# Patient Record
Sex: FEMALE | Race: WHITE | NOT HISPANIC OR LATINO | Employment: OTHER | URBAN - METROPOLITAN AREA
[De-identification: names, ages, dates, MRNs, and addresses within clinical notes are randomized per-mention and may not be internally consistent; named-entity substitution may affect disease eponyms.]

---

## 2017-07-12 ENCOUNTER — LAB REQUISITION (OUTPATIENT)
Dept: LAB | Facility: HOSPITAL | Age: 67
End: 2017-07-12
Payer: COMMERCIAL

## 2017-07-12 DIAGNOSIS — Z01.419 ENCOUNTER FOR GYNECOLOGICAL EXAMINATION WITHOUT ABNORMAL FINDING: ICD-10-CM

## 2017-07-12 PROCEDURE — G0145 SCR C/V CYTO,THINLAYER,RESCR: HCPCS | Performed by: OBSTETRICS & GYNECOLOGY

## 2017-07-20 LAB
LAB AP GYN PRIMARY INTERPRETATION: NORMAL
Lab: NORMAL

## 2017-08-04 ENCOUNTER — ALLSCRIPTS OFFICE VISIT (OUTPATIENT)
Dept: OTHER | Facility: OTHER | Age: 67
End: 2017-08-04

## 2017-09-18 ENCOUNTER — ALLSCRIPTS OFFICE VISIT (OUTPATIENT)
Dept: OTHER | Facility: OTHER | Age: 67
End: 2017-09-18

## 2018-01-10 NOTE — PROGRESS NOTES
Chief Complaint  Patient here for high dose flu shot      Active Problems    1  Acute frontal sinusitis (461 1) (J01 10)   2  Need for prophylactic vaccination and inoculation against influenza (V04 81) (Z23)   3  Need for vaccination with 13-polyvalent pneumococcal conjugate vaccine (V03 82) (Z23)    Current Meds   1  Probiotic CAPS; TAKE 1 CAPSULE BY MOUTH WITH LUNCH - GI HEALTH; Therapy: (Recorded:[id number]) to Recorded    Allergies    1   Codeine Derivatives    Plan  Need for prophylactic vaccination and inoculation against influenza    · Fluzone High-Dose 0 5 ML Intramuscular Suspension Prefilled Syringe    Signatures   Electronically signed by : Clif Castro DO; Sep 18 2017 12:05PM EST                       (Author)

## 2018-01-14 VITALS
WEIGHT: 206.25 LBS | HEIGHT: 60 IN | RESPIRATION RATE: 16 BRPM | TEMPERATURE: 101 F | BODY MASS INDEX: 40.49 KG/M2 | SYSTOLIC BLOOD PRESSURE: 124 MMHG | HEART RATE: 92 BPM | DIASTOLIC BLOOD PRESSURE: 90 MMHG

## 2018-07-11 ENCOUNTER — OFFICE VISIT (OUTPATIENT)
Dept: FAMILY MEDICINE CLINIC | Facility: CLINIC | Age: 68
End: 2018-07-11
Payer: COMMERCIAL

## 2018-07-11 VITALS
DIASTOLIC BLOOD PRESSURE: 74 MMHG | HEIGHT: 60 IN | BODY MASS INDEX: 40.37 KG/M2 | HEART RATE: 68 BPM | WEIGHT: 205.6 LBS | SYSTOLIC BLOOD PRESSURE: 110 MMHG | RESPIRATION RATE: 16 BRPM

## 2018-07-11 DIAGNOSIS — Z23 ENCOUNTER FOR IMMUNIZATION: ICD-10-CM

## 2018-07-11 DIAGNOSIS — Z12.11 COLON CANCER SCREENING: ICD-10-CM

## 2018-07-11 DIAGNOSIS — Z12.39 SCREENING FOR MALIGNANT NEOPLASM OF BREAST: ICD-10-CM

## 2018-07-11 DIAGNOSIS — Z00.00 WELLNESS EXAMINATION: Primary | ICD-10-CM

## 2018-07-11 DIAGNOSIS — Z13.1 SCREENING FOR DIABETES MELLITUS (DM): ICD-10-CM

## 2018-07-11 PROCEDURE — 90471 IMMUNIZATION ADMIN: CPT

## 2018-07-11 PROCEDURE — 90732 PPSV23 VACC 2 YRS+ SUBQ/IM: CPT

## 2018-07-11 PROCEDURE — 99397 PER PM REEVAL EST PAT 65+ YR: CPT | Performed by: FAMILY MEDICINE

## 2018-07-11 NOTE — PATIENT INSTRUCTIONS
Thank you for enrolling in [facility]  Please follow the instructions below to securely access your online medical record  Originalhart allows you to send messages to your doctor, view your test results, renew your prescriptions, schedule appointments, and more  [address] uses Single Sign on (SSO) Technology for you to log in and access our [facility]  Angel[place], including [employer]  No more remembering multiple user names and passwords! We are going to guide you through, step by step, to help you set up your 5 Minutes account which will provide access to your Originalhart account  How Do I Sign Up? 1  In your Internet browser, go to [email or web address]       2  Click on the   [employer] patient account and then click Dont have an                 Account? Create one now      3  Enter your demographic information and chose a user name (email address) and password  Think of one that is secure and easy to remember  Enter a Referral code if you have one (this is not your MyChart code ) Accept the Terms and Conditions and the Privacy Policy  4  Select your security questions that you will use to reset your password should you forget it  Click Submit  5  Enter your Roposot Activation Code exactly as it appears below  You will not need to use this code after you have completed the sign-up process  If you do not sign up before the expiration date, you must request a new code  [employer] Activation Code: [email or web address]  Expires: 7/25/2018  8:40 AM    6  Confirm your email address  An email confirmation was sent to you  Please open that email and click Confirm your Email   You should then be redirected to our 5 Minutes Single sign on page, where you will log on with the user name and password you created! Proceed to the [employer] Icon to view your personal health information          Additional Information  If you have questions, you can e-mail patient  [email or web address] or call [phone] to talk to our customer support staff  Remember, MyChart is NOT to be used for urgent needs  For medical emergencies, dial 911

## 2018-07-11 NOTE — PROGRESS NOTES
Assessment/Plan:    No problem-specific Assessment & Plan notes found for this encounter  Diagnoses and all orders for this visit:    Wellness examination  Comments:  Healthy on exam   Continue regular walking for exercise  Continue f/u with GYN  FBW ordered  Pneumovax given IM, and tolerated well  Orders:  -     Ambulatory referral to Gastroenterology; Future    Screening for diabetes mellitus (DM)  -     CBC and differential; Future  -     Comprehensive metabolic panel; Future  -     Lipid panel; Future  -     PNEUMOCOCCAL POLYSACCHARIDE VACCINE 23-VALENT =>3YO SQ IM (Pneumovax)    Screening for malignant neoplasm of breast    Encounter for immunization  -     PNEUMOCOCCAL POLYSACCHARIDE VACCINE 23-VALENT =>3YO SQ IM (Pneumovax)    Colon cancer screening  Comments:  Pt referred locally to GI  Orders:  -     Ambulatory referral to Gastroenterology; Future    Other orders  -     Probiotic Product (PROBIOTIC-10) CAPS; Take 1 capsule by mouth          Subjective:      Patient ID: Laila Gasca is a 69 y o  female  Annual Wellness exam today  Pt has GYN visits and mammogram with Dr Sol Cadet of Gynecology  Needs colon cancer screening  Walks for exercise  Immunizations reviewed; needs Pneumovax, and she will check with her insurance on coverage for Zostavax  Is seeing the Dentist         The following portions of the patient's history were reviewed and updated as appropriate: allergies, current medications, past family history, past social history, past surgical history and problem list     No past medical history on file        Current Outpatient Prescriptions:     Probiotic Product (PROBIOTIC-10) CAPS, Take 1 capsule by mouth, Disp: , Rfl:     Allergies   Allergen Reactions    Codeine Anaphylaxis     Increased Heart Rate, Palpitations     Social History   Substance Use Topics    Smoking status: Never Smoker    Smokeless tobacco: Not on file    Alcohol use No         Review of Systems Constitutional: Negative for activity change  Respiratory: Negative for shortness of breath  Cardiovascular: Negative for chest pain  Gastrointestinal: Negative for abdominal pain and blood in stool  Genitourinary: Negative for vaginal bleeding  No new breast lumps on self-examination  Psychiatric/Behavioral: Negative for dysphoric mood  The patient is not nervous/anxious  Objective:      /74 (BP Location: Left arm, Patient Position: Sitting, Cuff Size: Large)   Pulse 68   Resp 16   Ht 5' 0 08" (1 526 m)   Wt 93 3 kg (205 lb 9 6 oz)   BMI 40 05 kg/m²          Physical Exam   Constitutional: She is oriented to person, place, and time  She appears well-developed and well-nourished  No distress  HENT:   Head: Normocephalic and atraumatic  Right Ear: Hearing, tympanic membrane, external ear and ear canal normal    Left Ear: Hearing, tympanic membrane, external ear and ear canal normal    Mouth/Throat: Oropharynx is clear and moist  No oropharyngeal exudate  Neck: Normal range of motion  Neck supple  No thyromegaly present  Cardiovascular: Normal rate, regular rhythm and normal heart sounds  Exam reveals no gallop and no friction rub  No murmur heard  Pulmonary/Chest: Effort normal and breath sounds normal  No respiratory distress  She has no wheezes  She has no rales  Abdominal: Soft  Bowel sounds are normal  She exhibits no distension and no mass  There is no tenderness  There is no rebound and no guarding  Lymphadenopathy:     She has no cervical adenopathy  Neurological: She is alert and oriented to person, place, and time  Skin: She is not diaphoretic  Psychiatric: She has a normal mood and affect  Her behavior is normal  Judgment and thought content normal    Nursing note and vitals reviewed

## 2018-07-12 ENCOUNTER — TELEPHONE (OUTPATIENT)
Dept: FAMILY MEDICINE CLINIC | Facility: CLINIC | Age: 68
End: 2018-07-12

## 2018-10-03 ENCOUNTER — LAB (OUTPATIENT)
Dept: LAB | Facility: CLINIC | Age: 68
End: 2018-10-03
Payer: COMMERCIAL

## 2018-10-03 ENCOUNTER — OFFICE VISIT (OUTPATIENT)
Dept: FAMILY MEDICINE CLINIC | Facility: CLINIC | Age: 68
End: 2018-10-03
Payer: COMMERCIAL

## 2018-10-03 VITALS
BODY MASS INDEX: 40.56 KG/M2 | SYSTOLIC BLOOD PRESSURE: 120 MMHG | TEMPERATURE: 98.5 F | HEART RATE: 70 BPM | DIASTOLIC BLOOD PRESSURE: 82 MMHG | OXYGEN SATURATION: 98 % | WEIGHT: 206.6 LBS | HEIGHT: 60 IN | RESPIRATION RATE: 16 BRPM

## 2018-10-03 DIAGNOSIS — R07.89 ATYPICAL CHEST PAIN: ICD-10-CM

## 2018-10-03 DIAGNOSIS — R06.83 SNORING: Chronic | ICD-10-CM

## 2018-10-03 DIAGNOSIS — R53.83 FATIGUE, UNSPECIFIED TYPE: ICD-10-CM

## 2018-10-03 DIAGNOSIS — R53.83 FATIGUE, UNSPECIFIED TYPE: Primary | Chronic | ICD-10-CM

## 2018-10-03 LAB
ALBUMIN SERPL BCP-MCNC: 3.7 G/DL (ref 3.5–5)
ALP SERPL-CCNC: 93 U/L (ref 46–116)
ALT SERPL W P-5'-P-CCNC: 21 U/L (ref 12–78)
ANION GAP SERPL CALCULATED.3IONS-SCNC: 6 MMOL/L (ref 4–13)
AST SERPL W P-5'-P-CCNC: 23 U/L (ref 5–45)
BASOPHILS # BLD AUTO: 0.11 THOUSANDS/ΜL (ref 0–0.1)
BASOPHILS NFR BLD AUTO: 1 % (ref 0–1)
BILIRUB SERPL-MCNC: 0.45 MG/DL (ref 0.2–1)
BUN SERPL-MCNC: 17 MG/DL (ref 5–25)
CALCIUM SERPL-MCNC: 9.4 MG/DL (ref 8.3–10.1)
CHLORIDE SERPL-SCNC: 103 MMOL/L (ref 100–108)
CHOLEST SERPL-MCNC: 215 MG/DL (ref 50–200)
CO2 SERPL-SCNC: 28 MMOL/L (ref 21–32)
CREAT SERPL-MCNC: 0.95 MG/DL (ref 0.6–1.3)
EOSINOPHIL # BLD AUTO: 0.23 THOUSAND/ΜL (ref 0–0.61)
EOSINOPHIL NFR BLD AUTO: 3 % (ref 0–6)
ERYTHROCYTE [DISTWIDTH] IN BLOOD BY AUTOMATED COUNT: 13.9 % (ref 11.6–15.1)
GFR SERPL CREATININE-BSD FRML MDRD: 61 ML/MIN/1.73SQ M
GLUCOSE P FAST SERPL-MCNC: 98 MG/DL (ref 65–99)
HCT VFR BLD AUTO: 47.7 % (ref 34.8–46.1)
HDLC SERPL-MCNC: 41 MG/DL (ref 40–60)
HGB BLD-MCNC: 15.2 G/DL (ref 11.5–15.4)
IMM GRANULOCYTES # BLD AUTO: 0.02 THOUSAND/UL (ref 0–0.2)
IMM GRANULOCYTES NFR BLD AUTO: 0 % (ref 0–2)
LDLC SERPL CALC-MCNC: 143 MG/DL (ref 0–100)
LYMPHOCYTES # BLD AUTO: 1.98 THOUSANDS/ΜL (ref 0.6–4.47)
LYMPHOCYTES NFR BLD AUTO: 26 % (ref 14–44)
MCH RBC QN AUTO: 29.6 PG (ref 26.8–34.3)
MCHC RBC AUTO-ENTMCNC: 31.9 G/DL (ref 31.4–37.4)
MCV RBC AUTO: 93 FL (ref 82–98)
MONOCYTES # BLD AUTO: 0.71 THOUSAND/ΜL (ref 0.17–1.22)
MONOCYTES NFR BLD AUTO: 9 % (ref 4–12)
NEUTROPHILS # BLD AUTO: 4.55 THOUSANDS/ΜL (ref 1.85–7.62)
NEUTS SEG NFR BLD AUTO: 61 % (ref 43–75)
NRBC BLD AUTO-RTO: 0 /100 WBCS
PLATELET # BLD AUTO: 294 THOUSANDS/UL (ref 149–390)
PMV BLD AUTO: 10.4 FL (ref 8.9–12.7)
POTASSIUM SERPL-SCNC: 4.8 MMOL/L (ref 3.5–5.3)
PROT SERPL-MCNC: 8.4 G/DL (ref 6.4–8.2)
RBC # BLD AUTO: 5.14 MILLION/UL (ref 3.81–5.12)
SODIUM SERPL-SCNC: 137 MMOL/L (ref 136–145)
T4 FREE SERPL-MCNC: 0.75 NG/DL (ref 0.76–1.46)
TRIGL SERPL-MCNC: 153 MG/DL
TSH SERPL DL<=0.05 MIU/L-ACNC: 7.54 UIU/ML (ref 0.36–3.74)
WBC # BLD AUTO: 7.6 THOUSAND/UL (ref 4.31–10.16)

## 2018-10-03 PROCEDURE — 85025 COMPLETE CBC W/AUTO DIFF WBC: CPT

## 2018-10-03 PROCEDURE — 93000 ELECTROCARDIOGRAM COMPLETE: CPT | Performed by: FAMILY MEDICINE

## 2018-10-03 PROCEDURE — 80053 COMPREHEN METABOLIC PANEL: CPT

## 2018-10-03 PROCEDURE — 1160F RVW MEDS BY RX/DR IN RCRD: CPT | Performed by: FAMILY MEDICINE

## 2018-10-03 PROCEDURE — 36415 COLL VENOUS BLD VENIPUNCTURE: CPT

## 2018-10-03 PROCEDURE — 80061 LIPID PANEL: CPT

## 2018-10-03 PROCEDURE — 84443 ASSAY THYROID STIM HORMONE: CPT

## 2018-10-03 PROCEDURE — 84439 ASSAY OF FREE THYROXINE: CPT

## 2018-10-03 PROCEDURE — 99214 OFFICE O/P EST MOD 30 MIN: CPT | Performed by: FAMILY MEDICINE

## 2018-10-03 NOTE — PROGRESS NOTES
Assessment/Plan:    No problem-specific Assessment & Plan notes found for this encounter  Diagnoses and all orders for this visit:    Fatigue, unspecified type  Comments:  Unclear etiology - ?possible BERTIN? Non-fasting labs (with TSH and CBC incl) ordered, and pt referred locally to Sleep Medicine  Orders:  -     POCT ECG  -     Comprehensive metabolic panel; Future  -     CBC and differential; Future  -     Lipid Panel with Direct LDL reflex; Future  -     TSH, 3rd generation with Free T4 reflex; Future    Snoring  Comments:  As above  Orders:  -     Ambulatory referral to Sleep Medicine; Future    Atypical chest pain  Comments:  Suspect NON-cardiac in nature - ECG reassuring today  Possible GERD sx - pt has had tea in the afternoons recently; discussed  OTC Zantac PRN  Orders:  -     POCT ECG  -     Comprehensive metabolic panel; Future  -     CBC and differential; Future  -     Lipid Panel with Direct LDL reflex; Future  -     TSH, 3rd generation with Free T4 reflex; Future          Subjective:      Patient ID: Maryse Myles is a 69 y o  female  Presents with 6 months of persistent fatigue (pt has not yet done previously ordered FBW)  Has from beginning of day to the end  No CP/SOB  Had discomfort in chest laying in bed only last night - no clear heartburn  ECG done today, and reassuring, with no real changes from 05/10/2015 ECG  Fatigue   Associated symptoms include coughing and fatigue  Pertinent negatives include no chest pain or fever  The following portions of the patient's history were reviewed and updated as appropriate: allergies, current medications, past family history, past social history, past surgical history and problem list     No past medical history on file        Current Outpatient Prescriptions:     Probiotic Product (PROBIOTIC-10) CAPS, Take 1 capsule by mouth, Disp: , Rfl:     Allergies   Allergen Reactions    Codeine Anaphylaxis     Increased Heart Rate, Palpitations     No family history on file  Social History   Substance Use Topics    Smoking status: Never Smoker    Smokeless tobacco: Not on file    Alcohol use No       Review of Systems   Constitutional: Positive for fatigue  Negative for activity change and fever  HENT: Positive for postnasal drip  Had nasal congestion and pnd last week  Respiratory: Positive for cough  Negative for shortness of breath  Cardiovascular: Negative for chest pain  Gastrointestinal: Negative for blood in stool  No heartburn  Genitourinary: Negative for vaginal bleeding  Objective:      /82 (BP Location: Right arm, Patient Position: Sitting, Cuff Size: Large)   Pulse 70   Temp 98 5 °F (36 9 °C) (Tympanic)   Resp 16   Ht 5' (1 524 m)   Wt 93 7 kg (206 lb 9 6 oz)   SpO2 98%   BMI 40 35 kg/m²          Physical Exam   Constitutional: She is oriented to person, place, and time  She appears well-developed and well-nourished  No distress  HENT:   Head: Normocephalic and atraumatic  Right Ear: Hearing, tympanic membrane, external ear and ear canal normal    Left Ear: Hearing, tympanic membrane, external ear and ear canal normal    Mouth/Throat: Oropharynx is clear and moist  No oropharyngeal exudate  Eyes: Conjunctivae are normal    Neck: Normal range of motion  Neck supple  No thyromegaly present  Cardiovascular: Normal rate, regular rhythm and normal heart sounds  Exam reveals no gallop and no friction rub  No murmur heard  Pulmonary/Chest: Effort normal and breath sounds normal  No respiratory distress  She has no wheezes  She has no rales  Abdominal: Soft  Bowel sounds are normal  She exhibits no distension and no mass  There is no tenderness  There is no rebound and no guarding  Lymphadenopathy:     She has no cervical adenopathy  Neurological: She is alert and oriented to person, place, and time  Skin: She is not diaphoretic     Psychiatric: She has a normal mood and affect  Her behavior is normal  Judgment and thought content normal    Nursing note and vitals reviewed

## 2018-10-04 ENCOUNTER — TRANSCRIBE ORDERS (OUTPATIENT)
Dept: SLEEP CENTER | Facility: CLINIC | Age: 68
End: 2018-10-04

## 2018-10-04 ENCOUNTER — TELEPHONE (OUTPATIENT)
Dept: FAMILY MEDICINE CLINIC | Facility: CLINIC | Age: 68
End: 2018-10-04

## 2018-10-04 DIAGNOSIS — R53.83 FATIGUE, UNSPECIFIED TYPE: ICD-10-CM

## 2018-10-04 DIAGNOSIS — E03.8 SUBCLINICAL HYPOTHYROIDISM: Primary | ICD-10-CM

## 2018-10-05 ENCOUNTER — APPOINTMENT (OUTPATIENT)
Dept: LAB | Facility: CLINIC | Age: 68
End: 2018-10-05
Payer: COMMERCIAL

## 2018-10-05 DIAGNOSIS — E03.8 SUBCLINICAL HYPOTHYROIDISM: ICD-10-CM

## 2018-10-05 DIAGNOSIS — R53.83 FATIGUE, UNSPECIFIED TYPE: ICD-10-CM

## 2018-10-05 LAB
T4 FREE SERPL-MCNC: 0.78 NG/DL (ref 0.76–1.46)
TSH SERPL DL<=0.05 MIU/L-ACNC: 7.94 UIU/ML (ref 0.36–3.74)

## 2018-10-05 PROCEDURE — 84439 ASSAY OF FREE THYROXINE: CPT

## 2018-10-05 PROCEDURE — 86376 MICROSOMAL ANTIBODY EACH: CPT

## 2018-10-05 PROCEDURE — 86800 THYROGLOBULIN ANTIBODY: CPT

## 2018-10-05 PROCEDURE — 84443 ASSAY THYROID STIM HORMONE: CPT

## 2018-10-05 PROCEDURE — 36415 COLL VENOUS BLD VENIPUNCTURE: CPT

## 2018-10-06 ENCOUNTER — IMMUNIZATION (OUTPATIENT)
Dept: FAMILY MEDICINE CLINIC | Facility: CLINIC | Age: 68
End: 2018-10-06
Payer: COMMERCIAL

## 2018-10-06 DIAGNOSIS — E06.3 HYPOTHYROIDISM DUE TO HASHIMOTO'S THYROIDITIS: Primary | ICD-10-CM

## 2018-10-06 DIAGNOSIS — Z23 ENCOUNTER FOR IMMUNIZATION: ICD-10-CM

## 2018-10-06 DIAGNOSIS — E03.8 HYPOTHYROIDISM DUE TO HASHIMOTO'S THYROIDITIS: Primary | ICD-10-CM

## 2018-10-06 LAB
THYROGLOB AB SERPL-ACNC: <1 IU/ML (ref 0–0.9)
THYROPEROXIDASE AB SERPL-ACNC: 268 IU/ML (ref 0–34)

## 2018-10-06 PROCEDURE — 90471 IMMUNIZATION ADMIN: CPT

## 2018-10-06 PROCEDURE — 90662 IIV NO PRSV INCREASED AG IM: CPT

## 2018-10-06 RX ORDER — LEVOTHYROXINE SODIUM 0.03 MG/1
25 TABLET ORAL DAILY
Qty: 30 TABLET | Refills: 3 | Status: SHIPPED | OUTPATIENT
Start: 2018-10-06 | End: 2018-11-24 | Stop reason: DRUGHIGH

## 2018-11-06 ENCOUNTER — TELEPHONE (OUTPATIENT)
Dept: FAMILY MEDICINE CLINIC | Facility: CLINIC | Age: 68
End: 2018-11-06

## 2018-11-23 ENCOUNTER — APPOINTMENT (OUTPATIENT)
Dept: LAB | Facility: CLINIC | Age: 68
End: 2018-11-23
Payer: COMMERCIAL

## 2018-11-23 DIAGNOSIS — E03.8 HYPOTHYROIDISM DUE TO HASHIMOTO'S THYROIDITIS: ICD-10-CM

## 2018-11-23 DIAGNOSIS — E06.3 HYPOTHYROIDISM DUE TO HASHIMOTO'S THYROIDITIS: ICD-10-CM

## 2018-11-23 LAB
T4 FREE SERPL-MCNC: 0.87 NG/DL (ref 0.76–1.46)
TSH SERPL DL<=0.05 MIU/L-ACNC: 7.47 UIU/ML (ref 0.36–3.74)

## 2018-11-23 PROCEDURE — 84439 ASSAY OF FREE THYROXINE: CPT

## 2018-11-23 PROCEDURE — 84443 ASSAY THYROID STIM HORMONE: CPT

## 2018-11-23 PROCEDURE — 36415 COLL VENOUS BLD VENIPUNCTURE: CPT

## 2018-11-24 DIAGNOSIS — E06.3 HYPOTHYROIDISM DUE TO HASHIMOTO'S THYROIDITIS: Primary | Chronic | ICD-10-CM

## 2018-11-24 DIAGNOSIS — E03.8 HYPOTHYROIDISM DUE TO HASHIMOTO'S THYROIDITIS: Primary | Chronic | ICD-10-CM

## 2018-11-24 RX ORDER — LEVOTHYROXINE SODIUM 0.05 MG/1
50 TABLET ORAL DAILY
Qty: 30 TABLET | Refills: 5 | Status: SHIPPED | OUTPATIENT
Start: 2018-11-24 | End: 2019-05-23 | Stop reason: SDUPTHER

## 2018-11-26 ENCOUNTER — TELEPHONE (OUTPATIENT)
Dept: FAMILY MEDICINE CLINIC | Facility: CLINIC | Age: 68
End: 2018-11-26

## 2018-11-26 NOTE — TELEPHONE ENCOUNTER
----- Message from Andrew Herbert DO sent at 11/24/2018  1:16 PM EST -----  Please call the patient regarding her abnormal result - Her thyroid levels are slightly improved, but she does need a higher dose of Levothyroxine -> I increased to 50 mcg QAM, and sent to her pharmacy  She is to recheck non-fasting thyroid levels in 2 months, and f/u around then  Thanks  Jovanna

## 2019-02-06 ENCOUNTER — TELEPHONE (OUTPATIENT)
Dept: SLEEP CENTER | Facility: CLINIC | Age: 69
End: 2019-02-06

## 2019-02-15 ENCOUNTER — LAB (OUTPATIENT)
Dept: LAB | Facility: CLINIC | Age: 69
End: 2019-02-15
Payer: COMMERCIAL

## 2019-02-15 DIAGNOSIS — E03.8 HYPOTHYROIDISM DUE TO HASHIMOTO'S THYROIDITIS: Chronic | ICD-10-CM

## 2019-02-15 DIAGNOSIS — E06.3 HYPOTHYROIDISM DUE TO HASHIMOTO'S THYROIDITIS: Chronic | ICD-10-CM

## 2019-02-15 LAB — TSH SERPL DL<=0.05 MIU/L-ACNC: 2.7 UIU/ML (ref 0.36–3.74)

## 2019-02-15 PROCEDURE — 36415 COLL VENOUS BLD VENIPUNCTURE: CPT

## 2019-02-15 PROCEDURE — 84443 ASSAY THYROID STIM HORMONE: CPT

## 2019-02-16 NOTE — RESULT ENCOUNTER NOTE
Please let the patient know that their bloodwork was normal this time around; her thyroid levels normalized on current dose of Levothyroxine! I would not change her dose any further at this time  Thanks     Dr Kendall Suazo

## 2019-04-24 ENCOUNTER — OFFICE VISIT (OUTPATIENT)
Dept: FAMILY MEDICINE CLINIC | Facility: CLINIC | Age: 69
End: 2019-04-24
Payer: COMMERCIAL

## 2019-04-24 VITALS
OXYGEN SATURATION: 96 % | HEIGHT: 60 IN | TEMPERATURE: 98.1 F | WEIGHT: 204.4 LBS | SYSTOLIC BLOOD PRESSURE: 122 MMHG | HEART RATE: 81 BPM | RESPIRATION RATE: 18 BRPM | BODY MASS INDEX: 40.13 KG/M2 | DIASTOLIC BLOOD PRESSURE: 74 MMHG

## 2019-04-24 DIAGNOSIS — J20.9 ACUTE BRONCHITIS, UNSPECIFIED ORGANISM: Primary | ICD-10-CM

## 2019-04-24 DIAGNOSIS — Z12.11 COLON CANCER SCREENING: ICD-10-CM

## 2019-04-24 PROCEDURE — 99214 OFFICE O/P EST MOD 30 MIN: CPT | Performed by: FAMILY MEDICINE

## 2019-04-24 RX ORDER — ALBUTEROL SULFATE 90 UG/1
2 AEROSOL, METERED RESPIRATORY (INHALATION) EVERY 6 HOURS PRN
Qty: 1 INHALER | Refills: 1 | Status: SHIPPED | OUTPATIENT
Start: 2019-04-24 | End: 2019-05-24

## 2019-04-24 RX ORDER — AZITHROMYCIN 250 MG/1
TABLET, FILM COATED ORAL
Qty: 6 TABLET | Refills: 0 | Status: SHIPPED | OUTPATIENT
Start: 2019-04-24 | End: 2019-04-29

## 2019-04-24 RX ORDER — PREDNISONE 10 MG/1
TABLET ORAL
Qty: 30 TABLET | Refills: 0 | Status: SHIPPED | OUTPATIENT
Start: 2019-04-24 | End: 2019-05-06

## 2019-05-06 ENCOUNTER — OFFICE VISIT (OUTPATIENT)
Dept: FAMILY MEDICINE CLINIC | Facility: CLINIC | Age: 69
End: 2019-05-06
Payer: COMMERCIAL

## 2019-05-06 VITALS
SYSTOLIC BLOOD PRESSURE: 116 MMHG | OXYGEN SATURATION: 98 % | HEIGHT: 60 IN | RESPIRATION RATE: 16 BRPM | WEIGHT: 208.2 LBS | DIASTOLIC BLOOD PRESSURE: 72 MMHG | TEMPERATURE: 99.1 F | HEART RATE: 88 BPM | BODY MASS INDEX: 40.88 KG/M2

## 2019-05-06 DIAGNOSIS — J20.9 ACUTE BRONCHITIS, UNSPECIFIED ORGANISM: Primary | ICD-10-CM

## 2019-05-06 PROCEDURE — 99213 OFFICE O/P EST LOW 20 MIN: CPT | Performed by: FAMILY MEDICINE

## 2019-05-13 ENCOUNTER — PROCEDURE VISIT (OUTPATIENT)
Dept: FAMILY MEDICINE CLINIC | Facility: CLINIC | Age: 69
End: 2019-05-13
Payer: COMMERCIAL

## 2019-05-13 VITALS
SYSTOLIC BLOOD PRESSURE: 130 MMHG | OXYGEN SATURATION: 97 % | WEIGHT: 206.6 LBS | RESPIRATION RATE: 12 BRPM | HEART RATE: 98 BPM | DIASTOLIC BLOOD PRESSURE: 70 MMHG | BODY MASS INDEX: 39.01 KG/M2 | HEIGHT: 61 IN

## 2019-05-13 DIAGNOSIS — H61.23 BILATERAL IMPACTED CERUMEN: Primary | ICD-10-CM

## 2019-05-13 PROCEDURE — 99213 OFFICE O/P EST LOW 20 MIN: CPT | Performed by: FAMILY MEDICINE

## 2019-05-13 PROCEDURE — 69209 REMOVE IMPACTED EAR WAX UNI: CPT | Performed by: FAMILY MEDICINE

## 2019-05-23 DIAGNOSIS — E06.3 HYPOTHYROIDISM DUE TO HASHIMOTO'S THYROIDITIS: Chronic | ICD-10-CM

## 2019-05-23 DIAGNOSIS — E03.8 HYPOTHYROIDISM DUE TO HASHIMOTO'S THYROIDITIS: Chronic | ICD-10-CM

## 2019-05-23 RX ORDER — LEVOTHYROXINE SODIUM 0.05 MG/1
TABLET ORAL
Qty: 30 TABLET | Refills: 5 | Status: SHIPPED | OUTPATIENT
Start: 2019-05-23 | End: 2019-11-26 | Stop reason: SDUPTHER

## 2019-05-24 ENCOUNTER — HOSPITAL ENCOUNTER (INPATIENT)
Facility: HOSPITAL | Age: 69
LOS: 4 days | Discharge: HOME | DRG: 392 | End: 2019-05-28
Attending: EMERGENCY MEDICINE | Admitting: SURGERY
Payer: COMMERCIAL

## 2019-05-24 ENCOUNTER — APPOINTMENT (EMERGENCY)
Dept: CT IMAGING | Facility: HOSPITAL | Age: 69
DRG: 392 | End: 2019-05-24
Payer: COMMERCIAL

## 2019-05-24 DIAGNOSIS — E03.8 HYPOTHYROIDISM DUE TO HASHIMOTO'S THYROIDITIS: Chronic | ICD-10-CM

## 2019-05-24 DIAGNOSIS — E06.3 HYPOTHYROIDISM DUE TO HASHIMOTO'S THYROIDITIS: Chronic | ICD-10-CM

## 2019-05-24 DIAGNOSIS — K57.20 DIVERTICULITIS OF COLON WITH PERFORATION: Primary | ICD-10-CM

## 2019-05-24 PROBLEM — E66.9 OBESITY (BMI 35.0-39.9 WITHOUT COMORBIDITY): Status: ACTIVE | Noted: 2019-05-24

## 2019-05-24 LAB
ALBUMIN SERPL BCP-MCNC: 3.7 G/DL (ref 3.5–5)
ALP SERPL-CCNC: 94 U/L (ref 46–116)
ALT SERPL W P-5'-P-CCNC: 23 U/L (ref 12–78)
ANION GAP SERPL CALCULATED.3IONS-SCNC: 12 MMOL/L (ref 4–13)
APTT PPP: 31 SECONDS (ref 26–38)
AST SERPL W P-5'-P-CCNC: 27 U/L (ref 5–45)
BASOPHILS # BLD AUTO: 0.08 THOUSANDS/ΜL (ref 0–0.1)
BASOPHILS NFR BLD AUTO: 1 % (ref 0–1)
BILIRUB SERPL-MCNC: 0.6 MG/DL (ref 0.2–1)
BILIRUB UR QL STRIP: NEGATIVE
BUN SERPL-MCNC: 14 MG/DL (ref 5–25)
CALCIUM SERPL-MCNC: 9.2 MG/DL (ref 8.3–10.1)
CHLORIDE SERPL-SCNC: 102 MMOL/L (ref 100–108)
CLARITY UR: CLEAR
CO2 SERPL-SCNC: 25 MMOL/L (ref 21–32)
COLOR UR: YELLOW
CREAT SERPL-MCNC: 0.95 MG/DL (ref 0.6–1.3)
EOSINOPHIL # BLD AUTO: 0.13 THOUSAND/ΜL (ref 0–0.61)
EOSINOPHIL NFR BLD AUTO: 1 % (ref 0–6)
ERYTHROCYTE [DISTWIDTH] IN BLOOD BY AUTOMATED COUNT: 14 % (ref 11.6–15.1)
GFR SERPL CREATININE-BSD FRML MDRD: 61 ML/MIN/1.73SQ M
GLUCOSE SERPL-MCNC: 117 MG/DL (ref 65–140)
GLUCOSE UR STRIP-MCNC: NEGATIVE MG/DL
HCT VFR BLD AUTO: 44.1 % (ref 34.8–46.1)
HGB BLD-MCNC: 14.3 G/DL (ref 11.5–15.4)
HGB UR QL STRIP.AUTO: NEGATIVE
IMM GRANULOCYTES # BLD AUTO: 0.07 THOUSAND/UL (ref 0–0.2)
IMM GRANULOCYTES NFR BLD AUTO: 1 % (ref 0–2)
INR PPP: 1.01 (ref 0.86–1.17)
KETONES UR STRIP-MCNC: NEGATIVE MG/DL
LACTATE SERPL-SCNC: 1.2 MMOL/L (ref 0.5–2)
LACTATE SERPL-SCNC: 1.5 MMOL/L (ref 0.5–2)
LEUKOCYTE ESTERASE UR QL STRIP: NEGATIVE
LYMPHOCYTES # BLD AUTO: 0.87 THOUSANDS/ΜL (ref 0.6–4.47)
LYMPHOCYTES NFR BLD AUTO: 7 % (ref 14–44)
MCH RBC QN AUTO: 29.3 PG (ref 26.8–34.3)
MCHC RBC AUTO-ENTMCNC: 32.4 G/DL (ref 31.4–37.4)
MCV RBC AUTO: 90 FL (ref 82–98)
MONOCYTES # BLD AUTO: 0.87 THOUSAND/ΜL (ref 0.17–1.22)
MONOCYTES NFR BLD AUTO: 7 % (ref 4–12)
NEUTROPHILS # BLD AUTO: 10.81 THOUSANDS/ΜL (ref 1.85–7.62)
NEUTS SEG NFR BLD AUTO: 83 % (ref 43–75)
NITRITE UR QL STRIP: NEGATIVE
NRBC BLD AUTO-RTO: 0 /100 WBCS
PH UR STRIP.AUTO: 7.5 [PH]
PLATELET # BLD AUTO: 273 THOUSANDS/UL (ref 149–390)
PMV BLD AUTO: 10 FL (ref 8.9–12.7)
POTASSIUM SERPL-SCNC: 4 MMOL/L (ref 3.5–5.3)
PROCALCITONIN SERPL-MCNC: 0.05 NG/ML
PROT SERPL-MCNC: 7.9 G/DL (ref 6.4–8.2)
PROT UR STRIP-MCNC: NEGATIVE MG/DL
PROTHROMBIN TIME: 13 SECONDS (ref 11.8–14.2)
RBC # BLD AUTO: 4.88 MILLION/UL (ref 3.81–5.12)
SODIUM SERPL-SCNC: 139 MMOL/L (ref 136–145)
SP GR UR STRIP.AUTO: 1.02 (ref 1–1.03)
UROBILINOGEN UR QL STRIP.AUTO: 0.2 E.U./DL
WBC # BLD AUTO: 12.83 THOUSAND/UL (ref 4.31–10.16)

## 2019-05-24 PROCEDURE — 85025 COMPLETE CBC W/AUTO DIFF WBC: CPT | Performed by: EMERGENCY MEDICINE

## 2019-05-24 PROCEDURE — 87040 BLOOD CULTURE FOR BACTERIA: CPT | Performed by: EMERGENCY MEDICINE

## 2019-05-24 PROCEDURE — 85610 PROTHROMBIN TIME: CPT | Performed by: EMERGENCY MEDICINE

## 2019-05-24 PROCEDURE — 81003 URINALYSIS AUTO W/O SCOPE: CPT | Performed by: EMERGENCY MEDICINE

## 2019-05-24 PROCEDURE — 74177 CT ABD & PELVIS W/CONTRAST: CPT

## 2019-05-24 PROCEDURE — 99285 EMERGENCY DEPT VISIT HI MDM: CPT

## 2019-05-24 PROCEDURE — 84145 PROCALCITONIN (PCT): CPT | Performed by: EMERGENCY MEDICINE

## 2019-05-24 PROCEDURE — 36415 COLL VENOUS BLD VENIPUNCTURE: CPT | Performed by: EMERGENCY MEDICINE

## 2019-05-24 PROCEDURE — 83605 ASSAY OF LACTIC ACID: CPT | Performed by: EMERGENCY MEDICINE

## 2019-05-24 PROCEDURE — 85730 THROMBOPLASTIN TIME PARTIAL: CPT | Performed by: EMERGENCY MEDICINE

## 2019-05-24 PROCEDURE — 96361 HYDRATE IV INFUSION ADD-ON: CPT

## 2019-05-24 PROCEDURE — 96365 THER/PROPH/DIAG IV INF INIT: CPT

## 2019-05-24 PROCEDURE — 80053 COMPREHEN METABOLIC PANEL: CPT | Performed by: EMERGENCY MEDICINE

## 2019-05-24 PROCEDURE — 96376 TX/PRO/DX INJ SAME DRUG ADON: CPT

## 2019-05-24 PROCEDURE — 96375 TX/PRO/DX INJ NEW DRUG ADDON: CPT

## 2019-05-24 PROCEDURE — 99284 EMERGENCY DEPT VISIT MOD MDM: CPT | Performed by: EMERGENCY MEDICINE

## 2019-05-24 RX ORDER — SODIUM CHLORIDE 9 MG/ML
125 INJECTION, SOLUTION INTRAVENOUS CONTINUOUS
Status: DISCONTINUED | OUTPATIENT
Start: 2019-05-24 | End: 2019-05-24 | Stop reason: ALTCHOICE

## 2019-05-24 RX ORDER — LEVOTHYROXINE SODIUM 0.05 MG/1
50 TABLET ORAL
Status: DISCONTINUED | OUTPATIENT
Start: 2019-05-25 | End: 2019-05-28 | Stop reason: HOSPADM

## 2019-05-24 RX ORDER — HYDROMORPHONE HCL/PF 1 MG/ML
0.5 SYRINGE (ML) INJECTION ONCE
Status: COMPLETED | OUTPATIENT
Start: 2019-05-24 | End: 2019-05-24

## 2019-05-24 RX ORDER — 0.9 % SODIUM CHLORIDE 0.9 %
3 VIAL (ML) INJECTION AS NEEDED
Status: DISCONTINUED | OUTPATIENT
Start: 2019-05-24 | End: 2019-05-28 | Stop reason: HOSPADM

## 2019-05-24 RX ORDER — ONDANSETRON 2 MG/ML
4 INJECTION INTRAMUSCULAR; INTRAVENOUS EVERY 6 HOURS PRN
Status: DISCONTINUED | OUTPATIENT
Start: 2019-05-24 | End: 2019-05-28 | Stop reason: HOSPADM

## 2019-05-24 RX ORDER — ONDANSETRON 2 MG/ML
4 INJECTION INTRAMUSCULAR; INTRAVENOUS ONCE
Status: COMPLETED | OUTPATIENT
Start: 2019-05-24 | End: 2019-05-24

## 2019-05-24 RX ORDER — HYDROMORPHONE HCL/PF 1 MG/ML
0.5 SYRINGE (ML) INJECTION
Status: DISCONTINUED | OUTPATIENT
Start: 2019-05-24 | End: 2019-05-27

## 2019-05-24 RX ORDER — HYDROMORPHONE HCL/PF 1 MG/ML
1 SYRINGE (ML) INJECTION
Status: DISCONTINUED | OUTPATIENT
Start: 2019-05-24 | End: 2019-05-27

## 2019-05-24 RX ORDER — SODIUM CHLORIDE, SODIUM LACTATE, POTASSIUM CHLORIDE, CALCIUM CHLORIDE 600; 310; 30; 20 MG/100ML; MG/100ML; MG/100ML; MG/100ML
125 INJECTION, SOLUTION INTRAVENOUS CONTINUOUS
Status: DISCONTINUED | OUTPATIENT
Start: 2019-05-24 | End: 2019-05-26

## 2019-05-24 RX ADMIN — ONDANSETRON 4 MG: 2 INJECTION INTRAMUSCULAR; INTRAVENOUS at 16:37

## 2019-05-24 RX ADMIN — HYDROMORPHONE HYDROCHLORIDE 0.5 MG: 1 INJECTION, SOLUTION INTRAMUSCULAR; INTRAVENOUS; SUBCUTANEOUS at 19:29

## 2019-05-24 RX ADMIN — ONDANSETRON 4 MG: 2 INJECTION INTRAMUSCULAR; INTRAVENOUS at 22:41

## 2019-05-24 RX ADMIN — SODIUM CHLORIDE 1000 ML: 0.9 INJECTION, SOLUTION INTRAVENOUS at 18:07

## 2019-05-24 RX ADMIN — HYDROMORPHONE HYDROCHLORIDE 0.5 MG: 1 INJECTION, SOLUTION INTRAMUSCULAR; INTRAVENOUS; SUBCUTANEOUS at 16:34

## 2019-05-24 RX ADMIN — IOHEXOL 100 ML: 350 INJECTION, SOLUTION INTRAVENOUS at 17:28

## 2019-05-24 RX ADMIN — PIPERACILLIN SODIUM,TAZOBACTAM SODIUM 3.38 G: 3; .375 INJECTION, POWDER, FOR SOLUTION INTRAVENOUS at 19:30

## 2019-05-24 RX ADMIN — SODIUM CHLORIDE 3 G: 9 INJECTION, SOLUTION INTRAVENOUS at 22:40

## 2019-05-24 RX ADMIN — SODIUM CHLORIDE 1000 ML: 0.9 INJECTION, SOLUTION INTRAVENOUS at 16:34

## 2019-05-24 RX ADMIN — SODIUM CHLORIDE, SODIUM LACTATE, POTASSIUM CHLORIDE, AND CALCIUM CHLORIDE 125 ML/HR: .6; .31; .03; .02 INJECTION, SOLUTION INTRAVENOUS at 22:40

## 2019-05-25 LAB
ANION GAP SERPL CALCULATED.3IONS-SCNC: 9 MMOL/L (ref 4–13)
BUN SERPL-MCNC: 9 MG/DL (ref 5–25)
CALCIUM SERPL-MCNC: 8.4 MG/DL (ref 8.3–10.1)
CHLORIDE SERPL-SCNC: 105 MMOL/L (ref 100–108)
CO2 SERPL-SCNC: 21 MMOL/L (ref 21–32)
CREAT SERPL-MCNC: 0.71 MG/DL (ref 0.6–1.3)
ERYTHROCYTE [DISTWIDTH] IN BLOOD BY AUTOMATED COUNT: 14.6 % (ref 11.6–15.1)
GFR SERPL CREATININE-BSD FRML MDRD: 87 ML/MIN/1.73SQ M
GLUCOSE SERPL-MCNC: 130 MG/DL (ref 65–140)
HCT VFR BLD AUTO: 37.7 % (ref 34.8–46.1)
HGB BLD-MCNC: 12.1 G/DL (ref 11.5–15.4)
MCH RBC QN AUTO: 30 PG (ref 26.8–34.3)
MCHC RBC AUTO-ENTMCNC: 32.1 G/DL (ref 31.4–37.4)
MCV RBC AUTO: 94 FL (ref 82–98)
PLATELET # BLD AUTO: 203 THOUSANDS/UL (ref 149–390)
PMV BLD AUTO: 10.2 FL (ref 8.9–12.7)
POTASSIUM SERPL-SCNC: 3.8 MMOL/L (ref 3.5–5.3)
RBC # BLD AUTO: 4.03 MILLION/UL (ref 3.81–5.12)
SODIUM SERPL-SCNC: 135 MMOL/L (ref 136–145)
WBC # BLD AUTO: 12.28 THOUSAND/UL (ref 4.31–10.16)

## 2019-05-25 PROCEDURE — 85027 COMPLETE CBC AUTOMATED: CPT | Performed by: PHYSICIAN ASSISTANT

## 2019-05-25 PROCEDURE — 80048 BASIC METABOLIC PNL TOTAL CA: CPT | Performed by: PHYSICIAN ASSISTANT

## 2019-05-25 RX ADMIN — ONDANSETRON 4 MG: 2 INJECTION INTRAMUSCULAR; INTRAVENOUS at 16:45

## 2019-05-25 RX ADMIN — ONDANSETRON 4 MG: 2 INJECTION INTRAMUSCULAR; INTRAVENOUS at 23:34

## 2019-05-25 RX ADMIN — SODIUM CHLORIDE 3 G: 9 INJECTION, SOLUTION INTRAVENOUS at 05:30

## 2019-05-25 RX ADMIN — HYDROMORPHONE HYDROCHLORIDE 1 MG: 1 INJECTION, SOLUTION INTRAMUSCULAR; INTRAVENOUS; SUBCUTANEOUS at 09:12

## 2019-05-25 RX ADMIN — HYDROMORPHONE HYDROCHLORIDE 1 MG: 1 INJECTION, SOLUTION INTRAMUSCULAR; INTRAVENOUS; SUBCUTANEOUS at 23:19

## 2019-05-25 RX ADMIN — HYDROMORPHONE HYDROCHLORIDE 0.5 MG: 1 INJECTION, SOLUTION INTRAMUSCULAR; INTRAVENOUS; SUBCUTANEOUS at 10:47

## 2019-05-25 RX ADMIN — SODIUM CHLORIDE 3 G: 9 INJECTION, SOLUTION INTRAVENOUS at 16:45

## 2019-05-25 RX ADMIN — SODIUM CHLORIDE 3 G: 9 INJECTION, SOLUTION INTRAVENOUS at 23:19

## 2019-05-25 RX ADMIN — ENOXAPARIN SODIUM 40 MG: 40 INJECTION SUBCUTANEOUS at 09:11

## 2019-05-25 RX ADMIN — ONDANSETRON 4 MG: 2 INJECTION INTRAMUSCULAR; INTRAVENOUS at 09:12

## 2019-05-25 RX ADMIN — SODIUM CHLORIDE 3 G: 9 INJECTION, SOLUTION INTRAVENOUS at 09:45

## 2019-05-25 RX ADMIN — LEVOTHYROXINE SODIUM 50 MCG: 50 TABLET ORAL at 05:36

## 2019-05-25 RX ADMIN — HYDROMORPHONE HYDROCHLORIDE 1 MG: 1 INJECTION, SOLUTION INTRAMUSCULAR; INTRAVENOUS; SUBCUTANEOUS at 16:45

## 2019-05-25 RX ADMIN — HYDROMORPHONE HYDROCHLORIDE 1 MG: 1 INJECTION, SOLUTION INTRAMUSCULAR; INTRAVENOUS; SUBCUTANEOUS at 00:31

## 2019-05-26 ENCOUNTER — APPOINTMENT (INPATIENT)
Dept: RADIOLOGY | Facility: HOSPITAL | Age: 69
DRG: 392 | End: 2019-05-26
Payer: COMMERCIAL

## 2019-05-26 LAB
ALBUMIN SERPL BCP-MCNC: 2.8 G/DL (ref 3.5–5)
ALP SERPL-CCNC: 90 U/L (ref 46–116)
ALT SERPL W P-5'-P-CCNC: 21 U/L (ref 12–78)
ANION GAP SERPL CALCULATED.3IONS-SCNC: 7 MMOL/L (ref 4–13)
ANION GAP SERPL CALCULATED.3IONS-SCNC: 9 MMOL/L (ref 4–13)
AST SERPL W P-5'-P-CCNC: 28 U/L (ref 5–45)
BASOPHILS # BLD AUTO: 0.05 THOUSANDS/ΜL (ref 0–0.1)
BASOPHILS NFR BLD AUTO: 0 % (ref 0–1)
BILIRUB SERPL-MCNC: 0.8 MG/DL (ref 0.2–1)
BUN SERPL-MCNC: 5 MG/DL (ref 5–25)
BUN SERPL-MCNC: 6 MG/DL (ref 5–25)
CALCIUM SERPL-MCNC: 8.7 MG/DL (ref 8.3–10.1)
CALCIUM SERPL-MCNC: 8.9 MG/DL (ref 8.3–10.1)
CHLORIDE SERPL-SCNC: 100 MMOL/L (ref 100–108)
CHLORIDE SERPL-SCNC: 103 MMOL/L (ref 100–108)
CO2 SERPL-SCNC: 26 MMOL/L (ref 21–32)
CO2 SERPL-SCNC: 27 MMOL/L (ref 21–32)
CREAT SERPL-MCNC: 0.74 MG/DL (ref 0.6–1.3)
CREAT SERPL-MCNC: 0.79 MG/DL (ref 0.6–1.3)
EOSINOPHIL # BLD AUTO: 0.14 THOUSAND/ΜL (ref 0–0.61)
EOSINOPHIL NFR BLD AUTO: 1 % (ref 0–6)
ERYTHROCYTE [DISTWIDTH] IN BLOOD BY AUTOMATED COUNT: 13.7 % (ref 11.6–15.1)
ERYTHROCYTE [DISTWIDTH] IN BLOOD BY AUTOMATED COUNT: 14.1 % (ref 11.6–15.1)
GFR SERPL CREATININE-BSD FRML MDRD: 76 ML/MIN/1.73SQ M
GFR SERPL CREATININE-BSD FRML MDRD: 82 ML/MIN/1.73SQ M
GLUCOSE SERPL-MCNC: 115 MG/DL (ref 65–140)
GLUCOSE SERPL-MCNC: 138 MG/DL (ref 65–140)
GLUCOSE SERPL-MCNC: 139 MG/DL (ref 65–140)
HCT VFR BLD AUTO: 35.7 % (ref 34.8–46.1)
HCT VFR BLD AUTO: 37.5 % (ref 34.8–46.1)
HGB BLD-MCNC: 11.6 G/DL (ref 11.5–15.4)
HGB BLD-MCNC: 12.4 G/DL (ref 11.5–15.4)
IMM GRANULOCYTES # BLD AUTO: 0.08 THOUSAND/UL (ref 0–0.2)
IMM GRANULOCYTES NFR BLD AUTO: 1 % (ref 0–2)
LYMPHOCYTES # BLD AUTO: 1.15 THOUSANDS/ΜL (ref 0.6–4.47)
LYMPHOCYTES NFR BLD AUTO: 10 % (ref 14–44)
MCH RBC QN AUTO: 29.5 PG (ref 26.8–34.3)
MCH RBC QN AUTO: 29.9 PG (ref 26.8–34.3)
MCHC RBC AUTO-ENTMCNC: 32.5 G/DL (ref 31.4–37.4)
MCHC RBC AUTO-ENTMCNC: 33.1 G/DL (ref 31.4–37.4)
MCV RBC AUTO: 90 FL (ref 82–98)
MCV RBC AUTO: 91 FL (ref 82–98)
MONOCYTES # BLD AUTO: 0.94 THOUSAND/ΜL (ref 0.17–1.22)
MONOCYTES NFR BLD AUTO: 8 % (ref 4–12)
NEUTROPHILS # BLD AUTO: 9.08 THOUSANDS/ΜL (ref 1.85–7.62)
NEUTS SEG NFR BLD AUTO: 80 % (ref 43–75)
NRBC BLD AUTO-RTO: 0 /100 WBCS
PLATELET # BLD AUTO: 206 THOUSANDS/UL (ref 149–390)
PLATELET # BLD AUTO: 237 THOUSANDS/UL (ref 149–390)
PMV BLD AUTO: 10.3 FL (ref 8.9–12.7)
PMV BLD AUTO: 9.9 FL (ref 8.9–12.7)
POTASSIUM SERPL-SCNC: 3.6 MMOL/L (ref 3.5–5.3)
POTASSIUM SERPL-SCNC: 3.8 MMOL/L (ref 3.5–5.3)
PROT SERPL-MCNC: 7.1 G/DL (ref 6.4–8.2)
RBC # BLD AUTO: 3.93 MILLION/UL (ref 3.81–5.12)
RBC # BLD AUTO: 4.15 MILLION/UL (ref 3.81–5.12)
SODIUM SERPL-SCNC: 136 MMOL/L (ref 136–145)
SODIUM SERPL-SCNC: 136 MMOL/L (ref 136–145)
TROPONIN I SERPL-MCNC: <0.02 NG/ML
WBC # BLD AUTO: 11.44 THOUSAND/UL (ref 4.31–10.16)
WBC # BLD AUTO: 11.8 THOUSAND/UL (ref 4.31–10.16)

## 2019-05-26 PROCEDURE — 93005 ELECTROCARDIOGRAM TRACING: CPT

## 2019-05-26 PROCEDURE — 99291 CRITICAL CARE FIRST HOUR: CPT | Performed by: NURSE PRACTITIONER

## 2019-05-26 PROCEDURE — 85025 COMPLETE CBC W/AUTO DIFF WBC: CPT | Performed by: SURGERY

## 2019-05-26 PROCEDURE — 80048 BASIC METABOLIC PNL TOTAL CA: CPT | Performed by: SURGERY

## 2019-05-26 PROCEDURE — 80053 COMPREHEN METABOLIC PANEL: CPT | Performed by: NURSE PRACTITIONER

## 2019-05-26 PROCEDURE — 85027 COMPLETE CBC AUTOMATED: CPT | Performed by: NURSE PRACTITIONER

## 2019-05-26 PROCEDURE — 84484 ASSAY OF TROPONIN QUANT: CPT | Performed by: NURSE PRACTITIONER

## 2019-05-26 PROCEDURE — 71045 X-RAY EXAM CHEST 1 VIEW: CPT

## 2019-05-26 PROCEDURE — 82948 REAGENT STRIP/BLOOD GLUCOSE: CPT

## 2019-05-26 RX ORDER — DEXTROSE, SODIUM CHLORIDE, AND POTASSIUM CHLORIDE 5; .45; .22 G/100ML; G/100ML; G/100ML
50 INJECTION INTRAVENOUS CONTINUOUS
Status: DISCONTINUED | OUTPATIENT
Start: 2019-05-26 | End: 2019-05-28 | Stop reason: HOSPADM

## 2019-05-26 RX ADMIN — HYDROMORPHONE HYDROCHLORIDE 1 MG: 1 INJECTION, SOLUTION INTRAMUSCULAR; INTRAVENOUS; SUBCUTANEOUS at 02:55

## 2019-05-26 RX ADMIN — SODIUM CHLORIDE, SODIUM LACTATE, POTASSIUM CHLORIDE, AND CALCIUM CHLORIDE 125 ML/HR: .6; .31; .03; .02 INJECTION, SOLUTION INTRAVENOUS at 02:58

## 2019-05-26 RX ADMIN — SODIUM CHLORIDE 3 G: 9 INJECTION, SOLUTION INTRAVENOUS at 16:27

## 2019-05-26 RX ADMIN — SODIUM CHLORIDE 3 G: 9 INJECTION, SOLUTION INTRAVENOUS at 10:29

## 2019-05-26 RX ADMIN — DEXTROSE, SODIUM CHLORIDE, AND POTASSIUM CHLORIDE 100 ML/HR: 5; .45; .22 INJECTION INTRAVENOUS at 23:00

## 2019-05-26 RX ADMIN — ONDANSETRON 4 MG: 2 INJECTION INTRAMUSCULAR; INTRAVENOUS at 18:17

## 2019-05-26 RX ADMIN — SODIUM CHLORIDE 3 G: 9 INJECTION, SOLUTION INTRAVENOUS at 22:23

## 2019-05-26 RX ADMIN — SODIUM CHLORIDE 3 G: 9 INJECTION, SOLUTION INTRAVENOUS at 04:33

## 2019-05-26 RX ADMIN — DEXTROSE, SODIUM CHLORIDE, AND POTASSIUM CHLORIDE 100 ML/HR: 5; .45; .22 INJECTION INTRAVENOUS at 11:49

## 2019-05-26 RX ADMIN — ENOXAPARIN SODIUM 40 MG: 40 INJECTION SUBCUTANEOUS at 10:00

## 2019-05-26 RX ADMIN — HYDROMORPHONE HYDROCHLORIDE 0.5 MG: 1 INJECTION, SOLUTION INTRAMUSCULAR; INTRAVENOUS; SUBCUTANEOUS at 18:17

## 2019-05-26 RX ADMIN — LEVOTHYROXINE SODIUM 50 MCG: 50 TABLET ORAL at 05:57

## 2019-05-26 RX ADMIN — HYDROMORPHONE HYDROCHLORIDE 0.5 MG: 1 INJECTION, SOLUTION INTRAMUSCULAR; INTRAVENOUS; SUBCUTANEOUS at 10:34

## 2019-05-26 RX ADMIN — ONDANSETRON 4 MG: 2 INJECTION INTRAMUSCULAR; INTRAVENOUS at 10:34

## 2019-05-27 LAB
ANION GAP SERPL CALCULATED.3IONS-SCNC: 9 MMOL/L (ref 4–13)
ATRIAL RATE: 94 BPM
ATRIAL RATE: 97 BPM
BASOPHILS # BLD AUTO: 0.09 THOUSANDS/ΜL (ref 0–0.1)
BASOPHILS NFR BLD AUTO: 1 % (ref 0–1)
BUN SERPL-MCNC: 5 MG/DL (ref 5–25)
CALCIUM SERPL-MCNC: 9.3 MG/DL (ref 8.3–10.1)
CHLORIDE SERPL-SCNC: 104 MMOL/L (ref 100–108)
CO2 SERPL-SCNC: 25 MMOL/L (ref 21–32)
CREAT SERPL-MCNC: 0.77 MG/DL (ref 0.6–1.3)
EOSINOPHIL # BLD AUTO: 0.29 THOUSAND/ΜL (ref 0–0.61)
EOSINOPHIL NFR BLD AUTO: 3 % (ref 0–6)
ERYTHROCYTE [DISTWIDTH] IN BLOOD BY AUTOMATED COUNT: 13.8 % (ref 11.6–15.1)
GFR SERPL CREATININE-BSD FRML MDRD: 78 ML/MIN/1.73SQ M
GLUCOSE SERPL-MCNC: 122 MG/DL (ref 65–140)
HCT VFR BLD AUTO: 37.1 % (ref 34.8–46.1)
HGB BLD-MCNC: 12.3 G/DL (ref 11.5–15.4)
IMM GRANULOCYTES # BLD AUTO: 0.08 THOUSAND/UL (ref 0–0.2)
IMM GRANULOCYTES NFR BLD AUTO: 1 % (ref 0–2)
LYMPHOCYTES # BLD AUTO: 1.54 THOUSANDS/ΜL (ref 0.6–4.47)
LYMPHOCYTES NFR BLD AUTO: 14 % (ref 14–44)
MCH RBC QN AUTO: 29.5 PG (ref 26.8–34.3)
MCHC RBC AUTO-ENTMCNC: 33.2 G/DL (ref 31.4–37.4)
MCV RBC AUTO: 89 FL (ref 82–98)
MONOCYTES # BLD AUTO: 1 THOUSAND/ΜL (ref 0.17–1.22)
MONOCYTES NFR BLD AUTO: 9 % (ref 4–12)
NEUTROPHILS # BLD AUTO: 7.81 THOUSANDS/ΜL (ref 1.85–7.62)
NEUTS SEG NFR BLD AUTO: 72 % (ref 43–75)
NRBC BLD AUTO-RTO: 0 /100 WBCS
P AXIS: 71 DEGREES
P AXIS: 72 DEGREES
PLATELET # BLD AUTO: 240 THOUSANDS/UL (ref 149–390)
PMV BLD AUTO: 9.7 FL (ref 8.9–12.7)
POTASSIUM SERPL-SCNC: 3.8 MMOL/L (ref 3.5–5.3)
PR INTERVAL: 124 MS
PR INTERVAL: 124 MS
QRS AXIS: 22 DEGREES
QRS AXIS: 24 DEGREES
QRSD INTERVAL: 78 MS
QRSD INTERVAL: 80 MS
QT INTERVAL: 342 MS
QT INTERVAL: 344 MS
QTC INTERVAL: 427 MS
QTC INTERVAL: 436 MS
RBC # BLD AUTO: 4.17 MILLION/UL (ref 3.81–5.12)
SODIUM SERPL-SCNC: 138 MMOL/L (ref 136–145)
T WAVE AXIS: 16 DEGREES
T WAVE AXIS: 18 DEGREES
VENTRICULAR RATE: 94 BPM
VENTRICULAR RATE: 97 BPM
WBC # BLD AUTO: 10.81 THOUSAND/UL (ref 4.31–10.16)

## 2019-05-27 PROCEDURE — 80048 BASIC METABOLIC PNL TOTAL CA: CPT | Performed by: SURGERY

## 2019-05-27 PROCEDURE — 93010 ELECTROCARDIOGRAM REPORT: CPT | Performed by: INTERNAL MEDICINE

## 2019-05-27 PROCEDURE — 85025 COMPLETE CBC W/AUTO DIFF WBC: CPT | Performed by: SURGERY

## 2019-05-27 RX ORDER — ACETAMINOPHEN 325 MG/1
650 TABLET ORAL EVERY 6 HOURS PRN
Status: DISCONTINUED | OUTPATIENT
Start: 2019-05-27 | End: 2019-05-28 | Stop reason: HOSPADM

## 2019-05-27 RX ADMIN — ACETAMINOPHEN 650 MG: 325 TABLET, FILM COATED ORAL at 18:09

## 2019-05-27 RX ADMIN — SODIUM CHLORIDE 3 G: 9 INJECTION, SOLUTION INTRAVENOUS at 18:10

## 2019-05-27 RX ADMIN — SODIUM CHLORIDE 3 G: 9 INJECTION, SOLUTION INTRAVENOUS at 12:01

## 2019-05-27 RX ADMIN — DEXTROSE, SODIUM CHLORIDE, AND POTASSIUM CHLORIDE 50 ML/HR: 5; .45; .22 INJECTION INTRAVENOUS at 12:01

## 2019-05-27 RX ADMIN — LEVOTHYROXINE SODIUM 50 MCG: 50 TABLET ORAL at 06:12

## 2019-05-27 RX ADMIN — ENOXAPARIN SODIUM 40 MG: 40 INJECTION SUBCUTANEOUS at 08:00

## 2019-05-27 RX ADMIN — SODIUM CHLORIDE 3 G: 9 INJECTION, SOLUTION INTRAVENOUS at 06:10

## 2019-05-27 RX ADMIN — SODIUM CHLORIDE 3 G: 9 INJECTION, SOLUTION INTRAVENOUS at 23:46

## 2019-05-28 VITALS
DIASTOLIC BLOOD PRESSURE: 79 MMHG | BODY MASS INDEX: 39.16 KG/M2 | TEMPERATURE: 98.2 F | WEIGHT: 207.23 LBS | HEART RATE: 80 BPM | OXYGEN SATURATION: 95 % | RESPIRATION RATE: 18 BRPM | SYSTOLIC BLOOD PRESSURE: 146 MMHG

## 2019-05-28 LAB
BASOPHILS # BLD AUTO: 0.09 THOUSANDS/ΜL (ref 0–0.1)
BASOPHILS NFR BLD AUTO: 1 % (ref 0–1)
EOSINOPHIL # BLD AUTO: 0.37 THOUSAND/ΜL (ref 0–0.61)
EOSINOPHIL NFR BLD AUTO: 4 % (ref 0–6)
ERYTHROCYTE [DISTWIDTH] IN BLOOD BY AUTOMATED COUNT: 14 % (ref 11.6–15.1)
HCT VFR BLD AUTO: 37.1 % (ref 34.8–46.1)
HGB BLD-MCNC: 12.3 G/DL (ref 11.5–15.4)
IMM GRANULOCYTES # BLD AUTO: 0.09 THOUSAND/UL (ref 0–0.2)
IMM GRANULOCYTES NFR BLD AUTO: 1 % (ref 0–2)
LYMPHOCYTES # BLD AUTO: 1.5 THOUSANDS/ΜL (ref 0.6–4.47)
LYMPHOCYTES NFR BLD AUTO: 17 % (ref 14–44)
MCH RBC QN AUTO: 29.7 PG (ref 26.8–34.3)
MCHC RBC AUTO-ENTMCNC: 33.2 G/DL (ref 31.4–37.4)
MCV RBC AUTO: 90 FL (ref 82–98)
MONOCYTES # BLD AUTO: 1.05 THOUSAND/ΜL (ref 0.17–1.22)
MONOCYTES NFR BLD AUTO: 12 % (ref 4–12)
NEUTROPHILS # BLD AUTO: 5.72 THOUSANDS/ΜL (ref 1.85–7.62)
NEUTS SEG NFR BLD AUTO: 65 % (ref 43–75)
NRBC BLD AUTO-RTO: 0 /100 WBCS
PLATELET # BLD AUTO: 276 THOUSANDS/UL (ref 149–390)
PMV BLD AUTO: 10 FL (ref 8.9–12.7)
RBC # BLD AUTO: 4.14 MILLION/UL (ref 3.81–5.12)
WBC # BLD AUTO: 8.82 THOUSAND/UL (ref 4.31–10.16)

## 2019-05-28 PROCEDURE — 85025 COMPLETE CBC W/AUTO DIFF WBC: CPT | Performed by: SURGERY

## 2019-05-28 RX ORDER — AMOXICILLIN AND CLAVULANATE POTASSIUM 875; 125 MG/1; MG/1
1 TABLET, FILM COATED ORAL EVERY 12 HOURS SCHEDULED
Qty: 14 TABLET | Refills: 0 | Status: SHIPPED | OUTPATIENT
Start: 2019-05-28 | End: 2019-06-04

## 2019-05-28 RX ORDER — METRONIDAZOLE 250 MG/1
250 TABLET ORAL EVERY 8 HOURS SCHEDULED
Qty: 21 TABLET | Refills: 0 | Status: SHIPPED | OUTPATIENT
Start: 2019-05-28 | End: 2019-06-04

## 2019-05-28 RX ORDER — ACETAMINOPHEN 325 MG/1
650 TABLET ORAL EVERY 6 HOURS PRN
Qty: 30 TABLET | Refills: 0 | Status: SHIPPED | OUTPATIENT
Start: 2019-05-28 | End: 2022-02-13 | Stop reason: CLARIF

## 2019-05-28 RX ADMIN — SODIUM CHLORIDE 3 G: 9 INJECTION, SOLUTION INTRAVENOUS at 05:17

## 2019-05-28 RX ADMIN — LEVOTHYROXINE SODIUM 50 MCG: 50 TABLET ORAL at 05:17

## 2019-05-28 RX ADMIN — ENOXAPARIN SODIUM 40 MG: 40 INJECTION SUBCUTANEOUS at 07:51

## 2019-05-29 ENCOUNTER — TRANSITIONAL CARE MANAGEMENT (OUTPATIENT)
Dept: FAMILY MEDICINE CLINIC | Facility: CLINIC | Age: 69
End: 2019-05-29

## 2019-05-30 ENCOUNTER — TELEPHONE (OUTPATIENT)
Dept: FAMILY MEDICINE CLINIC | Facility: CLINIC | Age: 69
End: 2019-05-30

## 2019-05-30 LAB
BACTERIA BLD CULT: NORMAL
BACTERIA BLD CULT: NORMAL

## 2019-05-31 ENCOUNTER — OFFICE VISIT (OUTPATIENT)
Dept: FAMILY MEDICINE CLINIC | Facility: CLINIC | Age: 69
End: 2019-05-31
Payer: COMMERCIAL

## 2019-05-31 VITALS
WEIGHT: 203 LBS | TEMPERATURE: 98.3 F | RESPIRATION RATE: 18 BRPM | HEIGHT: 60 IN | HEART RATE: 80 BPM | BODY MASS INDEX: 39.85 KG/M2 | OXYGEN SATURATION: 97 % | SYSTOLIC BLOOD PRESSURE: 120 MMHG | DIASTOLIC BLOOD PRESSURE: 60 MMHG

## 2019-05-31 DIAGNOSIS — K57.20 DIVERTICULITIS OF LARGE INTESTINE WITH PERFORATION WITHOUT BLEEDING: Primary | ICD-10-CM

## 2019-05-31 PROCEDURE — 1160F RVW MEDS BY RX/DR IN RCRD: CPT | Performed by: FAMILY MEDICINE

## 2019-05-31 PROCEDURE — 1111F DSCHRG MED/CURRENT MED MERGE: CPT | Performed by: FAMILY MEDICINE

## 2019-05-31 PROCEDURE — 99496 TRANSJ CARE MGMT HIGH F2F 7D: CPT | Performed by: FAMILY MEDICINE

## 2019-07-29 DIAGNOSIS — Z12.39 ENCOUNTER FOR SCREENING FOR MALIGNANT NEOPLASM OF BREAST: Primary | ICD-10-CM

## 2019-08-04 ENCOUNTER — ANESTHESIA EVENT (OUTPATIENT)
Dept: GASTROENTEROLOGY | Facility: HOSPITAL | Age: 69
End: 2019-08-04

## 2019-08-04 NOTE — ANESTHESIA PREPROCEDURE EVALUATION
Review of Systems/Medical History  Patient summary reviewed  Chart reviewed      Cardiovascular  Negative cardio ROS    Pulmonary  Pneumonia,        GI/Hepatic  Negative GI/hepatic ROS          Negative  ROS        Endo/Other  History of thyroid disease , hypothyroidism,      GYN  Negative gynecology ROS          Hematology  Negative hematology ROS      Musculoskeletal    Arthritis     Neurology  Negative neurology ROS      Psychology   Negative psychology ROS              Physical Exam    Airway    Mallampati score: II  TM Distance: >3 FB  Neck ROM: full     Dental   No notable dental hx     Cardiovascular  Comment: Negative ROS, Rhythm: regular, Rate: normal, Cardiovascular exam normal    Pulmonary  Pulmonary exam normal Breath sounds clear to auscultation,     Other Findings        Anesthesia Plan  ASA Score- 2     Anesthesia Type- IV sedation with anesthesia with ASA Monitors  Additional Monitors:   Airway Plan:         Plan Factors-    Induction- intravenous  Postoperative Plan- Plan for postoperative opioid use  Informed Consent- Anesthetic plan and risks discussed with patient and spouse  I personally reviewed this patient with the CRNA  Discussed and agreed on the Anesthesia Plan with the CRNA  Bay Alva

## 2019-08-05 ENCOUNTER — ANESTHESIA (OUTPATIENT)
Dept: GASTROENTEROLOGY | Facility: HOSPITAL | Age: 69
End: 2019-08-05

## 2019-08-05 ENCOUNTER — HOSPITAL ENCOUNTER (OUTPATIENT)
Dept: GASTROENTEROLOGY | Facility: HOSPITAL | Age: 69
Setting detail: OUTPATIENT SURGERY
Discharge: HOME | End: 2019-08-05
Attending: SURGERY | Admitting: SURGERY
Payer: COMMERCIAL

## 2019-08-05 VITALS
SYSTOLIC BLOOD PRESSURE: 119 MMHG | HEART RATE: 68 BPM | OXYGEN SATURATION: 99 % | RESPIRATION RATE: 16 BRPM | DIASTOLIC BLOOD PRESSURE: 62 MMHG

## 2019-08-05 DIAGNOSIS — K57.20 DIVERTICULITIS OF LARGE INTESTINE WITH PERFORATION WITHOUT BLEEDING: ICD-10-CM

## 2019-08-05 PROCEDURE — 88305 TISSUE EXAM BY PATHOLOGIST: CPT | Performed by: PATHOLOGY

## 2019-08-05 PROCEDURE — 45384 COLONOSCOPY W/LESION REMOVAL: CPT | Performed by: SURGERY

## 2019-08-05 RX ORDER — PROPOFOL 10 MG/ML
INJECTION, EMULSION INTRAVENOUS AS NEEDED
Status: DISCONTINUED | OUTPATIENT
Start: 2019-08-05 | End: 2019-08-05 | Stop reason: SURG

## 2019-08-05 RX ORDER — PROPOFOL 10 MG/ML
INJECTION, EMULSION INTRAVENOUS CONTINUOUS PRN
Status: DISCONTINUED | OUTPATIENT
Start: 2019-08-05 | End: 2019-08-05 | Stop reason: SURG

## 2019-08-05 RX ORDER — SODIUM CHLORIDE 9 MG/ML
INJECTION, SOLUTION INTRAVENOUS CONTINUOUS PRN
Status: DISCONTINUED | OUTPATIENT
Start: 2019-08-05 | End: 2019-08-05 | Stop reason: SURG

## 2019-08-05 RX ORDER — LIDOCAINE HYDROCHLORIDE 10 MG/ML
INJECTION, SOLUTION INFILTRATION; PERINEURAL AS NEEDED
Status: DISCONTINUED | OUTPATIENT
Start: 2019-08-05 | End: 2019-08-05 | Stop reason: SURG

## 2019-08-05 RX ORDER — SODIUM CHLORIDE, SODIUM LACTATE, POTASSIUM CHLORIDE, CALCIUM CHLORIDE 600; 310; 30; 20 MG/100ML; MG/100ML; MG/100ML; MG/100ML
125 INJECTION, SOLUTION INTRAVENOUS CONTINUOUS
Status: DISCONTINUED | OUTPATIENT
Start: 2019-08-05 | End: 2019-08-09 | Stop reason: HOSPADM

## 2019-08-05 RX ADMIN — PROPOFOL 120 MG: 10 INJECTION, EMULSION INTRAVENOUS at 09:10

## 2019-08-05 RX ADMIN — LIDOCAINE HYDROCHLORIDE 50 MG: 10 INJECTION, SOLUTION INFILTRATION; PERINEURAL at 09:10

## 2019-08-05 RX ADMIN — PROPOFOL 70 MCG/KG/MIN: 10 INJECTION, EMULSION INTRAVENOUS at 09:10

## 2019-08-05 RX ADMIN — SODIUM CHLORIDE: 0.9 INJECTION, SOLUTION INTRAVENOUS at 08:55

## 2019-08-05 RX ADMIN — PHENYLEPHRINE HYDROCHLORIDE 150 MCG: 10 INJECTION INTRAVENOUS at 09:13

## 2019-08-05 NOTE — ANESTHESIA POSTPROCEDURE EVALUATION
Post-Op Assessment Note    CV Status:  Stable  Pain Score: 0    Pain management: adequate     Mental Status:  Awake and alert   Hydration Status:  Stable and euvolemic   PONV Controlled:  None   Airway Patency:  Patent and adequate   Post Op Vitals Reviewed: Yes      Staff: CRNA           /62 (08/05/19 0955)    Temp      Pulse 68 (08/05/19 0955)   Resp 16 (08/05/19 0955)    SpO2 99 % (08/05/19 0955)

## 2019-08-05 NOTE — H&P
History and Physical -General Surgical Care   Rashida Weaver 77 y o  female MRN: [id number]  Unit/Bed#:  Encounter: [id number]       Principal Problem: Diverticular disease  Diverticular disease    HPI: Rashida Weaver is a 77y o  year old female who presents with diverticular disease  Hospitalized late May with diverticulitis  Presents now for C-scope  Denies any issues      Review of Systems    Historical Information   Past Medical History:   Diagnosis Date    Arthritis     Disease of thyroid gland     PNA (pneumonia) 05/2019     Past Surgical History:   Procedure Laterality Date    CHOLECYSTECTOMY  1968    open     Social History   Social History     Substance and Sexual Activity   Alcohol Use No     Social History     Substance and Sexual Activity   Drug Use Never     Social History     Tobacco Use   Smoking Status Never Smoker   Smokeless Tobacco Never Used     Family History   Problem Relation Age of Onset    Cancer Mother     Cancer Father     Urolithiasis Sister        Meds/Allergies       (Not in a hospital admission)  Current Facility-Administered Medications   Medication Dose Route Frequency    lactated ringers infusion  125 mL/hr Intravenous Continuous       Allergies   Allergen Reactions    Codeine Anaphylaxis     Increased Heart Rate, Palpitations           There were no vitals taken for this visit  No intake or output data in the 24 hours ending 08/05/19 0858    PHYSICAL EXAM  General appearance: alert and oriented, in no acute distress  Lungs: clear to auscultation bilaterally  Heart: regular rate and rhythm, S1, S2 normal, no murmur, click, rub or gallop  Abdomen: soft, non-tender; bowel sounds normal; no masses,  no organomegaly  Rectal: deferred  Skin: Skin color, texture, turgor normal  No rashes or lesions    Lab Results:   No visits with results within 1 Day(s) from this visit  Latest known visit with results is:   No results displayed because visit has over 200 results  Imaging Studies:     ASSESSMENT: Diverticular disease    PLAN: Risks/benefits of colonoscopy including perforation or bleeding discussed and she agrees    Counseling / Coordination of Care  Total time spent today  20 minutes  Greater than 50% of total time was spent with the patient and / or family counseling and / or coordination of care

## 2019-10-17 ENCOUNTER — IMMUNIZATIONS (OUTPATIENT)
Dept: FAMILY MEDICINE CLINIC | Facility: CLINIC | Age: 69
End: 2019-10-17
Payer: COMMERCIAL

## 2019-10-17 DIAGNOSIS — Z23 ENCOUNTER FOR IMMUNIZATION: ICD-10-CM

## 2019-10-17 PROCEDURE — 90662 IIV NO PRSV INCREASED AG IM: CPT

## 2019-10-17 PROCEDURE — 90471 IMMUNIZATION ADMIN: CPT

## 2019-10-23 DIAGNOSIS — Z12.39 ENCOUNTER FOR SCREENING FOR MALIGNANT NEOPLASM OF BREAST: Primary | ICD-10-CM

## 2019-10-29 ENCOUNTER — TELEPHONE (OUTPATIENT)
Dept: FAMILY MEDICINE CLINIC | Facility: CLINIC | Age: 69
End: 2019-10-29

## 2019-11-26 DIAGNOSIS — E06.3 HYPOTHYROIDISM DUE TO HASHIMOTO'S THYROIDITIS: Chronic | ICD-10-CM

## 2019-11-26 DIAGNOSIS — E03.8 HYPOTHYROIDISM DUE TO HASHIMOTO'S THYROIDITIS: Chronic | ICD-10-CM

## 2019-11-26 RX ORDER — LEVOTHYROXINE SODIUM 0.05 MG/1
TABLET ORAL
Qty: 30 TABLET | Refills: 5 | Status: SHIPPED | OUTPATIENT
Start: 2019-11-26 | End: 2020-06-08 | Stop reason: SDUPTHER

## 2019-12-18 ENCOUNTER — OFFICE VISIT (OUTPATIENT)
Dept: FAMILY MEDICINE CLINIC | Facility: CLINIC | Age: 69
End: 2019-12-18
Payer: COMMERCIAL

## 2019-12-18 VITALS
RESPIRATION RATE: 16 BRPM | DIASTOLIC BLOOD PRESSURE: 68 MMHG | SYSTOLIC BLOOD PRESSURE: 132 MMHG | WEIGHT: 200.6 LBS | TEMPERATURE: 99.2 F | OXYGEN SATURATION: 98 % | HEART RATE: 77 BPM | BODY MASS INDEX: 38.82 KG/M2

## 2019-12-18 DIAGNOSIS — M77.12 LATERAL EPICONDYLITIS OF LEFT ELBOW: ICD-10-CM

## 2019-12-18 DIAGNOSIS — Z13.6 SCREENING FOR CARDIOVASCULAR CONDITION: ICD-10-CM

## 2019-12-18 DIAGNOSIS — Z00.00 ANNUAL PHYSICAL EXAM: Primary | ICD-10-CM

## 2019-12-18 DIAGNOSIS — E06.3 HYPOTHYROIDISM DUE TO HASHIMOTO'S THYROIDITIS: Chronic | ICD-10-CM

## 2019-12-18 DIAGNOSIS — K57.90 DIVERTICULOSIS: ICD-10-CM

## 2019-12-18 DIAGNOSIS — Z13.1 SCREENING FOR DIABETES MELLITUS: ICD-10-CM

## 2019-12-18 DIAGNOSIS — E03.8 HYPOTHYROIDISM DUE TO HASHIMOTO'S THYROIDITIS: Chronic | ICD-10-CM

## 2019-12-18 PROCEDURE — 1160F RVW MEDS BY RX/DR IN RCRD: CPT | Performed by: FAMILY MEDICINE

## 2019-12-18 PROCEDURE — 99397 PER PM REEVAL EST PAT 65+ YR: CPT | Performed by: FAMILY MEDICINE

## 2019-12-18 NOTE — PROGRESS NOTES
[address] [facility]    NAME: Kaylin Friend  AGE: 68 y o  SEX: female  : 1950     DATE: 2019     Assessment and Plan:     Problem List Items Addressed This Visit        Endocrine    Hypothyroidism due to Hashimoto's thyroiditis (Chronic)    Relevant Orders    TSH, 3rd generation with Free T4 reflex      Other Visit Diagnoses     Annual physical exam    -  Primary    Zoey Eugenia appears well  She is to continue a healthy, lower carb diet, and to get regular exercise  FBW ordered  Screening for diabetes mellitus        Relevant Orders    Comprehensive metabolic panel    Screening for cardiovascular condition        Relevant Orders    Lipid Panel with Direct LDL reflex    Diverticulosis        Condition is stable; colonoscopy recently UTD  Suspect here some mild constipation -> pt to try OTC Metamucil daily, with good PO hydration  Lateral epicondylitis of left elbow        Mild at this time -> pt to try an OTC elbow strap at this time  Pt declines steroid injection / PT referral at this time  Immunizations and preventive care screenings were discussed with patient today  Appropriate education was printed on patient's after visit summary  Counseling:  Dental Health: discussed importance of regular tooth brushing, flossing, and dental visits  · Exercise: the importance of regular exercise/physical activity was discussed  Recommend exercise 3-5 times per week for at least 30 minutes  Return in 6 months (on 2020) for Recheck       Chief Complaint:     Chief Complaint   Patient presents with    Annual Exam     Patient here for annual wellness exam     Arm Pain     Patient here for left arm and elbow pain, and arm weakness for past month     Abdominal Pain     patient here tenderness, pressure in stomach before bowel movements       History of Present Illness:     Adult Annual Physical   Patient here for a comprehensive physical exam  The patient reports no issues  She had colonoscopy in 08/2019 (pt was hospitalized for acute diverticulitis earlier this year)  1 polyp removed - due again in 2022  She had mammogram ordered at last OV - she will complete; I urged her to complete  She had HD Flu Vaccine earlier this year; has had Prevnar-13 and PPSV23 already  Discussed the Shingrix Vaccine series  Left elbow pain x 1 month - especially when picking things up  Mild discomfort in the lower abdomen, intermittent, in the last 2 - 3 months  Diet and Physical Activity  · Diet/Nutrition: well balanced diet  · Exercise: walking  Depression Screening  PHQ-9 Depression Screening    PHQ-9:    Frequency of the following problems over the past two weeks:            General Health  · Sleep: sleeps well  · Hearing: normal - bilateral   · Vision: no vision problems  · Dental: regular dental visits  /GYN Health  · Patient is: postmenopausal  · Last menstrual period: N/A  · Contraceptive method: None  Review of Systems:     Review of Systems   Constitutional: Negative for activity change and fever  Respiratory: Negative for shortness of breath  Cardiovascular: Negative for chest pain  Gastrointestinal: Negative for abdominal pain, blood in stool and constipation  No sharp abd pain, like her diverticulitis that she had earlier this year  Mild, lower abd discomfort  Genitourinary: Negative for dysuria, frequency and vaginal bleeding  No new breast lumps on self-examination  Musculoskeletal: Positive for arthralgias  Psychiatric/Behavioral: Negative for dysphoric mood  The patient is not nervous/anxious         Past Medical History:     Past Medical History:   Diagnosis Date    Arthritis     Disease of thyroid gland     PNA (pneumonia) 05/2019      Past Surgical History:     Past Surgical History:   Procedure Laterality Date    CHOLECYSTECTOMY  1968    open Social History:     Social History     Socioeconomic History    Marital status: /Civil Union     Spouse name: None    Number of children: None    Years of education: None    Highest education level: None   Occupational History    None   Social Needs    Financial resource strain: None    Food insecurity:     Worry: None     Inability: None    Transportation needs:     Medical: None     Non-medical: None   Tobacco Use    Smoking status: Never Smoker    Smokeless tobacco: Never Used   Substance and Sexual Activity    Alcohol use: No    Drug use: Never    Sexual activity: None   Lifestyle    Physical activity:     Days per week: None     Minutes per session: None    Stress: None   Relationships    Social connections:     Talks on phone: None     Gets together: None     Attends [employer]al service: None     Active member of club or organization: None     Attends meetings of clubs or organizations: None     Relationship status: None    Intimate partner violence:     Fear of current or ex partner: None     Emotionally abused: None     Physically abused: None     Forced sexual activity: None   Other Topics Concern    None   Social History Narrative    None      Family History:     Family History   Problem Relation Age of Onset    Cancer Mother     Cancer Father     Urolithiasis Sister       Current Medications:     Current Outpatient Medications   Medication Sig Dispense Refill    acetaminophen (TYLENOL) 325 mg tablet Take 2 tablets (650 mg total) by mouth every 6 (six) hours as needed for mild pain 30 tablet 0    levothyroxine 50 mcg tablet TAKE 1 TABLET BY MOUTH EVERY DAY 30 tablet 5    Probiotic Product (PROBIOTIC-10) CAPS Take 1 capsule by mouth       No current facility-administered medications for this visit  Allergies:      Allergies   Allergen Reactions    Codeine Anaphylaxis     Increased Heart Rate, Palpitations      Physical Exam:     /68   Pulse 77   Temp 99 2 °F (37 3 °C)   Resp 16   Wt 91 kg (200 lb 9 6 oz)   SpO2 98%   BMI 38 82 kg/m²     Physical Exam   Constitutional: She is oriented to person, place, and time  She appears well-developed and well-nourished  No distress  HENT:   Head: Normocephalic and atraumatic  Right Ear: Hearing, tympanic membrane, external ear and ear canal normal    Left Ear: Hearing, tympanic membrane, external ear and ear canal normal    Mouth/Throat: Oropharynx is clear and moist  No oropharyngeal exudate  Eyes: Conjunctivae are normal    Neck: Normal range of motion  Neck supple  No thyromegaly present  Cardiovascular: Normal rate, regular rhythm and normal heart sounds  Exam reveals no gallop and no friction rub  No murmur heard  Pulmonary/Chest: Effort normal and breath sounds normal  No stridor  No respiratory distress  She has no wheezes  She has no rales  Abdominal: Soft  Bowel sounds are normal  She exhibits no distension and no mass  There is no tenderness  There is no rebound and no guarding  Musculoskeletal:        Left elbow: She exhibits normal range of motion, no swelling and no effusion  Tenderness found  Lateral epicondyle tenderness noted  Arms:  Lymphadenopathy:     She has no cervical adenopathy  Neurological: She is alert and oriented to person, place, and time  Skin: She is not diaphoretic  Psychiatric: She has a normal mood and affect  Her behavior is normal  Judgment and thought content normal    Nursing note and vitals reviewed        Stan Perry, [address] [facility]

## 2019-12-18 NOTE — PATIENT INSTRUCTIONS

## 2020-06-08 DIAGNOSIS — E03.8 HYPOTHYROIDISM DUE TO HASHIMOTO'S THYROIDITIS: Chronic | ICD-10-CM

## 2020-06-08 DIAGNOSIS — E06.3 HYPOTHYROIDISM DUE TO HASHIMOTO'S THYROIDITIS: Chronic | ICD-10-CM

## 2020-06-08 RX ORDER — LEVOTHYROXINE SODIUM 0.05 MG/1
50 TABLET ORAL DAILY
Qty: 30 TABLET | Refills: 5 | Status: SHIPPED | OUTPATIENT
Start: 2020-06-08 | End: 2020-12-14

## 2020-06-17 ENCOUNTER — OFFICE VISIT (OUTPATIENT)
Dept: FAMILY MEDICINE CLINIC | Facility: CLINIC | Age: 70
End: 2020-06-17
Payer: COMMERCIAL

## 2020-06-17 VITALS
DIASTOLIC BLOOD PRESSURE: 62 MMHG | BODY MASS INDEX: 40.68 KG/M2 | HEART RATE: 73 BPM | RESPIRATION RATE: 16 BRPM | WEIGHT: 210.2 LBS | SYSTOLIC BLOOD PRESSURE: 142 MMHG | OXYGEN SATURATION: 79 % | TEMPERATURE: 97.7 F

## 2020-06-17 DIAGNOSIS — E06.3 HYPOTHYROIDISM DUE TO HASHIMOTO'S THYROIDITIS: Primary | Chronic | ICD-10-CM

## 2020-06-17 DIAGNOSIS — E03.8 HYPOTHYROIDISM DUE TO HASHIMOTO'S THYROIDITIS: Primary | Chronic | ICD-10-CM

## 2020-06-17 PROCEDURE — 1036F TOBACCO NON-USER: CPT | Performed by: FAMILY MEDICINE

## 2020-06-17 PROCEDURE — 99213 OFFICE O/P EST LOW 20 MIN: CPT | Performed by: FAMILY MEDICINE

## 2020-06-17 PROCEDURE — 1160F RVW MEDS BY RX/DR IN RCRD: CPT | Performed by: FAMILY MEDICINE

## 2020-06-17 PROCEDURE — 4040F PNEUMOC VAC/ADMIN/RCVD: CPT | Performed by: FAMILY MEDICINE

## 2020-10-05 ENCOUNTER — IMMUNIZATIONS (OUTPATIENT)
Dept: FAMILY MEDICINE CLINIC | Facility: CLINIC | Age: 70
End: 2020-10-05

## 2020-10-05 DIAGNOSIS — Z23 ENCOUNTER FOR IMMUNIZATION: Primary | ICD-10-CM

## 2020-12-12 DIAGNOSIS — E03.8 HYPOTHYROIDISM DUE TO HASHIMOTO'S THYROIDITIS: Chronic | ICD-10-CM

## 2020-12-12 DIAGNOSIS — E06.3 HYPOTHYROIDISM DUE TO HASHIMOTO'S THYROIDITIS: Chronic | ICD-10-CM

## 2020-12-14 RX ORDER — LEVOTHYROXINE SODIUM 0.05 MG/1
TABLET ORAL
Qty: 30 TABLET | Refills: 5 | Status: SHIPPED | OUTPATIENT
Start: 2020-12-14 | End: 2021-06-14

## 2021-02-08 PROBLEM — E66.01 MORBID OBESITY (HCC): Status: ACTIVE | Noted: 2019-05-24

## 2021-02-23 ENCOUNTER — OFFICE VISIT (OUTPATIENT)
Dept: FAMILY MEDICINE CLINIC | Facility: CLINIC | Age: 71
End: 2021-02-23
Payer: COMMERCIAL

## 2021-02-23 ENCOUNTER — TELEPHONE (OUTPATIENT)
Dept: FAMILY MEDICINE CLINIC | Facility: CLINIC | Age: 71
End: 2021-02-23

## 2021-02-23 VITALS
WEIGHT: 206.8 LBS | RESPIRATION RATE: 18 BRPM | HEIGHT: 60 IN | TEMPERATURE: 99 F | BODY MASS INDEX: 40.6 KG/M2 | HEART RATE: 82 BPM | OXYGEN SATURATION: 98 % | DIASTOLIC BLOOD PRESSURE: 76 MMHG | SYSTOLIC BLOOD PRESSURE: 138 MMHG

## 2021-02-23 DIAGNOSIS — R10.9 FLANK PAIN: ICD-10-CM

## 2021-02-23 LAB
SL AMB  POCT GLUCOSE, UA: ABNORMAL
SL AMB LEUKOCYTE ESTERASE,UA: ABNORMAL
SL AMB POCT BILIRUBIN,UA: ABNORMAL
SL AMB POCT BLOOD,UA: ABNORMAL
SL AMB POCT CLARITY,UA: CLEAR
SL AMB POCT COLOR,UA: YELLOW
SL AMB POCT KETONES,UA: ABNORMAL
SL AMB POCT NITRITE,UA: ABNORMAL
SL AMB POCT PH,UA: 5.5
SL AMB POCT SPECIFIC GRAVITY,UA: 1.02
SL AMB POCT URINE PROTEIN: ABNORMAL
SL AMB POCT UROBILINOGEN: 0.2

## 2021-02-23 PROCEDURE — 81002 URINALYSIS NONAUTO W/O SCOPE: CPT | Performed by: NURSE PRACTITIONER

## 2021-02-23 PROCEDURE — 1101F PT FALLS ASSESS-DOCD LE1/YR: CPT | Performed by: NURSE PRACTITIONER

## 2021-02-23 PROCEDURE — 99213 OFFICE O/P EST LOW 20 MIN: CPT | Performed by: NURSE PRACTITIONER

## 2021-02-23 PROCEDURE — 3288F FALL RISK ASSESSMENT DOCD: CPT | Performed by: NURSE PRACTITIONER

## 2021-02-23 PROCEDURE — 87086 URINE CULTURE/COLONY COUNT: CPT | Performed by: NURSE PRACTITIONER

## 2021-02-23 RX ORDER — TAMSULOSIN HYDROCHLORIDE 0.4 MG/1
0.4 CAPSULE ORAL
Qty: 14 CAPSULE | Refills: 0 | Status: SHIPPED | OUTPATIENT
Start: 2021-02-23 | End: 2022-02-13 | Stop reason: CLARIF

## 2021-02-23 NOTE — PROGRESS NOTES
FAMILY PRACTICE OFFICE VISIT       NAME: Susie Silvestre  AGE: 65 y o  SEX: female       : 1950        MRN: [id number]    DATE: 2021  TIME: 7:30 AM    Assessment and Plan   1  Flank pain  -     POCT urine dip  -     Urine culture  -     tamsulosin (FLOMAX) 0 4 mg; Take 1 capsule (0 4 mg total) by mouth daily with dinner  -     CT abdomen pelvis wo contrast; Future; Expected date: 2021                 Chief Complaint     Chief Complaint   Patient presents with    Flank Pain       History of Present Illness   Susie Silvestre is a 65y o -year-old female who is here for flank pain  Started  at 2am  Right side radiating to right abdomen  Urinary frequency  Denies pain with urination  No uti symptoms  No visible hematuria  No fever  Some chills  No sweats  No n/v/d  Hx of kidney stones and pain feels the same  Has been able to pass stones in past  Last one years ago  Using tylenol q4 hours taking edge of pain from 10 to 3  Sharp and stabbing pain  Drinking cranberry juice      Review of Systems   Review of Systems   Constitutional: Negative for fatigue and fever  HENT: Negative for congestion and postnasal drip  Respiratory: Negative for cough and shortness of breath  Cardiovascular: Negative for chest pain and palpitations  Gastrointestinal: Negative for constipation and diarrhea  Genitourinary: Positive for flank pain  Negative for dysuria, frequency and hematuria  Musculoskeletal: Negative for arthralgias and myalgias  Skin: Negative for rash  Neurological: Negative for dizziness and headaches  Hematological: Negative for adenopathy  Psychiatric/Behavioral: Negative for dysphoric mood and sleep disturbance  The patient is not nervous/anxious          Active Problem List     Patient Active Problem List   Diagnosis    Hypothyroidism due to Hashimoto's thyroiditis    Diverticulitis of large intestine with perforation without bleeding    Morbid obesity ([facility] [address] )         Past Medical History:  Past Medical History:   Diagnosis Date    Arthritis     Disease of thyroid gland     PNA (pneumonia) 05/2019       Past Surgical History:  Past Surgical History:   Procedure Laterality Date    CHOLECYSTECTOMY  1968    open       Family History:  Family History   Problem Relation Age of Onset    Cancer Mother     Cancer Father     Urolithiasis Sister        Social History:  Social History     Socioeconomic History    Marital status: /Civil Union     Spouse name: Not on file    Number of children: Not on file    Years of education: Not on file    Highest education level: Not on file   Occupational History    Not on file   Social Needs    Financial resource strain: Not on file    Food insecurity     Worry: Not on file     Inability: Not on file    Transportation needs     Medical: Not on file     Non-medical: Not on file   Tobacco Use    Smoking status: Never Smoker    Smokeless tobacco: Never Used   Substance and Sexual Activity    Alcohol use: No    Drug use: Never    Sexual activity: Not on file   Lifestyle    Physical activity     Days per week: Not on file     Minutes per session: Not on file    Stress: Not on file   Relationships    Social connections     Talks on phone: Not on file     Gets together: Not on file     Attends Rastafari service: Not on file     Active member of club or organization: Not on file     Attends meetings of clubs or organizations: Not on file     Relationship status: Not on file    Intimate partner violence     Fear of current or ex partner: Not on file     Emotionally abused: Not on file     Physically abused: Not on file     Forced sexual activity: Not on file   Other Topics Concern    Not on file   Social History Narrative    Not on file       Objective     Vitals:    02/23/21 1155   BP: 138/76   Pulse: 82   Resp: 18   Temp: 99 °F (37 2 °C)   SpO2: 98%     Wt Readings from Last 3 Encounters:   02/23/21 93 8 kg (206 lb 12 8 oz) 06/17/20 95 3 kg (210 lb 3 2 oz)   12/18/19 91 kg (200 lb 9 6 oz)       Physical Exam  Vitals signs and nursing note reviewed  Constitutional:       Appearance: Normal appearance  HENT:      Head: Normocephalic and atraumatic  Eyes:      Conjunctiva/sclera: Conjunctivae normal    Cardiovascular:      Rate and Rhythm: Normal rate and regular rhythm  Pulmonary:      Effort: Pulmonary effort is normal       Breath sounds: Normal breath sounds  Abdominal:      General: Bowel sounds are normal    Musculoskeletal: Normal range of motion  Skin:     General: Skin is warm and dry  Neurological:      General: No focal deficit present  Mental Status: She is alert and oriented to person, place, and time     Psychiatric:         Mood and Affect: Mood normal          Behavior: Behavior normal          Judgment: Judgment normal          Pertinent Laboratory/Diagnostic Studies:  Lab Results   Component Value Date    GLUCOSE 89 05/10/2015    BUN 5 05/27/2019    CREATININE 0 77 05/27/2019    CALCIUM 9 3 05/27/2019     05/10/2015    K 3 8 05/27/2019    CO2 25 05/27/2019     05/27/2019     Lab Results   Component Value Date    ALT 21 05/26/2019    AST 28 05/26/2019    ALKPHOS 90 05/26/2019    BILITOT 0 4 05/10/2015       Lab Results   Component Value Date    WBC 8 82 05/28/2019    HGB 12 3 05/28/2019    HCT 37 1 05/28/2019    MCV 90 05/28/2019     05/28/2019       No results found for: TSH    No results found for: CHOL  Lab Results   Component Value Date    TRIG 153 (H) 10/03/2018     Lab Results   Component Value Date    HDL 41 10/03/2018     Lab Results   Component Value Date    LDLCALC 143 (H) 10/03/2018     No results found for: HGBA1C    Results for orders placed or performed in visit on 02/23/21   Urine culture    Specimen: Urine, Other   Result Value Ref Range    Urine Culture <10,000 cfu/ml     POCT urine dip   Result Value Ref Range    LEUKOCYTE ESTERASE,UA neg     NITRITE,UA neg     SL AMB POCT UROBILINOGEN 0 2     POCT URINE PROTEIN neg      PH,UA 5 5     BLOOD,UA 3+     SPECIFIC GRAVITY,UA 1 025     KETONES,UA neg     BILIRUBIN,UA neg     GLUCOSE, UA neg      COLOR,UA yellow     CLARITY,UA clear        Orders Placed This Encounter   Procedures    Urine culture    CT abdomen pelvis wo contrast    POCT urine dip       ALLERGIES:  Allergies   Allergen Reactions    Codeine Anaphylaxis     Increased Heart Rate, Palpitations       Current Medications     Current Outpatient Medications   Medication Sig Dispense Refill    acetaminophen (TYLENOL) 325 mg tablet Take 2 tablets (650 mg total) by mouth every 6 (six) hours as needed for mild pain 30 tablet 0    levothyroxine 50 mcg tablet TAKE 1 TABLET BY MOUTH EVERY DAY 30 tablet 5    Probiotic Product (PROBIOTIC-10) CAPS Take 1 capsule by mouth      tamsulosin (FLOMAX) 0 4 mg Take 1 capsule (0 4 mg total) by mouth daily with dinner 14 capsule 0     No current facility-administered medications for this visit            Health Maintenance     Health Maintenance   Topic Date Due    Hepatitis C Screening  1950    MAMMOGRAM  1950    COVID-19 Vaccine (1 of 2) 10/23/1964    BMI: Followup Plan  10/23/1966    DTaP,Tdap,and Td Vaccines (1 - Tdap) 10/23/1969    Influenza Vaccine (1) 09/01/2020    Fall Risk  12/18/2020    Annual Physical  12/18/2020    Depression Screening PHQ  02/23/2022    BMI: Adult  02/23/2022    Colonoscopy Surveillance  08/05/2022    Colorectal Cancer Screening  08/05/2029    Pneumococcal Vaccine: 65+ Years  Completed    HIB Vaccine  Aged Out    Hepatitis B Vaccine  Aged Out    IPV Vaccine  Aged Out    Hepatitis A Vaccine  Aged Out    Meningococcal ACWY Vaccine  Aged Out    HPV Vaccine  Aged [employer] History   Administered Date(s) Administered    INFLUENZA 12/07/2015    Influenza Split High Dose Preservative Free IM 12/07/2015, 09/18/2017    Influenza, high dose seasonal 0 7 mL 10/06/2018, 10/17/2019    Pneumococcal Conjugate 13-Valent 12/07/2015    Pneumococcal Polysaccharide PPV23 07/11/2018     BMI Counseling: Body mass index is 40 01 kg/m²  The BMI is above normal  Nutrition recommendations include decreasing portion sizes, encouraging healthy choices of fruits and vegetables, decreasing fast food intake, consuming healthier snacks, limiting drinks that contain sugar, moderation in carbohydrate intake, increasing intake of lean protein, reducing intake of saturated and trans fat and reducing intake of cholesterol  Exercise recommendations include moderate physical activity 150 minutes/week, exercising 3-5 times per week and strength training exercises  No pharmacotherapy was ordered             RILEY Gonzalez

## 2021-02-23 NOTE — TELEPHONE ENCOUNTER
Pt called re: PT is requesting the pain medication as discussed during today's visit     Ph[facility]

## 2021-02-24 ENCOUNTER — HOSPITAL ENCOUNTER (OUTPATIENT)
Dept: CT IMAGING | Facility: HOSPITAL | Age: 71
Discharge: HOME | End: 2021-02-24
Payer: COMMERCIAL

## 2021-02-24 DIAGNOSIS — R10.9 FLANK PAIN: ICD-10-CM

## 2021-02-24 LAB — BACTERIA UR CULT: NORMAL

## 2021-02-24 PROCEDURE — 74176 CT ABD & PELVIS W/O CONTRAST: CPT

## 2021-02-24 PROCEDURE — G1004 CDSM NDSC: HCPCS

## 2021-02-26 ENCOUNTER — OFFICE VISIT (OUTPATIENT)
Dept: FAMILY MEDICINE CLINIC | Facility: CLINIC | Age: 71
End: 2021-02-26
Payer: COMMERCIAL

## 2021-02-26 VITALS
HEIGHT: 60 IN | WEIGHT: 204.4 LBS | TEMPERATURE: 97.8 F | RESPIRATION RATE: 16 BRPM | HEART RATE: 61 BPM | BODY MASS INDEX: 40.13 KG/M2 | OXYGEN SATURATION: 98 % | DIASTOLIC BLOOD PRESSURE: 80 MMHG | SYSTOLIC BLOOD PRESSURE: 130 MMHG

## 2021-02-26 DIAGNOSIS — R10.9 RIGHT FLANK PAIN: Primary | ICD-10-CM

## 2021-02-26 PROCEDURE — 1160F RVW MEDS BY RX/DR IN RCRD: CPT | Performed by: FAMILY MEDICINE

## 2021-02-26 PROCEDURE — 99214 OFFICE O/P EST MOD 30 MIN: CPT | Performed by: FAMILY MEDICINE

## 2021-02-26 PROCEDURE — 3008F BODY MASS INDEX DOCD: CPT | Performed by: FAMILY MEDICINE

## 2021-02-26 PROCEDURE — 1036F TOBACCO NON-USER: CPT | Performed by: FAMILY MEDICINE

## 2021-02-26 RX ORDER — HYDROCODONE BITARTRATE AND ACETAMINOPHEN 5; 325 MG/1; MG/1
1 TABLET ORAL EVERY 6 HOURS PRN
Qty: 15 TABLET | Refills: 0 | Status: SHIPPED | OUTPATIENT
Start: 2021-02-26 | End: 2022-02-13 | Stop reason: CLARIF

## 2021-02-26 RX ORDER — METHOCARBAMOL 500 MG/1
500 TABLET, FILM COATED ORAL 3 TIMES DAILY PRN
Qty: 40 TABLET | Refills: 0 | Status: SHIPPED | OUTPATIENT
Start: 2021-02-26 | End: 2022-02-13 | Stop reason: CLARIF

## 2021-02-26 NOTE — PROGRESS NOTES
FAMILY PRACTICE OFFICE VISIT       NAME: Don Salazar  AGE: 65 y o  SEX: female       : 1950        MRN: [id number]    DATE: 2021  TIME: 9:55 PM    Assessment and Plan   1  Right flank pain  Comments:  Pt stable; improving  Possibly has already passed a kidney stone  Hydrate well, heat to back, Tylenol PRN, Norco PRN severe pain ([place] PDMP checked)  Precautions  Orders:  -     methocarbamol (ROBAXIN) 500 mg tablet; Take 1 tablet (500 mg total) by mouth 3 (three) times a day as needed for muscle spasms  -     HYDROcodone-acetaminophen (NORCO) 5-325 mg per tablet; Take 1 tablet by mouth every 6 (six) hours as needed for painMax Daily Amount: 4 tablets         There are no Patient Instructions on file for this visit  Chief Complaint     Chief Complaint   Patient presents with    Follow-up     Patient is here for back right to front right       History of Present Illness   Don Salazar is a 65y o -year-old female who presents now 5+ days of right pain into the upper abd  It was constant initially, came on suddenly, and then became intermittent  She did see the clinic 21, and urine testing / CT on Renal Protocol (no contrast) were ordered  CT showed no kidney stones, no hydronephrosis, a normal biliary tree (pt with hx of cholecystectomy), and diverticulosis (no diverticulitis) - Urine Dip showed 3+ blood, urine cx was negative  Has had diverticulitis and nephrolithiasis in the past       Review of Systems   Review of Systems   Constitutional: Negative for activity change and fever  Respiratory: Negative for shortness of breath  Cardiovascular: Negative for chest pain  Gastrointestinal: Positive for abdominal pain  Negative for blood in stool and constipation  Genitourinary: Negative for dysuria and hematuria  Possible "tinge" of blood on paper when wiping after voiding  Musculoskeletal: Positive for back pain          Overall, she is feeling better, but not yet at baseline           Active Problem List     Patient Active Problem List   Diagnosis    Hypothyroidism due to Hashimoto's thyroiditis    Diverticulitis of large intestine with perforation without bleeding    Morbid obesity ([facility] [address] )         Past Medical History:  Past Medical History:   Diagnosis Date    Arthritis     Disease of thyroid gland     PNA (pneumonia) 05/2019       Past Surgical History:  Past Surgical History:   Procedure Laterality Date    CHOLECYSTECTOMY  1968    open       Family History:  Family History   Problem Relation Age of Onset    Cancer Mother     Cancer Father     Urolithiasis Sister        Social History:  Social History     Socioeconomic History    Marital status: /Civil Union     Spouse name: Not on file    Number of children: Not on file    Years of education: Not on file    Highest education level: Not on file   Occupational History    Not on file   Social Needs    Financial resource strain: Not on file    Food insecurity     Worry: Not on file     Inability: Not on file    Transportation needs     Medical: Not on file     Non-medical: Not on file   Tobacco Use    Smoking status: Never Smoker    Smokeless tobacco: Never Used   Substance and Sexual Activity    Alcohol use: No    Drug use: Never    Sexual activity: Not on file   Lifestyle    Physical activity     Days per week: Not on file     Minutes per session: Not on file    Stress: Not on file   Relationships    Social connections     Talks on phone: Not on file     Gets together: Not on file     Attends Pentecostalism service: Not on file     Active member of club or organization: Not on file     Attends meetings of clubs or organizations: Not on file     Relationship status: Not on file    Intimate partner violence     Fear of current or ex partner: Not on file     Emotionally abused: Not on file     Physically abused: Not on file     Forced sexual activity: Not on file   Other Topics Concern    Not on file Social History Narrative    Not on file       Objective     Vitals:    02/26/21 0916   BP: 130/80   Pulse: 61   Resp: 16   Temp: 97 8 °F (36 6 °C)   SpO2: 98%     Wt Readings from Last 3 Encounters:   02/26/21 92 7 kg (204 lb 6 4 oz)   02/23/21 93 8 kg (206 lb 12 8 oz)   06/17/20 95 3 kg (210 lb 3 2 oz)       Physical Exam  Vitals signs and nursing note reviewed  Constitutional:       General: She is not in acute distress  Appearance: Normal appearance  She is not ill-appearing, toxic-appearing or diaphoretic  HENT:      Head: Normocephalic and atraumatic  Eyes:      General: No scleral icterus  Conjunctiva/sclera: Conjunctivae normal    Neck:      Musculoskeletal: Normal range of motion and neck supple  No neck rigidity or muscular tenderness  Cardiovascular:      Rate and Rhythm: Normal rate and regular rhythm  Heart sounds: Normal heart sounds  No murmur  No friction rub  No gallop  Pulmonary:      Effort: Pulmonary effort is normal  No respiratory distress  Breath sounds: Normal breath sounds  No stridor  No wheezing, rhonchi or rales  Abdominal:      General: Abdomen is flat  Bowel sounds are normal  There is no distension  Palpations: Abdomen is soft  There is no mass  Tenderness: There is no abdominal tenderness  There is no right CVA tenderness, left CVA tenderness, guarding or rebound  Comments: Very mild TTP RUQ; no rebound TTP on exam, no rigidity  Lymphadenopathy:      Cervical: No cervical adenopathy  Neurological:      Mental Status: She is alert and oriented to person, place, and time  Psychiatric:         Mood and Affect: Mood normal          Behavior: Behavior normal          Thought Content:  Thought content normal          Judgment: Judgment normal          Pertinent Laboratory/Diagnostic Studies:  Lab Results   Component Value Date    GLUCOSE 89 05/10/2015    BUN 5 05/27/2019    CREATININE 0 77 05/27/2019    CALCIUM 9 3 05/27/2019     05/10/2015    K 3 8 05/27/2019    CO2 25 05/27/2019     05/27/2019     Lab Results   Component Value Date    ALT 21 05/26/2019    AST 28 05/26/2019    ALKPHOS 90 05/26/2019    BILITOT 0 4 05/10/2015       Lab Results   Component Value Date    WBC 8 82 05/28/2019    HGB 12 3 05/28/2019    HCT 37 1 05/28/2019    MCV 90 05/28/2019     05/28/2019       No results found for: TSH    No results found for: CHOL  Lab Results   Component Value Date    TRIG 153 (H) 10/03/2018     Lab Results   Component Value Date    HDL 41 10/03/2018     Lab Results   Component Value Date    LDLCALC 143 (H) 10/03/2018     No results found for: HGBA1C    Results for orders placed or performed in visit on 02/23/21   Urine culture    Specimen: Urine, Other   Result Value Ref Range    Urine Culture <10,000 cfu/ml     POCT urine dip   Result Value Ref Range    LEUKOCYTE ESTERASE,UA neg     NITRITE,UA neg     SL AMB POCT UROBILINOGEN 0 2     POCT URINE PROTEIN neg      PH,UA 5 5     BLOOD,UA 3+     SPECIFIC GRAVITY,UA 1 025     KETONES,UA neg     BILIRUBIN,UA neg     GLUCOSE, UA neg      COLOR,UA yellow     CLARITY,UA clear        No orders of the defined types were placed in this encounter        ALLERGIES:  Allergies   Allergen Reactions    Codeine Anaphylaxis     Increased Heart Rate, Palpitations       Current Medications     Current Outpatient Medications   Medication Sig Dispense Refill    acetaminophen (TYLENOL) 325 mg tablet Take 2 tablets (650 mg total) by mouth every 6 (six) hours as needed for mild pain 30 tablet 0    levothyroxine 50 mcg tablet TAKE 1 TABLET BY MOUTH EVERY DAY 30 tablet 5    Probiotic Product (PROBIOTIC-10) CAPS Take 1 capsule by mouth      HYDROcodone-acetaminophen (NORCO) 5-325 mg per tablet Take 1 tablet by mouth every 6 (six) hours as needed for painMax Daily Amount: 4 tablets 15 tablet 0    methocarbamol (ROBAXIN) 500 mg tablet Take 1 tablet (500 mg total) by mouth 3 (three) times a day as needed for muscle spasms 40 tablet 0    tamsulosin (FLOMAX) 0 4 mg Take 1 capsule (0 4 mg total) by mouth daily with dinner (Patient not taking: Reported on 2/26/2021) 14 capsule 0     No current facility-administered medications for this visit            Health Maintenance     Health Maintenance   Topic Date Due    COVID-19 Vaccine (1 of 2) 10/23/1964    Annual Physical  12/18/2020    Hepatitis C Screening  02/26/2024 (Originally 1950)    DTaP,Tdap,and Td Vaccines (1 - Tdap) 02/26/2022 (Originally 10/23/1969)    MAMMOGRAM  02/26/2022 (Originally 1950)    Depression Screening PHQ  02/23/2022    Fall Risk  02/25/2022    BMI: Followup Plan  02/25/2022    BMI: Adult  02/26/2022    Colonoscopy Surveillance  08/05/2022    Colorectal Cancer Screening  08/05/2029    Pneumococcal Vaccine: 65+ Years  Completed    Influenza Vaccine  Completed    HIB Vaccine  Aged Out    Hepatitis B Vaccine  Aged Out    IPV Vaccine  Aged Out    Hepatitis A Vaccine  Aged Out    Meningococcal ACWY Vaccine  Aged Out    HPV Vaccine  Aged Out     Immunization History   Administered Date(s) Administered    INFLUENZA 12/07/2015, 10/31/2020    Influenza Split High Dose Preservative Free IM 12/07/2015, 09/18/2017    Influenza, high dose seasonal 0 7 mL 10/06/2018, 10/17/2019    Pneumococcal Conjugate 13-Valent 12/07/2015    Pneumococcal Polysaccharide PPV23 07/11/2018          Stan Zuluaga DO

## 2021-04-05 ENCOUNTER — TELEPHONE (OUTPATIENT)
Dept: FAMILY MEDICINE CLINIC | Facility: CLINIC | Age: 71
End: 2021-04-05

## 2021-04-05 NOTE — TELEPHONE ENCOUNTER
Called the patient and explained as stated by the provider   She will contact the insurance company and let us know

## 2021-04-05 NOTE — TELEPHONE ENCOUNTER
Ok to order guys for the pt if they really want - I will sign off  But, the test is not very high quality (lots of false negatives/positives),, and may not be covered by insurance  Thanks    Jovanna

## 2021-04-07 ENCOUNTER — TELEPHONE (OUTPATIENT)
Dept: FAMILY MEDICINE CLINIC | Facility: CLINIC | Age: 71
End: 2021-04-07

## 2021-04-07 DIAGNOSIS — Z20.822 CONTACT WITH AND (SUSPECTED) EXPOSURE TO COVID-19: Primary | ICD-10-CM

## 2021-04-07 NOTE — TELEPHONE ENCOUNTER
Patient called covid antibody is covered by insurance  Once ordered patient would like a call and will pick script up

## 2021-06-08 ENCOUNTER — TELEPHONE (OUTPATIENT)
Dept: FAMILY MEDICINE CLINIC | Facility: CLINIC | Age: 71
End: 2021-06-08

## 2021-06-08 DIAGNOSIS — J01.01 ACUTE RECURRENT MAXILLARY SINUSITIS: Primary | ICD-10-CM

## 2021-06-08 RX ORDER — AMOXICILLIN AND CLAVULANATE POTASSIUM 875; 125 MG/1; MG/1
1 TABLET, FILM COATED ORAL 2 TIMES DAILY WITH MEALS
Qty: 20 TABLET | Refills: 0 | Status: SHIPPED | OUTPATIENT
Start: 2021-06-08 | End: 2021-06-18

## 2021-06-08 NOTE — TELEPHONE ENCOUNTER
Pt called back and is getting worse as the day goes on pt states khoa/runny nose/pressure in her head and nauseas from the dripping in her throat pt asked if you could send something to [facility] on [address] pls advise

## 2021-06-08 NOTE — TELEPHONE ENCOUNTER
Patient is requesting something otc for sinus, she said it started yesterday  Nose dripping down her throat  Her head feels like it going to burst  No fever, no nausea, no vomiting

## 2021-06-13 DIAGNOSIS — E06.3 HYPOTHYROIDISM DUE TO HASHIMOTO'S THYROIDITIS: Chronic | ICD-10-CM

## 2021-06-13 DIAGNOSIS — E03.8 HYPOTHYROIDISM DUE TO HASHIMOTO'S THYROIDITIS: Chronic | ICD-10-CM

## 2021-06-14 RX ORDER — LEVOTHYROXINE SODIUM 0.05 MG/1
TABLET ORAL
Qty: 90 TABLET | Refills: 1 | Status: SHIPPED | OUTPATIENT
Start: 2021-06-14 | End: 2021-12-12

## 2021-06-21 ENCOUNTER — OFFICE VISIT (OUTPATIENT)
Dept: FAMILY MEDICINE CLINIC | Facility: CLINIC | Age: 71
End: 2021-06-21
Payer: COMMERCIAL

## 2021-06-21 VITALS
OXYGEN SATURATION: 99 % | RESPIRATION RATE: 18 BRPM | WEIGHT: 205.8 LBS | TEMPERATURE: 99.1 F | BODY MASS INDEX: 40.4 KG/M2 | HEART RATE: 76 BPM | SYSTOLIC BLOOD PRESSURE: 134 MMHG | HEIGHT: 60 IN | DIASTOLIC BLOOD PRESSURE: 82 MMHG

## 2021-06-21 DIAGNOSIS — J01.01 ACUTE RECURRENT MAXILLARY SINUSITIS: Primary | ICD-10-CM

## 2021-06-21 DIAGNOSIS — R05.9 COUGH: ICD-10-CM

## 2021-06-21 PROCEDURE — 1036F TOBACCO NON-USER: CPT | Performed by: FAMILY MEDICINE

## 2021-06-21 PROCEDURE — 99213 OFFICE O/P EST LOW 20 MIN: CPT | Performed by: FAMILY MEDICINE

## 2021-06-21 PROCEDURE — 3008F BODY MASS INDEX DOCD: CPT | Performed by: FAMILY MEDICINE

## 2021-06-21 PROCEDURE — 1160F RVW MEDS BY RX/DR IN RCRD: CPT | Performed by: FAMILY MEDICINE

## 2021-06-21 NOTE — PROGRESS NOTES
FAMILY PRACTICE OFFICE VISIT       NAME: Brenda Ag  AGE: 65 y o  SEX: female       : 1950        MRN: [id number]    DATE: 2021  TIME: 12:20 PM    Assessment and Plan   1  Acute recurrent maxillary sinusitis  Comments:  Rita Parson is stable on exam - condition resolving, pt feeling better  Completed Augmentin  Residual sx - for now, continue OTC Cough/Cold preps PRN  2  Cough  Comments:  As above; resolving  We will help set up the pt for her COV-19 immunization (pt desires J&J - disc potential R/SE)  There are no Patient Instructions on file for this visit  Chief Complaint     Chief Complaint   Patient presents with    Sinus Problem       History of Present Illness   Brenda Ag is a 65y o -year-old female who presents with some continued chest congestion after treatment for acute sinusitis with Augmentin (completed)  Review of Systems   Review of Systems   Constitutional: Negative for activity change and fever  HENT: Positive for congestion and sinus pressure  Negative for sinus pain  Respiratory: Positive for cough  Negative for shortness of breath and wheezing          Active Problem List     Patient Active Problem List   Diagnosis    Hypothyroidism due to Hashimoto's thyroiditis    Diverticulitis of large intestine with perforation without bleeding    Morbid obesity ([facility] [address] )         Past Medical History:  Past Medical History:   Diagnosis Date    Arthritis     Disease of thyroid gland     PNA (pneumonia) 2019       Past Surgical History:  Past Surgical History:   Procedure Laterality Date    CHOLECYSTECTOMY  1968    open       Family History:  Family History   Problem Relation Age of Onset    Cancer Mother     Diabetes Mother     Cancer Father     Diabetes Father     Urolithiasis Sister        Social History:  Social History     Socioeconomic History    Marital status: /Civil Union     Spouse name: Not on file    Number of children: Not on file    Years of education: Not on file    Highest education level: Not on file   Occupational History    Not on file   Tobacco Use    Smoking status: Former Smoker    Smokeless tobacco: Never Used   Substance and Sexual Activity    Alcohol use: Yes    Drug use: Never    Sexual activity: Not on file   Other Topics Concern    Not on file   Social History Narrative    Not on file     Social Determinants of Health     Financial Resource Strain:     Difficulty of Paying Living Expenses:    Food Insecurity:     Worried About Running Out of Food in the Last Year:     [address] in the Last Year:    Transportation Needs:     Lack of Transportation (Medical):  Lack of Transportation (Non-Medical):    Physical Activity:     Days of Exercise per Week:     Minutes of Exercise per Session:    Stress:     Feeling of Stress :    Social Connections:     Frequency of Communication with Friends and Family:     Frequency of Social Gatherings with Friends and Family:     Attends Latter day Services:     Active Member of Clubs or Organizations:     Attends Club or Organization Meetings:     Marital Status:    Intimate Partner Violence:     Fear of Current or Ex-Partner:     Emotionally Abused:     Physically Abused:     Sexually Abused:        Objective     Vitals:    06/21/21 1108   BP: 134/82   Pulse: 76   Resp: 18   Temp: 99 1 °F (37 3 °C)   SpO2: 99%     Wt Readings from Last 3 Encounters:   06/21/21 93 4 kg (205 lb 12 8 oz)   06/14/21 92 5 kg (204 lb)   02/26/21 92 7 kg (204 lb 6 4 oz)       Physical Exam  Vitals and nursing note reviewed  Constitutional:       General: She is not in acute distress  Appearance: Normal appearance  She is not ill-appearing, toxic-appearing or diaphoretic  HENT:      Head: Normocephalic and atraumatic  Right Ear: Tympanic membrane, ear canal and external ear normal  There is no impacted cerumen        Left Ear: Tympanic membrane, ear canal and external ear normal  There is no impacted cerumen  Nose:      Right Sinus: No maxillary sinus tenderness or frontal sinus tenderness  Left Sinus: No maxillary sinus tenderness or frontal sinus tenderness  Eyes:      General: No scleral icterus  Conjunctiva/sclera: Conjunctivae normal    Cardiovascular:      Rate and Rhythm: Normal rate and regular rhythm  Heart sounds: Normal heart sounds  No murmur heard  No friction rub  No gallop  Pulmonary:      Effort: Pulmonary effort is normal  No respiratory distress  Breath sounds: Normal breath sounds  No stridor  No wheezing, rhonchi or rales  Musculoskeletal:      Cervical back: Normal range of motion and neck supple  No rigidity or tenderness  Lymphadenopathy:      Cervical: No cervical adenopathy  Neurological:      Mental Status: She is alert and oriented to person, place, and time  Psychiatric:         Mood and Affect: Mood normal          Behavior: Behavior normal          Thought Content:  Thought content normal          Judgment: Judgment normal          Pertinent Laboratory/Diagnostic Studies:  Lab Results   Component Value Date    GLUCOSE 89 05/10/2015    BUN 5 05/27/2019    CREATININE 0 77 05/27/2019    CALCIUM 9 3 05/27/2019     05/10/2015    K 3 8 05/27/2019    CO2 25 05/27/2019     05/27/2019     Lab Results   Component Value Date    ALT 21 05/26/2019    AST 28 05/26/2019    ALKPHOS 90 05/26/2019    BILITOT 0 4 05/10/2015       Lab Results   Component Value Date    WBC 8 82 05/28/2019    HGB 12 3 05/28/2019    HCT 37 1 05/28/2019    MCV 90 05/28/2019     05/28/2019       No results found for: TSH    No results found for: CHOL  Lab Results   Component Value Date    TRIG 153 (H) 10/03/2018     Lab Results   Component Value Date    HDL 41 10/03/2018     Lab Results   Component Value Date    LDLCALC 143 (H) 10/03/2018     No results found for: HGBA1C    Results for orders placed or performed in visit on 02/23/21 Urine culture    Specimen: Urine, Other   Result Value Ref Range    Urine Culture <10,000 cfu/ml     POCT urine dip   Result Value Ref Range    LEUKOCYTE ESTERASE,UA neg     NITRITE,UA neg     SL AMB POCT UROBILINOGEN 0 2     POCT URINE PROTEIN neg      PH,UA 5 5     BLOOD,UA 3+     SPECIFIC GRAVITY,UA 1 025     KETONES,UA neg     BILIRUBIN,UA neg     GLUCOSE, UA neg      COLOR,UA yellow     CLARITY,UA clear        No orders of the defined types were placed in this encounter  ALLERGIES:  Allergies   Allergen Reactions    Codeine Anaphylaxis     Increased Heart Rate, Palpitations       Current Medications     Current Outpatient Medications   Medication Sig Dispense Refill    acetaminophen (TYLENOL) 325 mg tablet Take 2 tablets (650 mg total) by mouth every 6 (six) hours as needed for mild pain 30 tablet 0    levothyroxine 50 mcg tablet TAKE 1 TABLET BY MOUTH EVERY DAY 90 tablet 1    Probiotic Product (PROBIOTIC-10) CAPS Take 1 capsule by mouth      HYDROcodone-acetaminophen (NORCO) 5-325 mg per tablet Take 1 tablet by mouth every 6 (six) hours as needed for painMax Daily Amount: 4 tablets (Patient not taking: Reported on 6/21/2021) 15 tablet 0    methocarbamol (ROBAXIN) 500 mg tablet Take 1 tablet (500 mg total) by mouth 3 (three) times a day as needed for muscle spasms (Patient not taking: Reported on 6/21/2021) 40 tablet 0    tamsulosin (FLOMAX) 0 4 mg Take 1 capsule (0 4 mg total) by mouth daily with dinner (Patient not taking: Reported on 2/26/2021) 14 capsule 0     No current facility-administered medications for this visit           Health Maintenance     Health Maintenance   Topic Date Due    COVID-19 Vaccine (1) Never done    Annual Physical  12/18/2020    Hepatitis C Screening  02/26/2022 (Originally 1950)    DTaP,Tdap,and Td Vaccines (1 - Tdap) 02/26/2022 (Originally 10/[birth year]/1969)    MAMMOGRAM  02/26/2022 (Originally 1950)    Depression Screening PHQ  02/23/2022    Fall Risk 02/25/2022    BMI: Followup Plan  02/25/2022    BMI: Adult  06/21/2022    Colorectal Cancer Screening  08/05/2022    Pneumococcal Vaccine: 65+ Years  Completed    Influenza Vaccine  Completed    HIB Vaccine  Aged Out    Hepatitis B Vaccine  Aged Out    IPV Vaccine  Aged Out    Hepatitis A Vaccine  Aged Out    Meningococcal ACWY Vaccine  Aged Out    HPV Vaccine  Aged Out     Immunization History   Administered Date(s) Administered    INFLUENZA 12/07/2015, 10/31/2020    Influenza Split High Dose Preservative Free IM 12/07/2015, 09/18/2017    Influenza, high dose seasonal 0 7 mL 10/06/2018, 10/17/2019    Pneumococcal Conjugate 13-Valent 12/07/2015    Pneumococcal Polysaccharide PPV23 07/11/2018          Stan Zuluaga DO

## 2021-08-16 ENCOUNTER — RA CDI HCC (OUTPATIENT)
Dept: OTHER | Facility: HOSPITAL | Age: 71
End: 2021-08-16

## 2021-08-16 NOTE — PROGRESS NOTES
[facility]  coding opportunities       Chart reviewed, no opportunity found: CHART REVIEWED, NO OPPORTUNITY FOUND                        Patients insurance company: [employer] (Medicare Advantage and Commercial)

## 2021-09-20 ENCOUNTER — OFFICE VISIT (OUTPATIENT)
Dept: FAMILY MEDICINE CLINIC | Facility: CLINIC | Age: 71
End: 2021-09-20
Payer: COMMERCIAL

## 2021-09-20 VITALS
BODY MASS INDEX: 38.87 KG/M2 | SYSTOLIC BLOOD PRESSURE: 124 MMHG | WEIGHT: 198 LBS | TEMPERATURE: 98.9 F | RESPIRATION RATE: 14 BRPM | HEIGHT: 60 IN | HEART RATE: 65 BPM | OXYGEN SATURATION: 98 % | DIASTOLIC BLOOD PRESSURE: 82 MMHG

## 2021-09-20 DIAGNOSIS — H25.9 AGE-RELATED CATARACT OF BOTH EYES, UNSPECIFIED AGE-RELATED CATARACT TYPE: Primary | ICD-10-CM

## 2021-09-20 DIAGNOSIS — Z23 IMMUNIZATION DUE: ICD-10-CM

## 2021-09-20 DIAGNOSIS — Z01.818 PREOPERATIVE EXAMINATION: ICD-10-CM

## 2021-09-20 PROCEDURE — 99243 OFF/OP CNSLTJ NEW/EST LOW 30: CPT | Performed by: FAMILY MEDICINE

## 2021-09-20 PROCEDURE — 1160F RVW MEDS BY RX/DR IN RCRD: CPT | Performed by: FAMILY MEDICINE

## 2021-09-20 PROCEDURE — 1036F TOBACCO NON-USER: CPT | Performed by: FAMILY MEDICINE

## 2021-09-20 PROCEDURE — 3008F BODY MASS INDEX DOCD: CPT | Performed by: FAMILY MEDICINE

## 2021-09-20 PROCEDURE — 90662 IIV NO PRSV INCREASED AG IM: CPT | Performed by: FAMILY MEDICINE

## 2021-09-20 PROCEDURE — 90471 IMMUNIZATION ADMIN: CPT | Performed by: FAMILY MEDICINE

## 2021-09-20 NOTE — PROGRESS NOTES
FAMILY PRACTICE OFFICE VISIT       NAME: Aakash Cid  AGE: 65 y o  SEX: female       : 1950        MRN: [id number]    DATE: 2021  TIME: 12:38 PM    Assessment and Plan   1  Age-related cataract of both eyes, unspecified age-related cataract type  Comments:  Oren Babcock is stable on exam - and cleared medically for her upcoming surgeries  We will complete her preop forms for Ophthalmology  2  Preoperative examination  Comments:  As above  3  Immunization due  Comments:  HD Flu Vaccine given IM today, and tolerated well  Pt declines vaccination against COV-19  Orders:  -     influenza vaccine, high-dose, PF 0 7 mL (FLUZONE HIGH-DOSE)         There are no Patient Instructions on file for this visit  Chief Complaint     Chief Complaint   Patient presents with    Pre-op Exam     patient being seen for pre op  cataract surg  History of Present Illness   Aakash Cid is a 65y o -year-old female who presents today:  Pt presents today for her preop exam for bilateral cataract surgery/extraction - right eye in 10/2021, and then the left in 2021  This will be done with [facility]  Pt has chronic hx decreased vision bilaterally - she no longer drives at night  With hard work, rOen Knowlestery has dropped 8 lbs in the last 3 months! Pt reports no CP/SOB when walking 4 city blocks, or climbing 2 flights of stairs  She has no hx of issues with anesthesia (does get N/V after anesthesia occasionally), or in her family  Pre-op Testing: There are no required PATs to be done for these procedures being done under conscious sedation  Review of Systems   Review of Systems   Constitutional: Negative for activity change and fever  HENT: Negative for congestion  Eyes: Positive for visual disturbance  Respiratory: Negative for cough and shortness of breath  Cardiovascular: Negative for chest pain  Gastrointestinal: Negative for abdominal pain     Genitourinary: Negative for dysuria  Active Problem List     Patient Active Problem List   Diagnosis    Hypothyroidism due to Hashimoto's thyroiditis    Diverticulitis of large intestine with perforation without bleeding    Morbid obesity ([facility] [address] )         Past Medical History:  Past Medical History:   Diagnosis Date    Arthritis     Disease of thyroid gland     PNA (pneumonia) 05/2019       Past Surgical History:  Past Surgical History:   Procedure Laterality Date    CHOLECYSTECTOMY  1968    open       Family History:  Family History   Problem Relation Age of Onset    Cancer Mother     Diabetes Mother     Cancer Father     Diabetes Father     Urolithiasis Sister        Social History:  Social History     Socioeconomic History    Marital status: /Civil Union     Spouse name: Not on file    Number of children: Not on file    Years of education: Not on file    Highest education level: Not on file   Occupational History    Not on file   Tobacco Use    Smoking status: Former Smoker    Smokeless tobacco: Never Used   Substance and Sexual Activity    Alcohol use: Yes    Drug use: Never    Sexual activity: Not on file   Other Topics Concern    Not on file   Social History Narrative    Not on file     Social Determinants of Health     Financial Resource Strain:     Difficulty of Paying Living Expenses:    Food Insecurity:     Worried About Running Out of Food in the Last Year:     [address] in the Last Year:    Transportation Needs:     Lack of Transportation (Medical):      Lack of Transportation (Non-Medical):    Physical Activity:     Days of Exercise per Week:     Minutes of Exercise per Session:    Stress:     Feeling of Stress :    Social Connections:     Frequency of Communication with Friends and Family:     Frequency of Social Gatherings with Friends and Family:     Attends Buddhism Services:     Active Member of Clubs or Organizations:     Attends Club or Organization Meetings:  Marital Status:    Intimate Partner Violence:     Fear of Current or Ex-Partner:     Emotionally Abused:     Physically Abused:     Sexually Abused:        Objective     Vitals:    09/20/21 1123   BP: 124/82   Pulse: 65   Resp: 14   Temp: 98 9 °F (37 2 °C)   SpO2: 98%     Wt Readings from Last 3 Encounters:   09/20/21 89 8 kg (198 lb)   06/21/21 93 4 kg (205 lb 12 8 oz)   06/14/21 92 5 kg (204 lb)       Physical Exam  Vitals and nursing note reviewed  Constitutional:       General: She is not in acute distress  Appearance: Normal appearance  She is not ill-appearing, toxic-appearing or diaphoretic  HENT:      Head: Normocephalic and atraumatic  Eyes:      General: No scleral icterus  Extraocular Movements: Extraocular movements intact  Conjunctiva/sclera: Conjunctivae normal       Pupils: Pupils are equal, round, and reactive to light  Comments: +Bilateral cataracts seen  Cardiovascular:      Rate and Rhythm: Normal rate and regular rhythm  Heart sounds: Normal heart sounds  No murmur heard  No friction rub  No gallop  Pulmonary:      Effort: Pulmonary effort is normal  No respiratory distress  Breath sounds: Normal breath sounds  No stridor  No wheezing, rhonchi or rales  Abdominal:      General: Abdomen is flat  Bowel sounds are normal  There is no distension  Palpations: Abdomen is soft  There is no mass  Tenderness: There is no abdominal tenderness  There is no guarding or rebound  Musculoskeletal:      Cervical back: Normal range of motion and neck supple  No rigidity or tenderness  Lymphadenopathy:      Cervical: No cervical adenopathy  Neurological:      Mental Status: She is alert and oriented to person, place, and time  Psychiatric:         Mood and Affect: Mood normal          Behavior: Behavior normal          Thought Content:  Thought content normal          Judgment: Judgment normal          Pertinent Laboratory/Diagnostic Studies:  Lab Results   Component Value Date    GLUCOSE 89 05/10/2015    BUN 5 05/27/2019    CREATININE 0 77 05/27/2019    CALCIUM 9 3 05/27/2019     05/10/2015    K 3 8 05/27/2019    CO2 25 05/27/2019     05/27/2019     Lab Results   Component Value Date    ALT 21 05/26/2019    AST 28 05/26/2019    ALKPHOS 90 05/26/2019    BILITOT 0 4 05/10/2015       Lab Results   Component Value Date    WBC 8 82 05/28/2019    HGB 12 3 05/28/2019    HCT 37 1 05/28/2019    MCV 90 05/28/2019     05/28/2019       No results found for: TSH    No results found for: CHOL  Lab Results   Component Value Date    TRIG 153 (H) 10/03/2018     Lab Results   Component Value Date    HDL 41 10/03/2018     Lab Results   Component Value Date    LDLCALC 143 (H) 10/03/2018     No results found for: HGBA1C    Results for orders placed or performed in visit on 02/23/21   Urine culture    Specimen: Urine, Other   Result Value Ref Range    Urine Culture <10,000 cfu/ml     POCT urine dip   Result Value Ref Range    LEUKOCYTE ESTERASE,UA neg     NITRITE,UA neg     SL AMB POCT UROBILINOGEN 0 2     POCT URINE PROTEIN neg      PH,UA 5 5     BLOOD,UA 3+     SPECIFIC GRAVITY,UA 1 025     KETONES,UA neg     BILIRUBIN,UA neg     GLUCOSE, UA neg      COLOR,UA yellow     CLARITY,UA clear        Orders Placed This Encounter   Procedures    influenza vaccine, high-dose, PF 0 7 mL (FLUZONE HIGH-DOSE)       ALLERGIES:  Allergies   Allergen Reactions    Codeine Anaphylaxis     Increased Heart Rate, Palpitations       Current Medications     Current Outpatient Medications   Medication Sig Dispense Refill    acetaminophen (TYLENOL) 325 mg tablet Take 2 tablets (650 mg total) by mouth every 6 (six) hours as needed for mild pain 30 tablet 0    levothyroxine 50 mcg tablet TAKE 1 TABLET BY MOUTH EVERY DAY 90 tablet 1    Probiotic Product (PROBIOTIC-10) CAPS Take 1 capsule by mouth      HYDROcodone-acetaminophen (NORCO) 5-325 mg per tablet Take 1 tablet by mouth every 6 (six) hours as needed for painMax Daily Amount: 4 tablets (Patient not taking: Reported on 6/21/2021) 15 tablet 0    methocarbamol (ROBAXIN) 500 mg tablet Take 1 tablet (500 mg total) by mouth 3 (three) times a day as needed for muscle spasms (Patient not taking: Reported on 9/20/2021) 40 tablet 0    tamsulosin (FLOMAX) 0 4 mg Take 1 capsule (0 4 mg total) by mouth daily with dinner (Patient not taking: Reported on 2/26/2021) 14 capsule 0     No current facility-administered medications for this visit           Health Maintenance     Health Maintenance   Topic Date Due    COVID-19 Vaccine (1) Never done    Annual Physical  12/18/2020    Depression Screening  02/23/2022    BMI: Followup Plan  02/25/2022    Hepatitis C Screening  02/26/2022 (Originally 1950)    DTaP,Tdap,and Td Vaccines (1 - Tdap) 02/26/2022 (Originally 10/23/1969)    Breast Cancer Screening: Mammogram  02/26/2022 (Originally 10/[birth year]/1988)    Fall Risk  02/25/2022    BMI: Adult  06/21/2022    Colorectal Cancer Screening  08/05/2022    Pneumococcal Vaccine: 65+ Years  Completed    Influenza Vaccine  Completed    HIB Vaccine  Aged Out    Hepatitis B Vaccine  Aged Out    IPV Vaccine  Aged Out    Hepatitis A Vaccine  Aged Out    Meningococcal ACWY Vaccine  Aged Out    HPV Vaccine  Aged Out     Immunization History   Administered Date(s) Administered    INFLUENZA 12/07/2015, 10/31/2020    Influenza Split High Dose Preservative Free IM 12/07/2015, 09/18/2017    Influenza, high dose seasonal 0 7 mL 10/06/2018, 10/17/2019, 09/20/2021    Pneumococcal Conjugate 13-Valent 12/07/2015    Pneumococcal Polysaccharide PPV23 07/11/2018          Stan Zuluaga DO

## 2021-09-21 ENCOUNTER — VBI (OUTPATIENT)
Dept: ADMINISTRATIVE | Facility: OTHER | Age: 71
End: 2021-09-21

## 2021-09-24 ENCOUNTER — PRE-OP CATARACT MEASUREMENTS (OUTPATIENT)
Dept: URBAN - METROPOLITAN AREA CLINIC 6 | Facility: CLINIC | Age: 71
End: 2021-09-24

## 2021-09-24 DIAGNOSIS — H04.123: ICD-10-CM

## 2021-09-24 DIAGNOSIS — H25.813: ICD-10-CM

## 2021-09-24 PROCEDURE — 92012 INTRM OPH EXAM EST PATIENT: CPT

## 2021-09-24 PROCEDURE — G8427 DOCREV CUR MEDS BY ELIG CLIN: HCPCS

## 2021-09-24 PROCEDURE — 92136 OPHTHALMIC BIOMETRY: CPT

## 2021-09-24 PROCEDURE — 1036F TOBACCO NON-USER: CPT

## 2021-09-24 ASSESSMENT — KERATOMETRY
OS_AXISANGLE2_DEGREES: 94
OD_AXISANGLE2_DEGREES: 85
OS_K2POWER_DIOPTERS: 47.75
OD_AXISANGLE_DEGREES: 175
OD_K1POWER_DIOPTERS: 47.00
OS_AXISANGLE_DEGREES: 4
OS_K1POWER_DIOPTERS: 47.00
OD_K2POWER_DIOPTERS: 52.75

## 2021-09-24 ASSESSMENT — VISUAL ACUITY
OD_CC: 20/60
OD_PH: 20/60
OS_PH: 20/60
OS_CC: 20/60

## 2021-09-24 ASSESSMENT — TONOMETRY
OS_IOP_MMHG: 23
OD_IOP_MMHG: 23

## 2021-10-11 ENCOUNTER — SURGERY/PROCEDURE (OUTPATIENT)
Dept: URBAN - METROPOLITAN AREA SURGICAL CENTER 6 | Facility: SURGICAL CENTER | Age: 71
End: 2021-10-11

## 2021-10-11 DIAGNOSIS — H25.812: ICD-10-CM

## 2021-10-11 PROCEDURE — 66984 XCAPSL CTRC RMVL W/O ECP: CPT | Mod: 54,LT

## 2021-10-11 PROCEDURE — MISCFEMTO FEMTO

## 2021-10-11 ASSESSMENT — KERATOMETRY
OD_AXISANGLE_DEGREES: 175
OD_K2POWER_DIOPTERS: 52.75
OD_K1POWER_DIOPTERS: 47.00
OS_AXISANGLE2_DEGREES: 94
OD_AXISANGLE2_DEGREES: 85
OS_K1POWER_DIOPTERS: 47.00
OS_AXISANGLE_DEGREES: 4
OS_K2POWER_DIOPTERS: 47.75

## 2021-10-12 ENCOUNTER — 1 DAY POST-OP (OUTPATIENT)
Dept: URBAN - METROPOLITAN AREA CLINIC 6 | Facility: CLINIC | Age: 71
End: 2021-10-12

## 2021-10-12 DIAGNOSIS — Z96.1: ICD-10-CM

## 2021-10-12 DIAGNOSIS — H40.052: ICD-10-CM

## 2021-10-12 PROCEDURE — 99024 POSTOP FOLLOW-UP VISIT: CPT

## 2021-10-12 PROCEDURE — G8427 DOCREV CUR MEDS BY ELIG CLIN: HCPCS

## 2021-10-12 PROCEDURE — 1036F TOBACCO NON-USER: CPT

## 2021-10-12 ASSESSMENT — VISUAL ACUITY
OS_PH: 20/40-2
OS_SC: 20/50-2

## 2021-10-12 ASSESSMENT — TONOMETRY
OS_IOP_MMHG: 26
OD_IOP_MMHG: 24

## 2021-10-19 ENCOUNTER — 1 WEEK POST-OP (OUTPATIENT)
Dept: URBAN - METROPOLITAN AREA CLINIC 6 | Facility: CLINIC | Age: 71
End: 2021-10-19

## 2021-10-19 DIAGNOSIS — Z96.1: ICD-10-CM

## 2021-10-19 DIAGNOSIS — H25.811: ICD-10-CM

## 2021-10-19 PROCEDURE — G8427 DOCREV CUR MEDS BY ELIG CLIN: HCPCS

## 2021-10-19 PROCEDURE — 92136 OPHTHALMIC BIOMETRY: CPT

## 2021-10-19 PROCEDURE — 99024 POSTOP FOLLOW-UP VISIT: CPT

## 2021-10-19 PROCEDURE — 1036F TOBACCO NON-USER: CPT

## 2021-10-19 ASSESSMENT — KERATOMETRY
OS_K2POWER_DIOPTERS: 47.75
OD_K1POWER_DIOPTERS: 47.00
OS_K1POWER_DIOPTERS: 47.00
OD_AXISANGLE2_DEGREES: 87
OS_AXISANGLE_DEGREES: 4
OS_AXISANGLE2_DEGREES: 94
OD_AXISANGLE_DEGREES: 175
OD_K2POWER_DIOPTERS: 50.75
OD_K2POWER_DIOPTERS: 52.75
OD_AXISANGLE2_DEGREES: 85
OD_AXISANGLE_DEGREES: 177

## 2021-10-19 ASSESSMENT — TONOMETRY
OD_IOP_MMHG: 18
OS_IOP_MMHG: 17

## 2021-10-19 ASSESSMENT — VISUAL ACUITY
OU_SC: J2
OS_SC: 20/30+2
OD_CC: 20/100
OD_PH: 20/70

## 2021-11-01 ENCOUNTER — SURGERY/PROCEDURE (OUTPATIENT)
Dept: URBAN - METROPOLITAN AREA SURGICAL CENTER 6 | Facility: SURGICAL CENTER | Age: 71
End: 2021-11-01

## 2021-11-01 DIAGNOSIS — H25.811: ICD-10-CM

## 2021-11-01 PROCEDURE — MISCFEMTO FEMTO

## 2021-11-01 PROCEDURE — MISCTIOL MISCTIOL

## 2021-11-01 PROCEDURE — 66984 XCAPSL CTRC RMVL W/O ECP: CPT | Mod: 54,RT,79,RT

## 2021-11-02 ENCOUNTER — 1 DAY POST-OP (OUTPATIENT)
Dept: URBAN - METROPOLITAN AREA CLINIC 6 | Facility: CLINIC | Age: 71
End: 2021-11-02

## 2021-11-02 DIAGNOSIS — H18.591: ICD-10-CM

## 2021-11-02 DIAGNOSIS — H40.051: ICD-10-CM

## 2021-11-02 DIAGNOSIS — Z96.1: ICD-10-CM

## 2021-11-02 PROCEDURE — 1036F TOBACCO NON-USER: CPT

## 2021-11-02 PROCEDURE — 99024 POSTOP FOLLOW-UP VISIT: CPT

## 2021-11-02 PROCEDURE — G8427 DOCREV CUR MEDS BY ELIG CLIN: HCPCS

## 2021-11-02 ASSESSMENT — TONOMETRY
OD_IOP_MMHG: 32
OD_IOP_MMHG: 31
OS_IOP_MMHG: 22
OS_IOP_MMHG: 24

## 2021-11-02 ASSESSMENT — KERATOMETRY
OD_K1POWER_DIOPTERS: 47.00
OD_K1POWER_DIOPTERS: 47.00
OS_K1POWER_DIOPTERS: 47.00
OS_AXISANGLE_DEGREES: 4
OD_AXISANGLE2_DEGREES: 87
OD_K2POWER_DIOPTERS: 52.75
OD_AXISANGLE_DEGREES: 175
OD_AXISANGLE_DEGREES: 175
OS_K1POWER_DIOPTERS: 47.00
OS_AXISANGLE_DEGREES: 4
OS_K2POWER_DIOPTERS: 47.75
OS_K2POWER_DIOPTERS: 47.75
OS_AXISANGLE2_DEGREES: 94
OD_AXISANGLE2_DEGREES: 85
OD_AXISANGLE2_DEGREES: 85
OD_K2POWER_DIOPTERS: 50.75
OD_K2POWER_DIOPTERS: 52.75
OS_AXISANGLE2_DEGREES: 94
OD_AXISANGLE_DEGREES: 177

## 2021-11-02 ASSESSMENT — VISUAL ACUITY
OD_PH: 20/30
OS_SC: 20/25
OD_SC: 20/70

## 2021-11-03 ENCOUNTER — 1 DAY POST-OP (OUTPATIENT)
Dept: URBAN - METROPOLITAN AREA CLINIC 6 | Facility: CLINIC | Age: 71
End: 2021-11-03

## 2021-11-03 DIAGNOSIS — Z96.1: ICD-10-CM

## 2021-11-03 PROCEDURE — 1036F TOBACCO NON-USER: CPT

## 2021-11-03 PROCEDURE — 99024 POSTOP FOLLOW-UP VISIT: CPT

## 2021-11-03 PROCEDURE — G8427 DOCREV CUR MEDS BY ELIG CLIN: HCPCS

## 2021-11-03 ASSESSMENT — KERATOMETRY
OD_AXISANGLE_DEGREES: 177
OD_AXISANGLE_DEGREES: 175
OD_K2POWER_DIOPTERS: 52.75
OS_K1POWER_DIOPTERS: 47.00
OD_K2POWER_DIOPTERS: 50.75
OD_K1POWER_DIOPTERS: 47.00
OD_AXISANGLE2_DEGREES: 85
OS_AXISANGLE_DEGREES: 4
OS_K2POWER_DIOPTERS: 47.75
OS_AXISANGLE2_DEGREES: 94
OD_AXISANGLE2_DEGREES: 87

## 2021-11-03 ASSESSMENT — VISUAL ACUITY
OD_SC: 20/200
OS_SC: 20/30-2

## 2021-11-03 ASSESSMENT — TONOMETRY
OS_IOP_MMHG: 17
OD_IOP_MMHG: 13

## 2021-11-09 ENCOUNTER — 1 WEEK POST-OP (OUTPATIENT)
Dept: URBAN - METROPOLITAN AREA CLINIC 6 | Facility: CLINIC | Age: 71
End: 2021-11-09

## 2021-11-09 DIAGNOSIS — H40.051: ICD-10-CM

## 2021-11-09 DIAGNOSIS — H26.493: ICD-10-CM

## 2021-11-09 DIAGNOSIS — Z96.1: ICD-10-CM

## 2021-11-09 DIAGNOSIS — H18.591: ICD-10-CM

## 2021-11-09 PROCEDURE — 99024 POSTOP FOLLOW-UP VISIT: CPT

## 2021-11-09 PROCEDURE — 1036F TOBACCO NON-USER: CPT

## 2021-11-09 PROCEDURE — G8427 DOCREV CUR MEDS BY ELIG CLIN: HCPCS

## 2021-11-09 ASSESSMENT — KERATOMETRY
OD_K2POWER_DIOPTERS: 50.75
OS_K1POWER_DIOPTERS: 47.00
OS_K2POWER_DIOPTERS: 47.75
OD_K1POWER_DIOPTERS: 47.00
OD_AXISANGLE2_DEGREES: 87
OD_AXISANGLE2_DEGREES: 85
OS_AXISANGLE_DEGREES: 4
OS_AXISANGLE2_DEGREES: 94
OD_AXISANGLE_DEGREES: 175
OD_AXISANGLE_DEGREES: 177
OD_K2POWER_DIOPTERS: 52.75

## 2021-11-09 ASSESSMENT — TONOMETRY
OS_IOP_MMHG: 16
OD_IOP_MMHG: 9

## 2021-11-09 ASSESSMENT — VISUAL ACUITY
OU_SC: J1-2
OS_SC: 20/30-1
OD_SC: 20/50-2
OS_PH: 20/25-1

## 2021-11-18 ENCOUNTER — VBI (OUTPATIENT)
Dept: ADMINISTRATIVE | Facility: OTHER | Age: 71
End: 2021-11-18

## 2021-12-03 ENCOUNTER — POST-OP CHECK (OUTPATIENT)
Dept: URBAN - METROPOLITAN AREA CLINIC 6 | Facility: CLINIC | Age: 71
End: 2021-12-03

## 2021-12-03 DIAGNOSIS — H18.591: ICD-10-CM

## 2021-12-03 DIAGNOSIS — H35.372: ICD-10-CM

## 2021-12-03 DIAGNOSIS — Z96.1: ICD-10-CM

## 2021-12-03 DIAGNOSIS — H26.493: ICD-10-CM

## 2021-12-03 PROCEDURE — 92134 CPTRZ OPH DX IMG PST SGM RTA: CPT | Mod: NC

## 2021-12-03 PROCEDURE — 99024 POSTOP FOLLOW-UP VISIT: CPT

## 2021-12-03 ASSESSMENT — KERATOMETRY
OS_AXISANGLE_DEGREES: 4
OS_K1POWER_DIOPTERS: 47.00
OD_AXISANGLE_DEGREES: 177
OS_K2POWER_DIOPTERS: 47.75
OD_AXISANGLE_DEGREES: 175
OS_AXISANGLE2_DEGREES: 94
OD_AXISANGLE2_DEGREES: 85
OD_AXISANGLE2_DEGREES: 87
OD_K1POWER_DIOPTERS: 47.00
OD_K2POWER_DIOPTERS: 52.75
OD_K2POWER_DIOPTERS: 50.75

## 2021-12-03 ASSESSMENT — TONOMETRY
OD_IOP_MMHG: 13
OS_IOP_MMHG: 12

## 2021-12-03 ASSESSMENT — VISUAL ACUITY
OU_SC: J3
OS_SC: 20/20-1
OD_SC: 20/50
OD_PH: 20/40

## 2021-12-11 DIAGNOSIS — E03.8 HYPOTHYROIDISM DUE TO HASHIMOTO'S THYROIDITIS: Chronic | ICD-10-CM

## 2021-12-11 DIAGNOSIS — E06.3 HYPOTHYROIDISM DUE TO HASHIMOTO'S THYROIDITIS: Chronic | ICD-10-CM

## 2021-12-12 RX ORDER — LEVOTHYROXINE SODIUM 0.05 MG/1
TABLET ORAL
Qty: 90 TABLET | Refills: 1 | Status: SHIPPED | OUTPATIENT
Start: 2021-12-12 | End: 2022-06-09

## 2022-02-07 ENCOUNTER — HOSPITAL ENCOUNTER (EMERGENCY)
Facility: HOSPITAL | Age: 72
Discharge: HOME | End: 2022-02-07
Attending: EMERGENCY MEDICINE | Admitting: EMERGENCY MEDICINE
Payer: COMMERCIAL

## 2022-02-07 ENCOUNTER — APPOINTMENT (EMERGENCY)
Dept: RADIOLOGY | Facility: HOSPITAL | Age: 72
End: 2022-02-07
Payer: COMMERCIAL

## 2022-02-07 ENCOUNTER — TELEPHONE (OUTPATIENT)
Dept: FAMILY MEDICINE CLINIC | Facility: CLINIC | Age: 72
End: 2022-02-07

## 2022-02-07 VITALS
RESPIRATION RATE: 18 BRPM | SYSTOLIC BLOOD PRESSURE: 158 MMHG | TEMPERATURE: 98.9 F | OXYGEN SATURATION: 95 % | DIASTOLIC BLOOD PRESSURE: 71 MMHG | HEART RATE: 86 BPM

## 2022-02-07 DIAGNOSIS — U07.1 COVID-19: Primary | ICD-10-CM

## 2022-02-07 LAB
ALBUMIN SERPL BCP-MCNC: 3.4 G/DL (ref 3.5–5)
ALP SERPL-CCNC: 86 U/L (ref 46–116)
ALT SERPL W P-5'-P-CCNC: 32 U/L (ref 12–78)
ANION GAP SERPL CALCULATED.3IONS-SCNC: 8 MMOL/L (ref 4–13)
AST SERPL W P-5'-P-CCNC: 58 U/L (ref 5–45)
ATRIAL RATE: 87 BPM
BASOPHILS # BLD AUTO: 0.02 THOUSANDS/ΜL (ref 0–0.1)
BASOPHILS NFR BLD AUTO: 1 % (ref 0–1)
BILIRUB SERPL-MCNC: 0.56 MG/DL (ref 0.2–1)
BUN SERPL-MCNC: 12 MG/DL (ref 5–25)
CALCIUM ALBUM COR SERPL-MCNC: 8.8 MG/DL (ref 8.3–10.1)
CALCIUM SERPL-MCNC: 8.3 MG/DL (ref 8.3–10.1)
CARDIAC TROPONIN I PNL SERPL HS: 7 NG/L
CHLORIDE SERPL-SCNC: 98 MMOL/L (ref 100–108)
CO2 SERPL-SCNC: 27 MMOL/L (ref 21–32)
CREAT SERPL-MCNC: 1.02 MG/DL (ref 0.6–1.3)
EOSINOPHIL # BLD AUTO: 0 THOUSAND/ΜL (ref 0–0.61)
EOSINOPHIL NFR BLD AUTO: 0 % (ref 0–6)
ERYTHROCYTE [DISTWIDTH] IN BLOOD BY AUTOMATED COUNT: 14.3 % (ref 11.6–15.1)
FLUAV RNA RESP QL NAA+PROBE: NEGATIVE
FLUBV RNA RESP QL NAA+PROBE: NEGATIVE
GFR SERPL CREATININE-BSD FRML MDRD: 54 ML/MIN/1.73SQ M
GLUCOSE SERPL-MCNC: 123 MG/DL (ref 65–140)
HCT VFR BLD AUTO: 45.4 % (ref 34.8–46.1)
HGB BLD-MCNC: 15.1 G/DL (ref 11.5–15.4)
IMM GRANULOCYTES # BLD AUTO: 0.03 THOUSAND/UL (ref 0–0.2)
IMM GRANULOCYTES NFR BLD AUTO: 1 % (ref 0–2)
LIPASE SERPL-CCNC: 222 U/L (ref 73–393)
LYMPHOCYTES # BLD AUTO: 0.57 THOUSANDS/ΜL (ref 0.6–4.47)
LYMPHOCYTES NFR BLD AUTO: 15 % (ref 14–44)
MAGNESIUM SERPL-MCNC: 2 MG/DL (ref 1.6–2.6)
MCH RBC QN AUTO: 29.7 PG (ref 26.8–34.3)
MCHC RBC AUTO-ENTMCNC: 33.3 G/DL (ref 31.4–37.4)
MCV RBC AUTO: 89 FL (ref 82–98)
MONOCYTES # BLD AUTO: 0.29 THOUSAND/ΜL (ref 0.17–1.22)
MONOCYTES NFR BLD AUTO: 8 % (ref 4–12)
NEUTROPHILS # BLD AUTO: 2.94 THOUSANDS/ΜL (ref 1.85–7.62)
NEUTS SEG NFR BLD AUTO: 75 % (ref 43–75)
NRBC BLD AUTO-RTO: 0 /100 WBCS
P AXIS: 74 DEGREES
PLATELET # BLD AUTO: 188 THOUSANDS/UL (ref 149–390)
PMV BLD AUTO: 11.4 FL (ref 8.9–12.7)
POTASSIUM SERPL-SCNC: 3.5 MMOL/L (ref 3.5–5.3)
PR INTERVAL: 116 MS
PROT SERPL-MCNC: 7.9 G/DL (ref 6.4–8.2)
QRS AXIS: 27 DEGREES
QRSD INTERVAL: 72 MS
QT INTERVAL: 340 MS
QTC INTERVAL: 400 MS
RBC # BLD AUTO: 5.08 MILLION/UL (ref 3.81–5.12)
RSV RNA RESP QL NAA+PROBE: NEGATIVE
SARS-COV-2 RNA RESP QL NAA+PROBE: POSITIVE
SODIUM SERPL-SCNC: 133 MMOL/L (ref 136–145)
T WAVE AXIS: 12 DEGREES
VENTRICULAR RATE: 83 BPM
WBC # BLD AUTO: 3.85 THOUSAND/UL (ref 4.31–10.16)

## 2022-02-07 PROCEDURE — 83735 ASSAY OF MAGNESIUM: CPT | Performed by: PHYSICIAN ASSISTANT

## 2022-02-07 PROCEDURE — 93010 ELECTROCARDIOGRAM REPORT: CPT | Performed by: INTERNAL MEDICINE

## 2022-02-07 PROCEDURE — 99284 EMERGENCY DEPT VISIT MOD MDM: CPT | Performed by: PHYSICIAN ASSISTANT

## 2022-02-07 PROCEDURE — 83690 ASSAY OF LIPASE: CPT | Performed by: PHYSICIAN ASSISTANT

## 2022-02-07 PROCEDURE — 84484 ASSAY OF TROPONIN QUANT: CPT | Performed by: PHYSICIAN ASSISTANT

## 2022-02-07 PROCEDURE — 99284 EMERGENCY DEPT VISIT MOD MDM: CPT

## 2022-02-07 PROCEDURE — 96374 THER/PROPH/DIAG INJ IV PUSH: CPT

## 2022-02-07 PROCEDURE — 0241U HB NFCT DS VIR RESP RNA 4 TRGT: CPT | Performed by: PHYSICIAN ASSISTANT

## 2022-02-07 PROCEDURE — 36415 COLL VENOUS BLD VENIPUNCTURE: CPT | Performed by: PHYSICIAN ASSISTANT

## 2022-02-07 PROCEDURE — 85025 COMPLETE CBC W/AUTO DIFF WBC: CPT | Performed by: PHYSICIAN ASSISTANT

## 2022-02-07 PROCEDURE — 71045 X-RAY EXAM CHEST 1 VIEW: CPT

## 2022-02-07 PROCEDURE — 80053 COMPREHEN METABOLIC PANEL: CPT | Performed by: PHYSICIAN ASSISTANT

## 2022-02-07 PROCEDURE — 93005 ELECTROCARDIOGRAM TRACING: CPT

## 2022-02-07 RX ORDER — SODIUM CHLORIDE 9 MG/ML
3 INJECTION INTRAVENOUS
Status: DISCONTINUED | OUTPATIENT
Start: 2022-02-07 | End: 2022-02-07 | Stop reason: HOSPADM

## 2022-02-07 RX ORDER — ONDANSETRON 4 MG/1
4 TABLET, ORALLY DISINTEGRATING ORAL EVERY 6 HOURS PRN
Qty: 20 TABLET | Refills: 0 | Status: SHIPPED | OUTPATIENT
Start: 2022-02-07 | End: 2022-02-13 | Stop reason: CLARIF

## 2022-02-07 RX ORDER — ONDANSETRON 2 MG/ML
4 INJECTION INTRAMUSCULAR; INTRAVENOUS ONCE
Status: COMPLETED | OUTPATIENT
Start: 2022-02-07 | End: 2022-02-07

## 2022-02-07 RX ORDER — DICYCLOMINE HCL 20 MG
20 TABLET ORAL EVERY 6 HOURS PRN
Qty: 20 TABLET | Refills: 0 | Status: SHIPPED | OUTPATIENT
Start: 2022-02-07 | End: 2022-02-13 | Stop reason: CLARIF

## 2022-02-07 RX ADMIN — ONDANSETRON 4 MG: 2 INJECTION INTRAMUSCULAR; INTRAVENOUS at 09:59

## 2022-02-07 NOTE — ED PROVIDER NOTES
History  Chief Complaint   Patient presents with    Diarrhea     Diarrhea and nausea x 3-4 days with extreme fatigue   said patient was "gray" and had cold sweats this AM       4 days of fatigue  No fever, but having chills yesterday  Had mild headache this morning that has since resolved  Today,  saw her on toiler with diarrhea x 1 today hunched over, sweating, pale  "Appeared lethargic//confused per  "  No Abdominal pain  Decreased appetite but still urinating  Increased nausea without vomiting  No CP or SOB  Hx of hypothyroidism   No DM, CAD    SHx sig for cholecystectomy in the past      Diarrhea  Associated symptoms: chills and headaches    Associated symptoms: no abdominal pain, no arthralgias, no fever and no vomiting        Prior to Admission Medications   Prescriptions Last Dose Informant Patient Reported? Taking?    HYDROcodone-acetaminophen (NORCO) 5-325 mg per tablet   No No   Sig: Take 1 tablet by mouth every 6 (six) hours as needed for painMax Daily Amount: 4 tablets   Patient not taking: Reported on 6/21/2021   Probiotic Product (PROBIOTIC-10) CAPS  Self Yes No   Sig: Take 1 capsule by mouth   acetaminophen (TYLENOL) 325 mg tablet  Self No No   Sig: Take 2 tablets (650 mg total) by mouth every 6 (six) hours as needed for mild pain   levothyroxine 50 mcg tablet   No No   Sig: TAKE 1 TABLET BY MOUTH EVERY DAY   methocarbamol (ROBAXIN) 500 mg tablet   No No   Sig: Take 1 tablet (500 mg total) by mouth 3 (three) times a day as needed for muscle spasms   Patient not taking: Reported on 9/20/2021   tamsulosin (FLOMAX) 0 4 mg  Self No No   Sig: Take 1 capsule (0 4 mg total) by mouth daily with dinner   Patient not taking: Reported on 2/26/2021      Facility-Administered Medications: None       Past Medical History:   Diagnosis Date    Arthritis     Disease of thyroid gland     PNA (pneumonia) 05/2019       Past Surgical History:   Procedure Laterality Date   [address] open       Family History   Problem Relation Age of Onset    Cancer Mother     Diabetes Mother     Cancer Father     Diabetes Father     Urolithiasis Sister      I have reviewed and agree with the history as documented  E-Cigarette/Vaping     E-Cigarette/Vaping Substances     Social History     Tobacco Use    Smoking status: Former Smoker    Smokeless tobacco: Never Used   Substance Use Topics    Alcohol use: Yes    Drug use: Never       Review of Systems   Constitutional: Positive for activity change, appetite change and chills  Negative for fever  HENT: Negative for ear pain and sore throat  Eyes: Negative for pain and visual disturbance  Respiratory: Negative for cough and shortness of breath  Cardiovascular: Negative for chest pain and palpitations  Gastrointestinal: Positive for diarrhea and nausea  Negative for abdominal pain and vomiting  Genitourinary: Negative for dysuria and hematuria  Musculoskeletal: Negative for arthralgias and back pain  Skin: Negative for color change and rash  Neurological: Positive for dizziness and headaches  Negative for seizures and syncope  Psychiatric/Behavioral: Positive for confusion  Negative for sleep disturbance  All other systems reviewed and are negative  Physical Exam  Physical Exam  Vitals and nursing note reviewed  Constitutional:       General: She is not in acute distress  Appearance: Normal appearance  She is ill-appearing  She is not toxic-appearing or diaphoretic  HENT:      Head: Normocephalic and atraumatic  Mouth/Throat:      Mouth: Mucous membranes are moist    Eyes:      General: No scleral icterus  Pupils: Pupils are equal, round, and reactive to light  Cardiovascular:      Rate and Rhythm: Normal rate and regular rhythm  Pulses: Normal pulses  Heart sounds: Normal heart sounds  Pulmonary:      Effort: Pulmonary effort is normal       Breath sounds: Normal breath sounds     Abdominal: General: Abdomen is flat  There is no distension  Palpations: Abdomen is soft  Tenderness: There is no abdominal tenderness  There is no guarding or rebound  Hernia: No hernia is present  Musculoskeletal:         General: No swelling or tenderness  Normal range of motion  Cervical back: Normal range of motion  Skin:     General: Skin is warm  Capillary Refill: Capillary refill takes less than 2 seconds  Neurological:      General: No focal deficit present  Mental Status: She is alert and oriented to person, place, and time     Psychiatric:         Mood and Affect: Mood normal          Behavior: Behavior normal          Vital Signs  ED Triage Vitals   Temperature Pulse Respirations Blood Pressure SpO2   02/07/22 0847 02/07/22 0845 02/07/22 0845 02/07/22 0845 02/07/22 0845   98 9 °F (37 2 °C) 86 18 158/71 95 %      Temp Source Heart Rate Source Patient Position - Orthostatic VS BP Location FiO2 (%)   02/07/22 0847 02/07/22 0845 -- -- --   Oral Monitor         Pain Score       02/07/22 0845       No Pain           Vitals:    02/07/22 0845   BP: 158/71   Pulse: 86         Visual Acuity      ED Medications  Medications   sodium chloride (PF) 0 9 % injection 3 mL (has no administration in time range)   ondansetron (ZOFRAN) injection 4 mg (4 mg Intravenous Given 2/7/22 0959)       Diagnostic Studies  Results Reviewed     Procedure Component Value Units Date/Time    HS Troponin 0hr (reflex protocol) [[id number]]  (Normal) Collected: 02/07/22 0958    Lab Status: Final result Specimen: Blood from Arm, Right Updated: 02/07/22 1055     hs TnI 0hr 7 0 ng/L     COVID/FLU/RSV - 2 hour TAT [[id number]]  (Abnormal) Collected: 02/07/22 0958    Lab Status: Final result Specimen: Nares from Nasopharyngeal Swab Updated: 02/07/22 1047     SARS-CoV-2 Positive     INFLUENZA A PCR Negative     INFLUENZA B PCR Negative     RSV PCR Negative    Narrative:      FOR PEDIATRIC PATIENTS - copy/paste COVID Guidelines URL to browser: [email or web address]    SARS-CoV-2 assay is a Nucleic Acid Amplification assay intended for the  qualitative detection of nucleic acid from SARS-CoV-2 in nasopharyngeal  swabs  Results are for the presumptive identification of SARS-CoV-2 RNA  Positive results are indicative of infection with SARS-CoV-2, the virus  causing COVID-19, but do not rule out bacterial infection or co-infection  with other viruses  Laboratories within the [place] and its  territories are required to report all positive results to the appropriate  public health authorities  Negative results do not preclude SARS-CoV-2  infection and should not be used as the sole basis for treatment or other  patient management decisions  Negative results must be combined with  clinical observations, patient history, and epidemiological information  This test has not been FDA cleared or approved  This test has been authorized by FDA under an Emergency Use Authorization  (EUA)  This test is only authorized for the duration of time the  declaration that circumstances exist justifying the authorization of the  emergency use of an in vitro diagnostic tests for detection of SARS-CoV-2  virus and/or diagnosis of COVID-19 infection under section 564(b)(1) of  the Act, 21 U  S C  738EGC-2(I)(2), unless the authorization is terminated  or revoked sooner  The test has been validated but independent review by FDA  and CLIA is pending  Test performed using [employer] GeneXpert: This RT-PCR assay targets N2,  a region unique to SARS-CoV-2  A conserved region in the E-gene was chosen  for pan-Sarbecovirus detection which includes SARS-CoV-2      CMP [[id number]]  (Abnormal) Collected: 02/07/22 0958    Lab Status: Final result Specimen: Blood from Arm, Right Updated: 02/07/22 1046     Sodium 133 mmol/L      Potassium 3 5 mmol/L      Chloride 98 mmol/L      CO2 27 mmol/L      ANION GAP 8 mmol/L      BUN 12 mg/dL      Creatinine 1 02 mg/dL      Glucose 123 mg/dL      Calcium 8 3 mg/dL      Corrected Calcium 8 8 mg/dL      AST 58 U/L      ALT 32 U/L      Alkaline Phosphatase 86 U/L      Total Protein 7 9 g/dL      Albumin 3 4 g/dL      Total Bilirubin 0 56 mg/dL      eGFR 54 ml/min/1 73sq m     Narrative:      National Kidney Disease Foundation guidelines for Chronic Kidney Disease (CKD):     Stage 1 with normal or high GFR (GFR > 90 mL/min/1 73 square meters)    Stage 2 Mild CKD (GFR = 60-89 mL/min/1 73 square meters)    Stage 3A Moderate CKD (GFR = 45-59 mL/min/1 73 square meters)    Stage 3B Moderate CKD (GFR = 30-44 mL/min/1 73 square meters)    Stage 4 Severe CKD (GFR = 15-29 mL/min/1 73 square meters)    Stage 5 End Stage CKD (GFR <15 mL/min/1 73 square meters)  Note: GFR calculation is accurate only with a steady state creatinine    Lipase [[id number]]  (Normal) Collected: 02/07/22 0958    Lab Status: Final result Specimen: Blood from Arm, Right Updated: 02/07/22 1046     Lipase 222 u/L     Magnesium [[id number]]  (Normal) Collected: 02/07/22 0958    Lab Status: Final result Specimen: Blood from Arm, Right Updated: 02/07/22 1046     Magnesium 2 0 mg/dL     CBC and differential [[id number]]  (Abnormal) Collected: 02/07/22 0958    Lab Status: Final result Specimen: Blood from Arm, Right Updated: 02/07/22 1011     WBC 3 85 Thousand/uL      RBC 5 08 Million/uL      Hemoglobin 15 1 g/dL      Hematocrit 45 4 %      MCV 89 fL      MCH 29 7 pg      MCHC 33 3 g/dL      RDW 14 3 %      MPV 11 4 fL      Platelets 449 Thousands/uL      nRBC 0 /100 WBCs      Neutrophils Relative 75 %      Immat GRANS % 1 %      Lymphocytes Relative 15 %      Monocytes Relative 8 %      Eosinophils Relative 0 %      Basophils Relative 1 %      Neutrophils Absolute 2 94 Thousands/µL      Immature Grans Absolute 0 03 Thousand/uL      Lymphocytes Absolute 0 57 Thousands/µL      Monocytes Absolute 0 29 Thousand/µL Eosinophils Absolute 0 00 Thousand/µL      Basophils Absolute 0 02 Thousands/µL                  X-ray chest 1 view portable   ED Interpretation by Cody White PA-C (02/07 1022)   No acute cardiopulmonary abnormality                 Procedures  Procedures         ED Course  ED Course as of 02/07/22 1125   Mon Feb 07, 2022   1103 Sue is positive                               SBIRT 20yo+      Most Recent Value   SBIRT (25 yo +)    In order to provide better care to our patients, we are screening all of our patients for alcohol and drug use  Would it be okay to ask you these screening questions? Unable to answer at this time Filed at: 02/07/2022 1106                    [facility]  Number of Diagnoses or Management Options  COVID-19: new and requires workup  Diagnosis management comments:  All questions were answered to patient/family's satisfaction  Anticipatory guidance and return precautions were discussed at length  They verbalized understanding and agreement with the plan  The patient remained stable while under my care in the Emergency Department  Amount and/or Complexity of Data Reviewed  Clinical lab tests: ordered and reviewed  Tests in the radiology section of CPT®: ordered and reviewed  Independent visualization of images, tracings, or specimens: yes    Risk of Complications, Morbidity, and/or Mortality  Presenting problems: moderate  Diagnostic procedures: moderate  Management options: moderate    Patient Progress  Patient progress: stable      Disposition  Final diagnoses:   COVID-19     Time reflects when diagnosis was documented in both MDM as applicable and the Disposition within this note     Time User Action Codes Description Comment    2/7/2022 11:11 AM Paula Bernal Add [U07 1] COVID-19       ED Disposition     ED Disposition Condition Date/Time Comment    Discharge Stable Mon Feb 7, 2022 11:11 AM Maryse Myles discharge to home/self care              Follow-up Information Follow up With Specialties Details Why Contact Info Additional Information    Manasa Monahan, DO Family Medicine  As needed [address] [id number]       [facility] Emergency Medicine  If symptoms worsen [address] Emergency Department, [address], [place], [place], [address]          Patient's Medications   Discharge Prescriptions    DICYCLOMINE (BENTYL) 20 MG TABLET    Take 1 tablet (20 mg total) by mouth every 6 (six) hours as needed (diarrhea) for up to 20 doses       Start Date: 2/7/2022  End Date: --       Order Dose: 20 mg       Quantity: 20 tablet    Refills: 0    ONDANSETRON (ZOFRAN ODT) 4 MG DISINTEGRATING TABLET    Take 1 tablet (4 mg total) by mouth every 6 (six) hours as needed for nausea or vomiting       Start Date: 2/7/2022  End Date: --       Order Dose: 4 mg       Quantity: 20 tablet    Refills: 0       No discharge procedures on file      PDMP Review     None          ED Provider  Electronically Signed by           Kayla Valles PA-C  02/07/22 2284

## 2022-02-07 NOTE — TELEPHONE ENCOUNTER
----- Message from Warren Way PA-C sent at 2/7/2022 11:12 AM EST -----  Please assess for monoclonal antibody infusion

## 2022-02-07 NOTE — DISCHARGE INSTRUCTIONS
Results for orders placed or performed during the hospital encounter of 02/07/22   COVID/FLU/RSV - 2 hour TAT    Specimen: Nasopharyngeal Swab; Nares   Result Value Ref Range    SARS-CoV-2 Positive (A) Negative    INFLUENZA A PCR Negative Negative    INFLUENZA B PCR Negative Negative    RSV PCR Negative Negative   CBC and differential   Result Value Ref Range    WBC 3 85 (L) 4 31 - 10 16 Thousand/uL    RBC 5 08 3 81 - 5 12 Million/uL    Hemoglobin 15 1 11 5 - 15 4 g/dL    Hematocrit 45 4 34 8 - 46 1 %    MCV 89 82 - 98 fL    MCH 29 7 26 8 - 34 3 pg    MCHC 33 3 31 4 - 37 4 g/dL    RDW 14 3 11 6 - 15 1 %    MPV 11 4 8 9 - 12 7 fL    Platelets 076 247 - 844 Thousands/uL    nRBC 0 /100 WBCs    Neutrophils Relative 75 43 - 75 %    Immat GRANS % 1 0 - 2 %    Lymphocytes Relative 15 14 - 44 %    Monocytes Relative 8 4 - 12 %    Eosinophils Relative 0 0 - 6 %    Basophils Relative 1 0 - 1 %    Neutrophils Absolute 2 94 1 85 - 7 62 Thousands/µL    Immature Grans Absolute 0 03 0 00 - 0 20 Thousand/uL    Lymphocytes Absolute 0 57 (L) 0 60 - 4 47 Thousands/µL    Monocytes Absolute 0 29 0 17 - 1 22 Thousand/µL    Eosinophils Absolute 0 00 0 00 - 0 61 Thousand/µL    Basophils Absolute 0 02 0 00 - 0 10 Thousands/µL   CMP   Result Value Ref Range    Sodium 133 (L) 136 - 145 mmol/L    Potassium 3 5 3 5 - 5 3 mmol/L    Chloride 98 (L) 100 - 108 mmol/L    CO2 27 21 - 32 mmol/L    ANION GAP 8 4 - 13 mmol/L    BUN 12 5 - 25 mg/dL    Creatinine 1 02 0 60 - 1 30 mg/dL    Glucose 123 65 - 140 mg/dL    Calcium 8 3 8 3 - 10 1 mg/dL    Corrected Calcium 8 8 8 3 - 10 1 mg/dL    AST 58 (H) 5 - 45 U/L    ALT 32 12 - 78 U/L    Alkaline Phosphatase 86 46 - 116 U/L    Total Protein 7 9 6 4 - 8 2 g/dL    Albumin 3 4 (L) 3 5 - 5 0 g/dL    Total Bilirubin 0 56 0 20 - 1 00 mg/dL    eGFR 54 ml/min/1 73sq m   Lipase   Result Value Ref Range    Lipase 222 73 - 393 u/L   Magnesium   Result Value Ref Range    Magnesium 2 0 1 6 - 2 6 mg/dL   HS Troponin 0hr (reflex protocol)   Result Value Ref Range    hs TnI 0hr 7 0 "Refer to ACS Flowchart"- see link ng/L     X-ray chest 1 view portable   ED Interpretation   No acute cardiopulmonary abnormality

## 2022-02-09 ENCOUNTER — TELEMEDICINE (OUTPATIENT)
Dept: FAMILY MEDICINE CLINIC | Facility: CLINIC | Age: 72
End: 2022-02-09
Payer: COMMERCIAL

## 2022-02-09 ENCOUNTER — DOCUMENTATION (OUTPATIENT)
Dept: INFUSION CENTER | Facility: CLINIC | Age: 72
End: 2022-02-09

## 2022-02-09 VITALS — TEMPERATURE: 99.1 F

## 2022-02-09 DIAGNOSIS — U07.1 COVID-19: Primary | ICD-10-CM

## 2022-02-09 DIAGNOSIS — E66.01 MORBID OBESITY (HCC): ICD-10-CM

## 2022-02-09 PROCEDURE — 99213 OFFICE O/P EST LOW 20 MIN: CPT | Performed by: FAMILY MEDICINE

## 2022-02-09 RX ORDER — ACETAMINOPHEN 325 MG/1
650 TABLET ORAL ONCE AS NEEDED
Status: CANCELLED | OUTPATIENT
Start: 2022-02-09

## 2022-02-09 RX ORDER — ONDANSETRON 2 MG/ML
4 INJECTION INTRAMUSCULAR; INTRAVENOUS ONCE AS NEEDED
Status: CANCELLED | OUTPATIENT
Start: 2022-02-09

## 2022-02-09 RX ORDER — SODIUM CHLORIDE 9 MG/ML
20 INJECTION, SOLUTION INTRAVENOUS ONCE
Status: CANCELLED | OUTPATIENT
Start: 2022-02-09

## 2022-02-09 RX ORDER — ALBUTEROL SULFATE 90 UG/1
3 AEROSOL, METERED RESPIRATORY (INHALATION) ONCE AS NEEDED
Status: CANCELLED | OUTPATIENT
Start: 2022-02-09

## 2022-02-09 RX ORDER — BENZONATATE 200 MG/1
200 CAPSULE ORAL 3 TIMES DAILY PRN
Qty: 20 CAPSULE | Refills: 0 | Status: SHIPPED | OUTPATIENT
Start: 2022-02-09 | End: 2022-02-13 | Stop reason: CLARIF

## 2022-02-09 NOTE — PROGRESS NOTES
COVID-19 Outpatient Progress Note    Assessment/Plan:    Problem List Items Addressed This Visit        Other    Morbid obesity ([address] )    COVID-19 - Primary    Relevant Medications    benzonatate (TESSALON) 200 MG capsule         Disposition:     I recommended continued isolation until at least 24 hours have passed since recovery defined as resolution of fever without the use of fever-reducing medications AND improvement in COVID symptoms AND 10 days have passed since onset of symptoms (or 10 days have passed since date of first positive viral diagnostic test for asymptomatic patients)  Patient is at increased risk of progressing towards severe COVID-19 due to the following high risk criteria:   - Older age (age >= 72years old)  - Obesity or being overweight    Patient meets criteria for Sotrovimab infusion  They were counseled in regards to risks, benefits, and side effects of this infusion  Sotrovimab is an investigational medicine used to treat mild-to-moderate symptoms of COVID-19 in adults and children (15years of age and older weighing at least 80 pounds (40 kg)) with positive results of direct SARS-CoV-2 viral testing, and who are at high risk of progression to severe COVID-19, including hospitalization or death  Sotrovimab is investigational because it is still being studied  There is limited information about the safety and effectiveness of using sotrovimab to treat people with mild-to-moderate COVID-19  The FDA has authorized the emergency use of sotrovimab for the treatment of COVID-19 under an Emergency Use Authorization (EUA)  How will I receive sotrovimab?    - You will receive 1 dose of sotrovimab  - Sotrovimab will be given through a vein (intravenous or IV infusion) over 30 minutes    Possible side effects of sotrovimab: Allergic reactions can happen during and after infusion with sotrovimab      Possible reactions include: fever, chills, nausea, headache, shortness of breath, low or high blood pressure, rapid or slow heart rate, chest discomfort or pain, weakness, confusion, feeling tired, wheezing, swelling of your lips, face, or throat, rash including hives, itching, muscle aches, dizziness, and sweating  These reactions may be severe or life threatening  Worsening symptoms after treatment: You may experience new or worsening symptoms after infusion, including fever, difficulty breathing, rapid or slow heart rate, tiredness, weakness or confusion  If these occur, contact your healthcare provider or seek immediate medical attention as some of these events have required hospitalization  It is unknown if these events are related to treatment or are due to the progression of COVID19  The side effects of getting any medicine by vein may include brief pain, bleeding, bruising of the skin, soreness, swelling, and possible infection at the infusion site  These are not all the possible side effects of sotrovimab  Not a lot of people have been given sotrovimab  Serious and unexpected side effects may happen  Sotrovimab are still being studied so it is possible that all of the risks are not known at this time  It is possible that sotrovimab could interfere with your body's own ability to fight off a future infection of SARS-CoV-2  Similarly, sotrovimab may reduce your bodys immune response to a vaccine for SARS-CoV-2  Specific studies have not been conducted to address these possible risks  Talk to your healthcare provider if you have any questions  Emergency Use Authorization:    The [facility] FDA has made sotrovimab available under an emergency access mechanism called an EUA  The EUA is supported by a [place] of Health and Human Service (HHS) declaration that circumstances exist to justify the emergency use of drugs and biological products during the COVID-19 pandemic  Sotrovimab have not undergone the same type of review as an FDA-approved or cleared product   The FDA may issue an EUA when certain criteria are met, which includes that there are no adequate, approved, and available alternatives  In addition, the FDA decision is based on the totality of scientific evidence available showing that it is reasonable to believe that the product meets certain criteria for safety, performance, and labeling and may be effective in treatment of patients during the COVID-19 pandemic  All of these criteria must be met to allow for the product to be used in the treatment of patients during the COVID-19 pandemic  The EUA for sotrovimab together is in effect for the duration of the COVID-19 declaration justifying emergency use of these products, unless terminated or revoked (after which the product may no longer be used)  What if I am pregnant or breastfeeding? There is no experience treating pregnant women or breastfeeding mothers with sotrovimab  For a mother and unborn baby, the benefit of receiving sotrovimab may be greater than the risk from the treatment  If you are pregnant or breastfeeding, discuss your options and specific situation with your healthcare provider  Regarding COVID-19 Vaccination:    Currently there is no data or safety or efficacy of COVID-19 vaccination in persons who received monoclonal antibodies as part of COVID-19 treatment  Treatment should be deferred for at least 90 days to avoid interference of the treatment with vaccine-induced immune responses (this is based on estimated half-life of therapies and evidence suggesting reinfection is uncommon within 90 days of initial infection)  Full fact sheet document for patients can be found at: [facility]    The patient consents to proceed with sotrovimab infusion  I have spent 15 minutes directly with the patient   Greater than 50% of this time was spent in counseling/coordination of care regarding: diagnostic results, prognosis, risks and benefits of treatment options, instructions for management, patient and family education, importance of treatment compliance, risk factor reductions and impressions  Sotrovimab is an investigational medicine used to treat mild-to-moderate symptoms of COVID-19 in adults and children (15years of age and older weighing at least 80 pounds [40 kg]) with positive results of direct SARS-CoV-2 viral testing, and who are at high risk of progression to severe COVID-19, including hospitalization or death  Sotrovimab is investigational because it is still being studied  There is limited information about the safety and effectiveness of using sotrovimab to treat people with mild-to-moderate COVID-19  The FDA has authorized the emergency use of sotrovimab for the treatment of COVID-19 under an Emergency Use Authorization (EUA)  How will I receive sotrovimab?  You will receive 1 dose of sotrovimab   Sotrovimab will be given through a vein (intravenous or IV infusion) over 30 minutes    Possible side effects of sotrovimab: Allergic reactions can happen during and after infusion with sotrovimab  Possible reactions include: fever, chills, nausea, headache, shortness of breath, low or high blood pressure, rapid or slow heart rate, chest discomfort or pain, weakness, confusion, feeling tired, wheezing, swelling of your lips, face, or throat, rash including hives, itching, muscle aches, dizziness, and sweating  These reactions may be severe or life threatening  Worsening symptoms after treatment: You may experience new or worsening symptoms after infusion, including fever, difficulty breathing, rapid or slow heart rate, tiredness, weakness or confusion  If these occur, contact your healthcare provider or seek immediate medical attention as some of these events have required hospitalization  It is unknown if these events are related to treatment or are due to the progression of COVID19      The side effects of getting any medicine by vein may include brief pain, bleeding, bruising of the skin, soreness, swelling, and possible infection at the infusion site  These are not all the possible side effects of sotrovimab  Not a lot of people have been given sotrovimab  Serious and unexpected side effects may happen  Sotrovimab are still being studied so it is possible that all of the risks are not known at this time  It is possible that sotrovimab could interfere with your body's own ability to fight off a future infection of SARS-CoV-2  Similarly, sotrovimab may reduce your bodys immune response to a vaccine for SARS-CoV-2  Specific studies have not been conducted to address these possible risks  Talk to your healthcare provider if you have any questions  Emergency Use Authorization:    The [facility] FDA has made sotrovimab available under an emergency access mechanism called an EUA  The EUA is supported by a [place] of Health and Human Service ([facility]) declaration that circumstances exist to justify the emergency use of drugs and biological products during the COVID-19 pandemic  Sotrovimab have not undergone the same type of review as an FDA-approved or cleared product  The FDA may issue an EUA when certain criteria are met, which includes that there are no adequate, approved, and available alternatives  In addition, the FDA decision is based on the totality of scientific evidence available showing that it is reasonable to believe that the product meets certain criteria for safety, performance, and labeling and may be effective in treatment of patients during the COVID-19 pandemic  All of these criteria must be met to allow for the product to be used in the treatment of patients during the COVID-19 pandemic  The EUA for sotrovimab together is in effect for the duration of the COVID-19 declaration justifying emergency use of these products, unless terminated or revoked (after which the product may no longer be used)       What if I am pregnant or breastfeeding? There is no experience treating pregnant women or breastfeeding mothers with sotrovimab  For a mother and unborn baby, the benefit of receiving sotrovimab may be greater than the risk from the treatment  If you are pregnant or breastfeeding, discuss your options and specific situation with your healthcare provider  Regarding COVID-19 Vaccination:    Currently there is no data or safety or efficacy of COVID-19 vaccination in persons who received monoclonal antibodies as part of COVID-19 treatment  Treatment should be deferred for at least 90 days to avoid interference of the treatment with vaccine-induced immune responses (this is based on estimated half-life of therapies and evidence suggesting reinfection is uncommon within 90 days of initial infection)  Full fact sheet document for patients can be found at: [facility]     The patient consents to proceed with sotrovimab infusion  Encounter provider 126 Gisel Dillon DO    Provider located at [address] [address]    Recent Visits  No visits were found meeting these conditions  Showing recent visits within past 7 days and meeting all other requirements  Today's Visits  Date Type Provider Dept   02/09/22 Telemedicine DO Merrill Raines   Showing today's visits and meeting all other requirements  Future Appointments  No visits were found meeting these conditions  Showing future appointments within next 150 days and meeting all other requirements       Patient agrees to participate in a virtual check in via telephone or video visit instead of presenting to the office to address urgent/immediate medical needs  Patient is aware this is a billable service  After connecting through [facility], the patient was identified by name and date of birth   Solange Cuba was informed that this was a telemedicine visit and that the exam was being conducted confidentially over secure lines  My office door was closed  No one else was in the room  Cipriano Keller acknowledged consent and understanding of privacy and security of the telemedicine visit  I informed the patient that I have reviewed her record in Epic and presented the opportunity for her to ask any questions regarding the visit today  The patient agreed to participate  Verification of patient location:  Patient is located in the following state in which I hold an active license: [place]    Subjective:   Cipriano Keller is a 66 y o  female who has been screened for COVID-19  Symptom change since last report: unchanged  Patient's symptoms include fever, fatigue, cough, nausea, diarrhea and headache  Patient denies shortness of breath  - Date of symptom onset: 2/5/2022  - Date of exposure: 2/1/2022  - Date of positive COVID-19 test: 2/7/2022  Type of test: PCR  COVID-19 vaccination status: Not vaccinated    Amish Celeste has been staying home and has isolated themselves in her home  She is taking care to not share personal items and is cleaning all surfaces that are touched often, like counters, tabletops, and doorknobs using household cleaning sprays or wipes  She is wearing a mask when she leaves her room  Amish Celeste started feeling ill, soon after her  Helen Valencia; with her today - Crow Aguilar is vaccinated against COV-19) had COV-19  She tested positive for the virus 2 days ago at the ER      Lab Results   Component Value Date    SARSCOV2 Positive (A) 02/07/2022     Past Medical History:   Diagnosis Date    Arthritis     Disease of thyroid gland     PNA (pneumonia) 05/2019     Past Surgical History:   Procedure Laterality Date    CHOLECYSTECTOMY  1968    open     Current Outpatient Medications   Medication Sig Dispense Refill    acetaminophen (TYLENOL) 325 mg tablet Take 2 tablets (650 mg total) by mouth every 6 (six) hours as needed for mild pain 30 tablet 0    dicyclomine (BENTYL) 20 mg tablet Take 1 tablet (20 mg total) by mouth every 6 (six) hours as needed (diarrhea) for up to 20 doses 20 tablet 0    levothyroxine 50 mcg tablet TAKE 1 TABLET BY MOUTH EVERY DAY 90 tablet 1    ondansetron (Zofran ODT) 4 mg disintegrating tablet Take 1 tablet (4 mg total) by mouth every 6 (six) hours as needed for nausea or vomiting 20 tablet 0    Probiotic Product (PROBIOTIC-10) CAPS Take 1 capsule by mouth      benzonatate (TESSALON) 200 MG capsule Take 1 capsule (200 mg total) by mouth 3 (three) times a day as needed for cough 20 capsule 0    HYDROcodone-acetaminophen (NORCO) 5-325 mg per tablet Take 1 tablet by mouth every 6 (six) hours as needed for painMax Daily Amount: 4 tablets (Patient not taking: Reported on 6/21/2021) 15 tablet 0    methocarbamol (ROBAXIN) 500 mg tablet Take 1 tablet (500 mg total) by mouth 3 (three) times a day as needed for muscle spasms (Patient not taking: Reported on 9/20/2021) 40 tablet 0    tamsulosin (FLOMAX) 0 4 mg Take 1 capsule (0 4 mg total) by mouth daily with dinner (Patient not taking: Reported on 2/26/2021) 14 capsule 0     No current facility-administered medications for this visit  Allergies   Allergen Reactions    Codeine Anaphylaxis     Increased Heart Rate, Palpitations       Review of Systems   Constitutional: Positive for fatigue and fever  Respiratory: Positive for cough  Negative for shortness of breath  Gastrointestinal: Positive for diarrhea and nausea  Neurological: Positive for headaches  Objective:    Vitals:    02/09/22 1215   Temp: 99 1 °F (37 3 °C)   TempSrc: Temporal       Physical Exam  Constitutional:       General: She is not in acute distress  Appearance: Normal appearance  She is ill-appearing  She is not toxic-appearing or diaphoretic  Comments: Jeanette Lopez appears tired today, but is non-toxic appearing; she is speaking in full sentences  HENT:      Head: Normocephalic and atraumatic     Eyes: General: No scleral icterus  Conjunctiva/sclera: Conjunctivae normal    Pulmonary:      Effort: Pulmonary effort is normal  No respiratory distress  Neurological:      Mental Status: She is alert and oriented to person, place, and time  Psychiatric:         Mood and Affect: Mood normal          Behavior: Behavior normal          Thought Content: Thought content normal          Judgment: Judgment normal      Gabrielle Portillo was seen today for covid-19 and covid-19  Diagnoses and all orders for this visit:    COVID-19  Comments:  Pt stable  OTC Cold Preps PRN, rest, bland diet, & good hydration  [facility] Ant[facility] Therapy disc, & ordered  COV-19 PCR+  Precautions given; self-isolation  Orders:  -     benzonatate (TESSALON) 200 MG capsule; Take 1 capsule (200 mg total) by mouth 3 (three) times a day as needed for cough    Morbid obesity ([address] )  Comments:  As above  VIRTUAL VISIT DISCLAIMER    Ange Hartley verbally agrees to participate in [employer]  Pt is aware that [employer] could be limited without vital signs or the ability to perform a full hands-on physical Beena Madrigal understands she or the provider may request at any time to terminate the video visit and request the patient to seek care or treatment in person

## 2022-02-10 ENCOUNTER — HOSPITAL ENCOUNTER (OUTPATIENT)
Dept: INFUSION CENTER | Facility: HOSPITAL | Age: 72
Discharge: HOME | End: 2022-02-10
Payer: COMMERCIAL

## 2022-02-10 VITALS
RESPIRATION RATE: 16 BRPM | OXYGEN SATURATION: 95 % | SYSTOLIC BLOOD PRESSURE: 118 MMHG | TEMPERATURE: 97.6 F | HEART RATE: 67 BPM | DIASTOLIC BLOOD PRESSURE: 70 MMHG

## 2022-02-10 DIAGNOSIS — U07.1 COVID-19: ICD-10-CM

## 2022-02-10 DIAGNOSIS — E66.01 MORBID OBESITY (HCC): Primary | ICD-10-CM

## 2022-02-10 PROCEDURE — M0247 HB SOTROVIMAB INF ADMIN: HCPCS | Performed by: FAMILY MEDICINE

## 2022-02-10 RX ORDER — SODIUM CHLORIDE 9 MG/ML
20 INJECTION, SOLUTION INTRAVENOUS ONCE
Status: CANCELLED | OUTPATIENT
Start: 2022-02-10

## 2022-02-10 RX ORDER — ACETAMINOPHEN 325 MG/1
650 TABLET ORAL ONCE AS NEEDED
Status: DISCONTINUED | OUTPATIENT
Start: 2022-02-10 | End: 2022-02-13 | Stop reason: HOSPADM

## 2022-02-10 RX ORDER — ACETAMINOPHEN 325 MG/1
650 TABLET ORAL ONCE AS NEEDED
Status: CANCELLED | OUTPATIENT
Start: 2022-02-10

## 2022-02-10 RX ORDER — ONDANSETRON 2 MG/ML
4 INJECTION INTRAMUSCULAR; INTRAVENOUS ONCE AS NEEDED
Status: CANCELLED | OUTPATIENT
Start: 2022-02-10

## 2022-02-10 RX ORDER — ALBUTEROL SULFATE 90 UG/1
3 AEROSOL, METERED RESPIRATORY (INHALATION) ONCE AS NEEDED
Status: CANCELLED | OUTPATIENT
Start: 2022-02-10

## 2022-02-10 RX ORDER — ALBUTEROL SULFATE 90 UG/1
3 AEROSOL, METERED RESPIRATORY (INHALATION) ONCE AS NEEDED
Status: DISCONTINUED | OUTPATIENT
Start: 2022-02-10 | End: 2022-02-13 | Stop reason: HOSPADM

## 2022-02-10 RX ORDER — SODIUM CHLORIDE 9 MG/ML
20 INJECTION, SOLUTION INTRAVENOUS ONCE
Status: COMPLETED | OUTPATIENT
Start: 2022-02-10 | End: 2022-02-10

## 2022-02-10 RX ORDER — ONDANSETRON 2 MG/ML
4 INJECTION INTRAMUSCULAR; INTRAVENOUS ONCE AS NEEDED
Status: COMPLETED | OUTPATIENT
Start: 2022-02-10 | End: 2022-02-10

## 2022-02-10 RX ADMIN — SODIUM CHLORIDE 20 ML/HR: 9 INJECTION, SOLUTION INTRAVENOUS at 08:43

## 2022-02-10 RX ADMIN — SODIUM CHLORIDE 500 MG: 9 INJECTION, SOLUTION INTRAVENOUS at 08:44

## 2022-02-10 RX ADMIN — ONDANSETRON 4 MG: 2 INJECTION INTRAMUSCULAR; INTRAVENOUS at 09:24

## 2022-02-10 NOTE — NURSING NOTE
Vss   Copy of EUA information and medication information given to patient  Aware they should have a follow up with fmd within 24 hours of infusion    Pt left treatment area by wc with RN

## 2022-02-10 NOTE — NURSING NOTE
Pt  to treatment area by lissy with RN for sotrovimab infusion  EUA reviewed and copy given to patient  Verbal consent to proceed with treatment obtained

## 2022-02-10 NOTE — NURSING NOTE
Patient tolerated infusion with no adverse reactions  Vss as noted  Aware they will be monitored for 1 hour  Pt states she has nausea  Anthony Coe Has had since diagnosed with covid  No increase with infusion

## 2022-02-11 ENCOUNTER — TELEMEDICINE (OUTPATIENT)
Dept: FAMILY MEDICINE CLINIC | Facility: CLINIC | Age: 72
End: 2022-02-11
Payer: COMMERCIAL

## 2022-02-11 DIAGNOSIS — R51.9 ACUTE NONINTRACTABLE HEADACHE, UNSPECIFIED HEADACHE TYPE: ICD-10-CM

## 2022-02-11 DIAGNOSIS — U07.1 COVID-19: Primary | ICD-10-CM

## 2022-02-11 PROCEDURE — 3725F SCREEN DEPRESSION PERFORMED: CPT | Performed by: FAMILY MEDICINE

## 2022-02-11 PROCEDURE — 99213 OFFICE O/P EST LOW 20 MIN: CPT | Performed by: FAMILY MEDICINE

## 2022-02-11 PROCEDURE — 1036F TOBACCO NON-USER: CPT | Performed by: FAMILY MEDICINE

## 2022-02-11 PROCEDURE — 3288F FALL RISK ASSESSMENT DOCD: CPT | Performed by: FAMILY MEDICINE

## 2022-02-11 PROCEDURE — 1101F PT FALLS ASSESS-DOCD LE1/YR: CPT | Performed by: FAMILY MEDICINE

## 2022-02-11 PROCEDURE — 1160F RVW MEDS BY RX/DR IN RCRD: CPT | Performed by: FAMILY MEDICINE

## 2022-02-11 RX ORDER — PREDNISONE 20 MG/1
40 TABLET ORAL DAILY
Qty: 10 TABLET | Refills: 0 | Status: SHIPPED | OUTPATIENT
Start: 2022-02-11 | End: 2022-02-13 | Stop reason: CLARIF

## 2022-02-11 NOTE — PROGRESS NOTES
COVID-19 Outpatient Progress Note    Assessment/Plan:    Problem List Items Addressed This Visit        Other    COVID-19 - Primary    Relevant Medications    predniSONE 20 mg tablet      Other Visit Diagnoses     Acute nonintractable headache, unspecified headache type        Starting Prednisone burst   Pt has PRN Ondansetron at home  Relevant Medications    predniSONE 20 mg tablet         Disposition:     I recommended continued isolation until at least 24 hours have passed since recovery defined as resolution of fever without the use of fever-reducing medications AND improvement in COVID symptoms AND 10 days have passed since onset of symptoms (or 10 days have passed since date of first positive viral diagnostic test for asymptomatic patients)  I have spent 15 minutes directly with the patient  Greater than 50% of this time was spent in counseling/coordination of care regarding: prognosis, risks and benefits of treatment options, instructions for management, patient and family education, importance of treatment compliance, risk factor reductions and impressions  Encounter provider Vignesh Naik DO    Provider located at [address] [address]    Recent Visits  Date Type Provider Dept   02/09/22 Telemedicine Stan Li DO [address] recent visits within past 7 days and meeting all other requirements  Today's Visits  Date Type Provider Dept   02/11/22 Telemedicine Stan Li DO [facility]   Showing today's visits and meeting all other requirements  Future Appointments  No visits were found meeting these conditions  Showing future appointments within next 150 days and meeting all other requirements       Patient agrees to participate in a virtual check in via telephone or video visit instead of presenting to the office to address urgent/immediate medical needs   Patient is aware this is a billable service  After connecting through [facility], the patient was identified by name and date of birth  Raiford Shone was informed that this was a telemedicine visit and that the exam was being conducted confidentially over secure lines  My office door was closed  No one else was in the room  Raiford Shone acknowledged consent and understanding of privacy and security of the telemedicine visit  I informed the patient that I have reviewed her record in Epic and presented the opportunity for her to ask any questions regarding the visit today  The patient agreed to participate  Verification of patient location:  Patient is located in the following state in which I hold an active license: [place]    Subjective:   Raiford Shone is a 66 y o  female who has been screened for COVID-19  Symptom change since last report: unchanged  Patient's symptoms include cough, nausea and headache  Patient denies fever and shortness of breath  - Date of symptom onset: 2/5/2022  - Date of exposure: 2/1/2022  - Date of positive COVID-19 test: 2/7/2022  Type of test: PCR  COVID-19 vaccination status: Not vaccinated    Alma Delia Martini has been staying home and has isolated themselves in her home  She is taking care to not share personal items and is cleaning all surfaces that are touched often, like counters, tabletops, and doorknobs using household cleaning sprays or wipes  She is wearing a mask when she leaves her room  Monoclonal Antibody Follow-up Symptom Questionnaire  I feel overall: similar  My breathing is: similar  My fever is: same  My fatigue is: similar    Alma Delia Martini had the Sotrovimab infusion yesterday, and tolerated well  Pt has a bad headache and nausea, and reporting to feel terrible  Discussed with pt and  at length, told Alma Delia Martini that I would order a Prednisone burst for her, and that hopefully, with just a little more time, the [facility] Antibody infusion will start to help her  They have PRN Ondansetron at home  STRICT precautions were given to the pt and her : That if [place] felt worse, had no relief, etc, that Kj Parham () should take [place] to the ER  They voiced their understanding      Lab Results   Component Value Date    SARSCOV2 Positive (A) 02/07/2022     Past Medical History:   Diagnosis Date    Arthritis     Disease of thyroid gland     PNA (pneumonia) 05/2019     Past Surgical History:   Procedure Laterality Date    CHOLECYSTECTOMY  1968    open     Current Outpatient Medications   Medication Sig Dispense Refill    acetaminophen (TYLENOL) 325 mg tablet Take 2 tablets (650 mg total) by mouth every 6 (six) hours as needed for mild pain 30 tablet 0    dicyclomine (BENTYL) 20 mg tablet Take 1 tablet (20 mg total) by mouth every 6 (six) hours as needed (diarrhea) for up to 20 doses 20 tablet 0    levothyroxine 50 mcg tablet TAKE 1 TABLET BY MOUTH EVERY DAY 90 tablet 1    ondansetron (Zofran ODT) 4 mg disintegrating tablet Take 1 tablet (4 mg total) by mouth every 6 (six) hours as needed for nausea or vomiting 20 tablet 0    Probiotic Product (PROBIOTIC-10) CAPS Take 1 capsule by mouth      benzonatate (TESSALON) 200 MG capsule Take 1 capsule (200 mg total) by mouth 3 (three) times a day as needed for cough 20 capsule 0    HYDROcodone-acetaminophen (NORCO) 5-325 mg per tablet Take 1 tablet by mouth every 6 (six) hours as needed for painMax Daily Amount: 4 tablets (Patient not taking: Reported on 6/21/2021) 15 tablet 0    methocarbamol (ROBAXIN) 500 mg tablet Take 1 tablet (500 mg total) by mouth 3 (three) times a day as needed for muscle spasms (Patient not taking: Reported on 9/20/2021) 40 tablet 0    predniSONE 20 mg tablet Take 2 tablets (40 mg total) by mouth daily for 5 days 10 tablet 0    tamsulosin (FLOMAX) 0 4 mg Take 1 capsule (0 4 mg total) by mouth daily with dinner (Patient not taking: Reported on 2/26/2021) 14 capsule 0     No current facility-administered medications for this visit  Facility-Administered Medications Ordered in Other Visits   Medication Dose Route Frequency Provider Last Rate Last Admin    acetaminophen (TYLENOL) tablet 650 mg  650 mg Oral Once PRN Stan Zuluaga, DO        albuterol (PROVENTIL HFA,VENTOLIN HFA) inhaler 3 puff  3 puff Inhalation Once PRN Stan Cimorelli, DO         Allergies   Allergen Reactions    Codeine Anaphylaxis     Increased Heart Rate, Palpitations       Review of Systems   Constitutional: Negative for fever  Respiratory: Positive for cough  Negative for shortness of breath  Cardiovascular: Negative for chest pain  Gastrointestinal: Positive for nausea  Neurological: Positive for headaches  Objective:    Vitals:       Physical Exam  Constitutional:       General: She is not in acute distress  Appearance: Normal appearance  She is ill-appearing  She is not toxic-appearing or diaphoretic  Comments: Олег Mclean appears tired today and uncomfortable due to her headache, but is non-toxic appearing; she is speaking in full sentences  HENT:      Head: Normocephalic and atraumatic  Eyes:      General: No scleral icterus  Conjunctiva/sclera: Conjunctivae normal    Pulmonary:      Effort: Pulmonary effort is normal  No respiratory distress  Neurological:      Mental Status: She is alert and oriented to person, place, and time  Psychiatric:         Mood and Affect: Mood normal          Behavior: Behavior normal          Thought Content: Thought content normal          Judgment: Judgment normal          Олег Mclean was seen today for follow-up and covid-19  Diagnoses and all orders for this visit:    COVID-19  Comments:  Pt stable  OTC Cold Preps PRN, rest, & good hydration  Strict precautions given; continue self-isolation  Orders:  -     predniSONE 20 mg tablet;  Take 2 tablets (40 mg total) by mouth daily for 5 days    Acute nonintractable headache, unspecified headache type  Comments:  Starting Prednisone burst   Pt has PRN Ondansetron at home  Orders:  -     predniSONE 20 mg tablet; Take 2 tablets (40 mg total) by mouth daily for 5 days        VIRTUAL VISIT DISCLAIMER    Ange Hartley verbally agrees to participate in [address]  Pt is aware that [address] could be limited without vital signs or the ability to perform a full hands-on physical Beena Madrigal understands she or the provider may request at any time to terminate the video visit and request the patient to seek care or treatment in person

## 2022-02-13 ENCOUNTER — HOSPITAL ENCOUNTER (INPATIENT)
Facility: HOSPITAL | Age: 72
LOS: 4 days | Discharge: HOME | DRG: 177 | End: 2022-02-17
Attending: EMERGENCY MEDICINE | Admitting: INTERNAL MEDICINE
Payer: COMMERCIAL

## 2022-02-13 ENCOUNTER — TELEPHONE (OUTPATIENT)
Dept: OTHER | Facility: OTHER | Age: 72
End: 2022-02-13

## 2022-02-13 ENCOUNTER — APPOINTMENT (EMERGENCY)
Dept: RADIOLOGY | Facility: HOSPITAL | Age: 72
DRG: 177 | End: 2022-02-13
Payer: COMMERCIAL

## 2022-02-13 DIAGNOSIS — U07.1 PNEUMONIA DUE TO COVID-19 VIRUS: Primary | ICD-10-CM

## 2022-02-13 DIAGNOSIS — R09.02 HYPOXIA: ICD-10-CM

## 2022-02-13 DIAGNOSIS — R06.02 SHORTNESS OF BREATH: ICD-10-CM

## 2022-02-13 DIAGNOSIS — J12.82 PNEUMONIA DUE TO COVID-19 VIRUS: Primary | ICD-10-CM

## 2022-02-13 DIAGNOSIS — U07.1 COVID-19: ICD-10-CM

## 2022-02-13 PROBLEM — E03.8 HYPOTHYROIDISM DUE TO HASHIMOTO'S THYROIDITIS: Status: ACTIVE | Noted: 2018-11-24

## 2022-02-13 PROBLEM — E06.3 HYPOTHYROIDISM DUE TO HASHIMOTO'S THYROIDITIS: Status: ACTIVE | Noted: 2018-11-24

## 2022-02-13 LAB
2HR DELTA HS TROPONIN: 0 NG/L
ALBUMIN SERPL BCP-MCNC: 3.1 G/DL (ref 3.5–5)
ALP SERPL-CCNC: 94 U/L (ref 46–116)
ALT SERPL W P-5'-P-CCNC: 30 U/L (ref 12–78)
ANION GAP SERPL CALCULATED.3IONS-SCNC: 12 MMOL/L (ref 4–13)
APTT PPP: 35 SECONDS (ref 23–37)
AST SERPL W P-5'-P-CCNC: 56 U/L (ref 5–45)
ATRIAL RATE: 70 BPM
BASOPHILS # BLD AUTO: 0.04 THOUSANDS/ΜL (ref 0–0.1)
BASOPHILS NFR BLD AUTO: 0 % (ref 0–1)
BILIRUB SERPL-MCNC: 0.85 MG/DL (ref 0.2–1)
BUN SERPL-MCNC: 16 MG/DL (ref 5–25)
CALCIUM ALBUM COR SERPL-MCNC: 9.4 MG/DL (ref 8.3–10.1)
CALCIUM SERPL-MCNC: 8.7 MG/DL (ref 8.3–10.1)
CARDIAC TROPONIN I PNL SERPL HS: 4 NG/L
CARDIAC TROPONIN I PNL SERPL HS: 4 NG/L
CHLORIDE SERPL-SCNC: 98 MMOL/L (ref 100–108)
CK SERPL-CCNC: 69 U/L (ref 26–192)
CO2 SERPL-SCNC: 26 MMOL/L (ref 21–32)
CREAT SERPL-MCNC: 0.98 MG/DL (ref 0.6–1.3)
CRP SERPL HS-MCNC: >90 MG/L
D DIMER PPP FEU-MCNC: 0.69 UG/ML FEU
EOSINOPHIL # BLD AUTO: 0.08 THOUSAND/ΜL (ref 0–0.61)
EOSINOPHIL NFR BLD AUTO: 1 % (ref 0–6)
ERYTHROCYTE [DISTWIDTH] IN BLOOD BY AUTOMATED COUNT: 13.8 % (ref 11.6–15.1)
GFR SERPL CREATININE-BSD FRML MDRD: 57 ML/MIN/1.73SQ M
GLUCOSE SERPL-MCNC: 98 MG/DL (ref 65–140)
HCT VFR BLD AUTO: 45.6 % (ref 34.8–46.1)
HGB BLD-MCNC: 15.3 G/DL (ref 11.5–15.4)
IMM GRANULOCYTES # BLD AUTO: 0.08 THOUSAND/UL (ref 0–0.2)
IMM GRANULOCYTES NFR BLD AUTO: 1 % (ref 0–2)
INR PPP: 1.18 (ref 0.84–1.19)
LYMPHOCYTES # BLD AUTO: 1.47 THOUSANDS/ΜL (ref 0.6–4.47)
LYMPHOCYTES NFR BLD AUTO: 15 % (ref 14–44)
MCH RBC QN AUTO: 29.3 PG (ref 26.8–34.3)
MCHC RBC AUTO-ENTMCNC: 33.6 G/DL (ref 31.4–37.4)
MCV RBC AUTO: 87 FL (ref 82–98)
MONOCYTES # BLD AUTO: 0.72 THOUSAND/ΜL (ref 0.17–1.22)
MONOCYTES NFR BLD AUTO: 7 % (ref 4–12)
NEUTROPHILS # BLD AUTO: 7.53 THOUSANDS/ΜL (ref 1.85–7.62)
NEUTS SEG NFR BLD AUTO: 76 % (ref 43–75)
NRBC BLD AUTO-RTO: 0 /100 WBCS
NT-PROBNP SERPL-MCNC: 399 PG/ML
P AXIS: 71 DEGREES
PLATELET # BLD AUTO: 488 THOUSANDS/UL (ref 149–390)
PMV BLD AUTO: 10.5 FL (ref 8.9–12.7)
POTASSIUM SERPL-SCNC: 3.6 MMOL/L (ref 3.5–5.3)
PR INTERVAL: 112 MS
PROT SERPL-MCNC: 7.3 G/DL (ref 6.4–8.2)
PROTHROMBIN TIME: 14.9 SECONDS (ref 11.6–14.5)
QRS AXIS: 33 DEGREES
QRSD INTERVAL: 66 MS
QT INTERVAL: 392 MS
QTC INTERVAL: 423 MS
RBC # BLD AUTO: 5.22 MILLION/UL (ref 3.81–5.12)
SODIUM SERPL-SCNC: 136 MMOL/L (ref 136–145)
T WAVE AXIS: 13 DEGREES
VENTRICULAR RATE: 70 BPM
WBC # BLD AUTO: 9.92 THOUSAND/UL (ref 4.31–10.16)

## 2022-02-13 PROCEDURE — 85025 COMPLETE CBC W/AUTO DIFF WBC: CPT

## 2022-02-13 PROCEDURE — 84484 ASSAY OF TROPONIN QUANT: CPT

## 2022-02-13 PROCEDURE — 85379 FIBRIN DEGRADATION QUANT: CPT

## 2022-02-13 PROCEDURE — XW033E5 INTRODUCTION OF REMDESIVIR ANTI-INFECTIVE INTO PERIPHERAL VEIN, PERCUTANEOUS APPROACH, NEW TECHNOLOGY GROUP 5: ICD-10-PCS | Performed by: INTERNAL MEDICINE

## 2022-02-13 PROCEDURE — 80053 COMPREHEN METABOLIC PANEL: CPT

## 2022-02-13 PROCEDURE — 99223 1ST HOSP IP/OBS HIGH 75: CPT | Performed by: INTERNAL MEDICINE

## 2022-02-13 PROCEDURE — 85730 THROMBOPLASTIN TIME PARTIAL: CPT

## 2022-02-13 PROCEDURE — 99285 EMERGENCY DEPT VISIT HI MDM: CPT

## 2022-02-13 PROCEDURE — 36415 COLL VENOUS BLD VENIPUNCTURE: CPT

## 2022-02-13 PROCEDURE — 71045 X-RAY EXAM CHEST 1 VIEW: CPT

## 2022-02-13 PROCEDURE — 83880 ASSAY OF NATRIURETIC PEPTIDE: CPT

## 2022-02-13 PROCEDURE — 93005 ELECTROCARDIOGRAM TRACING: CPT

## 2022-02-13 PROCEDURE — 85610 PROTHROMBIN TIME: CPT

## 2022-02-13 PROCEDURE — 82550 ASSAY OF CK (CPK): CPT

## 2022-02-13 PROCEDURE — 86141 C-REACTIVE PROTEIN HS: CPT

## 2022-02-13 PROCEDURE — 99285 EMERGENCY DEPT VISIT HI MDM: CPT | Performed by: EMERGENCY MEDICINE

## 2022-02-13 PROCEDURE — 93010 ELECTROCARDIOGRAM REPORT: CPT | Performed by: INTERNAL MEDICINE

## 2022-02-13 RX ORDER — ACETAMINOPHEN 325 MG/1
650 TABLET ORAL EVERY 6 HOURS PRN
Status: DISCONTINUED | OUTPATIENT
Start: 2022-02-13 | End: 2022-02-17 | Stop reason: HOSPADM

## 2022-02-13 RX ORDER — LEVOTHYROXINE SODIUM 0.05 MG/1
50 TABLET ORAL DAILY
Status: DISCONTINUED | OUTPATIENT
Start: 2022-02-14 | End: 2022-02-17 | Stop reason: HOSPADM

## 2022-02-13 RX ORDER — DEXAMETHASONE SODIUM PHOSPHATE 4 MG/ML
6 INJECTION, SOLUTION INTRA-ARTICULAR; INTRALESIONAL; INTRAMUSCULAR; INTRAVENOUS; SOFT TISSUE EVERY 24 HOURS
Status: DISCONTINUED | OUTPATIENT
Start: 2022-02-13 | End: 2022-02-17 | Stop reason: HOSPADM

## 2022-02-13 RX ORDER — ONDANSETRON 2 MG/ML
4 INJECTION INTRAMUSCULAR; INTRAVENOUS EVERY 4 HOURS PRN
Status: DISCONTINUED | OUTPATIENT
Start: 2022-02-13 | End: 2022-02-17 | Stop reason: HOSPADM

## 2022-02-13 RX ADMIN — REMDESIVIR 200 MG: 100 INJECTION, POWDER, LYOPHILIZED, FOR SOLUTION INTRAVENOUS at 18:40

## 2022-02-13 RX ADMIN — ENOXAPARIN SODIUM 30 MG: 30 INJECTION SUBCUTANEOUS at 20:42

## 2022-02-13 RX ADMIN — ONDANSETRON 4 MG: 2 INJECTION INTRAMUSCULAR; INTRAVENOUS at 20:44

## 2022-02-13 RX ADMIN — DEXAMETHASONE SODIUM PHOSPHATE 6 MG: 4 INJECTION INTRA-ARTICULAR; INTRALESIONAL; INTRAMUSCULAR; INTRAVENOUS; SOFT TISSUE at 18:41

## 2022-02-13 NOTE — ED PROVIDER NOTES
History  Chief Complaint   Patient presents with    Shortness of Breath     Tested + for COVID on Monday  SOB over past week, worse today  Karry Phoenix is a 66year old female with a PMH of hypothyroidism, arthritis who presents today with SoB after being diagnosed with COVID-19 on Monday, 02/02/21  She states that initially she experienced headaches, nonproductive cough, congestion, nausea, fatigue and anorexia, saw her PCP and tested positive for COVID  She was told to come to the ED if she started experiencing SoB  One day ago she began experiencing new onset LLQ abdominal pain accompanied by diarrhea  This morning, patient felt short of breath and presented to the ED  She admits to chills and anxiety, however denies fevers, sore throat, anosmia, chest pain, wheezing, cold intolerance, changes in urinary habits  At the time of my evaluation, patient is tearful and anxious  Rest of review of systems as below  Prior to Admission Medications   Prescriptions Last Dose Informant Patient Reported? Taking?    HYDROcodone-acetaminophen (NORCO) 5-325 mg per tablet Not Taking at Unknown time  No No   Sig: Take 1 tablet by mouth every 6 (six) hours as needed for painMax Daily Amount: 4 tablets   Patient not taking: Reported on 6/21/2021   Probiotic Product (PROBIOTIC-10) CAPS  Self Yes Yes   Sig: Take 1 capsule by mouth   acetaminophen (TYLENOL) 325 mg tablet  Self No Yes   Sig: Take 2 tablets (650 mg total) by mouth every 6 (six) hours as needed for mild pain   benzonatate (TESSALON) 200 MG capsule   No Yes   Sig: Take 1 capsule (200 mg total) by mouth 3 (three) times a day as needed for cough   dicyclomine (BENTYL) 20 mg tablet   No Yes   Sig: Take 1 tablet (20 mg total) by mouth every 6 (six) hours as needed (diarrhea) for up to 20 doses   levothyroxine 50 mcg tablet   No Yes   Sig: TAKE 1 TABLET BY MOUTH EVERY DAY   methocarbamol (ROBAXIN) 500 mg tablet Not Taking at Unknown time  No No   Sig: Take 1 tablet (500 mg total) by mouth 3 (three) times a day as needed for muscle spasms   Patient not taking: Reported on 9/20/2021   ondansetron (Zofran ODT) 4 mg disintegrating tablet Not Taking at Unknown time  No No   Sig: Take 1 tablet (4 mg total) by mouth every 6 (six) hours as needed for nausea or vomiting   Patient not taking: Reported on 2/13/2022    predniSONE 20 mg tablet   No Yes   Sig: Take 2 tablets (40 mg total) by mouth daily for 5 days   tamsulosin (FLOMAX) 0 4 mg Not Taking at Unknown time Self No No   Sig: Take 1 capsule (0 4 mg total) by mouth daily with dinner   Patient not taking: Reported on 2/26/2021      Facility-Administered Medications: None       Past Medical History:   Diagnosis Date    Arthritis     Disease of thyroid gland     PNA (pneumonia) 05/2019       Past Surgical History:   Procedure Laterality Date    CHOLECYSTECTOMY  1968    open       Family History   Problem Relation Age of Onset    Cancer Mother     Diabetes Mother     Cancer Father     Diabetes Father     Urolithiasis Sister      I have reviewed and agree with the history as documented  E-Cigarette/Vaping     E-Cigarette/Vaping Substances     Social History     Tobacco Use    Smoking status: Former Smoker    Smokeless tobacco: Never Used   Substance Use Topics    Alcohol use: Yes    Drug use: Never        Review of Systems   Constitutional: Positive for activity change, appetite change (Anorexia), chills and fatigue  Negative for diaphoresis and fever  HENT: Positive for congestion, postnasal drip and rhinorrhea  Negative for sinus pain, sore throat and trouble swallowing  Eyes: Negative for visual disturbance  Respiratory: Positive for cough and shortness of breath  Negative for choking, chest tightness and wheezing  Cardiovascular: Negative for chest pain, palpitations and leg swelling  Gastrointestinal: Positive for abdominal pain (LLQ), diarrhea and nausea  Negative for constipation and vomiting  Endocrine: Negative for cold intolerance  Genitourinary: Negative for decreased urine volume, difficulty urinating and dysuria  Musculoskeletal: Negative for myalgias  Skin: Negative for pallor and rash  Neurological: Positive for headaches  Negative for dizziness, weakness, light-headedness and numbness  Psychiatric/Behavioral: The patient is nervous/anxious  All other systems reviewed and are negative  Physical Exam  ED Triage Vitals   Temperature Pulse Respirations Blood Pressure SpO2   02/13/22 1234 02/13/22 1232 02/13/22 1232 02/13/22 1232 02/13/22 1232   98 2 °F (36 8 °C) 73 18 117/65 90 %      Temp Source Heart Rate Source Patient Position - Orthostatic VS BP Location FiO2 (%)   02/13/22 1234 02/13/22 1232 02/13/22 1232 02/13/22 1232 --   Oral Monitor Sitting Left arm       Pain Score       02/13/22 1232       No Pain             Orthostatic Vital Signs  Vitals:    02/13/22 1232 02/13/22 1439   BP: 117/65 (!) 175/79   Pulse: 73 76   Patient Position - Orthostatic VS: Sitting Lying       Physical Exam  Vitals and nursing note reviewed  Constitutional:       General: She is in acute distress  Appearance: She is well-developed  She is obese  She is ill-appearing  She is not toxic-appearing or diaphoretic  HENT:      Head: Normocephalic and atraumatic  Eyes:      General: No scleral icterus  Right eye: No discharge  Left eye: No discharge  Extraocular Movements: Extraocular movements intact  Conjunctiva/sclera: Conjunctivae normal    Cardiovascular:      Rate and Rhythm: Normal rate and regular rhythm  Pulses: Normal pulses  Heart sounds: Normal heart sounds  No murmur heard  No gallop  Pulmonary:      Effort: Pulmonary effort is normal  No tachypnea, accessory muscle usage or respiratory distress  Breath sounds: Examination of the left-middle field reveals rhonchi  Examination of the left-lower field reveals rhonchi  Rhonchi present   No decreased breath sounds, wheezing or rales  Chest:      Chest wall: No tenderness  Abdominal:      General: Bowel sounds are normal       Palpations: Abdomen is soft  There is no hepatomegaly or splenomegaly  Tenderness: There is no abdominal tenderness  There is no guarding or rebound  Musculoskeletal:      Cervical back: Normal range of motion and neck supple  Right lower leg: No tenderness  No edema  Left lower leg: No tenderness  No edema  Skin:     General: Skin is warm and dry  Coloration: Skin is not jaundiced or pale  Neurological:      General: No focal deficit present  Mental Status: She is alert  Psychiatric:         Mood and Affect: Mood is anxious  Behavior: Behavior normal          Thought Content:  Thought content normal          Judgment: Judgment normal          ED Medications  Medications - No data to display    Diagnostic Studies  Results Reviewed     Procedure Component Value Units Date/Time    HS Troponin I 4hr [[id number]]     Lab Status: No result Specimen: Blood     High sensitivity CRP [[id number]] Collected: 02/13/22 1335    Lab Status: Final result Specimen: Blood from Arm, Left Updated: 02/13/22 1429     CRP, High Sensitivity >90 00 mg/L     Narrative:            HsCRP Level       Relative Risk           <1 0 mg/L          Low           1 0 to 3 0 mg/L    Average           >3 0 mg/L          High        CK Total with Reflex CKMB [[id number]]  (Normal) Collected: 02/13/22 1335    Lab Status: Final result Specimen: Blood from Arm, Left Updated: 02/13/22 1428     Total CK 69 U/L     NT-BNP PRO [[id number]]  (Abnormal) Collected: 02/13/22 1335    Lab Status: Final result Specimen: Blood from Arm, Left Updated: 02/13/22 1428     NT-proBNP 399 pg/mL     HS Troponin I 2hr [[id number]]     Lab Status: No result Specimen: Blood     HS Troponin 0hr (reflex protocol) [[id number]]  (Normal) Collected: 02/13/22 1335    Lab Status: Final result Specimen: Blood from Arm, Left Updated: 02/13/22 1403     hs TnI 0hr 4 ng/L     Comprehensive metabolic panel [[id number]]  (Abnormal) Collected: 02/13/22 1335    Lab Status: Final result Specimen: Blood from Arm, Left Updated: 02/13/22 1355     Sodium 136 mmol/L      Potassium 3 6 mmol/L      Chloride 98 mmol/L      CO2 26 mmol/L      ANION GAP 12 mmol/L      BUN 16 mg/dL      Creatinine 0 98 mg/dL      Glucose 98 mg/dL      Calcium 8 7 mg/dL      Corrected Calcium 9 4 mg/dL      AST 56 U/L      ALT 30 U/L      Alkaline Phosphatase 94 U/L      Total Protein 7 3 g/dL      Albumin 3 1 g/dL      Total Bilirubin 0 85 mg/dL      eGFR 57 ml/min/1 73sq m     Narrative:      [place] guidelines for Chronic Kidney Disease (CKD):     Stage 1 with normal or high GFR (GFR > 90 mL/min/1 73 square meters)    Stage 2 Mild CKD (GFR = 60-89 mL/min/1 73 square meters)    Stage 3A Moderate CKD (GFR = 45-59 mL/min/1 73 square meters)    Stage 3B Moderate CKD (GFR = 30-44 mL/min/1 73 square meters)    Stage 4 Severe CKD (GFR = 15-29 mL/min/1 73 square meters)    Stage 5 End Stage CKD (GFR <15 mL/min/1 73 square meters)  Note: GFR calculation is accurate only with a steady state creatinine    D-Dimer [[id number]]  (Abnormal) Collected: 02/13/22 1335    Lab Status: Final result Specimen: Blood from Arm, Left Updated: 02/13/22 1354     D-Dimer, Quant 0 69 ug/ml FEU     CBC and differential [[id number]]  (Abnormal) Collected: 02/13/22 1335    Lab Status: Final result Specimen: Blood from Arm, Left Updated: 02/13/22 1347     WBC 9 92 Thousand/uL      RBC 5 22 Million/uL      Hemoglobin 15 3 g/dL      Hematocrit 45 6 %      MCV 87 fL      MCH 29 3 pg      MCHC 33 6 g/dL      RDW 13 8 %      MPV 10 5 fL      Platelets 526 Thousands/uL      nRBC 0 /100 WBCs      Neutrophils Relative 76 %      Immat GRANS % 1 %      Lymphocytes Relative 15 %      Monocytes Relative 7 %      Eosinophils Relative 1 %      Basophils Relative 0 % Neutrophils Absolute 7 53 Thousands/µL      Immature Grans Absolute 0 08 Thousand/uL      Lymphocytes Absolute 1 47 Thousands/µL      Monocytes Absolute 0 72 Thousand/µL      Eosinophils Absolute 0 08 Thousand/µL      Basophils Absolute 0 04 Thousands/µL                  XR chest 1 view portable   ED Interpretation by Ana Martin MD (02/13 1429)   Ground glass opacities consistent with COVID-19 pneumonia noted bilaterally, more pronounced at the lung bases  Procedures  ECG 12 Lead Documentation Only    Date/Time: 2/13/2022 2:21 PM  Performed by: Ana Martin MD  Authorized by: Ana Martin MD     ECG reviewed by me, the ED Provider: yes    Patient location:  ED  Previous ECG:     Previous ECG:  Compared to current    Similarity:  Changes noted    Comparison to cardiac monitor: Yes    Interpretation:     Interpretation: abnormal    Rate:     ECG rate:  70    ECG rate assessment: normal    Rhythm:     Rhythm: sinus rhythm    Ectopy:     Ectopy: none    QRS:     QRS axis:  Normal    QRS intervals:  Normal  Conduction:     Conduction: normal    ST segments:     ST segments:  Abnormal    Depression:  II  T waves:     T waves: inverted      Inverted:  V3, V4 and V5          ED Course  ED Course as of 02/13/22 1557   Sun Feb 13, 2022   1429 XR chest 1 view portable  Bilateral groundglass opacities noted on CXR consistent with COVID-19 pneumonia  SBIRT 22yo+      Most Recent Value   SBIRT (24 yo +)    In order to provide better care to our patients, we are screening all of our patients for alcohol and drug use  Would it be okay to ask you these screening questions? Yes Filed at: 02/13/2022 1234   Initial Alcohol Screen: US AUDIT-C     1  How often do you have a drink containing alcohol? 0 Filed at: 02/13/2022 1234   2  How many drinks containing alcohol do you have on a typical day you are drinking? 0 Filed at: 02/13/2022 1234   3a  Male UNDER 65:  How often do you have five or more drinks on one occasion? 0 Filed at: 02/13/2022 1234   3b  FEMALE Any Age, or MALE 65+: How often do you have 4 or more drinks on one occassion? 0 Filed at: 02/13/2022 1234   Audit-C Score 0 Filed at: 02/13/2022 1234   ARLEEN: How many times in the past year have you    Used an illegal drug or used a prescription medication for non-medical reasons? Never Filed at: 02/13/2022 1234                MDM  Number of Diagnoses or Management Options     Amount and/or Complexity of Data Reviewed  Clinical lab tests: ordered and reviewed  Tests in the radiology section of CPT®: ordered and reviewed  Tests in the medicine section of CPT®: ordered and reviewed  Review and summarize past medical records: yes  Discuss the patient with other providers: yes  Independent visualization of images, tracings, or specimens: yes    Risk of Complications, Morbidity, and/or Mortality  Presenting problems: moderate  Diagnostic procedures: moderate  Management options: moderate  General comments: Patient presents with SOB that started 1 hour prior to presentation to the ED in the setting of COVID-19 that was diagnosed 1 week ago  On initial vital signs patient is mildly hypoxic with SpO2 at 90%, however she desaturates into the 80s with ambulation  Physical exam is benign except for brief rhonchi heard on the left  Labs, including D-dimer and troponin are within normal limits, except for elevated NT proBNP and thrombocytosis  Chest x-ray shows ground-glass opacities, more pronounced at the lung bases likely secondary to COVID-19  DDX including but not limited to:  COVID-19, pneumonia, pleural effusion, CHF, PE, PTX, ACS, MI  Patient was discussed with Dr Thuan Karimi and will be admitted on an inpatient basis due to COVID-19 pneumonia      Patient Progress  Patient progress: stable      Disposition  Final diagnoses:   Hypoxia   Shortness of breath   COVID-19   Pneumonia due to COVID-19 virus     Time reflects when diagnosis was documented in both MDM as applicable and the Disposition within this note     Time User Action Codes Description Comment    2/13/2022  2:47 PM Denaedeven Martin, [facility] [R09 02] Hypoxia     2/13/2022  2:47 PM Forddarrin Salazar, [facility] [R06 02] Shortness of breath     2/13/2022  2:47 PM Kg Salazar, [address] [place] [address] [U07 1] COVID-19     2/13/2022  2:47 PM Zareva, [place] [U07 1,  J12 82] Pneumonia due to COVID-19 virus     2/13/2022  2:47 PM Lacretia Senior [address] [R09 02] Hypoxia     2/13/2022  2:47 PM Maida Bunch Modify [U07 1,  J12 82] Pneumonia due to COVID-19 virus       ED Disposition     ED Disposition Condition Date/Time Comment    Admit Stable Nancy Feb 13, 2022  2:47 PM Case was discussed with Dr Tato Karimi and the patient's admission status was agreed to be Admission Status: inpatient status to the service of Dr Dawson Gordon   Follow-up Information    None         Patient's Medications   Discharge Prescriptions    No medications on file     No discharge procedures on file  PDMP Review     None           ED Provider  Attending physically available and evaluated Solange Cuba I managed the patient along with the ED Attending      Electronically Signed by         Alla Philippe MD  02/13/22 [address] Kg Salazar MD  02/13/22 0792

## 2022-02-13 NOTE — ED ATTENDING ATTESTATION
2/13/2022  IОлег MD, saw and evaluated the patient  I have discussed the patient with the resident/non-physician practitioner and agree with the resident's/non-physician practitioner's findings, Plan of Care, and MDM as documented in the resident's/non-physician practitioner's note, except where noted  All available labs and Radiology studies were reviewed  I was present for key portions of any procedure(s) performed by the resident/non-physician practitioner and I was immediately available to provide assistance  At this point I agree with the current assessment done in the Emergency Department  I have conducted an independent evaluation of this patient a history and physical is as follows:    64-year-old female with history of hypothyroidism presenting for evaluation of shortness of breath  Patient tested positive for COVID on 2/7  She was treated with monoclonal antibody therapy  Has been experiencing headaches, nonproductive cough, congestion, nausea, fatigue, and decreased appetite, but developed shortness of breath during the day yesterday prompting her to come in for evaluation  No chest pain  Physical Exam  Constitutional:       General: She is not in acute distress  Appearance: She is well-developed  She is not diaphoretic  HENT:      Head: Normocephalic and atraumatic  Right Ear: External ear normal       Left Ear: External ear normal       Nose: Nose normal    Eyes:      Conjunctiva/sclera: Conjunctivae normal    Cardiovascular:      Rate and Rhythm: Normal rate and regular rhythm  Pulses: Normal pulses  Heart sounds: Normal heart sounds  No murmur heard  No friction rub  No gallop  Pulmonary:      Effort: Pulmonary effort is normal  No respiratory distress  Breath sounds: Rales (scattered) present  No wheezing        Comments: SpO2 drops to 85% with marching in place, 91-92% at rest in bed  Abdominal:      General: Bowel sounds are normal  There is no distension  Palpations: Abdomen is soft  Tenderness: There is no abdominal tenderness  There is no guarding  Musculoskeletal:         General: No deformity  Normal range of motion  Cervical back: Normal range of motion and neck supple  Comments: No calf swelling or tenderness   Skin:     General: Skin is warm and dry  Neurological:      Mental Status: She is alert and oriented to person, place, and time  Motor: No abnormal muscle tone  ED Course  ED Course as of 02/13/22 1750   Sun Feb 13, 2022   1401 D-dimer age-adjusted negative, doubt PE     1435 SPO2 drops to 85% with marching in place  CXR consistent with COVID PNA  Pt placed on 2L O2 by nasal cannula and will admit to [facility]  Chest x-ray consistent with COVID pneumonia      Critical Care Time  Procedures

## 2022-02-13 NOTE — H&P
[facility]  H&P- Argelia Morgan 1950, 66 y o  female MRN: [id number]  Unit/Bed#: S -01 Encounter: [id number]  Primary Care Provider: Fany Villafana DO   Date and time admitted to hospital: 2/13/2022 12:35 PM    * COVID-19  Assessment & Plan  POA, worsening shortness of breath  Patient positive for COVID on 02/07/2022 at [facility] ED and was sent home with supportive measures  Patient received monoclonal antibody treatment on 02/10/2022  Patient continued to experience worsening shortness of breath  Patient is not vaccinated against COVID-19  CXR - shows worsening ground-glass opacities, when compared with CXR from 02/07/2022  In ED, patient was ambulated and desatted to 85%, and was placed on 2 L of nasal cannula  D-dimer 0 69, when age adjusted VTE on likely  CK 69, proBNP 399, CRP >90  EKG - NSR    Plan  Mild COVID pathway  Remdesivir, Decadron  VTE PPX Lovenox  Wean O2 as tolerated  Incentive spirometry  Self proning  A m  CMP, CBC, CRP  A m  procalcitonin    Hypothyroidism due to Hashimoto's thyroiditis  Assessment & Plan  Plan  Continue home levothyroxine    VTE Prophylaxis: Enoxaparin (Lovenox)  / sequential compression device   Code Status:  Level 1  POLST: POLST form is not discussed and not completed at this time  Anticipated Length of Stay:  Patient will be admitted on an Inpatient basis with an anticipated length of stay of  >  2 midnights  Justification for Hospital Stay:  Shortness of breath    Chief Complaint:   Shortness of breath     History of Present Illness:    Argelia Morgan is a 66 y o  female with past medical history of hypothyroidism, who presents with worsening shortness of breath  Patient was diagnosed with COVID on 02/07/2022 at [address] [place] [address] ED and was discharged home with supportive measures  Patient received monoclonal antibody infusion on 02/10/2022    Patient still experienced worsening shortness of breath  Patient reported decreased appetite, chills, congestion, postnasal drip, dry cough, diarrhea, abdominal pain, myalgia, headache, and anxiety  Patient is not vaccinated against COVID-19  Patient had sick contact with  who recently had COVID  In ED, CXR showed ground-glass opacities, still pending official read  Patient was ambulated and started to desaturate to 85% and was placed on 2 L NC  On evaluation, patient overwhelmed and tearful with current situation  Patient reports being comfortable on 2 L of oxygen and was saturating at 95%  On exam, coarse breath sounds were appreciated at bilateral posterior bases  Review of Systems:    Review of Systems   Constitutional: Positive for appetite change (DEC) and chills  Negative for diaphoresis and fever  HENT: Positive for congestion and postnasal drip  Negative for sneezing, sore throat and trouble swallowing  Eyes: Negative for photophobia and visual disturbance  Respiratory: Positive for cough (dry) and shortness of breath  Negative for wheezing and stridor  Cardiovascular: Positive for chest pain (worse w cough)  Negative for palpitations and leg swelling  Gastrointestinal: Positive for abdominal pain (mild, at LLQ and RLQ), diarrhea (4 per day ) and nausea  Negative for vomiting  Genitourinary: Negative for difficulty urinating, enuresis, frequency and hematuria  Musculoskeletal: Positive for back pain (lower) and myalgias  Negative for arthralgias and gait problem  Skin: Negative for color change and rash  Neurological: Positive for headaches  Negative for dizziness, weakness, light-headedness and numbness  Psychiatric/Behavioral: Negative for confusion, decreased concentration and dysphoric mood  The patient is nervous/anxious          Past Medical and Surgical History:     Past Medical History:   Diagnosis Date    Arthritis     Disease of thyroid gland     PNA (pneumonia) 05/2019       Past Surgical History: Procedure Laterality Date    CHOLECYSTECTOMY  1968    open       Meds/Allergies:    Prior to Admission medications    Medication Sig Start Date End Date Taking? Authorizing Provider   acetaminophen (TYLENOL) 325 mg tablet Take 2 tablets (650 mg total) by mouth every 6 (six) hours as needed for mild pain 5/28/19  Yes Liset Sood PA-C   benzonatate (TESSALON) 200 MG capsule Take 1 capsule (200 mg total) by mouth 3 (three) times a day as needed for cough 2/9/22  Yes Stan Zuluaga DO   dicyclomine (BENTYL) 20 mg tablet Take 1 tablet (20 mg total) by mouth every 6 (six) hours as needed (diarrhea) for up to 20 doses 2/7/22  Yes Whitney Rey PA-C   levothyroxine 50 mcg tablet TAKE 1 TABLET BY MOUTH EVERY DAY 12/12/21  Yes Stan Zuluaga DO   predniSONE 20 mg tablet Take 2 tablets (40 mg total) by mouth daily for 5 days 2/11/22 2/16/22 Yes Stan Zuluaga DO   Probiotic Product (PROBIOTIC-10) CAPS Take 1 capsule by mouth   Yes Historical Provider, MD   HYDROcodone-acetaminophen (NORCO) 5-325 mg per tablet Take 1 tablet by mouth every 6 (six) hours as needed for painMax Daily Amount: 4 tablets  Patient not taking: Reported on 6/21/2021 2/26/21   Stan Perry DO   methocarbamol (ROBAXIN) 500 mg tablet Take 1 tablet (500 mg total) by mouth 3 (three) times a day as needed for muscle spasms  Patient not taking: Reported on 9/20/2021 2/26/21   Stan Perry DO   ondansetron (Zofran ODT) 4 mg disintegrating tablet Take 1 tablet (4 mg total) by mouth every 6 (six) hours as needed for nausea or vomiting  Patient not taking: Reported on 2/13/2022 2/7/22   Whitney Rey PA-C   tamsulosin (FLOMAX) 0 4 mg Take 1 capsule (0 4 mg total) by mouth daily with dinner  Patient not taking: Reported on 2/26/2021 2/23/21   RILEY Dukes     I have reviewed home medications with patient personally  Allergies:    Allergies   Allergen Reactions    Codeine Anaphylaxis     Increased Heart Rate, Palpitations Social History:     Marital Status: /Civil Union   Occupation: Business owner  Patient Pre-hospital Living Situation: Home w Family   Patient Pre-hospital Level of Mobility: Walk on her own  Patient Pre-hospital Diet Restrictions: None   Substance Use History:   Social History     Substance and Sexual Activity   Alcohol Use Yes     Social History     Tobacco Use   Smoking Status Former Smoker   Smokeless Tobacco Never Used     Social History     Substance and Sexual Activity   Drug Use Never       Family History:    Family History   Problem Relation Age of Onset    Cancer Mother     Diabetes Mother     Cancer Father     Diabetes Father     Urolithiasis Sister        Physical Exam:     Vitals:   Blood Pressure: 134/65 (02/13/22 1809)  Pulse: 78 (02/13/22 1809)  Temperature: 98 6 °F (37 °C) (02/13/22 1809)  Temp Source: Oral (02/13/22 1809)  Respirations: 16 (02/13/22 1809)  Height: 5' (152 4 cm) (02/13/22 1809)  Weight - Scale: 86 6 kg (190 lb 14 7 oz) (02/13/22 1809)  SpO2: 97 % (02/13/22 1809)    Physical Exam  Vitals and nursing note reviewed  Constitutional:       General: She is not in acute distress  Appearance: Normal appearance  She is obese  She is not ill-appearing or diaphoretic  HENT:      Head: Normocephalic and atraumatic  Mouth/Throat:      Mouth: Mucous membranes are moist       Pharynx: Oropharynx is clear  Eyes:      General: No scleral icterus  Conjunctiva/sclera: Conjunctivae normal    Cardiovascular:      Rate and Rhythm: Normal rate and regular rhythm  Pulses: Normal pulses  Heart sounds: Normal heart sounds  No murmur heard  No gallop  Pulmonary:      Effort: Pulmonary effort is normal  No respiratory distress  Breath sounds: No wheezing or rales  Comments: Coarse breath sounds at B/L bases posteriorly  Abdominal:      General: Bowel sounds are normal  There is no distension  Palpations: Abdomen is soft  There is no mass  Tenderness: There is no abdominal tenderness  Musculoskeletal:         General: No tenderness  Normal range of motion  Cervical back: Normal range of motion and neck supple  Right lower leg: No edema  Left lower leg: No edema  Skin:     General: Skin is warm and dry  Capillary Refill: Capillary refill takes less than 2 seconds  Coloration: Skin is not jaundiced  Neurological:      General: No focal deficit present  Mental Status: She is alert and oriented to person, place, and time  Mental status is at baseline  Sensory: No sensory deficit  Psychiatric:         Mood and Affect: Mood normal  Affect is tearful  Thought Content: Thought content normal          Judgment: Judgment normal              Additional Data:     Lab Results: I have personally reviewed pertinent reports  Results from last 7 days   Lab Units 02/13/22  1335   WBC Thousand/uL 9 92   HEMOGLOBIN g/dL 15 3   HEMATOCRIT % 45 6   PLATELETS Thousands/uL 488*   NEUTROS PCT % 76*   LYMPHS PCT % 15   MONOS PCT % 7   EOS PCT % 1     Results from last 7 days   Lab Units 02/13/22  1335   POTASSIUM mmol/L 3 6   CHLORIDE mmol/L 98*   CO2 mmol/L 26   BUN mg/dL 16   CREATININE mg/dL 0 98   CALCIUM mg/dL 8 7   ALK PHOS U/L 94   ALT U/L 30   AST U/L 56*     Results from last 7 days   Lab Units 02/13/22  1614   INR  1 18       Imaging: I have personally reviewed pertinent reports  and I have personally reviewed pertinent films in PACS    X-ray chest 1 view portable    Result Date: 2/7/2022  Narrative: CHEST INDICATION:   chest pain  COMPARISON:  5/26/2019 EXAM PERFORMED/VIEWS:  XR CHEST PORTABLE Single view FINDINGS: Cardiomediastinal silhouette appears unremarkable  The lungs are clear  No pneumothorax or pleural effusion  Osseous structures appear within normal limits for patient age  Impression: No acute cardiopulmonary disease   Findings are stable Workstation performed: [id number]       EKG, Pathology, and Other Studies Reviewed on Admission:   · EKG:  NSR, 70 beats per minute  Nonspecific ST and T-wave changes    Epic / Care Everywhere Records Reviewed: Yes     ** Please Note: This note has been constructed using a voice recognition system   **

## 2022-02-14 ENCOUNTER — TELEPHONE (OUTPATIENT)
Dept: FAMILY MEDICINE CLINIC | Facility: CLINIC | Age: 72
End: 2022-02-14

## 2022-02-14 LAB
ALBUMIN SERPL BCP-MCNC: 2.8 G/DL (ref 3.5–5)
ALP SERPL-CCNC: 88 U/L (ref 46–116)
ALT SERPL W P-5'-P-CCNC: 27 U/L (ref 12–78)
ANION GAP SERPL CALCULATED.3IONS-SCNC: 13 MMOL/L (ref 4–13)
AST SERPL W P-5'-P-CCNC: 49 U/L (ref 5–45)
BASOPHILS # BLD MANUAL: 0 THOUSAND/UL (ref 0–0.1)
BASOPHILS NFR MAR MANUAL: 0 % (ref 0–1)
BILIRUB SERPL-MCNC: 0.6 MG/DL (ref 0.2–1)
BUN SERPL-MCNC: 19 MG/DL (ref 5–25)
CALCIUM ALBUM COR SERPL-MCNC: 9.7 MG/DL (ref 8.3–10.1)
CALCIUM SERPL-MCNC: 8.7 MG/DL (ref 8.3–10.1)
CHLORIDE SERPL-SCNC: 99 MMOL/L (ref 100–108)
CO2 SERPL-SCNC: 25 MMOL/L (ref 21–32)
CREAT SERPL-MCNC: 0.81 MG/DL (ref 0.6–1.3)
CRP SERPL QL: 79 MG/L
EOSINOPHIL # BLD MANUAL: 0 THOUSAND/UL (ref 0–0.4)
EOSINOPHIL NFR BLD MANUAL: 0 % (ref 0–6)
ERYTHROCYTE [DISTWIDTH] IN BLOOD BY AUTOMATED COUNT: 14.2 % (ref 11.6–15.1)
GFR SERPL CREATININE-BSD FRML MDRD: 72 ML/MIN/1.73SQ M
GLUCOSE SERPL-MCNC: 112 MG/DL (ref 65–140)
HCT VFR BLD AUTO: 43.8 % (ref 34.8–46.1)
HGB BLD-MCNC: 14.5 G/DL (ref 11.5–15.4)
LG PLATELETS BLD QL SMEAR: PRESENT
LYMPHOCYTES # BLD AUTO: 0.5 THOUSAND/UL (ref 0.6–4.47)
LYMPHOCYTES # BLD AUTO: 8 % (ref 14–44)
MCH RBC QN AUTO: 29.2 PG (ref 26.8–34.3)
MCHC RBC AUTO-ENTMCNC: 33.1 G/DL (ref 31.4–37.4)
MCV RBC AUTO: 88 FL (ref 82–98)
MONOCYTES # BLD AUTO: 0.25 THOUSAND/UL (ref 0–1.22)
MONOCYTES NFR BLD: 4 % (ref 4–12)
MYELOCYTES NFR BLD MANUAL: 1 % (ref 0–1)
NEUTROPHILS # BLD MANUAL: 5.38 THOUSAND/UL (ref 1.85–7.62)
NEUTS SEG NFR BLD AUTO: 86 % (ref 43–75)
PLATELET # BLD AUTO: 454 THOUSANDS/UL (ref 149–390)
PLATELET BLD QL SMEAR: ABNORMAL
PMV BLD AUTO: 11.1 FL (ref 8.9–12.7)
POTASSIUM SERPL-SCNC: 3.7 MMOL/L (ref 3.5–5.3)
PROT SERPL-MCNC: 7.6 G/DL (ref 6.4–8.2)
RBC # BLD AUTO: 4.97 MILLION/UL (ref 3.81–5.12)
RBC MORPH BLD: NORMAL
SODIUM SERPL-SCNC: 137 MMOL/L (ref 136–145)
TSH SERPL DL<=0.05 MIU/L-ACNC: 1.26 UIU/ML (ref 0.36–3.74)
VARIANT LYMPHS # BLD AUTO: 1 %
WBC # BLD AUTO: 6.26 THOUSAND/UL (ref 4.31–10.16)

## 2022-02-14 PROCEDURE — 84443 ASSAY THYROID STIM HORMONE: CPT

## 2022-02-14 PROCEDURE — 86140 C-REACTIVE PROTEIN: CPT

## 2022-02-14 PROCEDURE — 80053 COMPREHEN METABOLIC PANEL: CPT

## 2022-02-14 PROCEDURE — 85027 COMPLETE CBC AUTOMATED: CPT

## 2022-02-14 PROCEDURE — 99232 SBSQ HOSP IP/OBS MODERATE 35: CPT | Performed by: INTERNAL MEDICINE

## 2022-02-14 PROCEDURE — 85007 BL SMEAR W/DIFF WBC COUNT: CPT

## 2022-02-14 RX ADMIN — DEXAMETHASONE SODIUM PHOSPHATE 6 MG: 4 INJECTION INTRA-ARTICULAR; INTRALESIONAL; INTRAMUSCULAR; INTRAVENOUS; SOFT TISSUE at 17:20

## 2022-02-14 RX ADMIN — ONDANSETRON 4 MG: 2 INJECTION INTRAMUSCULAR; INTRAVENOUS at 07:57

## 2022-02-14 RX ADMIN — ONDANSETRON 4 MG: 2 INJECTION INTRAMUSCULAR; INTRAVENOUS at 17:22

## 2022-02-14 RX ADMIN — ENOXAPARIN SODIUM 30 MG: 30 INJECTION SUBCUTANEOUS at 20:33

## 2022-02-14 RX ADMIN — ENOXAPARIN SODIUM 30 MG: 30 INJECTION SUBCUTANEOUS at 07:54

## 2022-02-14 RX ADMIN — LEVOTHYROXINE SODIUM 50 MCG: 50 TABLET ORAL at 07:54

## 2022-02-14 RX ADMIN — REMDESIVIR 100 MG: 100 INJECTION, POWDER, LYOPHILIZED, FOR SOLUTION INTRAVENOUS at 17:20

## 2022-02-14 NOTE — ASSESSMENT & PLAN NOTE
· POA, worsening shortness of breath, unvaccinated  · Tested positive for COVID on 02/07/2022 at [facility] ED and was sent home with supportive measures  · Patient received monoclonal antibody treatment on 02/10/2022  · In ED, patient was ambulated and desatted to 85%, and was placed on 2 L of nasal cannula  Workup:   · CXR - shows worsening ground-glass opacities, when compared with CXR from 02/07/2022      Lab Results   Component Value Date    PROCALCITONI 0 05 05/24/2019    HSCRP >90 00 02/13/2022     · D-dimer 0 69  · CK 69, proBNP 399  · EKG - NSR  · CRP 2/14: 79  · CMP 2/14 grossly WNL except low albumin (2 8), elevated AST (49)  · CBC 2/14- WBC 6 26, ANS 5 38  · Procal, D dimer, CRP 2/14: pending    Plan, Mild COVID pathway:   · Remdesivir day 2  · Decadron 6 mg q24h day 2  · VTE PPX - Lovenox 30 mg q12h  · Wean O2 as tolerated   · Incentive spirometry  · Self-proning

## 2022-02-14 NOTE — TELEPHONE ENCOUNTER
Spoke with Josep Barroso - he reports that George Goltz started IV Remdisivir and IV steroids at the hospital, and is already sounding better  I asked him to let George Goltz know that we are sending our best to her  Josep Barroso has a slight dry cough  We discussed - it is HIGHLY unlikely to be recurrent COV-19 -> pt just cleared COV-19 a little over 2 weeks ago  He will monitor his symptoms, and call if any issues      Jovanna

## 2022-02-14 NOTE — TELEPHONE ENCOUNTER
Patient's  called to inform you that patient was admitted to the hospital this weekend due to trouble breathing and was diagnosed with pneumonia   is requesting to speak with you

## 2022-02-14 NOTE — ASSESSMENT & PLAN NOTE
· POA, worsening shortness of breath, unvaccinated  · Tested positive for COVID on 02/07/2022 at [facility] ED and was sent home with supportive measures  · Patient received monoclonal antibody treatment on 02/10/2022  · In ED, patient was ambulated and desatted to 85%, and was placed on 2 L of nasal cannula  Workup:   · CXR - shows worsening ground-glass opacities, when compared with CXR from 02/07/2022  · CRP 90--> 79 (2/14)-->78 7 (2/15)  · Procal 2/16- 22 70   · No fevers noted      · D-dimer 0 69  · CK 69, proBNP 399  · EKG - NSR  · CBC shows no leukocytosis on 2/15  · On 2/15 am, failed home O2 eval  Required 2L O2 via NC with walking  Plan, Mild COVID pathway:   · Remdesivir day 4  · Decadron 6 mg q24h day 4; upon dc- oral Prednisone  · Given elevated procal, started ABX: ceftriaxone 1g q24h IV, Vibramycin 100mg PO BID- day 1 Switch to oral upon discharge to complete 5 day total course  · VTE PPX - Lovenox 30 mg q12h   · Home O2 eval  · Wean O2 as tolerated   · Ambulatory pulse ox  · Monitor temp curves, CBC for leukocytosis     · Incentive spirometry  · Self-proning

## 2022-02-14 NOTE — PROGRESS NOTES
[facility]  Progress Note Marylen Clinton 1950, 66 y o  female MRN: [id number]  Unit/Bed#: S -01 Encounter: [id number]  Primary Care Provider: Erich Escobar DO   Date and time admitted to hospital: 2/13/2022 12:35 PM    * COVID-19  Assessment & Plan  · POA, worsening shortness of breath, unvaccinated  · Tested positive for COVID on 02/07/2022 at [facility] ED and was sent home with supportive measures  · Patient received monoclonal antibody treatment on 02/10/2022  · In ED, patient was ambulated and desatted to 85%, and was placed on 2 L of nasal cannula  Workup:   · CXR - shows worsening ground-glass opacities, when compared with CXR from 02/07/2022  Lab Results   Component Value Date    PROCALCITONI 0 05 05/24/2019    HSCRP >90 00 02/13/2022     · D-dimer 0 69  · CK 69, proBNP 399  · EKG - NSR  · CRP 2/14: 79  · CMP 2/14 grossly WNL except low albumin (2 8), elevated AST (49)  · CBC 2/14- WBC 6 26, ANS 5 38  · Procal, D dimer, CRP 2/14: pending    Plan, Mild COVID pathway:   · Remdesivir day 2  · Decadron 6 mg q24h day 2  · VTE PPX - Lovenox 30 mg q12h  · Wean O2 as tolerated   · Incentive spirometry  · Self-proning    Hypothyroidism due to Hashimoto's thyroiditis  Assessment & Plan  Lab Results   Component Value Date    [id number] 2 700 02/15/2019     Plan:  · Continue home levothyroxine 50 mcg QD      VTE Pharmacologic Prophylaxis: VTE Score: 6 High Risk (Score >/= 5) - Pharmacological DVT Prophylaxis Ordered: enoxaparin (Lovenox)  Sequential Compression Devices Ordered  Patient Centered Rounds: I performed bedside rounds with nursing staff today  Discussions with Specialists or Other Care Team Provider: None  Education and Discussions with Family / Patient: Will update family later this afternoon via phone        Current Length of Stay: 1 day(s)  Current Patient Status: Inpatient   Discharge Plan: Anticipate discharge in 48-72 hrs to home     Code Status: Level 1 - Full Code    Subjective:   Ms Santos Tejada tells me she is feeling much better this morning  Was able to rest somewhat comfortably overnight  She continues to have nausea, but denies vomiting episodes  Denies headaches, vision changes, nausea, speech difficulty, swallowing difficulty, bowel/bladder incontinence, numbness or tingling, weakness, palpitations, SOB, abdominal pain  Objective:     Vitals:   Temp (24hrs), Av 3 °F (36 8 °C), Min:98 1 °F (36 7 °C), Max:98 6 °F (37 °C)    Temp:  [98 1 °F (36 7 °C)-98 6 °F (37 °C)] 98 2 °F (36 8 °C)  HR:  [68-91] 68  Resp:  [16-19] 19  BP: (134-175)/(63-82) 148/63  SpO2:  [94 %-97 %] 95 %  Body mass index is 37 29 kg/m²  Input and Output Summary (last 24 hours): Intake/Output Summary (Last 24 hours) at 2022 1300  Last data filed at 2022 0942  Gross per 24 hour   Intake 350 ml   Output 200 ml   Net 150 ml     Physical Exam   Vitals reviewed  General Examination: Lying in bed, cooperative   HEENT: Normocephalic, Atraumatic  Extraocular movements intact, PERRLA  CVS: S1, S2 noted  Lungs: Decreased breath sounds b/l, crackles in b/l lower lungs, on 2L NC sat 94%  Ext: No edema noted  Psych: Though Process - logical    Skin: No bleeding/bruising noted  Additional Data:     Labs:  Results from last 7 days   Lab Units 22  0442 22  1335 22  1335   WBC Thousand/uL 6 26   < > 9 92   HEMOGLOBIN g/dL 14 5   < > 15 3   HEMATOCRIT % 43 8   < > 45 6   PLATELETS Thousands/uL 454*   < > 488*   NEUTROS PCT %  --   --  76*   LYMPHS PCT %  --   --  15   LYMPHO PCT % 8*  --   --    MONOS PCT %  --   --  7   MONO PCT % 4  --   --    EOS PCT % 0  --  1    < > = values in this interval not displayed       Results from last 7 days   Lab Units 22  0442   SODIUM mmol/L 137   POTASSIUM mmol/L 3 7   CHLORIDE mmol/L 99*   CO2 mmol/L 25   BUN mg/dL 19   CREATININE mg/dL 0 81   ANION GAP mmol/L 13   CALCIUM mg/dL 8 7 ALBUMIN g/dL 2 8*   TOTAL BILIRUBIN mg/dL 0 60   ALK PHOS U/L 88   ALT U/L 27   AST U/L 49*   GLUCOSE RANDOM mg/dL 112     Results from last 7 days   Lab Units 02/13/22  1614   INR  1 18                   Lines/Drains:  Invasive Devices  Report    Peripheral Intravenous Line            Peripheral IV 02/13/22 Right Antecubital <1 day              Imaging: Reviewed radiology reports from this admission including: chest xray  XR chest 1 view portable   ED Interpretation by Hipolito Knott MD (02/13 0439)   Ground glass opacities consistent with COVID-19 pneumonia noted bilaterally, more pronounced at the lung bases  Final Result by Paola Huerta MD (02/14 8419)      Bilateral mixed groundglass and airspace opacities is suspicious for Covid 19 pneumonia  Groundglass opacity consistent with Covid 19 pneumonia was noted on the ER preliminary result  Workstation performed: [id number]           Recent Cultures (last 7 days):         Last 24 Hours Medication List:   Current Facility-Administered Medications   Medication Dose Route Frequency Provider Last Rate    acetaminophen  650 mg Oral Q6H PRN Elana Ballard MD      dexamethasone  6 mg Intravenous Q24H Jessica Wallis MD      enoxaparin  30 mg Subcutaneous Q12H [address] Jessica Wallis MD      levothyroxine  50 mcg Oral Daily Jessica Wallis MD      ondansetron  4 mg Intravenous Q4H PRN Elana Ballard MD      remdesivir  100 mg Intravenous Q24H Jessica Wallis MD          Today, Patient Was Seen By: Kailash Soria MD    **Please Note: This note may have been constructed using a voice recognition system  **

## 2022-02-14 NOTE — ASSESSMENT & PLAN NOTE
POA, worsening shortness of breath  Patient positive for COVID on 02/07/2022 at [facility] ED and was sent home with supportive measures  Patient received monoclonal antibody treatment on 02/10/2022  Patient continued to experience worsening shortness of breath  Patient is not vaccinated against COVID-19  CXR - shows worsening ground-glass opacities, when compared with CXR from 02/07/2022  In ED, patient was ambulated and desatted to 85%, and was placed on 2 L of nasal cannula    D-dimer 0 69, when age adjusted VTE on likely  CK 69, proBNP 399, CRP >90  EKG - NSR    Plan  Mild COVID pathway  Remdesivir, Decadron  VTE PPX Lovenox  Wean O2 as tolerated  Incentive spirometry  Self proning  A m  CMP, CBC, CRP  A m  procalcitonin

## 2022-02-14 NOTE — UTILIZATION REVIEW
Initial Clinical Review    Admission: Date/Time/Statement:   Admission Orders (From admission, onward)     Ordered        02/13/22 1448  Inpatient Admission  Once                      Orders Placed This Encounter   Procedures    Inpatient Admission     Standing Status:   Standing     Number of Occurrences:   1     Order Specific Question:   Level of Care     Answer:   Med Surg [16]     Order Specific Question:   Estimated length of stay     Answer:   More than 2 Midnights     Order Specific Question:   Certification     Answer:   I certify that inpatient services are medically necessary for this patient for a duration of greater than two midnights  See H&P and MD Progress Notes for additional information about the patient's course of treatment  ED Arrival Information     Expected Arrival Acuity    - 2/13/2022 12:13 Emergent         Means of arrival Escorted by Service Admission type    [facility] Emergency         Arrival complaint    Covid+/SOB        Chief Complaint   Patient presents with    Shortness of Breath     Tested + for COVID on Monday  SOB over past week, worse today  Initial Presentation: 66year old female, presents to ED accompanied by spouse from home  POA worsening SOB   Covid + 2/7/22 seen at [facility] D/C with supportive measures  Received IV monoclonal Antibody Therapy on 2/10/22  Continued to get progressively worse SOB, unvaccinated  Inpatient class admission for evaluation and treatment of Covid-19  CXR 2/13 reveals worsening ggo when compared to [address] [facility] on 2/7/22  Desats to 85% on ambulation, placed ib 2L NC  Elevated D-dimer, elevated pro-BNP, CRP CK,  initiate EAU therapy for Covid-19 w/ IV remdesivir, IV decadron, VTE PPX lovenox SCDs, wean 02 as pt tolerates, I/S, self proning, trend serial CMP, BMP, CRP  &  Procalcitonin  PMH: hashimoto's thyroiditis, hypothyroid c/w home levothyroxine   Coarse bilateral breath sounds B/L crackles, lower lung fields, affect tearful  Date: 2/14/22 Day 2: C/W 02 3LNC, 02 sat 90%,  D 2/5IV remdesisiv, D2/10 IV decadron, C/W  wean 02 as pt tolerates, I/S, self proning, trend serial CMP, BMP, CRP  &  Procalcitonin  Decreased Breath sounds, B/L crackles lower lungs  ED Triage Vitals   Temperature Pulse Respirations Blood Pressure SpO2   02/13/22 1234 02/13/22 1232 02/13/22 1232 02/13/22 1232 02/13/22 1232   98 2 °F (36 8 °C) 73 18 117/65 90 %      Temp Source Heart Rate Source Patient Position - Orthostatic VS BP Location FiO2 (%)   02/13/22 1234 02/13/22 1232 02/13/22 1232 02/13/22 1232 --   Oral Monitor Sitting Left arm       Pain Score       02/13/22 1232       No Pain          Wt Readings from Last 1 Encounters:   02/13/22 86 6 kg (190 lb 14 7 oz)     Additional Vital Signs:   Date/Time Temp Pulse Resp BP MAP (mmHg) SpO2 Calculated FIO2 (%) - Nasal Cannula Nasal Cannula O2 Flow Rate (L/min) O2 Device Patient Position - Orthostatic VS   02/14/22 1533 98 1 °F (36 7 °C) 71 24 Abnormal  139/72 -- 90 % -- -- Nasal cannula Lying   02/14/22 0939 -- -- -- -- -- -- 32 3 L/min Nasal cannula --   02/14/22 0649 98 2 °F (36 8 °C) 68 19 148/63 -- 95 % -- -- -- Lying   02/13/22 2049 98 1 °F (36 7 °C) 91 16 152/82 -- 94 % 28 2 L/min Nasal cannula Lying   02/13/22 1809 98 6 °F (37 °C) 78 16 134/65 93 97 % -- -- Nasal cannula Lying   02/13/22 1439 -- 76 18 175/79 Abnormal  113 94 % -- -- None (Room air) Lying   02/13/22 1234 98 2 °F (36 8 °C) -- -- -- -- -- -- -- -- --       Pertinent Labs/Diagnostic Test Results:   XR chest 1 view portable   2/14/22   Ground glass opacities consistent with COVID-19 pneumonia noted bilaterally, more pronounced at the lung bases  Bilateral mixed groundglass and airspace opacities is suspicious for Covid 19 pneumonia  Groundglass opacity consistent with Covid 19 pneumonia was noted on the ER preliminary result          Recent Results (from the past 29863 hour(s))   ECG 12 lead   Result Value Ventricular Rate 70    Atrial Rate 70    MS Interval 112    QRSD Interval 66    QT Interval 392    QTC Interval 423    P Axis 71    QRS Axis 33    T Wave Axis 13    Narrative    Normal sinus rhythm  Nonspecific T wave abnormality  Low voltage QRS  Abnormal ECG  When compared with ECG of 07-FEB-2022 09:25,  Non-specific change in ST segment in Inferior leads  T wave amplitude has increased in Lateral leads  Confirmed by Saud Garnica ([id number]) on 2/13/2022 7:56:26 PM             Results from last 7 days   Lab Units 02/14/22  0442 02/13/22  1335   WBC Thousand/uL 6 26 9 92   HEMOGLOBIN g/dL 14 5 15 3   HEMATOCRIT % 43 8 45 6   PLATELETS Thousands/uL 454* 488*   NEUTROS ABS Thousands/µL  --  7 53         Results from last 7 days   Lab Units 02/14/22  0442 02/13/22  1335   SODIUM mmol/L 137 136   POTASSIUM mmol/L 3 7 3 6   CHLORIDE mmol/L 99* 98*   CO2 mmol/L 25 26   ANION GAP mmol/L 13 12   BUN mg/dL 19 16   CREATININE mg/dL 0 81 0 98   EGFR ml/min/1 73sq m 72 57   CALCIUM mg/dL 8 7 8 7     Results from last 7 days   Lab Units 02/14/22  0442 02/13/22  1335   AST U/L 49* 56*   ALT U/L 27 30   ALK PHOS U/L 88 94   TOTAL PROTEIN g/dL 7 6 7 3   ALBUMIN g/dL 2 8* 3 1*   TOTAL BILIRUBIN mg/dL 0 60 0 85         Results from last 7 days   Lab Units 02/14/22  0442 02/13/22  1335   GLUCOSE RANDOM mg/dL 112 98     Results from last 7 days   Lab Units 02/13/22  1335   CK TOTAL U/L 69     Results from last 7 days   Lab Units 02/13/22  1613 02/13/22  1335   HS TNI 0HR ng/L  --  4   HS TNI 2HR ng/L 4  --    HSTNI D2 ng/L 0  --      Results from last 7 days   Lab Units 02/13/22  1335   D-DIMER QUANTITATIVE ug/ml FEU 0 69*     Results from last 7 days   Lab Units 02/13/22  1614   PROTIME seconds 14 9*   INR  1 18   PTT seconds 35     Results from last 7 days   Lab Units 02/14/22  0442   TSH 3RD GENERATON uIU/mL 1 262       Results from last 7 days   Lab Units 02/13/22  1335   NT-PRO BNP pg/mL 399*       Results from last 7 days   Lab Units 02/14/22  0442   CRP mg/L 79 0*   ED Treatment:   Medication Administration from 02/13/2022 1213 to 02/13/2022 1811     None        Past Medical History:   Diagnosis Date    Arthritis     Disease of thyroid gland     PNA (pneumonia) 05/2019     Present on Admission:   COVID-19   Hypothyroidism due to Hashimoto's thyroiditis      Admitting Diagnosis: Shortness of breath [R06 02]  Hypoxia [R09 02]  Pneumonia due to COVID-19 virus [U07 1, J12 82]  COVID-19 [U07 1]  Age/Sex: 66 y o  female  Admission Orders:scds, I/o, self proning, scd, titrate 02,   Scheduled Medications:  dexamethasone, 6 mg, Intravenous, Q24H  enoxaparin, 30 mg, Subcutaneous, Q12H [address]  levothyroxine, 50 mcg, Oral, Daily  remdesivir, 100 mg, Intravenous, Q24H      Continuous IV Infusions:none     PRN Meds:  acetaminophen, 650 mg, Oral, Q6H PRN  ondansetron, 4 mg, Intravenous, Q4H PRN 2/13 x1, 2/14 x1      Network Utilization Review Department  ATTENTION: Please call with any questions or concerns to [phone] and carefully listen to the prompts so that you are directed to the right person  All voicemails are confidential   Ming Martin all requests for admission clinical reviews, approved or denied determinations and any other requests to dedicated fax number below belonging to the campus where the patient is receiving treatment   List of dedicated fax numbers for the Facilities:  [address] DENIALS (Administrative/Medical Necessity) [phone]   [address] (Maternity/NICU/Pediatrics) [phone]   [address] [place] [address] [id number] [facility] Naldo Mccarty [address] [phone]

## 2022-02-14 NOTE — ASSESSMENT & PLAN NOTE
Lab Results   Component Value Date    [id number] 2 700 02/15/2019     Plan:  · Continue home levothyroxine 50 mcg QD

## 2022-02-14 NOTE — QUICK NOTE
I spoke to this patient's  extensively over the phone to provide a comprehensive clinical update  I answered all of his questions and addressed all of his concerns to the best of my ability and to his satisfaction      LAURO Dickens , PGY-1

## 2022-02-15 PROBLEM — E87.6 HYPOKALEMIA: Status: ACTIVE | Noted: 2022-02-15

## 2022-02-15 PROBLEM — J96.00 ACUTE RESPIRATORY FAILURE (HCC): Status: ACTIVE | Noted: 2022-02-15

## 2022-02-15 LAB
ANION GAP SERPL CALCULATED.3IONS-SCNC: 12 MMOL/L (ref 4–13)
BASOPHILS # BLD AUTO: 0.02 THOUSANDS/ΜL (ref 0–0.1)
BASOPHILS NFR BLD AUTO: 0 % (ref 0–1)
BUN SERPL-MCNC: 19 MG/DL (ref 5–25)
CALCIUM SERPL-MCNC: 8.7 MG/DL (ref 8.3–10.1)
CHLORIDE SERPL-SCNC: 101 MMOL/L (ref 100–108)
CO2 SERPL-SCNC: 24 MMOL/L (ref 21–32)
CREAT SERPL-MCNC: 0.84 MG/DL (ref 0.6–1.3)
CRP SERPL QL: 78.7 MG/L
D DIMER PPP FEU-MCNC: 0.36 UG/ML FEU
EOSINOPHIL # BLD AUTO: 0 THOUSAND/ΜL (ref 0–0.61)
EOSINOPHIL NFR BLD AUTO: 0 % (ref 0–6)
ERYTHROCYTE [DISTWIDTH] IN BLOOD BY AUTOMATED COUNT: 14.1 % (ref 11.6–15.1)
GFR SERPL CREATININE-BSD FRML MDRD: 69 ML/MIN/1.73SQ M
GLUCOSE SERPL-MCNC: 143 MG/DL (ref 65–140)
HCT VFR BLD AUTO: 42.4 % (ref 34.8–46.1)
HGB BLD-MCNC: 14.2 G/DL (ref 11.5–15.4)
IMM GRANULOCYTES # BLD AUTO: 0.13 THOUSAND/UL (ref 0–0.2)
IMM GRANULOCYTES NFR BLD AUTO: 2 % (ref 0–2)
LYMPHOCYTES # BLD AUTO: 1.14 THOUSANDS/ΜL (ref 0.6–4.47)
LYMPHOCYTES NFR BLD AUTO: 13 % (ref 14–44)
MCH RBC QN AUTO: 29.5 PG (ref 26.8–34.3)
MCHC RBC AUTO-ENTMCNC: 33.5 G/DL (ref 31.4–37.4)
MCV RBC AUTO: 88 FL (ref 82–98)
MONOCYTES # BLD AUTO: 0.65 THOUSAND/ΜL (ref 0.17–1.22)
MONOCYTES NFR BLD AUTO: 8 % (ref 4–12)
NEUTROPHILS # BLD AUTO: 6.68 THOUSANDS/ΜL (ref 1.85–7.62)
NEUTS SEG NFR BLD AUTO: 77 % (ref 43–75)
NRBC BLD AUTO-RTO: 0 /100 WBCS
PLATELET # BLD AUTO: 474 THOUSANDS/UL (ref 149–390)
PMV BLD AUTO: 10.1 FL (ref 8.9–12.7)
POTASSIUM SERPL-SCNC: 3.4 MMOL/L (ref 3.5–5.3)
RBC # BLD AUTO: 4.81 MILLION/UL (ref 3.81–5.12)
SODIUM SERPL-SCNC: 137 MMOL/L (ref 136–145)
WBC # BLD AUTO: 8.62 THOUSAND/UL (ref 4.31–10.16)

## 2022-02-15 PROCEDURE — 85379 FIBRIN DEGRADATION QUANT: CPT | Performed by: INTERNAL MEDICINE

## 2022-02-15 PROCEDURE — 85025 COMPLETE CBC W/AUTO DIFF WBC: CPT

## 2022-02-15 PROCEDURE — 86140 C-REACTIVE PROTEIN: CPT | Performed by: INTERNAL MEDICINE

## 2022-02-15 PROCEDURE — 94761 N-INVAS EAR/PLS OXIMETRY MLT: CPT

## 2022-02-15 PROCEDURE — 99232 SBSQ HOSP IP/OBS MODERATE 35: CPT | Performed by: INTERNAL MEDICINE

## 2022-02-15 PROCEDURE — 80048 BASIC METABOLIC PNL TOTAL CA: CPT

## 2022-02-15 RX ORDER — POTASSIUM CHLORIDE 20 MEQ/1
20 TABLET, EXTENDED RELEASE ORAL ONCE
Status: COMPLETED | OUTPATIENT
Start: 2022-02-15 | End: 2022-02-15

## 2022-02-15 RX ADMIN — ENOXAPARIN SODIUM 30 MG: 30 INJECTION SUBCUTANEOUS at 07:34

## 2022-02-15 RX ADMIN — DEXAMETHASONE SODIUM PHOSPHATE 6 MG: 4 INJECTION INTRA-ARTICULAR; INTRALESIONAL; INTRAMUSCULAR; INTRAVENOUS; SOFT TISSUE at 16:48

## 2022-02-15 RX ADMIN — POTASSIUM CHLORIDE 20 MEQ: 1500 TABLET, EXTENDED RELEASE ORAL at 07:34

## 2022-02-15 RX ADMIN — ONDANSETRON 4 MG: 2 INJECTION INTRAMUSCULAR; INTRAVENOUS at 16:48

## 2022-02-15 RX ADMIN — LEVOTHYROXINE SODIUM 50 MCG: 50 TABLET ORAL at 07:34

## 2022-02-15 RX ADMIN — ENOXAPARIN SODIUM 30 MG: 30 INJECTION SUBCUTANEOUS at 20:33

## 2022-02-15 RX ADMIN — REMDESIVIR 100 MG: 100 INJECTION, POWDER, LYOPHILIZED, FOR SOLUTION INTRAVENOUS at 16:48

## 2022-02-15 NOTE — DISCHARGE INSTRUCTIONS
COVID-19 (Coronavirus Disease 2019)   AMBULATORY CARE:   Coronavirus disease 2019 (COVID-19)  is the disease caused by a coronavirus first discovered in December 2019  Coronaviruses generally cause upper respiratory (nose, throat, and lung) infections, such as a cold  The new virus spreads quickly and easily  The virus can be spread starting 2 days before symptoms even begin  The virus has also changed into several new forms (called variants) since it was discovered  The variants may be more contagious (easily spread) than the original form  Some may also cause more severe illness than others  It is important to follow local, national, and worldwide measures to protect yourself and others from infection  Signs and symptoms of COVID-19  may not develop  Signs and symptoms that develop usually start about 5 days after infection but can take 2 to 14 days  You may feel like you have the flu or a bad cold  Some signs and symptoms go away in a few days  Others can last weeks, months, or possibly years  Information on COVID-19 is still being learned  Tell your healthcare provider if you think you were infected but develop signs or symptoms not listed below:  · A cough    · Shortness of breath or trouble breathing that may become severe    · A fever of at least 100 4°F, or 38°C (may be lower in adults 65 or older)    · Chills that might include shaking    · Muscle pain, body aches, or a headache    · A sore throat    · Suddenly not being able to taste or smell anything    · Feeling mentally and physically tired (fatigue)    · Congestion (stuffy head and nose), or a runny nose    · Diarrhea, nausea, or vomiting    If you think you or someone you know may be infected:  Do the following to protect others:  · If emergency care is needed,  tell the  about the possible infection, or call ahead and tell the emergency department  · Call a healthcare provider  for instructions if symptoms are mild   Anyone who may be infected should not  arrive without calling first  The provider will need to protect staff members and other patients  · The person who may be infected needs to wear a face covering  while getting medical care  This will help lower the risk of infecting others  Coverings are not used for anyone who is younger than 2 years, has breathing problems, or cannot remove it  The provider can give you instructions for anyone who cannot wear a covering  Call your local emergency number (911 in the [address] [facility]) or an emergency department if:   · You have trouble breathing or shortness of breath at rest     · You have chest pain or pressure that lasts longer than 5 minutes  · You become confused or hard to wake  · Your lips or face are blue  · You have a fever of 104°F (40°C) or higher  Call your doctor if:   · You do not  have symptoms of COVID-19 but had close physical contact within 14 days with someone who tested positive  · You have questions or concerns about your condition or care  How COVID-19 is diagnosed: If you think you have COVID-19, call your healthcare provider  He or she will tell you what to do based on your symptoms and testing guidelines in your area  In general, the following may be used:  · A viral test  shows if you have a current infection  Samples are taken from your nose and throat, usually with swabs  You may need to wait several days to get the test results  Your healthcare provider will tell you how to get your results  You will need to quarantine (stay physically away from others) until you get your results  If results show you have COVID-19, you will need to continue until you are well  Your provider or other health official may give you more directions  You will also need to prevent another infection until it is known if you can get COVID-19 again  · An antibody test  shows if you had a past infection   Blood samples are used for this test  Antibodies are made by your immune system to attack the virus that causes COVID-19  Antibodies will form 1 to 3 weeks after you are infected  It is not known if antibodies prevent a second infection, or for how long a person might be protected  If you have antibodies, you will still need to be careful around others until more is known  · CT scans or x-rays  may be used to check for signs of pneumonia  The 2019 coronavirus causes a specific kind of pneumonia, usually in both lungs  The pictures may also be used to check for health problems in other parts of your body  Treatment  such as monoclonal antibodies and convalescent plasma have emergency use authorization (EUA)  This means they may be given only to patients who are hospitalized with severe signs and symptoms  The following may be used to manage your symptoms:  · Mild symptoms  may get better on their own  If you do not need to be treated in a hospital, you will be given instructions to use at home  Your condition will be closely monitored  You will need to watch for worsening symptoms and seek immediate care if needed  Talk to your healthcare provider about the following:    ? Decongestants  help reduce nasal congestion and help you breathe more easily  If you take decongestant pills, they may make you feel restless or cause problems with your sleep  Do not use decongestant sprays for more than a few days  ? Cough suppressants  help reduce coughing  Ask your healthcare provider which type of cough medicine is best for you  ? To soothe a sore throat,  gargle with warm salt water, or use throat lozenges or a throat spray  Drink more liquids to thin and loosen mucus and to prevent dehydration  ? NSAIDs or acetaminophen  can help lower a fever and relieve body aches or a headache  Follow directions  If not taken correctly, NSAIDs can cause kidney damage and acetaminophen can cause liver damage      · Severe or life-threatening symptoms  are treated in the hospital  You may need a combination of the following:    ? Medicines  may be given to lower or prevent inflammation or to fight the virus  You may also need blood thinners to prevent or treat blood clots  If you have a deep vein thrombosis (DVT) or pulmonary embolism (PE), you may need to keep using blood thinners for 3 months  ? Extra oxygen  may be given if you have respiratory failure  This means your lungs cannot get enough oxygen into your blood and out to your organs  Extra oxygen can help prevent organ failure  ? A ventilator  may be used to help you breathe  What you need to know about health problems the virus may cause:  Serious health problems may improve or continue for weeks, months, and possibly years  Health problems that continue may be called long COVID  Anyone can develop serious problems from this virus, but your risk is higher if you are 72 or older  A weak immune system, diabetes, or a heart or lung condition can also increase your risk  Your risk is also higher if you are a current or former cigarette smoker  COVID-19 can lead to any of the following:  · Multisymptom inflammatory syndrome in adults (MIS-A) or in children (MIS-C), causing inflammation in the heart, digestive system, skin, or brain    · Serious lower respiratory conditions, such as pneumonia or acute respiratory distress syndrome (ARDS)    · Blood vessel damage, leading to blood clots    · Organ damage from a lack of oxygen or from blood clots    · Sleep problems    · Problems thinking clearly, remembering information, or concentrating    · Mood changes, depression, or anxiety    What you need to know about COVID-19 vaccines:  Get a vaccine even if you already had COVID-19  · COVID-19 vaccines are given as a shot in 1 or 2 doses  Some vaccines have emergency use authorization (EUA)  An EUA means the vaccine is not approved but is given because the benefits outweigh the risks   A 2-dose vaccine is fully approved for use in those 16 years or older  This vaccine also has an EUA for adolescents 12 to 15 years  Your healthcare provider can help you understand the benefits and risks  · A third dose is recommended for adults with a weakened immune system who get a 2-dose vaccine  The third dose is given at least 28 days after the second  · Even after you get the vaccine, continue social distancing and other measures  Experts are still learning how well the vaccines work to prevent infection, transmission, and severe illness  Although rare, you can become infected after you get the vaccine  You may also be able to pass the virus to others without knowing you are infected  · After you get the vaccine, check local, national, and international travel rules  Check to see if you need to be tested before you travel  You may also need to quarantine after you return  Some countries require proof of a negative test before you leave  You should also be tested 3 to 5 days after you return from another country  How the 2019 coronavirus spreads: The following are ways the virus is thought to spread, but more information may be coming:  · Droplets are the main way all coronaviruses spread  The virus travels in droplets that form when a person talks, coughs, or sneezes  The droplets can also float in the air for minutes or hours  Infection happens when you breathe in the droplets or get them in your eyes or nose  Close personal contact with an infected person increases your risk for infection  This means being within 6 feet (2 meters) of the person for at least 15 minutes over 24 hours  · Person-to-person contact can spread the virus  For example, a person with the virus on his or her hands can spread it by shaking hands with someone  · The virus can stay on objects and surfaces for a short time  You may become infected by touching the object or surface and then touching your eyes or mouth      · An infected animal may be able to infect a person who touches it  This may happen at live markets or on a farm  Help lower the risk for COVID-19:  The best way to prevent infection is to avoid anyone who is infected, but this can be hard to do  An infected person can spread the virus before signs or symptoms begin, or even if signs or symptoms never develop  The following can help lower the risk for infection:      · Wash your hands often throughout the day  Use soap and water  Rub your soapy hands together, lacing your fingers, for at least 20 seconds  Rinse with warm, running water  Dry your hands with a clean towel or paper towel  Use hand  that contains alcohol if soap and water are not available  Teach children how to wash their hands and use hand   · Cover sneezes and coughs  Turn your face away and cover your mouth and nose with a tissue  Throw the tissue away  Use the bend of your arm if a tissue is not available  Then wash your hands well with soap and water or use hand   Teach children how to cover a cough or sneeze  · Wear a face covering (mask) around anyone who does not live in your home  Use a cloth covering with at least 2 layers  You can also create layers by putting a cloth covering over a disposable non-medical mask  Cover your mouth and your nose  The covering should fit snugly against the bridge of your nose  Securely fasten it under your chin and on the sides of your face  Do not  wear a plastic face shield instead of a covering  Continue social distancing and washing your hands often  A face covering is not a substitute for social distancing safety measures  · Follow worldwide, national, and local social distancing guidelines  Keep at least 6 feet (2 meters) between you and others  Also keep this distance from anyone who comes to your home, such as someone making a delivery  Wear a face covering while you are around others   You will need to wear a covering in restaurants, stores, and other public buildings  You will also need a covering on mass transit, such as a bus, subway, or airplane  Remember to use a covering made from thick material or wear 2 coverings together  · Make a habit of not touching your face  If you get the virus on your hands, you can transfer it to your eyes, nose, or mouth and become infected  You can also transfer it to objects, surfaces, or people  Do not touch your eyes, nose, or mouth without washing your hands first     · Clean and disinfect high-touch surfaces and objects often  Use disinfecting wipes, or make a solution of 4 teaspoons of bleach in 1 quart (4 cups) of water  Clean and disinfect even if you think no one living in or coming to your home is infected with the virus  · Ask about other vaccines you may need  Get the influenza (flu) vaccine as soon as recommended each year, usually starting in September or October  Get the pneumonia vaccine if recommended  Your healthcare provider can tell you if you should also get other vaccines, and when to get them  Follow social distancing guidelines:  National and local social distancing rules vary  Rules may change over time as restrictions are lifted  Restrictions may return if an outbreak happens where you live  It is important to know and follow all current social distancing rules in your area  The following are general guidelines:  · Stay home if you are sick or think you may have COVID-19  It is important to stay home if you are waiting for a testing appointment or for test results  Even if you do not have symptoms, you can pass the virus to others  · Limit trips out of your home  Have food, medicines, and other supplies delivered and left at your door or other area, if possible  Plan trips out of your home so you make the fewest stops possible to limit close personal contact  Keep track of places you go  This will help contact tracers notify others if you become infected      · Avoid close physical contact with anyone who does not live in your home  Do not shake hands with, hug, or kiss a person as a greeting  If you must use public transportation (such as a bus or subway), try to sit or stand away from others  Only allow necessary people into your home  Wear your face covering, and remind others to wear a face covering  Remind them to wash their hands when they arrive and before they leave  Do not  let someone into your home or go to someone's home just to visit  Even if you both do not feel sick, the virus can pass from one of you to the other  · Avoid in-person gatherings and crowds  Gatherings or crowds of 10 or more individuals can cause the virus to spread  Avoid places such as vargas, beaches, sporting events, and tourist attractions  For events such as parties, holiday meals, Zoroastrian services, and conferences, attend virtually (on a computer), if possible  · Ask your healthcare provider for other ways to have appointments  Some providers offer phone, video, or other types of appointments  You may also be able to get prescriptions for a few months of your medicines at a time  · Stay safe if you must go out to work  Keep physical distance between you and other workers as much as possible  Follow your employer's rules so everyone stays safe  If you have COVID-19 and are recovering at home,  healthcare providers will give you specific instructions to follow  The following are general guidelines to remind you how to keep others safe until you are well:  · Wash your hands often  Use soap and water as much as possible  Use hand  that contains alcohol if soap and water are not available  Dry your hands with a clean towel or paper towel  Do not share towels with anyone  If you use paper towels, throw them away in a lined trash can kept in your room or area  Use a covered trash can, if possible  · Do not go out of your home unless it is necessary    Ask someone who is not infected to go out for groceries or supplies, or have them delivered  Do not go to your healthcare provider's office without an appointment  · Only have close physical contact with a person giving direct care, or a baby or child you must care for  Family members and friends should not visit you  If possible, stay in a separate area or room of your home if you live with others  No one should go into the area or room except to give you care  You can visit with others by phone, video chat, e-mail, or similar systems  · Wear a face covering while others are near you  This can help prevent droplets from spreading the virus when you talk, sneeze, or cough  Put the covering on before anyone comes into your room or area  Remind the person to cover his or her nose and mouth before coming in to provide care for you  · Do not share items  Do not share dishes, towels, or other items with anyone  Items need to be washed after you use them  · Protect your baby  Some newborns have tested positive for the virus  It is not known if they became infected before or after birth  The highest risk is when a  has close contact with an infected person  If you are pregnant or breastfeeding, talk to your healthcare provider or obstetrician about any concerns you have  He or she will tell you when to bring your baby in for check-ups and vaccines  He or she will also tell you what to do if you think your baby was infected with the coronavirus  Wash your hands and put on a clean face covering before you breastfeed or care for your baby  · Do not handle live animals unless it is necessary  Some animals, including pets, have been infected with the new coronavirus  Ask someone who is not infected to take care of your pet until you are well  If you must care for a pet, wear a face covering  Wash your hands before and after you give care  Talk to your healthcare provider about how to keep a service animal safe, if needed      · Follow directions from your healthcare provider for being around others after you recover  It is not known if or for how long a recovered person can pass the virus to others  Your provider may give you instructions, such as continuing social distancing and wearing a face covering  He or she will tell you when it is okay to be around others again  This may be 10 to 20 days after symptoms started or you had a positive test  Most symptoms will also need to be gone  Your provider will give you more information  Follow up with your doctor as directed:  Write down your questions so you remember to ask them during your visits  For more information:   · Centers for Disease Control and Prevention  [address] , [address]  Phone: [phone] - [phone]  Web Address: [employer]    © [facility] 2021 Information is for End User's use only and may not be sold, redistributed or otherwise used for commercial purposes  All illustrations and images included in CareNotes® are the copyrighted property of A D A M , Inc  or [address] [place] [address]  The above information is an  only  It is not intended as medical advice for individual conditions or treatments  Talk to your doctor, nurse or pharmacist before following any medical regimen to see if it is safe and effective for you  COVID-19 (Coronavirus Disease 2019)   WHAT YOU NEED TO KNOW:   Coronavirus disease 2019 (COVID-19) is the disease caused by a coronavirus first discovered in December 2019  Coronaviruses generally cause upper respiratory (nose, throat, and lung) infections, such as a cold  The new virus spreads quickly and easily  The virus can be spread starting 2 days before symptoms even begin  The virus has also changed into several new forms (called variants) since it was discovered  The variants may be more contagious (easily spread) than the original form  Some may also cause more severe illness than others   It is important to follow local, national, and worldwide measures to protect yourself and others from infection  DISCHARGE INSTRUCTIONS:   If you think you or someone you know may be infected:  Do the following to protect others:  · If emergency care is needed,  tell the  about the possible infection, or call ahead and tell the emergency department  · Call a healthcare provider  for instructions if symptoms are mild  Anyone who may be infected should not  arrive without calling first  The provider will need to protect staff members and other patients  · The person who may be infected needs to wear a face covering  while getting medical care  This will help lower the risk of infecting others  Coverings are not used for anyone who is younger than 2 years, has breathing problems, or cannot remove it  The provider can give you instructions for anyone who cannot wear a covering  Call your local emergency number (911 in the [address] [place] [address],[address]) or an emergency department if:   · You have trouble breathing or shortness of breath at rest     · You have chest pain or pressure that lasts longer than 5 minutes  · You become confused or hard to wake  · Your lips or face are blue  · You have a fever of 104°F (40°C) or higher  Call your doctor if:   · You do not  have symptoms of COVID-19 but had close physical contact within 14 days with someone who tested positive  · You have questions or concerns about your condition or care  Medicines: You may need any of the following for mild symptoms:  · Decongestants  help reduce nasal congestion and help you breathe more easily  If you take decongestant pills, they may make you feel restless or cause problems with your sleep  Do not use decongestant sprays for more than a few days  · Cough suppressants  help reduce coughing  Ask your healthcare provider which type of cough medicine is best for you  · Acetaminophen  decreases pain and fever  It is available without a doctor's order   Ask how much to take and how often to take it  Follow directions  Read the labels of all other medicines you are using to see if they also contain acetaminophen, or ask your doctor or pharmacist  Acetaminophen can cause liver damage if not taken correctly  Do not use more than 4 grams (4,000 milligrams) total of acetaminophen in one day  · NSAIDs , such as ibuprofen, help decrease swelling, pain, and fever  NSAIDs can cause stomach bleeding or kidney problems in certain people  If you take blood thinner medicine, always ask your healthcare provider if NSAIDs are safe for you  Always read the medicine label and follow directions  · Take your medicine as directed  Contact your healthcare provider if you think your medicine is not helping or if you have side effects  Tell him or her if you are allergic to any medicine  Keep a list of the medicines, vitamins, and herbs you take  Include the amounts, and when and why you take them  Bring the list or the pill bottles to follow-up visits  Carry your medicine list with you in case of an emergency  What you need to know about COVID-19 vaccines:  Get a vaccine even if you already had COVID-19  · COVID-19 vaccines are given as a shot in 1 or 2 doses  Some vaccines have emergency use authorization (EUA)  An EUA means the vaccine is not approved but is given because the benefits outweigh the risks  A 2-dose vaccine is fully approved for use in those 16 years or older  This vaccine also has an EUA for adolescents 12 to 15 years  Your healthcare provider can help you understand the benefits and risks  · A third dose is recommended for adults with a weakened immune system who get a 2-dose vaccine  The third dose is given at least 28 days after the second  · Even after you get the vaccine, continue social distancing and other measures  Experts are still learning how well the vaccines work to prevent infection, transmission, and severe illness   Although rare, you can become infected after you get the vaccine  You may also be able to pass the virus to others without knowing you are infected  · After you get the vaccine, check local, national, and international travel rules  Check to see if you need to be tested before you travel  You may also need to quarantine after you return  Some countries require proof of a negative test before you leave  You should also be tested 3 to 5 days after you return from another country  How the 2019 coronavirus spreads: The following are ways the virus is thought to spread, but more information may be coming:  · Droplets are the main way all coronaviruses spread  The virus travels in droplets that form when a person talks, coughs, or sneezes  The droplets can also float in the air for minutes or hours  Infection happens when you breathe in the droplets or get them in your eyes or nose  Close personal contact with an infected person increases your risk for infection  This means being within 6 feet (2 meters) of the person for at least 15 minutes over 24 hours  · Person-to-person contact can spread the virus  For example, a person with the virus on his or her hands can spread it by shaking hands with someone  · The virus can stay on objects and surfaces for a short time  You may become infected by touching the object or surface and then touching your eyes or mouth  · An infected animal may be able to infect a person who touches it  This may happen at live markets or on a farm  Help lower the risk for COVID-19:  The best way to prevent infection is to avoid anyone who is infected, but this can be hard to do  An infected person can spread the virus before signs or symptoms begin, or even if signs or symptoms never develop  The following can help lower the risk for infection:      · Wash your hands often throughout the day  Use soap and water  Rub your soapy hands together, lacing your fingers, for at least 20 seconds   Rinse with warm, running water  Dry your hands with a clean towel or paper towel  Use hand  that contains alcohol if soap and water are not available  Teach children how to wash their hands and use hand   · Cover sneezes and coughs  Turn your face away and cover your mouth and nose with a tissue  Throw the tissue away  Use the bend of your arm if a tissue is not available  Then wash your hands well with soap and water or use hand   Teach children how to cover a cough or sneeze  · Wear a face covering (mask) around anyone who does not live in your home  Use a cloth covering with at least 2 layers  You can also create layers by putting a cloth covering over a disposable non-medical mask  Cover your mouth and your nose  The covering should fit snugly against the bridge of your nose  Securely fasten it under your chin and on the sides of your face  Do not  wear a plastic face shield instead of a covering  Continue social distancing and washing your hands often  A face covering is not a substitute for social distancing safety measures  · Follow worldwide, national, and local social distancing guidelines  Keep at least 6 feet (2 meters) between you and others  Also keep this distance from anyone who comes to your home, such as someone making a delivery  Wear a face covering while you are around others  You will need to wear a covering in restaurants, stores, and other public buildings  You will also need a covering on mass transit, such as a bus, subway, or airplane  Remember to use a covering made from thick material or wear 2 coverings together  · Make a habit of not touching your face  If you get the virus on your hands, you can transfer it to your eyes, nose, or mouth and become infected  You can also transfer it to objects, surfaces, or people   Do not touch your eyes, nose, or mouth without washing your hands first     · Clean and disinfect high-touch surfaces and objects often  Use disinfecting wipes, or make a solution of 4 teaspoons of bleach in 1 quart (4 cups) of water  Clean and disinfect even if you think no one living in or coming to your home is infected with the virus  · Ask about other vaccines you may need  Get the influenza (flu) vaccine as soon as recommended each year, usually starting in September or October  Get the pneumonia vaccine if recommended  Your healthcare provider can tell you if you should also get other vaccines, and when to get them  Follow social distancing guidelines:  National and local social distancing rules vary  Rules may change over time as restrictions are lifted  Restrictions may return if an outbreak happens where you live  It is important to know and follow all current social distancing rules in your area  The following are general guidelines:  · Stay home if you are sick or think you may have COVID-19  It is important to stay home if you are waiting for a testing appointment or for test results  Even if you do not have symptoms, you can pass the virus to others  · Limit trips out of your home  Have food, medicines, and other supplies delivered and left at your door or other area, if possible  Plan trips out of your home so you make the fewest stops possible to limit close personal contact  Keep track of places you go  This will help contact tracers notify others if you become infected  · Avoid close physical contact with anyone who does not live in your home  Do not shake hands with, hug, or kiss a person as a greeting  If you must use public transportation (such as a bus or subway), try to sit or stand away from others  Only allow necessary people into your home  Wear your face covering, and remind others to wear a face covering  Remind them to wash their hands when they arrive and before they leave  Do not  let someone into your home or go to someone's home just to visit   Even if you both do not feel sick, the virus can pass from one of you to the other  · Avoid in-person gatherings and crowds  Gatherings or crowds of 10 or more individuals can cause the virus to spread  Avoid places such as vargas, beaches, sporting events, and tourist attractions  For events such as parties, holiday meals, [employer]ism services, and conferences, attend virtually (on a computer), if possible  · Ask your healthcare provider for other ways to have appointments  Some providers offer phone, video, or other types of appointments  You may also be able to get prescriptions for a few months of your medicines at a time  · Stay safe if you must go out to work  Keep physical distance between you and other workers as much as possible  Follow your employer's rules so everyone stays safe  If you have COVID-19 and are recovering at home,  healthcare providers will give you specific instructions to follow  The following are general guidelines to remind you how to keep others safe until you are well:  · Wash your hands often  Use soap and water as much as possible  Use hand  that contains alcohol if soap and water are not available  Dry your hands with a clean towel or paper towel  Do not share towels with anyone  If you use paper towels, throw them away in a lined trash can kept in your room or area  Use a covered trash can, if possible  · Do not go out of your home unless it is necessary  Ask someone who is not infected to go out for groceries or supplies, or have them delivered  Do not go to your healthcare provider's office without an appointment  · Only have close physical contact with a person giving direct care, or a baby or child you must care for  Family members and friends should not visit you  If possible, stay in a separate area or room of your home if you live with others  No one should go into the area or room except to give you care   You can visit with others by phone, video chat, e-mail, or similar systems  · Wear a face covering while others are near you  This can help prevent droplets from spreading the virus when you talk, sneeze, or cough  Put the covering on before anyone comes into your room or area  Remind the person to cover his or her nose and mouth before coming in to provide care for you  · Do not share items  Do not share dishes, towels, or other items with anyone  Items need to be washed after you use them  · Protect your baby  Some newborns have tested positive for the virus  It is not known if they became infected before or after birth  The highest risk is when a  has close contact with an infected person  If you are pregnant or breastfeeding, talk to your healthcare provider or obstetrician about any concerns you have  He or she will tell you when to bring your baby in for check-ups and vaccines  He or she will also tell you what to do if you think your baby was infected with the coronavirus  Wash your hands and put on a clean face covering before you breastfeed or care for your baby  · Do not handle live animals unless it is necessary  Some animals, including pets, have been infected with the new coronavirus  Ask someone who is not infected to take care of your pet until you are well  If you must care for a pet, wear a face covering  Wash your hands before and after you give care  Talk to your healthcare provider about how to keep a service animal safe, if needed  · Follow directions from your healthcare provider for being around others after you recover  It is not known if or for how long a recovered person can pass the virus to others  Your provider may give you instructions, such as continuing social distancing and wearing a face covering  He or she will tell you when it is okay to be around others again  This may be 10 to 20 days after symptoms started or you had a positive test  Most symptoms will also need to be gone   Your provider will give you more information  Follow up with your doctor as directed:  Write down your questions so you remember to ask them during your visits  For more information:   · Centers for Disease Control and Prevention  [address] , [address]  Phone: 4- 205 - 856-9371  Web Address: [employer]     © 2891 [facility] 2021 Information is for End User's use only and may not be sold, redistributed or otherwise used for commercial purposes  All illustrations and images included in CareNotes® are the copyrighted property of [employer]  or [address] [place], [place] [address]everton Prasad   The above information is an  only  It is not intended as medical advice for individual conditions or treatments  Talk to your doctor, nurse or pharmacist before following any medical regimen to see if it is safe and effective for you

## 2022-02-15 NOTE — RESPIRATORY THERAPY NOTE
Home Oxygen Qualifying Test     Patient name: Naif Lindquist        : 1950   Date of Test:  February 15, 2022  Diagnosis:    Home Oxygen Test:    **Medicare Guidelines require item(s) 1-5 on all ambulatory patients or 1 and 2 on non-ambulatory patients  1  Baseline SPO2 on Room Air at rest 91  %   a  If <= 88% on Room Air add O2 via NC to obtain SpO2 >=88%  If LPM needed, document LPM  needed to reach =>88%    2  SPO2 during exertion on Room Air 86  %  a  During exertion monitor SPO2  If SPO2 increases >=89%, do not add supplemental oxygen    3  SPO2 on Oxygen at Rest 94 % at 2lpm  4    5    6    7    8    9    10    11    12       13  SPO2 during exertion on Oxygen 90  % at 2  LPM    14  Test performed during exertion activity  [x]  Supplemental Home Oxygen is indicated  []  Client does not qualify for home oxygen      Respiratory Additional Notes-  Pt qualifies for home 02 with exertion at 2lpm via nasal cannula   Also requires humidifier bottles   Cece Will

## 2022-02-15 NOTE — ASSESSMENT & PLAN NOTE
Lab Results   Component Value Date    [id number] 1 262 02/14/2022     Plan:  · Continue home levothyroxine 50 mcg QD

## 2022-02-15 NOTE — CASE MANAGEMENT
Case Management Progress Note    Patient name Jose Guadalupe Armstrong  Location S /S -01 MRN [id number]  : 1950 Date 2/15/2022       LOS (days): 2  Geometric Mean LOS (GMLOS) (days):   Days to GMLOS:        OBJECTIVE:        Current admission status: Inpatient  Preferred Pharmacy:   [id number] [phone]  Phone: [phone] Fax: [phone]    Primary Care Provider: Ramy Casanova DO    Primary Insurance: BLUE CROSS  Secondary Insurance: MEDICARE    PROGRESS NOTE:  As per SLIM, patient is not yet stable for dc  She remains on 2L acute O2 w/ exertion, as per RT home O2 eval today  SLIM hopes for patient to wean off of O2 completely prior to dc and as such, she will continue on IV Remdesivir and steroids and they will repeat home O2 eval in a day or two  Patient is independent with ADLs/mobility as per SLIM rounds and flowsheet documentation  CM Dept will continue to follow

## 2022-02-15 NOTE — DISCHARGE INSTR - AVS FIRST PAGE
Dear Donavon Rizzo,     It was our pleasure to care for you here at [facility]  It is our hope that we were always able to exceed the expected standards for your care during your stay  You were hospitalized due to COVID 19 infection, shortness of breath  You were cared for on the fourth floor by Verne Spatz, MD under the service of Su Ovalle DO with the [facility] who covers for your primary care physician (PCP), Arlen Dillon DO, while you were hospitalized  If you have any questions or concerns related to this hospitalization, you may contact us at [phone]  For follow up as well as any medication refills, we recommend that you follow up with your primary care physician  A registered nurse will reach out to you by phone within a few days after your discharge to answer any additional questions that you may have after going home  However, at this time we provide for you here, the most important instructions / recommendations at discharge:     Notable Medication Adjustments -   Take Prednisone 40 mg daily for 5 days starting tomorrow 02/18/2022  Start taking Doxycycline 100 mg twice daily  Your first dose is tonight at 9 pm  Continue until completed  Take Cefdinir 300 mg every 12 hours starting tomorrow 02/18/2022 for 3 days  Take Tessalon Perles as needed for cough  Testing Required after Discharge -   None  Important follow up information -   Follow up with PCP within 72 hours  Other Instructions -   Call your PCP or Return to the emergency department if your symptoms worsen  Please review this entire after visit summary as additional general instructions including medication list, appointments, activity, diet, any pertinent wound care, and other additional recommendations from your care team that may be provided for you        Sincerely,     Verne Spatz, MD

## 2022-02-15 NOTE — PLAN OF CARE
Problem: RESPIRATORY - ADULT  Goal: Achieves optimal ventilation and oxygenation  Description: INTERVENTIONS:  - Assess for changes in respiratory status  - Assess for changes in mentation and behavior  - Position to facilitate oxygenation and minimize respiratory effort  - Oxygen administered by appropriate delivery if ordered  - Initiate smoking cessation education as indicated  - Encourage broncho-pulmonary hygiene including cough, deep breathe, Incentive Spirometry  - Assess the need for suctioning and aspirate as needed  - Assess and instruct to report SOB or any respiratory difficulty  - Respiratory Therapy support as indicated  Outcome: Progressing     Problem: Potential for Falls  Goal: Patient will remain free of falls  Description: INTERVENTIONS:  - Educate patient/family on patient safety including physical limitations  - Instruct patient to call for assistance with activity   - Consult OT/PT to assist with strengthening/mobility   - Keep Call bell within reach  - Keep bed low and locked with side rails adjusted as appropriate  - Keep care items and personal belongings within reach  - Initiate and maintain comfort rounds  - Make Fall Risk Sign visible to staff  - Offer Toileting every  Hours, in advance of need  - Initiate/Maintain alarm  - Obtain necessary fall risk management equipment  - Apply yellow socks and bracelet for high fall risk patients  - Consider moving patient to room near nurses station  Outcome: Progressing     Problem: GASTROINTESTINAL - ADULT  Goal: Minimal or absence of nausea and/or vomiting  Description: INTERVENTIONS:  - Administer IV fluids if ordered to ensure adequate hydration  - Maintain NPO status until nausea and vomiting are resolved  - Nasogastric tube if ordered  - Administer ordered antiemetic medications as needed  - Provide nonpharmacologic comfort measures as appropriate  - Advance diet as tolerated, if ordered  - Consider nutrition services referral to assist patient with adequate nutrition and appropriate food choices  Outcome: Progressing  Goal: Maintains adequate nutritional intake  Description: INTERVENTIONS:  - Monitor percentage of each meal consumed  - Identify factors contributing to decreased intake, treat as appropriate  - Assist with meals as needed  - Monitor I&O, weight, and lab values if indicated  - Obtain nutrition services referral as needed  Outcome: Progressing  Goal: Oral mucous membranes remain intact  Description: INTERVENTIONS  - Assess oral mucosa and hygiene practices  - Implement preventative oral hygiene regimen  - Implement oral medicated treatments as ordered  - Initiate Nutrition services referral as needed  Outcome: Progressing     Problem: METABOLIC, FLUID AND ELECTROLYTES - ADULT  Goal: Electrolytes maintained within normal limits  Description: INTERVENTIONS:  - Monitor labs and assess patient for signs and symptoms of electrolyte imbalances  - Administer electrolyte replacement as ordered  - Monitor response to electrolyte replacements, including repeat lab results as appropriate  - Instruct patient on fluid and nutrition as appropriate  Outcome: Progressing  Goal: Fluid balance maintained  Description: INTERVENTIONS:  - Monitor labs   - Monitor I/O and WT  - Instruct patient on fluid and nutrition as appropriate  - Assess for signs & symptoms of volume excess or deficit  Outcome: Progressing

## 2022-02-15 NOTE — ASSESSMENT & PLAN NOTE
K noted to be mildly low on 2/15 am      Lab Results   Component Value Date    K 3 5 02/16/2022    K 3 4 (L) 02/15/2022    K 3 7 02/14/2022     Plan:   · Repleted on 2/15  · Normal K noted on 2/16 BMP

## 2022-02-15 NOTE — PROGRESS NOTES
[facility]  Progress Note Sergio Few 1950, 66 y o  female MRN: [id number]  Unit/Bed#: S -01 Encounter: [id number]  Primary Care Provider: Real Muhammad,    Date and time admitted to hospital: 2/13/2022 12:35 PM    * COVID-19  Assessment & Plan  · POA, worsening shortness of breath, unvaccinated  · Tested positive for COVID on 02/07/2022 at [facility] ED and was sent home with supportive measures  · Patient received monoclonal antibody treatment on 02/10/2022  · In ED, patient was ambulated and desatted to 85%, and was placed on 2 L of nasal cannula  Workup:   · CXR - shows worsening ground-glass opacities, when compared with CXR from 02/07/2022  · CRP 90--> 79 (2/14)  · Procal: pending 2/15 am    · D-dimer 0 69  · CK 69, proBNP 399  · EKG - NSR  · BMP 2/15 pending  · CBC 2/15- WBC 8 62, ANS 6 68, platelets elevated 113   · On 2/15 am, failed home O2 eval  Required 2L O2 via NC with walking  Plan, Mild COVID pathway:   · Remdesivir day 3  · Decadron 6 mg q24h day 3; upon dc- oral Prednisone  · VTE PPX - Lovenox 30 mg q12h  · Home O2 eval  · Wean O2 as tolerated   · Incentive spirometry  · Self-proning      Acute respiratory failure (HCC)  Assessment & Plan  In setting of COVID infection  Pt is unvaccinated  See COVID plan  Hypothyroidism due to Hashimoto's thyroiditis  Assessment & Plan  Lab Results   Component Value Date    [id number] 1 262 02/14/2022     Plan:  · Continue home levothyroxine 50 mcg QD    Hypokalemia  Assessment & Plan  K noted to be mildly low on 2/15 am    Lab Results   Component Value Date    K 3 4 (L) 02/15/2022     Plan:   · Repleted    Morbid obesity ([address] )  Assessment & Plan  BMI 37 29   on diet, healthy lifestyle  Follow up with PCP  VTE Pharmacologic Prophylaxis: VTE Score: 6 High Risk (Score >/= 5) - Pharmacological DVT Prophylaxis Ordered: enoxaparin (Lovenox)   Sequential Compression Devices Ordered  Patient Centered Rounds: I performed bedside rounds with nursing staff today  Discussions with Specialists or Other Care Team Provider: None  Education and Discussions with Family / Patient: Will update family later this afternoon via phone        Current Length of Stay: 2 day(s)  Current Patient Status: Inpatient   Discharge Plan: Anticipate discharge in 48-72 hrs to home  Code Status: Level 1 - Full Code    Subjective:   Ms Angel Amato tells me she is feeling much better this morning  Was able to rest comfortably overnight  She is no longer nauseous  Denies headaches, vision changes, nausea, speech difficulty, swallowing difficulty, bowel/bladder incontinence, numbness or tingling, weakness, palpitations, SOB, abdominal pain  Objective:     Vitals:   Temp (24hrs), Av 1 °F (36 7 °C), Min:98 1 °F (36 7 °C), Max:98 2 °F (36 8 °C)    Temp:  [98 1 °F (36 7 °C)-98 2 °F (36 8 °C)] 98 2 °F (36 8 °C)  HR:  [70-72] 72  Resp:  [18-24] 19  BP: (139-142)/(62-72) 142/62  SpO2:  [90 %-92 %] 92 %  Body mass index is 37 29 kg/m²  Input and Output Summary (last 24 hours): Intake/Output Summary (Last 24 hours) at 2/15/2022 1228  Last data filed at 2/15/2022 6545  Gross per 24 hour   Intake 440 ml   Output 640 ml   Net -200 ml     Physical Exam   Vitals reviewed  General Examination: Lying in bed, cooperative   HEENT: Normocephalic, Atraumatic  Extraocular movements intact, PERRLA  CVS: S1, S2 noted  Lungs: Decreased breath sounds b/l, crackles in b/l lower lungs, on 2LNC sat 92%  Abdomen: Soft, non tender, normal BS  Ext: No edema noted  Psych: Though Process - logical    Skin: No bleeding/bruising noted        Additional Data:     Labs:  Results from last 7 days   Lab Units 02/15/22  0554   WBC Thousand/uL 8 62   HEMOGLOBIN g/dL 14 2   HEMATOCRIT % 42 4   PLATELETS Thousands/uL 474*   NEUTROS PCT % 77*   LYMPHS PCT % 13*   MONOS PCT % 8   EOS PCT % 0     Results from last 7 days   Lab Units 02/15/22  0554 02/14/22  0442 02/14/22  0442   SODIUM mmol/L 137   < > 137   POTASSIUM mmol/L 3 4*   < > 3 7   CHLORIDE mmol/L 101   < > 99*   CO2 mmol/L 24   < > 25   BUN mg/dL 19   < > 19   CREATININE mg/dL 0 84   < > 0 81   ANION GAP mmol/L 12   < > 13   CALCIUM mg/dL 8 7   < > 8 7   ALBUMIN g/dL  --   --  2 8*   TOTAL BILIRUBIN mg/dL  --   --  0 60   ALK PHOS U/L  --   --  88   ALT U/L  --   --  27   AST U/L  --   --  49*   GLUCOSE RANDOM mg/dL 143*   < > 112    < > = values in this interval not displayed  Results from last 7 days   Lab Units 02/13/22  1614   INR  1 18                   Lines/Drains:  Invasive Devices  Report    Peripheral Intravenous Line            Peripheral IV 02/13/22 Right Antecubital 1 day              Imaging: Reviewed radiology reports from this admission including: chest xray  XR chest 1 view portable   ED Interpretation by Mikayla Sanders MD (02/13 7521)   Ground glass opacities consistent with COVID-19 pneumonia noted bilaterally, more pronounced at the lung bases  Final Result by Aurelio Daugherty MD (02/14 2048)      Bilateral mixed groundglass and airspace opacities is suspicious for Covid 19 pneumonia  Groundglass opacity consistent with Covid 19 pneumonia was noted on the ER preliminary result           Workstation performed: [id number]           Recent Cultures (last 7 days):         Last 24 Hours Medication List:   Current Facility-Administered Medications   Medication Dose Route Frequency Provider Last Rate    acetaminophen  650 mg Oral Q6H PRN Abrahan Marcos MD      dexamethasone  6 mg Intravenous Q24H Ralf Jamison MD      enoxaparin  30 mg Subcutaneous Q12H [facility] Ralf Jamison MD      levothyroxine  50 mcg Oral Daily Ralf Jamison MD      ondansetron  4 mg Intravenous Q4H PRN Abrahan Marcos MD      remdesivir  100 mg Intravenous Q24H Ralf Jamison MD          Today, Patient Was Seen By: Carlton Dalton MD    **Please Note: This note may have been constructed using a voice recognition system  **

## 2022-02-15 NOTE — QUICK NOTE
I spoke to this patient's  extensively over the phone to provide a comprehensive clinical update  I answered all of his questions and addressed all of his concerns to the best of my ability and to his satisfaction      LAURO Trevino , PGY-1

## 2022-02-16 PROBLEM — R73.9 HYPERGLYCEMIA: Status: ACTIVE | Noted: 2022-02-16

## 2022-02-16 LAB
ANION GAP SERPL CALCULATED.3IONS-SCNC: 10 MMOL/L (ref 4–13)
BUN SERPL-MCNC: 19 MG/DL (ref 5–25)
CALCIUM SERPL-MCNC: 8.7 MG/DL (ref 8.3–10.1)
CHLORIDE SERPL-SCNC: 104 MMOL/L (ref 100–108)
CO2 SERPL-SCNC: 26 MMOL/L (ref 21–32)
CREAT SERPL-MCNC: 0.89 MG/DL (ref 0.6–1.3)
GFR SERPL CREATININE-BSD FRML MDRD: 64 ML/MIN/1.73SQ M
GLUCOSE SERPL-MCNC: 123 MG/DL (ref 65–140)
GLUCOSE SERPL-MCNC: 137 MG/DL (ref 65–140)
POTASSIUM SERPL-SCNC: 3.5 MMOL/L (ref 3.5–5.3)
PROCALCITONIN SERPL-MCNC: 22.7 NG/ML
SODIUM SERPL-SCNC: 140 MMOL/L (ref 136–145)

## 2022-02-16 PROCEDURE — 82948 REAGENT STRIP/BLOOD GLUCOSE: CPT

## 2022-02-16 PROCEDURE — 84145 PROCALCITONIN (PCT): CPT

## 2022-02-16 PROCEDURE — 80048 BASIC METABOLIC PNL TOTAL CA: CPT | Performed by: INTERNAL MEDICINE

## 2022-02-16 PROCEDURE — 99232 SBSQ HOSP IP/OBS MODERATE 35: CPT | Performed by: INTERNAL MEDICINE

## 2022-02-16 RX ORDER — BENZONATATE 100 MG/1
200 CAPSULE ORAL 3 TIMES DAILY
Status: DISCONTINUED | OUTPATIENT
Start: 2022-02-16 | End: 2022-02-17 | Stop reason: HOSPADM

## 2022-02-16 RX ORDER — ECHINACEA PURPUREA EXTRACT 125 MG
1 TABLET ORAL AS NEEDED
Status: DISCONTINUED | OUTPATIENT
Start: 2022-02-16 | End: 2022-02-17 | Stop reason: HOSPADM

## 2022-02-16 RX ORDER — DOXYCYCLINE HYCLATE 100 MG/1
100 CAPSULE ORAL EVERY 12 HOURS SCHEDULED
Status: DISCONTINUED | OUTPATIENT
Start: 2022-02-16 | End: 2022-02-17 | Stop reason: HOSPADM

## 2022-02-16 RX ADMIN — CEFTRIAXONE SODIUM 1000 MG: 10 INJECTION, POWDER, FOR SOLUTION INTRAVENOUS at 14:52

## 2022-02-16 RX ADMIN — DOXYCYCLINE 100 MG: 100 CAPSULE ORAL at 20:34

## 2022-02-16 RX ADMIN — ENOXAPARIN SODIUM 30 MG: 30 INJECTION SUBCUTANEOUS at 20:18

## 2022-02-16 RX ADMIN — DEXAMETHASONE SODIUM PHOSPHATE 6 MG: 4 INJECTION INTRA-ARTICULAR; INTRALESIONAL; INTRAMUSCULAR; INTRAVENOUS; SOFT TISSUE at 18:56

## 2022-02-16 RX ADMIN — LEVOTHYROXINE SODIUM 50 MCG: 50 TABLET ORAL at 05:00

## 2022-02-16 RX ADMIN — BENZONATATE 200 MG: 100 CAPSULE ORAL at 14:53

## 2022-02-16 RX ADMIN — ENOXAPARIN SODIUM 30 MG: 30 INJECTION SUBCUTANEOUS at 08:11

## 2022-02-16 RX ADMIN — REMDESIVIR 100 MG: 100 INJECTION, POWDER, LYOPHILIZED, FOR SOLUTION INTRAVENOUS at 18:56

## 2022-02-16 RX ADMIN — DOXYCYCLINE 100 MG: 100 CAPSULE ORAL at 14:52

## 2022-02-16 RX ADMIN — BENZONATATE 200 MG: 100 CAPSULE ORAL at 20:18

## 2022-02-16 NOTE — ASSESSMENT & PLAN NOTE
K noted to be mildly low on 2/15 am      Lab Results   Component Value Date    K 3 7 02/17/2022    K 3 5 02/16/2022    K 3 4 (L) 02/15/2022     Plan:   · Repleted on 2/15  · Normal K noted on 2/17 BMP

## 2022-02-16 NOTE — ASSESSMENT & PLAN NOTE
· POA, worsening shortness of breath, unvaccinated  · Tested positive for COVID on 02/07/2022 at [facility] ED and was sent home with supportive measures  · Patient received monoclonal antibody treatment on 02/10/2022  · In ED, patient was ambulated and desatted to 85%, and was placed on 2 L of nasal cannula  Workup:   · CXR - shows worsening ground-glass opacities, when compared with CXR from 02/07/2022  · CRP 90--> 79 (2/14)-->78 7 (2/15)  · Procal 2/16- 22 70, 2/17: 8 47   · No fevers noted      · D-dimer 0 69  · CK 69, proBNP 399  · EKG - NSR  · CBC shows no leukocytosis on 2/17  Plan, Mild COVID pathway:   · Remdesivir day 5  · Decadron 6 mg q24h day 4; upon dc- oral Prednisone 40 mg for 5 days  · Given elevated procal, started ABX: ceftriaxone 1g q24h IV, Vibramycin 100mg PO BID- day 2 on 2/17  Switch to oral upon discharge to complete 5 day total course  · VTE PPX - Lovenox 30 mg q12h   · Wean O2 as tolerated   · Monitor temp curves, CBC for leukocytosis

## 2022-02-16 NOTE — ASSESSMENT & PLAN NOTE
· Noted hyperglycemia on labwork  · Most likely in setting of steroid use  · No need for insulin at this time  · Monitor glucose levels  · Goal glucose 140-180

## 2022-02-16 NOTE — ASSESSMENT & PLAN NOTE
Lab Results   Component Value Date    [id number] 1 262 02/14/2022     Plan:  · Continue home levothyroxine 50 mcg QD

## 2022-02-16 NOTE — PLAN OF CARE
Problem: RESPIRATORY - ADULT  Goal: Achieves optimal ventilation and oxygenation  Description: INTERVENTIONS:  - Assess for changes in respiratory status  - Assess for changes in mentation and behavior  - Position to facilitate oxygenation and minimize respiratory effort  - Oxygen administered by appropriate delivery if ordered  - Initiate smoking cessation education as indicated  - Encourage broncho-pulmonary hygiene including cough, deep breathe, Incentive Spirometry  - Assess the need for suctioning and aspirate as needed  - Assess and instruct to report SOB or any respiratory difficulty  - Respiratory Therapy support as indicated  Outcome: Progressing     Problem: Potential for Falls  Goal: Patient will remain free of falls  Description: INTERVENTIONS:  - Educate patient/family on patient safety including physical limitations  - Instruct patient to call for assistance with activity   - Consult OT/PT to assist with strengthening/mobility   - Keep Call bell within reach  - Keep bed low and locked with side rails adjusted as appropriate  - Keep care items and personal belongings within reach  - Initiate and maintain comfort rounds  - Make Fall Risk Sign visible to staff  - Offer Toileting ever Hours, in advance of need  - Initiate/Maintain alarm  - Obtain necessary fall risk management equipment:   - Apply yellow socks and bracelet for high fall risk patients  - Consider moving patient to room near nurses station  Outcome: Progressing     Problem: GASTROINTESTINAL - ADULT  Goal: Minimal or absence of nausea and/or vomiting  Description: INTERVENTIONS:  - Administer IV fluids if ordered to ensure adequate hydration  - Maintain NPO status until nausea and vomiting are resolved  - Nasogastric tube if ordered  - Administer ordered antiemetic medications as needed  - Provide nonpharmacologic comfort measures as appropriate  - Advance diet as tolerated, if ordered  - Consider nutrition services referral to assist patient with adequate nutrition and appropriate food choices  Outcome: Progressing  Goal: Maintains adequate nutritional intake  Description: INTERVENTIONS:  - Monitor percentage of each meal consumed  - Identify factors contributing to decreased intake, treat as appropriate  - Assist with meals as needed  - Monitor I&O, weight, and lab values if indicated  - Obtain nutrition services referral as needed  Outcome: Progressing  Goal: Oral mucous membranes remain intact  Description: INTERVENTIONS  - Assess oral mucosa and hygiene practices  - Implement preventative oral hygiene regimen  - Implement oral medicated treatments as ordered  - Initiate Nutrition services referral as needed  Outcome: Progressing     Problem: METABOLIC, FLUID AND ELECTROLYTES - ADULT  Goal: Electrolytes maintained within normal limits  Description: INTERVENTIONS:  - Monitor labs and assess patient for signs and symptoms of electrolyte imbalances  - Administer electrolyte replacement as ordered  - Monitor response to electrolyte replacements, including repeat lab results as appropriate  - Instruct patient on fluid and nutrition as appropriate  Outcome: Progressing  Goal: Fluid balance maintained  Description: INTERVENTIONS:  - Monitor labs   - Monitor I/O and WT  - Instruct patient on fluid and nutrition as appropriate  - Assess for signs & symptoms of volume excess or deficit  Outcome: Progressing

## 2022-02-16 NOTE — PROGRESS NOTES
[facility]  Progress Note Marylen Clinton 1950, 66 y o  female MRN: [id number]  Unit/Bed#: S -01 Encounter: [id number]  Primary Care Provider: Erich Escobar DO   Date and time admitted to hospital: 2/13/2022 12:35 PM     * COVID-19  Assessment & Plan  · POA, worsening shortness of breath, unvaccinated  · Tested positive for COVID on 02/07/2022 at [facility] ED and was sent home with supportive measures  · Patient received monoclonal antibody treatment on 02/10/2022  · In ED, patient was ambulated and desatted to 85%, and was placed on 2 L of nasal cannula  Workup:   · CXR - shows worsening ground-glass opacities, when compared with CXR from 02/07/2022  · CRP 90--> 79 (2/14)-->78 7 (2/15)  · Procal 2/16- 22 70   · No fevers noted      · D-dimer 0 69  · CK 69, proBNP 399  · EKG - NSR  · CBC shows no leukocytosis on 2/15  · On 2/15 am, failed home O2 eval  Required 2L O2 via NC with walking  Plan, Mild COVID pathway:   · Remdesivir day 4  · Decadron 6 mg q24h day 4; upon dc- oral Prednisone  · Given elevated procal, started ABX: ceftriaxone 1g q24h IV, Vibramycin 100mg PO BID- day 1 Switch to oral upon discharge to complete 5 day total course  · VTE PPX - Lovenox 30 mg q12h   · Home O2 eval  · Wean O2 as tolerated   · Ambulatory pulse ox  · Monitor temp curves, CBC for leukocytosis  · Incentive spirometry  · Self-proning      Acute respiratory failure ([facility] [address] )  Assessment & Plan  In setting of COVID infection  Pt is unvaccinated  See COVID plan  Hypothyroidism due to Hashimoto's thyroiditis  Assessment & Plan  Lab Results   Component Value Date    [id number] 1 262 02/14/2022     Plan:  · Continue home levothyroxine 50 mcg QD    Hyperglycemia  Assessment & Plan  · Noted hyperglycemia on labwork  · Most likely in setting of steroid use  · No need for insulin at this time  · Monitor glucose levels  · Goal glucose 140-180  Hypokalemia  Assessment & Plan  K noted to be mildly low on 2/15 am      Lab Results   Component Value Date    K 3 5 2022    K 3 4 (L) 02/15/2022    K 3 7 2022     Plan:   · Repleted on 2/15  · Normal K noted on  BMP  Morbid obesity ([address] )  Assessment & Plan  BMI 37 29   on diet, healthy lifestyle  Follow up with PCP  VTE Pharmacologic Prophylaxis: VTE Score: 6 High Risk (Score >/= 5) - Pharmacological DVT Prophylaxis Ordered: enoxaparin (Lovenox)  Sequential Compression Devices Ordered  Patient Centered Rounds: I performed bedside rounds with nursing staff today  Discussions with Specialists or Other Care Team Provider: None  Education and Discussions with Family / Patient: will update family via phone later today        Current Length of Stay: 3 day(s)  Current Patient Status: Inpatient   Discharge Plan: Anticipate discharge in 24-48 hrs to home  Code Status: Level 1 - Full Code    Subjective:   Ms Iveth Goetz reports feeling much better this morning  She was able to sleep well last night and has improved appetite this morning  She says she has not had a BM yet this morning, but able to pass gas  Denies headaches, vision changes, nausea, speech difficulty, swallowing difficulty, bowel/bladder incontinence, numbness or tingling, new weakness, palpitations, SOB, abdominal pain  Objective:     Vitals:   Temp (24hrs), Av 2 °F (36 8 °C), Min:97 6 °F (36 4 °C), Max:98 8 °F (37 1 °C)    Temp:  [97 6 °F (36 4 °C)-98 8 °F (37 1 °C)] 97 6 °F (36 4 °C)  HR:  [63-85] 63  Resp:  [18-22] 22  BP: (162-168)/(77-83) 168/83  SpO2:  [93 %-97 %] 97 %  Body mass index is 37 29 kg/m²  Input and Output Summary (last 24 hours):      Intake/Output Summary (Last 24 hours) at 2022 1519  Last data filed at 2022 1300  Gross per 24 hour   Intake 700 ml   Output --   Net 700 ml     Physical Exam     Vitals reviewed  General Examination: Sitting up in bed, cooperative HEENT: Normocephalic, Atraumatic  Extraocular movements intact, PERRLA  CVS: S1, S2 noted  Lungs: Decresed breath sounds b/l  On 2L NC, saturating 96%  Abdomen: Soft, normal bowel sounds  Non distended, non tender  Ext: No edema noted  Psych: Though Process - logical    Skin: No bleeding/bruising noted  Neuro: A, Ox3  No focal deficits appreciated  Additional Data:     Labs:  Results from last 7 days   Lab Units 02/15/22  0554   WBC Thousand/uL 8 62   HEMOGLOBIN g/dL 14 2   HEMATOCRIT % 42 4   PLATELETS Thousands/uL 474*   NEUTROS PCT % 77*   LYMPHS PCT % 13*   MONOS PCT % 8   EOS PCT % 0     Results from last 7 days   Lab Units 02/16/22  0633 02/15/22  0554 02/14/22  0442   SODIUM mmol/L 140   < > 137   POTASSIUM mmol/L 3 5   < > 3 7   CHLORIDE mmol/L 104   < > 99*   CO2 mmol/L 26   < > 25   BUN mg/dL 19   < > 19   CREATININE mg/dL 0 89   < > 0 81   ANION GAP mmol/L 10   < > 13   CALCIUM mg/dL 8 7   < > 8 7   ALBUMIN g/dL  --   --  2 8*   TOTAL BILIRUBIN mg/dL  --   --  0 60   ALK PHOS U/L  --   --  88   ALT U/L  --   --  27   AST U/L  --   --  49*   GLUCOSE RANDOM mg/dL 137   < > 112    < > = values in this interval not displayed  Results from last 7 days   Lab Units 02/13/22  1614   INR  1 18     Results from last 7 days   Lab Units 02/16/22  0810   POC GLUCOSE mg/dl 123         Results from last 7 days   Lab Units 02/16/22  0633   PROCALCITONIN ng/ml 22 70*       Lines/Drains:  Invasive Devices  Report    Peripheral Intravenous Line            Peripheral IV 02/13/22 Right Antecubital 2 days                      Imaging: Reviewed radiology reports from this admission including: chest xray     XR chest 1 view portable   ED Interpretation by Mimi Yang MD (02/13 8718)   Ground glass opacities consistent with COVID-19 pneumonia noted bilaterally, more pronounced at the lung bases         Final Result by Zoraida Fulton MD (02/14 9649)      Bilateral mixed groundglass and airspace opacities is suspicious for Covid 19 pneumonia  Groundglass opacity consistent with Covid 19 pneumonia was noted on the ER preliminary result  Workstation performed: [id number]           Recent Cultures (last 7 days):         Last 24 Hours Medication List:   Current Facility-Administered Medications   Medication Dose Route Frequency Provider Last Rate    acetaminophen  650 mg Oral Q6H PRN Miah Chun MD      benzonatate  200 mg Oral TID Angely Melena, DO      cefTRIAXone  1,000 mg Intravenous Q24H Arabella Mao MD 1,000 mg (02/16/22 1452)    dexamethasone  6 mg Intravenous Q24H Tanner Jimenez MD      doxycycline hyclate  100 mg Oral Q12H [facility] Arabella Mao MD      enoxaparin  30 mg Subcutaneous Q12H [facility] Tanner Jimenez MD      levothyroxine  50 mcg Oral Daily Tanner Jimenez MD      ondansetron  4 mg Intravenous Q4H PRN Miah Chun MD      remdesivir  100 mg Intravenous Q24H Tanner Jimenez MD      sodium chloride  1 spray Each Nare PRN Kimani Davidson MD          Today, Patient Was Seen By: Kimani Davidson MD    **Please Note: This note may have been constructed using a voice recognition system  **

## 2022-02-17 VITALS
OXYGEN SATURATION: 94 % | HEIGHT: 60 IN | TEMPERATURE: 97.9 F | RESPIRATION RATE: 18 BRPM | BODY MASS INDEX: 37.48 KG/M2 | HEART RATE: 71 BPM | DIASTOLIC BLOOD PRESSURE: 88 MMHG | SYSTOLIC BLOOD PRESSURE: 164 MMHG | WEIGHT: 190.92 LBS

## 2022-02-17 LAB
ANION GAP SERPL CALCULATED.3IONS-SCNC: 10 MMOL/L (ref 4–13)
BASOPHILS # BLD AUTO: 0.03 THOUSANDS/ΜL (ref 0–0.1)
BASOPHILS NFR BLD AUTO: 0 % (ref 0–1)
BUN SERPL-MCNC: 20 MG/DL (ref 5–25)
CALCIUM SERPL-MCNC: 8.8 MG/DL (ref 8.3–10.1)
CHLORIDE SERPL-SCNC: 103 MMOL/L (ref 100–108)
CO2 SERPL-SCNC: 26 MMOL/L (ref 21–32)
CREAT SERPL-MCNC: 0.87 MG/DL (ref 0.6–1.3)
DME PARACHUTE DELIVERY DATE ACTUAL: NORMAL
DME PARACHUTE DELIVERY DATE REQUESTED: NORMAL
DME PARACHUTE DELIVERY NOTE: NORMAL
DME PARACHUTE ITEM DESCRIPTION: NORMAL
DME PARACHUTE ORDER STATUS: NORMAL
DME PARACHUTE SUPPLIER NAME: NORMAL
DME PARACHUTE SUPPLIER PHONE: NORMAL
EOSINOPHIL # BLD AUTO: 0 THOUSAND/ΜL (ref 0–0.61)
EOSINOPHIL NFR BLD AUTO: 0 % (ref 0–6)
ERYTHROCYTE [DISTWIDTH] IN BLOOD BY AUTOMATED COUNT: 14.2 % (ref 11.6–15.1)
GFR SERPL CREATININE-BSD FRML MDRD: 66 ML/MIN/1.73SQ M
GLUCOSE SERPL-MCNC: 136 MG/DL (ref 65–140)
HCT VFR BLD AUTO: 42.8 % (ref 34.8–46.1)
HGB BLD-MCNC: 14.3 G/DL (ref 11.5–15.4)
IMM GRANULOCYTES # BLD AUTO: 0.21 THOUSAND/UL (ref 0–0.2)
IMM GRANULOCYTES NFR BLD AUTO: 2 % (ref 0–2)
LYMPHOCYTES # BLD AUTO: 1.26 THOUSANDS/ΜL (ref 0.6–4.47)
LYMPHOCYTES NFR BLD AUTO: 14 % (ref 14–44)
MCH RBC QN AUTO: 29.3 PG (ref 26.8–34.3)
MCHC RBC AUTO-ENTMCNC: 33.4 G/DL (ref 31.4–37.4)
MCV RBC AUTO: 88 FL (ref 82–98)
MONOCYTES # BLD AUTO: 0.64 THOUSAND/ΜL (ref 0.17–1.22)
MONOCYTES NFR BLD AUTO: 7 % (ref 4–12)
NEUTROPHILS # BLD AUTO: 6.85 THOUSANDS/ΜL (ref 1.85–7.62)
NEUTS SEG NFR BLD AUTO: 77 % (ref 43–75)
NRBC BLD AUTO-RTO: 0 /100 WBCS
PLATELET # BLD AUTO: 514 THOUSANDS/UL (ref 149–390)
PMV BLD AUTO: 10.2 FL (ref 8.9–12.7)
POTASSIUM SERPL-SCNC: 3.7 MMOL/L (ref 3.5–5.3)
PROCALCITONIN SERPL-MCNC: 8.47 NG/ML
RBC # BLD AUTO: 4.88 MILLION/UL (ref 3.81–5.12)
SODIUM SERPL-SCNC: 139 MMOL/L (ref 136–145)
WBC # BLD AUTO: 8.99 THOUSAND/UL (ref 4.31–10.16)

## 2022-02-17 PROCEDURE — 80048 BASIC METABOLIC PNL TOTAL CA: CPT

## 2022-02-17 PROCEDURE — 85025 COMPLETE CBC W/AUTO DIFF WBC: CPT

## 2022-02-17 PROCEDURE — 99239 HOSP IP/OBS DSCHRG MGMT >30: CPT | Performed by: INTERNAL MEDICINE

## 2022-02-17 PROCEDURE — 84145 PROCALCITONIN (PCT): CPT

## 2022-02-17 RX ORDER — CEFDINIR 300 MG/1
300 CAPSULE ORAL EVERY 12 HOURS SCHEDULED
Qty: 6 CAPSULE | Refills: 0 | Status: SHIPPED | OUTPATIENT
Start: 2022-02-18 | End: 2022-02-21

## 2022-02-17 RX ORDER — DOXYCYCLINE HYCLATE 100 MG/1
100 CAPSULE ORAL EVERY 12 HOURS SCHEDULED
Qty: 8 CAPSULE | Refills: 0 | Status: SHIPPED | OUTPATIENT
Start: 2022-02-17 | End: 2022-02-21

## 2022-02-17 RX ORDER — BENZONATATE 200 MG/1
200 CAPSULE ORAL 3 TIMES DAILY
Qty: 20 CAPSULE | Refills: 0 | Status: SHIPPED | OUTPATIENT
Start: 2022-02-17 | End: 2022-06-23

## 2022-02-17 RX ORDER — PREDNISONE 20 MG/1
40 TABLET ORAL DAILY
Qty: 10 TABLET | Refills: 0 | Status: SHIPPED | OUTPATIENT
Start: 2022-02-18 | End: 2022-02-23

## 2022-02-17 RX ADMIN — ENOXAPARIN SODIUM 30 MG: 30 INJECTION SUBCUTANEOUS at 08:38

## 2022-02-17 RX ADMIN — REMDESIVIR 100 MG: 100 INJECTION, POWDER, LYOPHILIZED, FOR SOLUTION INTRAVENOUS at 14:03

## 2022-02-17 RX ADMIN — BENZONATATE 200 MG: 100 CAPSULE ORAL at 08:38

## 2022-02-17 RX ADMIN — CEFTRIAXONE SODIUM 1000 MG: 10 INJECTION, POWDER, FOR SOLUTION INTRAVENOUS at 11:23

## 2022-02-17 RX ADMIN — DOXYCYCLINE 100 MG: 100 CAPSULE ORAL at 08:38

## 2022-02-17 RX ADMIN — LEVOTHYROXINE SODIUM 50 MCG: 50 TABLET ORAL at 06:18

## 2022-02-17 NOTE — NURSING NOTE
Patient sleeping in bed  Patient has no visible signs or symptoms of distress noted at this time  Bed low and locked  Call bell within reach  Will continue to monitor         Mike Arboleda, JIA

## 2022-02-17 NOTE — DISCHARGE INSTR - OTHER ORDERS
If your pulse ox is < 90  on room air- rest for 5 minutes and take deep breaths through your nose with the oxygen in place  Make sure your finger is warm (cold fingers will give falsely low readings)  After 5 minutes, recheck your pulse ox with oxygen on  If your pulse ox remains below 90: If excessively short of breath, call your PCP or pulmonologist AND consider further evaluation in the Emergency Department- mask must be worn to enter ED  if not feeling excessively short of breath, place a call to your primary care physician or pulmonologist 24/7 (if after office hours the answering service  will contact the on call physician who will call you back)  It is important to have your appointment with your doctor within 30 days of discharge from the hospital to continue qualifying for insurance coverage of home oxygen  If you are no longer using the oxygen, notify your doctor immediately  Coverage for home oxygen is based on your insurance's determination

## 2022-02-17 NOTE — CASE MANAGEMENT
Case Management Discharge Planning Note    Patient name Dhara Davis  Location S /S -01 MRN [id number]  : 1950 Date 2022       Current Admission Date: 2022  Current Admission Diagnosis:COVID-19   Patient Active Problem List    Diagnosis Date Noted    Hyperglycemia 2022    Hypokalemia 02/15/2022    Acute respiratory failure ([facility] [address] ) 02/15/2022    COVID-19 2022    Diverticulitis of large intestine with perforation without bleeding 2019    Morbid obesity ([facility] [address] ) 2019    Hypothyroidism due to Hashimoto's thyroiditis 2018      LOS (days): 4  Geometric Mean LOS (GMLOS) (days): 5 40  Days to GMLOS:1 4     OBJECTIVE:  Risk of Unplanned Readmission Score: 11         Current admission status: Inpatient   Preferred Pharmacy:   [id number] [address]  [address] [place] [address]  Phone: [phone] Fax: [phone]    Primary Care Provider: Charlotte Blake DO    Primary Insurance: BLUE CROSS  Secondary Insurance: MEDICARE    DISCHARGE DETAILS:    Discharge planning discussed with[de-identified] patient via room phone  Freedom of Choice: Yes  Comments - Freedom of Choice: As per SLIM, patient is medically stable for dc home today  She will require 2L O2 with exertion, as she still drops on room air when walked by RN  CM called patient via room phone to introduce self and role  She confirms she's independent with ADLs and mobility and reports the only CM need at this time is home O2 set up  She confirms she does have a pulse ox at home  Patient would like to utilize [employer] for her O2 needs and reports her spouse will provide transportation at dc  CM explained that patient will be sent home with a [employer] POC, once approved for home O2, and [employer] will contact her tomorrow to swap out her permanent home set up  Patient agreeable to same         DME Referral Provided  Referral made for DME?: Yes (sent via parachute)  DME referral completed for the following items[de-identified] Portable Oxygen tanks,Home Oxygen concentrator  DME Supplier Name[de-identified] [employer]    Other Referral/Resources/Interventions Provided:  Interventions: DME  Referral Comments: Referral sent to [facility] via [place] for home O2 set up  Patient is approved at 100% as per Honey Garcia and she is able to take home [employer] P[employer], which [facility]  RN aware and will deliver to bedside  Patient is expecting a call from [facility] tomorrow to swap out her home set up  Treatment Team Recommendation: Home  Discharge Destination Plan[de-identified] Home  Transport at Discharge : Family      ETA of Transport (Date): 02/17/22  ETA of Transport (Time): 1600        IMM Given (Date):: 02/17/22  IMM Given to[de-identified] Patient (reviewed via room phone  She understands her rights and agrees with dc today   She already has copy at bedside )

## 2022-02-17 NOTE — DISCHARGE SUMMARY
[facility]  Discharge- Cassy Downs 1950, 66 y o  female MRN: [id number]  Unit/Bed#: S -01 Encounter: [id number]  Primary Care Provider: Manasa Monahan DO   Date and time admitted to hospital: 2/13/2022 12:35 PM    * COVID-19  Assessment & Plan  · POA, worsening shortness of breath, unvaccinated  · Tested positive for COVID on 02/07/2022 at [facility] ED and was sent home with supportive measures  · Patient received monoclonal antibody treatment on 02/10/2022  · In ED, patient was ambulated and desatted to 85%, and was placed on 2 L of nasal cannula  Workup:   · CXR - shows worsening ground-glass opacities, when compared with CXR from 02/07/2022  · CRP 90--> 79 (2/14)-->78 7 (2/15)  · Procal 2/16- 22 70, 2/17: 8 47   · No fevers noted      · D-dimer 0 69  · CK 69, proBNP 399  · EKG - NSR  · CBC shows no leukocytosis on 2/17  Plan, Mild COVID pathway:   · Remdesivir day 5  · Decadron 6 mg q24h day 4; upon dc- oral Prednisone 40 mg for 5 days  · Given elevated procal, started ABX: ceftriaxone 1g q24h IV, Vibramycin 100mg PO BID- day 2 on 2/17  Switch to oral upon discharge to complete 5 day total course  · VTE PPX - Lovenox 30 mg q12h   · Wean O2 as tolerated   · Monitor temp curves, CBC for leukocytosis  Acute respiratory failure ([facility] [address] )  Assessment & Plan  In setting of COVID infection  Pt is unvaccinated  See COVID plan  Hypothyroidism due to Hashimoto's thyroiditis  Assessment & Plan  Lab Results   Component Value Date    [id number] 1 262 02/14/2022     Plan:  · Continue home levothyroxine 50 mcg QD    Hyperglycemia  Assessment & Plan  · Noted hyperglycemia on labwork  · Most likely in setting of steroid use  · No need for insulin at this time  · Monitor glucose levels  · Goal glucose 140-180         Hypokalemia  Assessment & Plan  K noted to be mildly low on 2/15 am      Lab Results   Component Value Date    K 3 7 02/17/2022 K 3 5 02/16/2022    K 3 4 (L) 02/15/2022     Plan:   · Repleted on 2/15  · Normal K noted on 2/17 BMP  Morbid obesity ([address] )  Assessment & Plan  · BMI 37 29  ·  on diet, healthy lifestyle  · Follow up with PCP  Medical Problems             Resolved Problems  Date Reviewed: 2/13/2022    None              Discharging Resident: Shelly Hoang MD  Discharging Attending: Corina August DO  PCP: Arlen Dillon DO  Admission Date:   Admission Orders (From admission, onward)     Ordered        02/13/22 1448  Inpatient Admission  Once                      Discharge Date: 02/17/22    Consultations During Hospital Stay:  · None     Procedures Performed:   · None  Significant Findings / Test Results:   · CXR (2/13/22): Bilateral mixed groundglass and airspace opacities is suspicious for Covid 19 pneumonia  Incidental Findings:   · Hypokalemia     Test Results Pending at Discharge (will require follow up): · None  Outpatient Tests Requested:  · None  Complications:  None  Reason for Admission: acute respiratory failure 2/2 COVID infection  Hospital Course:   Evelyn Lim is a 66 y o  female patient who originally presented to the hospital on 2/13/2022 due to worsening shortness of breath approximately for 1 week  She had previously tested positive for COVID-19 on 2/2/2022  She was saturating more than 90% on room air at rest but with ambulation dropped down to 80s  Chest x-ray showed worsening ground-glass opacities consistent with COVID-19 infection  Patient was admitted to the hospital on the mild COVID pathway and given:  Remdesivir (5 days), dexamethasone (4 days)  Her respiratory status was monitored and improved throughout the hospital stay  Patient's procalcitonin was also found to be high although trending down by the time of her discharge  Of note, no leukocytosis was noted   She was started on antibiotics and will be discharged on oral antibiotics to complete a total course of 5 days  Patient is advised to follow-up with PCP upon discharge within 72 hours  Please see above list of diagnoses and related plan for additional information  Condition at Discharge: good    Discharge Day Visit / Exam:   Subjective:    Ms Iveth Goetz says she is feeling much better today  She was able to sleep well last night and has been able to eat properly today  Denies headaches, vision changes, nausea, speech difficulty, swallowing difficulty, bowel/bladder incontinence, numbness or tingling, new weakness, palpitations, SOB, abdominal pain  Vitals: Blood Pressure: 164/88 (02/17/22 0739)  Pulse: 71 (02/17/22 0739)  Temperature: 97 9 °F (36 6 °C) (02/17/22 0739)  Temp Source: Oral (02/17/22 0739)  Respirations: 18 (02/17/22 0739)  Height: 5' (152 4 cm) (02/13/22 1809)  Weight - Scale: 86 6 kg (190 lb 14 7 oz) (02/13/22 1809)  SpO2: 94 % (02/17/22 0739)    Physical Exam   Vitals reviewed  General Examination: Sitting up in bed, cooperative   HEENT: Normocephalic, Atraumatic  Extraocular movements intact, PERRLA  CVS: S1, S2 noted  Lungs: Decresed breath sounds b/l  On 2L NC, saturating 96%  Abdomen: Soft, normal bowel sounds  Non distended, non tender  Ext: No edema noted  Psych: Though Process - logical    Skin: No bleeding/bruising noted  Neuro: A, Ox3  No focal deficits appreciated  Discussion with Family: Will update family later today  Alyssa Hammonds Discharge instructions/Information to patient and family:   See after visit summary for information provided to patient and family  Provisions for Follow-Up Care:  See after visit summary for information related to follow-up care and any pertinent home health orders  Disposition:   Home    Planned Readmission: None  Discharge Medications:  See after visit summary for reconciled discharge medications provided to patient and/or family        **Please Note: This note may have been constructed using a voice recognition system**

## 2022-02-18 ENCOUNTER — TRANSITIONAL CARE MANAGEMENT (OUTPATIENT)
Dept: FAMILY MEDICINE CLINIC | Facility: CLINIC | Age: 72
End: 2022-02-18

## 2022-02-21 ENCOUNTER — TELEMEDICINE (OUTPATIENT)
Dept: FAMILY MEDICINE CLINIC | Facility: CLINIC | Age: 72
End: 2022-02-21
Payer: COMMERCIAL

## 2022-02-21 VITALS — OXYGEN SATURATION: 96 %

## 2022-02-21 DIAGNOSIS — Z13.6 SCREENING FOR CARDIOVASCULAR CONDITION: ICD-10-CM

## 2022-02-21 DIAGNOSIS — J12.82 PNEUMONIA DUE TO COVID-19 VIRUS: ICD-10-CM

## 2022-02-21 DIAGNOSIS — R73.9 HYPERGLYCEMIA: ICD-10-CM

## 2022-02-21 DIAGNOSIS — J96.01 ACUTE RESPIRATORY FAILURE WITH HYPOXIA (HCC): ICD-10-CM

## 2022-02-21 DIAGNOSIS — U07.1 COVID-19: Primary | ICD-10-CM

## 2022-02-21 DIAGNOSIS — U07.1 PNEUMONIA DUE TO COVID-19 VIRUS: ICD-10-CM

## 2022-02-21 PROCEDURE — 99495 TRANSJ CARE MGMT MOD F2F 14D: CPT | Performed by: FAMILY MEDICINE

## 2022-02-21 PROCEDURE — 1111F DSCHRG MED/CURRENT MED MERGE: CPT | Performed by: FAMILY MEDICINE

## 2022-02-21 NOTE — PROGRESS NOTES
Virtual TCM Visit:    Verification of patient location:    Patient is located in the following state in which I hold an active license [place]        Assessment/Plan:        Problem List Items Addressed This Visit        Respiratory    Acute respiratory failure ([address] )       Other    COVID-19 - Primary    Hyperglycemia    Relevant Orders    Comprehensive metabolic panel    HEMOGLOBIN A1C W/ EAG ESTIMATION      Other Visit Diagnoses     Pneumonia due to COVID-19 virus        As above  Screening for cardiovascular condition        Relevant Orders    Lipid Panel with Direct LDL reflex             Reason for visit is being seen in f/u today for her hospitalization 2/13 - 2/17/22 for COV-19 PNA and hypoxia (records reviewed)  She presents today with her , Des Ramos  We had sent Rita Parson for [facility] Antibody therapy prior to her hospitalization, but unfortunately, her condition progressed  Pt was found at the hospital to have a COV-19 PNA  Was treated on the mild pathway - with O2, IV Remdesivir/Decadron, IV Ceftriaxone and Doxycycline  Blood sugars there were noted to be elevated - this was attributed to the steroids that she was on  Pt responded well to therapy  Pt was discharged with supplemental O2 (she largely does not require at all anymore; O2 sats reported to be stable at home), a PO Prednisone burst (she is in the process of finishing), and PO Cefdinir/Doxycycline (pt completed both)  We did discuss today the importance of eventually her being vaccinated against COV-19      Encounter provider Axel Carlton DO       Provider located at [address] [address]      Recent Visits  Date Type Provider Dept   02/14/22 Telephone Hansa Puente   Showing recent visits within past 7 days and meeting all other requirements  Today's Visits  Date Type Provider Dept   02/21/22 Telemedicine Stan [address] DO Merrill Lua Showing today's visits and meeting all other requirements  Future Appointments  No visits were found meeting these conditions  Showing future appointments within next 150 days and meeting all other requirements       After connecting through [employer], the patient was identified by name and date of birth  Solange Cuba was informed that this is a telemedicine visit and that the visit is being conducted through [employer] and patient was informed that this is not a secure, HIPAA-compliant platform  She agrees to proceed     My office door was closed  No one else was in the room  She acknowledged consent and understanding of privacy and security of the video platform  The patient has agreed to participate and understands they can discontinue the visit at any time  Patient is aware this is a billable service  Subjective:     Patient ID: Solange Cuba is a 66 y o  female  As above  Review of Systems   Constitutional: Negative for activity change and fever  Respiratory: Positive for cough  Negative for shortness of breath  Still some mild residual ZAMORA, but getting better  Minimal cough  Cardiovascular: Negative for chest pain  Gastrointestinal: Negative for diarrhea  Neurological: Positive for headaches  Objective:    Vitals:    02/21/22 1120   SpO2: 96%       Physical Exam  Vitals reviewed  Constitutional:       General: She is not in acute distress  Appearance: Normal appearance  She is not ill-appearing, toxic-appearing or diaphoretic  Comments: Nolan Landers appears a little tired today, but is non-toxic appearing; she is not requiring oxygen right now, is speaking in full sentences without pauses, etc    HENT:      Head: Normocephalic and atraumatic  Eyes:      General: No scleral icterus  Conjunctiva/sclera: Conjunctivae normal    Pulmonary:      Effort: Pulmonary effort is normal  No respiratory distress     Neurological:      Mental Status: She is alert and oriented to person, place, and time  Psychiatric:         Mood and Affect: Mood normal          Behavior: Behavior normal          Thought Content: Thought content normal          Judgment: Judgment normal              Transitional Care Management Review:  Maryse Myles is a 66 y o  female here for TCM follow up  During the TCM phone call patient stated:    TCM Call (since 1/21/2022)     Date and time call was made  2/18/2022 10:58 AM    Hospital care reviewed  Records not available        Patient was hospitialized at  [address] [place] [address]        Date of Admission  02/13/22    Date of discharge  02/17/22    Diagnosis  COVID-19    Disposition  Home    Were the patients medications reviewed and updated  No    Current Symptoms  None      TCM Call (since 1/21/2022)     Post hospital issues  None    Should patient be enrolled in anticoag monitoring? No    Scheduled for follow up? Yes    Did you obtain your prescribed medications  Yes    Do you need help managing your prescriptions or medications  No    Is transportation to your appointment needed  No    I have advised the patient to call PCP with any new or worsening symptoms  Terry Devika,     Living Arrangements  Spouse or Significiant other    Support System  None    Are you recieving any outpatient services  No    Are you recieving home care services  No    Are you using any community resources  No    Current waiver services  No    Have you fallen in the last 12 months  No    Interperter language line needed  No        Jeanette Elder was seen today for transition of care management and virtual tcm  Diagnoses and all orders for this visit:    COVID-19  Comments:  Pt stable on exam - doing much better  She is clear off self-isolation  She is to complete the Prednisone burst   Largely not requiring O2 any longer  Pneumonia due to COVID-19 virus  Comments:  As above      Acute respiratory failure with hypoxia (HCC)  Comments:  Resolved; as above     Hyperglycemia  Comments:  Likely assoc with steroids  Check FBW with A1c prior to next OV  Pt to continue a lower carb diet, and remain active  Orders:  -     Comprehensive metabolic panel; Future  -     HEMOGLOBIN A1C W/ EAG ESTIMATION; Future    Screening for cardiovascular condition  -     Lipid Panel with Direct LDL reflex; Future          I spent 25 minutes with the patient during this visit  Iris Kiser, DO      VIRTUAL VISIT DISCLAIMER    Horacio Dumas verbally agrees to participate in [employer]  Pt is aware that [employer] could be limited without vital signs or the ability to perform a full hands-on physical Tahira Speaker understands she or the provider may request at any time to terminate the video visit and request the patient to seek care or treatment in person

## 2022-03-01 ENCOUNTER — TELEPHONE (OUTPATIENT)
Dept: FAMILY MEDICINE CLINIC | Facility: CLINIC | Age: 72
End: 2022-03-01

## 2022-03-01 NOTE — TELEPHONE ENCOUNTER
Patient called and stated that Dalbert Boas needs a Rx to  the oxygen faxed over, she is no longer using it   Please advise      Fax number: [phone]

## 2022-03-04 ENCOUNTER — APPOINTMENT (EMERGENCY)
Dept: CT IMAGING | Facility: HOSPITAL | Age: 72
End: 2022-03-04
Payer: COMMERCIAL

## 2022-03-04 ENCOUNTER — HOSPITAL ENCOUNTER (EMERGENCY)
Facility: HOSPITAL | Age: 72
End: 2022-03-05
Attending: EMERGENCY MEDICINE
Payer: COMMERCIAL

## 2022-03-04 DIAGNOSIS — N20.1 RIGHT URETERAL STONE: Primary | ICD-10-CM

## 2022-03-04 LAB
ALBUMIN SERPL BCP-MCNC: 3.3 G/DL (ref 3.5–5)
ALP SERPL-CCNC: 89 U/L (ref 46–116)
ALT SERPL W P-5'-P-CCNC: 29 U/L (ref 12–78)
ANION GAP SERPL CALCULATED.3IONS-SCNC: 15 MMOL/L (ref 4–13)
AST SERPL W P-5'-P-CCNC: 21 U/L (ref 5–45)
BASOPHILS # BLD AUTO: 0.09 THOUSANDS/ΜL (ref 0–0.1)
BASOPHILS NFR BLD AUTO: 1 % (ref 0–1)
BILIRUB SERPL-MCNC: 0.44 MG/DL (ref 0.2–1)
BUN SERPL-MCNC: 12 MG/DL (ref 5–25)
CALCIUM ALBUM COR SERPL-MCNC: 9.8 MG/DL (ref 8.3–10.1)
CALCIUM SERPL-MCNC: 9.2 MG/DL (ref 8.3–10.1)
CHLORIDE SERPL-SCNC: 101 MMOL/L (ref 100–108)
CO2 SERPL-SCNC: 22 MMOL/L (ref 21–32)
CREAT SERPL-MCNC: 1.09 MG/DL (ref 0.6–1.3)
EOSINOPHIL # BLD AUTO: 0.12 THOUSAND/ΜL (ref 0–0.61)
EOSINOPHIL NFR BLD AUTO: 1 % (ref 0–6)
ERYTHROCYTE [DISTWIDTH] IN BLOOD BY AUTOMATED COUNT: 14.6 % (ref 11.6–15.1)
GFR SERPL CREATININE-BSD FRML MDRD: 50 ML/MIN/1.73SQ M
GLUCOSE SERPL-MCNC: 161 MG/DL (ref 65–140)
HCT VFR BLD AUTO: 41.5 % (ref 34.8–46.1)
HGB BLD-MCNC: 14.3 G/DL (ref 11.5–15.4)
IMM GRANULOCYTES # BLD AUTO: 0.08 THOUSAND/UL (ref 0–0.2)
IMM GRANULOCYTES NFR BLD AUTO: 1 % (ref 0–2)
LIPASE SERPL-CCNC: 116 U/L (ref 73–393)
LYMPHOCYTES # BLD AUTO: 1.55 THOUSANDS/ΜL (ref 0.6–4.47)
LYMPHOCYTES NFR BLD AUTO: 15 % (ref 14–44)
MCH RBC QN AUTO: 29.4 PG (ref 26.8–34.3)
MCHC RBC AUTO-ENTMCNC: 34.5 G/DL (ref 31.4–37.4)
MCV RBC AUTO: 85 FL (ref 82–98)
MONOCYTES # BLD AUTO: 0.68 THOUSAND/ΜL (ref 0.17–1.22)
MONOCYTES NFR BLD AUTO: 7 % (ref 4–12)
NEUTROPHILS # BLD AUTO: 7.81 THOUSANDS/ΜL (ref 1.85–7.62)
NEUTS SEG NFR BLD AUTO: 75 % (ref 43–75)
NRBC BLD AUTO-RTO: 0 /100 WBCS
PLATELET # BLD AUTO: 223 THOUSANDS/UL (ref 149–390)
PMV BLD AUTO: 10.5 FL (ref 8.9–12.7)
POTASSIUM SERPL-SCNC: 3.5 MMOL/L (ref 3.5–5.3)
PROT SERPL-MCNC: 7.7 G/DL (ref 6.4–8.2)
RBC # BLD AUTO: 4.87 MILLION/UL (ref 3.81–5.12)
SODIUM SERPL-SCNC: 138 MMOL/L (ref 136–145)
WBC # BLD AUTO: 10.33 THOUSAND/UL (ref 4.31–10.16)

## 2022-03-04 PROCEDURE — 85025 COMPLETE CBC W/AUTO DIFF WBC: CPT | Performed by: EMERGENCY MEDICINE

## 2022-03-04 PROCEDURE — 99283 EMERGENCY DEPT VISIT LOW MDM: CPT | Performed by: EMERGENCY MEDICINE

## 2022-03-04 PROCEDURE — 36415 COLL VENOUS BLD VENIPUNCTURE: CPT

## 2022-03-04 PROCEDURE — 96376 TX/PRO/DX INJ SAME DRUG ADON: CPT

## 2022-03-04 PROCEDURE — 96374 THER/PROPH/DIAG INJ IV PUSH: CPT

## 2022-03-04 PROCEDURE — 83036 HEMOGLOBIN GLYCOSYLATED A1C: CPT | Performed by: INTERNAL MEDICINE

## 2022-03-04 PROCEDURE — 74177 CT ABD & PELVIS W/CONTRAST: CPT

## 2022-03-04 PROCEDURE — 96361 HYDRATE IV INFUSION ADD-ON: CPT

## 2022-03-04 PROCEDURE — 96375 TX/PRO/DX INJ NEW DRUG ADDON: CPT

## 2022-03-04 PROCEDURE — 99285 EMERGENCY DEPT VISIT HI MDM: CPT

## 2022-03-04 PROCEDURE — 83690 ASSAY OF LIPASE: CPT | Performed by: EMERGENCY MEDICINE

## 2022-03-04 PROCEDURE — 80053 COMPREHEN METABOLIC PANEL: CPT | Performed by: EMERGENCY MEDICINE

## 2022-03-04 RX ORDER — ONDANSETRON 2 MG/ML
4 INJECTION INTRAMUSCULAR; INTRAVENOUS ONCE
Status: COMPLETED | OUTPATIENT
Start: 2022-03-04 | End: 2022-03-04

## 2022-03-04 RX ORDER — TAMSULOSIN HYDROCHLORIDE 0.4 MG/1
0.4 CAPSULE ORAL ONCE
Status: COMPLETED | OUTPATIENT
Start: 2022-03-04 | End: 2022-03-04

## 2022-03-04 RX ORDER — HYDROMORPHONE HCL/PF 1 MG/ML
0.5 SYRINGE (ML) INJECTION ONCE
Status: COMPLETED | OUTPATIENT
Start: 2022-03-04 | End: 2022-03-04

## 2022-03-04 RX ADMIN — HYDROMORPHONE HYDROCHLORIDE 0.5 MG: 1 INJECTION, SOLUTION INTRAMUSCULAR; INTRAVENOUS; SUBCUTANEOUS at 21:17

## 2022-03-04 RX ADMIN — SODIUM CHLORIDE 1000 ML: 0.9 INJECTION, SOLUTION INTRAVENOUS at 21:48

## 2022-03-04 RX ADMIN — TAMSULOSIN HYDROCHLORIDE 0.4 MG: 0.4 CAPSULE ORAL at 23:49

## 2022-03-04 RX ADMIN — IOHEXOL 100 ML: 350 INJECTION, SOLUTION INTRAVENOUS at 21:33

## 2022-03-04 RX ADMIN — HYDROMORPHONE HYDROCHLORIDE 0.5 MG: 1 INJECTION, SOLUTION INTRAMUSCULAR; INTRAVENOUS; SUBCUTANEOUS at 22:28

## 2022-03-04 RX ADMIN — ONDANSETRON 4 MG: 2 INJECTION INTRAMUSCULAR; INTRAVENOUS at 21:17

## 2022-03-05 ENCOUNTER — TELEPHONE (OUTPATIENT)
Dept: UROLOGY | Facility: AMBULATORY SURGERY CENTER | Age: 72
End: 2022-03-05

## 2022-03-05 ENCOUNTER — APPOINTMENT (OUTPATIENT)
Dept: RADIOLOGY | Facility: HOSPITAL | Age: 72
DRG: 661 | End: 2022-03-05
Payer: COMMERCIAL

## 2022-03-05 ENCOUNTER — ANESTHESIA (OUTPATIENT)
Dept: PERIOP | Facility: HOSPITAL | Age: 72
DRG: 661 | End: 2022-03-05
Payer: COMMERCIAL

## 2022-03-05 ENCOUNTER — HOSPITAL ENCOUNTER (INPATIENT)
Facility: HOSPITAL | Age: 72
LOS: 1 days | Discharge: HOME | DRG: 661 | End: 2022-03-06
Attending: STUDENT IN AN ORGANIZED HEALTH CARE EDUCATION/TRAINING PROGRAM | Admitting: HOSPITALIST
Payer: COMMERCIAL

## 2022-03-05 ENCOUNTER — ANESTHESIA EVENT (OUTPATIENT)
Dept: PERIOP | Facility: HOSPITAL | Age: 72
DRG: 661 | End: 2022-03-05
Payer: COMMERCIAL

## 2022-03-05 VITALS
SYSTOLIC BLOOD PRESSURE: 173 MMHG | OXYGEN SATURATION: 95 % | RESPIRATION RATE: 18 BRPM | DIASTOLIC BLOOD PRESSURE: 77 MMHG | TEMPERATURE: 98.2 F | HEART RATE: 97 BPM

## 2022-03-05 DIAGNOSIS — N20.0 NEPHROLITHIASIS: Primary | ICD-10-CM

## 2022-03-05 DIAGNOSIS — N13.2 URETERAL STONE WITH HYDRONEPHROSIS: Primary | ICD-10-CM

## 2022-03-05 PROBLEM — N20.1 URETERAL STONE: Status: ACTIVE | Noted: 2022-03-05

## 2022-03-05 LAB
BACTERIA UR QL AUTO: ABNORMAL /HPF
BILIRUB UR QL STRIP: NEGATIVE
CAOX CRY URNS QL MICRO: ABNORMAL /HPF
CLARITY UR: ABNORMAL
COLOR UR: YELLOW
CRYSTALS URNS QL MICRO: ABNORMAL /HPF
EST. AVERAGE GLUCOSE BLD GHB EST-MCNC: 126 MG/DL
GLUCOSE UR STRIP-MCNC: NEGATIVE MG/DL
HBA1C MFR BLD: 6 %
HGB UR QL STRIP.AUTO: ABNORMAL
KETONES UR STRIP-MCNC: NEGATIVE MG/DL
LEUKOCYTE ESTERASE UR QL STRIP: NEGATIVE
NITRITE UR QL STRIP: NEGATIVE
NON-SQ EPI CELLS URNS QL MICRO: ABNORMAL /HPF
PH UR STRIP.AUTO: 5 [PH]
PROT UR STRIP-MCNC: NEGATIVE MG/DL
RBC #/AREA URNS AUTO: ABNORMAL /HPF
SP GR UR STRIP.AUTO: 1.01 (ref 1–1.03)
URATE CRY URNS QL MICRO: ABNORMAL /HPF
UROBILINOGEN UR QL STRIP.AUTO: 0.2 E.U./DL
WBC #/AREA URNS AUTO: ABNORMAL /HPF

## 2022-03-05 PROCEDURE — C1758 CATHETER, URETERAL: HCPCS | Performed by: UROLOGY

## 2022-03-05 PROCEDURE — 99223 1ST HOSP IP/OBS HIGH 75: CPT | Performed by: UROLOGY

## 2022-03-05 PROCEDURE — C1769 GUIDE WIRE: HCPCS | Performed by: UROLOGY

## 2022-03-05 PROCEDURE — 99219 PR INITIAL OBSERVATION CARE/DAY 50 MINUTES: CPT | Performed by: STUDENT IN AN ORGANIZED HEALTH CARE EDUCATION/TRAINING PROGRAM

## 2022-03-05 PROCEDURE — 74420 UROGRAPHY RTRGR +-KUB: CPT

## 2022-03-05 PROCEDURE — BT1D1ZZ FLUOROSCOPY OF RIGHT KIDNEY, URETER AND BLADDER USING LOW OSMOLAR CONTRAST: ICD-10-PCS | Performed by: UROLOGY

## 2022-03-05 PROCEDURE — 81001 URINALYSIS AUTO W/SCOPE: CPT | Performed by: EMERGENCY MEDICINE

## 2022-03-05 PROCEDURE — C2617 STENT, NON-COR, TEM W/O DEL: HCPCS | Performed by: UROLOGY

## 2022-03-05 PROCEDURE — 99024 POSTOP FOLLOW-UP VISIT: CPT | Performed by: INTERNAL MEDICINE

## 2022-03-05 PROCEDURE — 0T768DZ DILATION OF RIGHT URETER WITH INTRALUMINAL DEVICE, VIA NATURAL OR ARTIFICIAL OPENING ENDOSCOPIC: ICD-10-PCS | Performed by: UROLOGY

## 2022-03-05 PROCEDURE — 0TF68ZZ FRAGMENTATION IN RIGHT URETER, VIA NATURAL OR ARTIFICIAL OPENING ENDOSCOPIC: ICD-10-PCS | Performed by: UROLOGY

## 2022-03-05 PROCEDURE — 52356 CYSTO/URETERO W/LITHOTRIPSY: CPT | Performed by: UROLOGY

## 2022-03-05 PROCEDURE — 96376 TX/PRO/DX INJ SAME DRUG ADON: CPT

## 2022-03-05 DEVICE — INLAY OPTIMA URETERAL STENT W/O GUIDEWIRE
Type: IMPLANTABLE DEVICE | Status: FUNCTIONAL
Brand: BARD® INLAY OPTIMA® URETERAL STENT

## 2022-03-05 RX ORDER — BENZONATATE 100 MG/1
200 CAPSULE ORAL 3 TIMES DAILY
Status: CANCELLED | OUTPATIENT
Start: 2022-03-05

## 2022-03-05 RX ORDER — ACETAMINOPHEN 325 MG/1
650 TABLET ORAL EVERY 6 HOURS PRN
Status: DISCONTINUED | OUTPATIENT
Start: 2022-03-05 | End: 2022-03-06 | Stop reason: HOSPADM

## 2022-03-05 RX ORDER — TAMSULOSIN HYDROCHLORIDE 0.4 MG/1
0.4 CAPSULE ORAL
Qty: 7 CAPSULE | Refills: 0 | Status: SHIPPED | OUTPATIENT
Start: 2022-03-05 | End: 2022-03-08 | Stop reason: SDUPTHER

## 2022-03-05 RX ORDER — HYDROMORPHONE HCL/PF 1 MG/ML
1 SYRINGE (ML) INJECTION EVERY 6 HOURS PRN
Status: DISCONTINUED | OUTPATIENT
Start: 2022-03-05 | End: 2022-03-06 | Stop reason: HOSPADM

## 2022-03-05 RX ORDER — ONDANSETRON 2 MG/ML
4 INJECTION INTRAMUSCULAR; INTRAVENOUS ONCE AS NEEDED
Status: DISCONTINUED | OUTPATIENT
Start: 2022-03-05 | End: 2022-03-05 | Stop reason: HOSPADM

## 2022-03-05 RX ORDER — POLYETHYLENE GLYCOL 3350 17 G/17G
17 POWDER, FOR SOLUTION ORAL DAILY PRN
Status: CANCELLED | OUTPATIENT
Start: 2022-03-05

## 2022-03-05 RX ORDER — PHENAZOPYRIDINE HYDROCHLORIDE 100 MG/1
100 TABLET, FILM COATED ORAL
Status: DISCONTINUED | OUTPATIENT
Start: 2022-03-05 | End: 2022-03-06 | Stop reason: HOSPADM

## 2022-03-05 RX ORDER — FENTANYL CITRATE/PF 50 MCG/ML
25 SYRINGE (ML) INJECTION
Status: DISCONTINUED | OUTPATIENT
Start: 2022-03-05 | End: 2022-03-05 | Stop reason: HOSPADM

## 2022-03-05 RX ORDER — HYDROMORPHONE HCL/PF 1 MG/ML
1 SYRINGE (ML) INJECTION EVERY 6 HOURS PRN
Status: CANCELLED | OUTPATIENT
Start: 2022-03-05

## 2022-03-05 RX ORDER — HYDROMORPHONE HCL/PF 1 MG/ML
0.5 SYRINGE (ML) INJECTION ONCE
Status: COMPLETED | OUTPATIENT
Start: 2022-03-05 | End: 2022-03-05

## 2022-03-05 RX ORDER — OXYBUTYNIN CHLORIDE 5 MG/1
5 TABLET ORAL 2 TIMES DAILY
Status: DISCONTINUED | OUTPATIENT
Start: 2022-03-05 | End: 2022-03-06 | Stop reason: HOSPADM

## 2022-03-05 RX ORDER — LIDOCAINE HYDROCHLORIDE 10 MG/ML
INJECTION, SOLUTION EPIDURAL; INFILTRATION; INTRACAUDAL; PERINEURAL AS NEEDED
Status: DISCONTINUED | OUTPATIENT
Start: 2022-03-05 | End: 2022-03-05

## 2022-03-05 RX ORDER — PROPOFOL 10 MG/ML
INJECTION, EMULSION INTRAVENOUS AS NEEDED
Status: DISCONTINUED | OUTPATIENT
Start: 2022-03-05 | End: 2022-03-05

## 2022-03-05 RX ORDER — DOCUSATE SODIUM 100 MG/1
100 CAPSULE, LIQUID FILLED ORAL 3 TIMES DAILY
Qty: 21 CAPSULE | Refills: 0 | Status: SHIPPED | OUTPATIENT
Start: 2022-03-05 | End: 2022-03-12

## 2022-03-05 RX ORDER — OXYBUTYNIN CHLORIDE 10 MG/1
10 TABLET, EXTENDED RELEASE ORAL
Qty: 7 TABLET | Refills: 0 | Status: SHIPPED | OUTPATIENT
Start: 2022-03-05 | End: 2022-06-23

## 2022-03-05 RX ORDER — POTASSIUM CHLORIDE 20 MEQ/1
40 TABLET, EXTENDED RELEASE ORAL ONCE
Status: COMPLETED | OUTPATIENT
Start: 2022-03-05 | End: 2022-03-05

## 2022-03-05 RX ORDER — ONDANSETRON 2 MG/ML
INJECTION INTRAMUSCULAR; INTRAVENOUS AS NEEDED
Status: DISCONTINUED | OUTPATIENT
Start: 2022-03-05 | End: 2022-03-05

## 2022-03-05 RX ORDER — HYDROMORPHONE HCL/PF 1 MG/ML
0.5 SYRINGE (ML) INJECTION EVERY 6 HOURS PRN
Status: DISCONTINUED | OUTPATIENT
Start: 2022-03-05 | End: 2022-03-06 | Stop reason: HOSPADM

## 2022-03-05 RX ORDER — ONDANSETRON 2 MG/ML
4 INJECTION INTRAMUSCULAR; INTRAVENOUS EVERY 6 HOURS PRN
Status: DISCONTINUED | OUTPATIENT
Start: 2022-03-05 | End: 2022-03-06 | Stop reason: HOSPADM

## 2022-03-05 RX ORDER — POTASSIUM CHLORIDE 20 MEQ/1
40 TABLET, EXTENDED RELEASE ORAL ONCE
Status: CANCELLED | OUTPATIENT
Start: 2022-03-05 | End: 2022-03-05

## 2022-03-05 RX ORDER — SODIUM CHLORIDE 9 MG/ML
100 INJECTION, SOLUTION INTRAVENOUS CONTINUOUS
Status: CANCELLED | OUTPATIENT
Start: 2022-03-05 | End: 2022-03-06

## 2022-03-05 RX ORDER — SODIUM CHLORIDE 9 MG/ML
100 INJECTION, SOLUTION INTRAVENOUS CONTINUOUS
Status: DISPENSED | OUTPATIENT
Start: 2022-03-05 | End: 2022-03-06

## 2022-03-05 RX ORDER — FENTANYL CITRATE 50 UG/ML
INJECTION, SOLUTION INTRAMUSCULAR; INTRAVENOUS AS NEEDED
Status: DISCONTINUED | OUTPATIENT
Start: 2022-03-05 | End: 2022-03-05

## 2022-03-05 RX ORDER — ACETAMINOPHEN 325 MG/1
650 TABLET ORAL EVERY 6 HOURS PRN
Refills: 0
Start: 2022-03-05

## 2022-03-05 RX ORDER — BENZONATATE 100 MG/1
200 CAPSULE ORAL 3 TIMES DAILY
Status: DISCONTINUED | OUTPATIENT
Start: 2022-03-05 | End: 2022-03-06 | Stop reason: HOSPADM

## 2022-03-05 RX ORDER — CEFAZOLIN SODIUM 2 G/50ML
SOLUTION INTRAVENOUS AS NEEDED
Status: DISCONTINUED | OUTPATIENT
Start: 2022-03-05 | End: 2022-03-05

## 2022-03-05 RX ORDER — ONDANSETRON 2 MG/ML
4 INJECTION INTRAMUSCULAR; INTRAVENOUS EVERY 6 HOURS PRN
Status: CANCELLED | OUTPATIENT
Start: 2022-03-05

## 2022-03-05 RX ORDER — POLYETHYLENE GLYCOL 3350 17 G/17G
17 POWDER, FOR SOLUTION ORAL DAILY PRN
Status: DISCONTINUED | OUTPATIENT
Start: 2022-03-05 | End: 2022-03-06 | Stop reason: HOSPADM

## 2022-03-05 RX ORDER — TAMSULOSIN HYDROCHLORIDE 0.4 MG/1
0.4 CAPSULE ORAL
Status: CANCELLED | OUTPATIENT
Start: 2022-03-05

## 2022-03-05 RX ORDER — HYDROMORPHONE HCL/PF 1 MG/ML
0.5 SYRINGE (ML) INJECTION EVERY 6 HOURS PRN
Status: CANCELLED | OUTPATIENT
Start: 2022-03-05

## 2022-03-05 RX ORDER — TAMSULOSIN HYDROCHLORIDE 0.4 MG/1
0.4 CAPSULE ORAL
Status: DISCONTINUED | OUTPATIENT
Start: 2022-03-05 | End: 2022-03-06 | Stop reason: HOSPADM

## 2022-03-05 RX ORDER — LEVOTHYROXINE SODIUM 0.05 MG/1
50 TABLET ORAL
Status: DISCONTINUED | OUTPATIENT
Start: 2022-03-05 | End: 2022-03-06 | Stop reason: HOSPADM

## 2022-03-05 RX ORDER — ONDANSETRON 4 MG/1
4 TABLET, ORALLY DISINTEGRATING ORAL EVERY 6 HOURS PRN
Qty: 20 TABLET | Refills: 0 | Status: SHIPPED | OUTPATIENT
Start: 2022-03-05 | End: 2022-06-23

## 2022-03-05 RX ORDER — LEVOTHYROXINE SODIUM 0.05 MG/1
50 TABLET ORAL DAILY
Status: CANCELLED | OUTPATIENT
Start: 2022-03-05

## 2022-03-05 RX ORDER — ACETAMINOPHEN 325 MG/1
650 TABLET ORAL EVERY 6 HOURS PRN
Status: CANCELLED | OUTPATIENT
Start: 2022-03-05

## 2022-03-05 RX ADMIN — FENTANYL CITRATE 25 MCG: 50 INJECTION INTRAMUSCULAR; INTRAVENOUS at 09:32

## 2022-03-05 RX ADMIN — ONDANSETRON 4 MG: 2 INJECTION INTRAMUSCULAR; INTRAVENOUS at 02:30

## 2022-03-05 RX ADMIN — LEVOTHYROXINE SODIUM 50 MCG: 50 TABLET ORAL at 05:43

## 2022-03-05 RX ADMIN — SODIUM CHLORIDE 100 ML/HR: 9 INJECTION, SOLUTION INTRAVENOUS at 02:30

## 2022-03-05 RX ADMIN — HYDROMORPHONE HYDROCHLORIDE 0.5 MG: 1 INJECTION, SOLUTION INTRAMUSCULAR; INTRAVENOUS; SUBCUTANEOUS at 13:03

## 2022-03-05 RX ADMIN — ONDANSETRON 4 MG: 2 INJECTION INTRAMUSCULAR; INTRAVENOUS at 10:08

## 2022-03-05 RX ADMIN — FENTANYL CITRATE 25 MCG: 50 INJECTION INTRAMUSCULAR; INTRAVENOUS at 09:35

## 2022-03-05 RX ADMIN — LIDOCAINE HYDROCHLORIDE 50 MG: 10 INJECTION, SOLUTION EPIDURAL; INFILTRATION; INTRACAUDAL; PERINEURAL at 09:22

## 2022-03-05 RX ADMIN — FENTANYL CITRATE 50 MCG: 50 INJECTION INTRAMUSCULAR; INTRAVENOUS at 09:39

## 2022-03-05 RX ADMIN — PROPOFOL 200 MG: 10 INJECTION, EMULSION INTRAVENOUS at 09:22

## 2022-03-05 RX ADMIN — PHENYLEPHRINE HYDROCHLORIDE 50 MCG/MIN: 10 INJECTION INTRAVENOUS at 09:37

## 2022-03-05 RX ADMIN — TAMSULOSIN HYDROCHLORIDE 0.4 MG: 0.4 CAPSULE ORAL at 17:08

## 2022-03-05 RX ADMIN — POTASSIUM CHLORIDE 40 MEQ: 1500 TABLET, EXTENDED RELEASE ORAL at 03:28

## 2022-03-05 RX ADMIN — CEFAZOLIN SODIUM 2000 MG: 2 SOLUTION INTRAVENOUS at 09:33

## 2022-03-05 RX ADMIN — OXYBUTYNIN CHLORIDE 5 MG: 5 TABLET ORAL at 17:08

## 2022-03-05 RX ADMIN — HYDROMORPHONE HYDROCHLORIDE 0.5 MG: 1 INJECTION, SOLUTION INTRAMUSCULAR; INTRAVENOUS; SUBCUTANEOUS at 00:46

## 2022-03-05 RX ADMIN — PHENAZOPYRIDINE 100 MG: 100 TABLET ORAL at 17:08

## 2022-03-05 NOTE — H&P
[facility]  H&P- Edinson Mullen 1950, 66 y o  female MRN: [id number]  Unit/Bed#: FT 02 Encounter: [id number]  Primary Care Provider: Bruce Baugh DO   Date and time admitted to hospital: 3/4/2022  8:21 PM    * Ureteral stone with hydronephrosis  Assessment & Plan  Patient presented with abdominal pain associated with nausea    CT showed:6 mm obstructing stone at the right ureteropelvic junction with associated mild hydronephrosis and obstructive uropathy  Patient transferred from [facility] for [facility]    NPO  IVF  flomax  Zofran  Pain control  Urology consulted  Hold DVT prophylaxis      Morbid obesity [facility])  Assessment & Plan  Encourage lifestyle modifications    Hypothyroidism due to Hashimoto's thyroiditis  Assessment & Plan  Continue home levothyroxine 50 mcg    VTE Pharmacologic Prophylaxis: VTE Score: 3 Moderate Risk (Score 3-4) - Pharmacological DVT Prophylaxis Contraindicated  Sequential Compression Devices Ordered  Code Status: Prior full code    Anticipated Length of Stay: Patient will be admitted on an observation basis with an anticipated length of stay of less than 2 midnights secondary to Right ureteral stone  Total Time for Visit, including Counseling / Coordination of Care: 20 minutes Greater than 50% of this total time spent on direct patient counseling and coordination of care  Chief Complaint:  Abdominal pain    History of Present Illness:  Edinson Mullen is a 66 y o  female with a PMH of hypothyroidism and obesity who presents with abdominal pain that started earlier this afternoon  She states that it was associated with nausea and nonbilious nonbloody emesis  She describes it as a sharp right lower quadrant pain that radiates to the back that is severe and constant  She denies any fevers, chills, sweats, chest pain, shortness of breath, diarrhea and urinary symptoms    She was evaluated in the emergency department of [address] [facility] and Urology was consulted at this time  Recommended transfer to [place]  Review of Systems:  Review of Systems   All other systems reviewed and are negative  Past Medical and Surgical History:   Past Medical History:   Diagnosis Date    Arthritis     Cataracts, bilateral     Disease of thyroid gland     Hypothyroid     Obesity (BMI 35 0-39 9 without comorbidity)     PNA (pneumonia) 05/2019       Past Surgical History:   Procedure Laterality Date    CHOLECYSTECTOMY  1968    open       Meds/Allergies:  Prior to Admission medications    Medication Sig Start Date End Date Taking? Authorizing Provider   benzonatate (TESSALON) 200 MG capsule Take 1 capsule (200 mg total) by mouth 3 (three) times a day 2/17/22   William Tan MD   levothyroxine 50 mcg tablet TAKE 1 TABLET BY MOUTH EVERY DAY 12/12/21   Stan Perry DO     I have reviewed home medications using recent Epic encounter  Allergies: Allergies   Allergen Reactions    Codeine Anaphylaxis     Increased Heart Rate, Palpitations       Social History:  Marital Status: /Civil Union   Patient Pre-hospital Living Situation: Home  Patient Pre-hospital Level of Mobility: walks  Patient Pre-hospital Diet Restrictions: none  Substance Use History:   Social History     Substance and Sexual Activity   Alcohol Use Yes     Social History     Tobacco Use   Smoking Status Former Smoker   Smokeless Tobacco Never Used     Social History     Substance and Sexual Activity   Drug Use Never       Family History:  Family History   Problem Relation Age of Onset    Cancer Mother     Diabetes Mother     Cancer Father     Diabetes Father     Urolithiasis Sister        Physical Exam:     Vitals:   Blood Pressure: (!) 173/77 (03/05/22 0037)  Pulse: 97 (03/05/22 0037)  Temperature: 98 2 °F (36 8 °C) (03/04/22 1947)  Respirations: 18 (03/05/22 0037)  SpO2: 95 % (03/05/22 0037)    Physical Exam  Vitals and nursing note reviewed  Constitutional:       Appearance: Normal appearance  She is obese  Cardiovascular:      Rate and Rhythm: Normal rate and regular rhythm  Pulses: Normal pulses  Pulmonary:      Effort: Pulmonary effort is normal       Breath sounds: Normal breath sounds  Abdominal:      General: Abdomen is flat  Bowel sounds are normal       Palpations: Abdomen is soft  Tenderness: There is no right CVA tenderness or left CVA tenderness  Musculoskeletal:      Right lower leg: No edema  Left lower leg: No edema  Skin:     General: Skin is warm  Neurological:      General: No focal deficit present  Mental Status: She is alert and oriented to person, place, and time  Additional Data:     Lab Results:  Results from last 7 days   Lab Units 03/04/22 1949   WBC Thousand/uL 10 33*   HEMOGLOBIN g/dL 14 3   HEMATOCRIT % 41 5   PLATELETS Thousands/uL 223   NEUTROS PCT % 75   LYMPHS PCT % 15   MONOS PCT % 7   EOS PCT % 1     Results from last 7 days   Lab Units 03/04/22 1949   SODIUM mmol/L 138   POTASSIUM mmol/L 3 5   CHLORIDE mmol/L 101   CO2 mmol/L 22   BUN mg/dL 12   CREATININE mg/dL 1 09   ANION GAP mmol/L 15*   CALCIUM mg/dL 9 2   ALBUMIN g/dL 3 3*   TOTAL BILIRUBIN mg/dL 0 44   ALK PHOS U/L 89   ALT U/L 29   AST U/L 21   GLUCOSE RANDOM mg/dL 161*                       Imaging: Reviewed radiology reports from this admission including: abdominal/pelvic CT  CT abdomen pelvis with contrast   Final Result by Haroon Wagner DO (03/04 2302)      6 mm obstructing stone at the right ureteropelvic junction with associated mild hydronephrosis and obstructive uropathy  Patchy opacities in the visualized lungs bilaterally, could represent infectious or inflammatory pneumonitis or scarring, correlates with the patient's history of known Covid 19 infection          Coronary atherosclerosis, fatty infiltration of the liver, bilateral renal cysts, colonic diverticulosis without evidence of acute diverticulitis, small hiatal hernia, and other findings as above  Workstation performed: [id number]             EKG and Other Studies Reviewed on Admission:   · EKG: No EKG obtained  ** Please Note: This note has been constructed using a voice recognition system   **

## 2022-03-05 NOTE — UTILIZATION REVIEW
Initial Clinical Review    PLEASE NOTE: Patient  Admitted to Observation 3/5 @ [phone] and converted to Inpatient 3/5 @ 1553     Admission: Date/Time/Statement:   Admission Orders (From admission, onward)     Ordered        03/05/22 1553  Inpatient Admission  Once            03/05/22 0212  Place in Observation  Once                        Initial Presentation: 66 y o  female with a PMH of hypothyroidism and obesity  initially presented to ED @ [facility] on 3/4  with sharp RLQ abdominal pain, radiates to back, constant, severe that started this afternoon, associated with nause and nbnb vomiting  CT A&P showed 6 mm obstructing stone at the right ureteropelvic junction with associated mild hydronephrosis and obstructive uropathy  Case dw Urology who recommended transfer to B  REc'd Dilaudid IV x 3, Zofran IV X 1, flomax x 1 and IVF prior to transfer  Admitted to Observation with Ureteral stone with Hydronephrosis and Plan is for NPO, IVF, flomax, prn zofran, pain control, urology consult  Per Urology: H/O right flank pain associated with nausea and some vomiting  Imaging shows a proximal right ureteral stone at the ureteropelvic junction  She has had stone surgery previously, many years ago  She has also been able to pass stones on her own  She was recently hospitalized for COVID-19  Unfortunately we do not have the option of medical expulsive therapy given the size and location of her stone  Plan for OR     SURGERY DATE: 3/5/2022  Procedure(s) (LRB):  CYSTOSCOPY URETEROSCOPY WITH LITHOTRIPSY HOLMIUM LASER, RETROGRADE PYELOGRAM AND INSERTION STENT URETERAL (Right)   Anesthesia Type:   General   Operative Findings:  A ureteropelvic junction stone is noted on the right  This was engaged with dusting settings using a virtual basketing technique to prevent retropulsion  Stones were broken into dust   No fragments remained that were large enough for basketing    Successful [facility] cent[facility] stent was placed  Two stents had to be opened as the string on the for stent broke  The 2nd stent was successfully placed with a Dangler being taped to the mons pubis  The patient can remove her stent in 5 days  She can see us back in the office in 3 months with an x-ray and ultrasound prior      Admission   Vitals   Temperature Pulse Respirations Blood Pressure SpO2   03/05/22 0150 03/05/22 0150 03/05/22 0150 03/05/22 0150 03/05/22 0150   97 6 °F (36 4 °C) 91 17 154/82 91 %      Temp Source Heart Rate Source Patient Position - Orthostatic VS BP Location FiO2 (%)   03/05/22 1025 03/05/22 1025 -- -- --   Temporal Monitor         Pain Score       03/05/22 0155       No Pain          Wt Readings from Last 1 Encounters:   03/05/22 86 9 kg (191 lb 9 3 oz)     Additional Vital Signs:   03/05/22 14:55:19 97 3 °F (36 3 °C)  81 16 133/60 84 95 % -- --   03/05/22 1045 97 5 °F (36 4 °C) 80 13 138/77 96 96 % None (Room air) WDL   03/05/22 1030 -- 84 17 121/65 94 97 % None (Room air) --   03/05/22 1025 96 9 °F (36 1 °C)  87 16 168/68 -- 100 % Simple mask WDL   03/05/22 0900 -- -- -- -- -- -- None (Room air) --   03/05/22 07:35:04 97 3 °F (36 3 °C)  70 20 149/79 102 98 % --        Pertinent Labs/Diagnostic Test Results:   FL retrograde pyelogram   Final Result by Wero Pineda MD (03/05 1335)      Fluoroscopic guidance provided for procedure guidance  Please refer to the separate procedure notes for additional details                 Workstation performed: [id number]           3/4 CT A&P in ED: 6 mm obstructing stone at the right ureteropelvic junction with associated mild hydronephrosis and obstructive uropathy       Patchy opacities in the visualized lungs bilaterally, could represent infectious or inflammatory pneumonitis or scarring, correlates with the patient's history of known Covid 19 infection        Coronary atherosclerosis, fatty infiltration of the liver, bilateral renal cysts, colonic diverticulosis without evidence of acute diverticulitis, small hiatal hernia, and other findings as above    Results from last 7 days   Lab Units 03/04/22 1949   WBC Thousand/uL 10 33*   HEMOGLOBIN g/dL 14 3   HEMATOCRIT % 41 5   PLATELETS Thousands/uL 223   NEUTROS ABS Thousands/µL 7 81*     Results from last 7 days   Lab Units 03/04/22 1949   SODIUM mmol/L 138   POTASSIUM mmol/L 3 5   CHLORIDE mmol/L 101   CO2 mmol/L 22   ANION GAP mmol/L 15*   BUN mg/dL 12   CREATININE mg/dL 1 09   EGFR ml/min/1 73sq m 50   CALCIUM mg/dL 9 2     Results from last 7 days   Lab Units 03/04/22 1949   AST U/L 21   ALT U/L 29   ALK PHOS U/L 89   TOTAL PROTEIN g/dL 7 7   ALBUMIN g/dL 3 3*   TOTAL BILIRUBIN mg/dL 0 44     Results from last 7 days   Lab Units 03/04/22 1949   GLUCOSE RANDOM mg/dL 161*     Results from last 7 days   Lab Units 03/04/22 1949   HEMOGLOBIN A1C % 6 0*   EAG mg/dl 126     Results from last 7 days   Lab Units 03/04/22 1949   LIPASE u/L 116     Results from last 7 days   Lab Units 03/05/22  0024   CLARITY UA  Slightly Cloudy   COLOR UA  Yellow   SPEC GRAV UA  1 010   PH UA  5 0   GLUCOSE UA mg/dl Negative   KETONES UA mg/dl Negative   BLOOD UA  Large*   PROTEIN UA mg/dl Negative   NITRITE UA  Negative   BILIRUBIN UA  Negative   UROBILINOGEN UA E U /dl 0 2   LEUKOCYTES UA  Negative   WBC UA /hpf None Seen   RBC UA /hpf 20-30*   BACTERIA UA /hpf Occasional   EPITHELIAL CELLS WET PREP /hpf Occasional       Past Medical History:   Diagnosis Date    Arthritis     Cataracts, bilateral     Disease of thyroid gland     Hypothyroid     Obesity (BMI 35 0-39 9 without comorbidity)     PNA (pneumonia) 05/2019     Present on Admission:   Ureteral stone with hydronephrosis   Hypothyroidism due to Hashimoto's thyroiditis   Morbid obesity ([facility] [address] )      Admitting Diagnosis: Right ureteral stone [N20 1]  Age/Sex: 66 y o  female  Admission Orders:  Scheduled Medications:  benzonatate, 200 mg, Oral, TID  levothyroxine, 50 mcg, Oral, Early Morning  oxybutynin, 5 mg, Oral, BID  phenazopyridine, 100 mg, Oral, TID With Meals  tamsulosin, 0 4 mg, Oral, Daily With Dinner  KCL 40 meq IV x 1 3/5    Continuous IV Infusions:  sodium chloride, 100 mL/hr, Intravenous, Continuous    PRN Meds:  acetaminophen, 650 mg, Oral, Q6H PRN  HYDROmorphone, 0 5 mg, Intravenous, Q6H PRN x 1 3/5   Or  HYDROmorphone, 1 mg, Intravenous, Q6H PRN  ondansetron, 4 mg, Intravenous, Q6H PRN x 1 35  polyethylene glycol, 17 g, Oral, Daily PRN    SCDs  IP CONSULT TO UROLOGY    Network Utilization Review Department  ATTENTION: Please call with any questions or concerns to [phone] and carefully listen to the prompts so that you are directed to the right person  All voicemails are confidential   Caridad Soto all requests for admission clinical reviews, approved or denied determinations and any other requests to dedicated fax number below belonging to the campus where the patient is receiving treatment   List of dedicated fax numbers for the Facilities:  [address] DENIALS (Administrative/Medical Necessity) [phone]   [address] (Maternity/NICU/Pediatrics) [phone]   [address]  [address] [place] [address] [place] [address] Ryan Mccarty [address] [facility] [address] [facility] [phone]

## 2022-03-05 NOTE — TELEPHONE ENCOUNTER
The following message has been sent to our clinical team:    The patient is status post ureteroscopy with laser lithotripsy, has a 6 [place] by 22 centimeter right ureteral stent on a string  Please have remove this in 5 days    She can see an advanced practitioner in 3 months with x-ray and ultrasound prior, orders placed, thank you

## 2022-03-05 NOTE — ANESTHESIA PREPROCEDURE EVALUATION
Procedure:  CYSTOSCOPY URETEROSCOPY WITH LITHOTRIPSY HOLMIUM LASER, RETROGRADE PYELOGRAM AND INSERTION STENT URETERAL (Right Bladder)    Relevant Problems   ENDO   (+) Hypothyroidism due to Hashimoto's thyroiditis      /RENAL   (+) Ureteral stone with hydronephrosis      PULMONARY   (+) Acute respiratory failure (HCC)      Disease of thyroid gland    PNA (pneumonia)    Arthritis    Obesity (BMI 35 0-39 9 without comorbidity)    Hypothyroid    Cataracts, bilateral        Physical Exam    Airway    Mallampati score: II  TM Distance: >3 FB  Neck ROM: full     Dental       Cardiovascular  Cardiovascular exam normal    Pulmonary  Pulmonary exam normal     Other Findings        Anesthesia Plan  ASA Score- 3 Emergent    Anesthesia Type- general with ASA Monitors  Additional Monitors:   Airway Plan:           Plan Factors-Exercise tolerance (METS): >4 METS  Chart reviewed  EKG reviewed  Imaging results reviewed  Existing labs reviewed  Patient summary reviewed  Induction- intravenous  Postoperative Plan- Plan for postoperative opioid use  Planned trial extubation    Informed Consent- Anesthetic plan and risks discussed with patient  I personally reviewed this patient with the CRNA  Discussed and agreed on the Anesthesia Plan with the CRNA  Tima Moreno

## 2022-03-05 NOTE — DISCHARGE INSTRUCTIONS
Ureteroscopy   WHAT YOU NEED TO KNOW:     Minh Mehta,    Today you had ureteroscopy with laser lithotripsy  We used a small scope to go into your kidney and break your stone into dust   It is common to pass small stone fragments as you recover from this operation  It is also common to have urgency and frequency of urination along with some blood in the urine  In terms of discomfort, it is best to start with Tylenol and ibuprofen as needed for pain  If this is not controlling your pain, please contact us by way of the call service and we can send you something stronger  I would like to avoid Percocet at this time given that it is a cousin of codeine and you report a severe allergy to that medication  You can use the small black thread coming out of the vagina which is attached to your stent to remove the stent in 5 days  Please do not use the vagina for anything other than urinating during this time wear your stent is in place  We will see back in the office in 3 months with an x-ray and ultrasound prior  If you have questions or concerns as you recover, please let us know  Thank you for allowing us to take care of you today,  Dr Fernando Carranza  A ureteroscopy is a procedure to examine in the inside of your urinary tract, which includes your urethra, bladder, ureters, and kidneys  A ureteroscope is a small, thin tube with a light and camera on the end  Ureteroscopy can help your healthcare provider diagnose and treat problems in your urinary tract, such as kidney stones  DISCHARGE INSTRUCTIONS:   Medicine:   · Antibiotics  may be given to treat or prevent an infection  · Take your medicine as directed  Contact your healthcare provider if you think your medicine is not helping or if you have side effects  Tell him or her if you are allergic to any medicine  Keep a list of the medicines, vitamins, and herbs you take  Include the amounts, and when and why you take them   Bring the list or the pill bottles to follow-up visits  Carry your medicine list with you in case of an emergency  Follow up with your healthcare provider as directed:  Write down your questions so you remember to ask them during your visits  Drink liquids as directed  Liquids can help prevent kidney stones and urinary tract infections  Drink water and limit the amount of caffeine you drink  Caffeine may be found in coffee, tea, soda, sports drinks, and foods  Ask your healthcare provider how much liquid to drink each day  Contact your healthcare provider if:   · You have a fever  · You cannot urinate  · You have blood in your urine  · You are vomiting  · You have pain in your abdomen or side  · You have questions or concerns about your condition or care  © Copyright [employer] 2018 Information is for End User's use only and may not be sold, redistributed or otherwise used for commercial purposes  All illustrations and images included in CareNotes® are the copyrighted property of [employer]  or [facility] Robert Prasad   The above information is an  only  It is not intended as medical advice for individual conditions or treatments  Talk to your doctor, nurse or pharmacist before following any medical regimen to see if it is safe and effective for you

## 2022-03-05 NOTE — ANESTHESIA POSTPROCEDURE EVALUATION
Post-Op Assessment Note    CV Status:  Stable  Pain Score: 0    Pain management: adequate     Mental Status:  Alert   Hydration Status:  Stable   PONV Controlled:  Controlled   Airway Patency:  Patent      Post Op Vitals Reviewed: Yes      Staff: CRNA         No complications documented      /68 (03/05/22 1025)    Temp (!) 96 9 °F (36 1 °C) (03/05/22 1025)    Pulse 87 (03/05/22 1025)   Resp 16 (03/05/22 1025)    SpO2 100 % (03/05/22 1025)

## 2022-03-05 NOTE — PHYSICAL THERAPY NOTE
Physical Therapy Cancellation Note       03/05/22 0913   PT Last Visit   PT Visit Date 03/05/22   Note Type   Note type Evaluation   Cancel Reasons Patient to operating room     PT orders received and chart reviewed  Pt currently off the unit at OR for urology procedure  Will continue to follow and initiate PT evaluation post-op as appropriate      Shyam Orozco, PT, DPT

## 2022-03-05 NOTE — EMTALA/ACUTE CARE TRANSFER
[address] [place] [address]  Dept: [phone]      EMTALA TRANSFER CONSENT    NAME Iain Guy                                         1950                              MRN [id number]    I have been informed of my rights regarding examination, treatment, and transfer   by Dr Rickey Cooper MD    Benefits: Specialized equipment and/or services available at the receiving facility (Include comment)________________________ (Urology)    Risks:        Consent for Transfer:  I acknowledge that my medical condition has been evaluated and explained to me by the emergency department physician or other qualified medical person and/or my attending physician, who has recommended that I be transferred to the service of  Accepting Physician: Dr Sruthi Albarado at [address], [address] : [facility]  The above potential benefits of such transfer, the potential risks associated with such transfer, and the probable risks of not being transferred have been explained to me, and I fully understand them  The doctor has explained that, in my case, the benefits of transfer outweigh the risks  I agree to be transferred  I authorize the performance of emergency medical procedures and treatments upon me in both transit and upon arrival at the receiving facility  Additionally, I authorize the release of any and all medical records to the receiving facility and request they be transported with me, if possible  I understand that the safest mode of transportation during a medical emergency is an ambulance and that the Hospital advocates the use of this mode of transport  Risks of traveling to the receiving facility by car, including absence of medical control, life sustaining equipment, such as oxygen, and medical personnel has been explained to me and I fully understand them      (PAIGE CORRECT BOX BELOW)  [X]  I consent to the stated transfer and to be transported by ambulance/helicopter  [  ]  I consent to the stated transfer, but refuse transportation by ambulance and accept full responsibility for my transportation by car  I understand the risks of non-ambulance transfers and I exonerate the Hospital and its staff from any deterioration in my condition that results from this refusal     X___________________________________________    DATE  22  TIME________  Signature of patient or legally responsible individual signing on patient behalf           RELATIONSHIP TO PATIENT_________________________          Provider Certification    NAME Jase Arevalo                                         1950                              MRN [id number]    A medical screening exam was performed on the above named patient  Based on the examination:    Condition Necessitating Transfer The encounter diagnosis was Right ureteral stone  Patient Condition: The patient has been stabilized such that within reasonable medical probability, no material deterioration of the patient condition or the condition of the unborn child(anam) is likely to result from the transfer    Reason for Transfer: Level of Care needed not available at this facility    Transfer Requirements: [address] [address]   · Space available and qualified personnel available for treatment as acknowledged by    · Agreed to accept transfer and to provide appropriate medical treatment as acknowledged by       Dr Edward Holman  · Appropriate medical records of the examination and treatment of the patient are provided at the time of transfer   [address], [address] _______  · Transfer will be performed by qualified personnel from    and appropriate transfer equipment as required, including the use of necessary and appropriate life support measures      Provider Certification: I have examined the patient and explained the following risks and benefits of being transferred/refusing transfer to the patient/family:  General risk, such as traffic hazards, adverse weather conditions, rough terrain or turbulence, possible failure of equipment (including vehicle or aircraft), or consequences of actions of persons outside the control of the transport personnel      Based on these reasonable risks and benefits to the patient and/or the unborn child(anam), and based upon the information available at the time of the patients examination, I certify that the medical benefits reasonably to be expected from the provision of appropriate medical treatments at another medical facility outweigh the increasing risks, if any, to the individuals medical condition, and in the case of labor to the unborn child, from effecting the transfer      X____________________________________________ DATE 03/04/22        TIME_______      ORIGINAL - SEND TO MEDICAL RECORDS   COPY - SEND WITH PATIENT DURING TRANSFER

## 2022-03-05 NOTE — OCCUPATIONAL THERAPY NOTE
Occupational Therapy cx        Patient Name: Zoila Muhammad  TQOZI'F Date: 3/5/2022         03/05/22 0926   OT Last Visit   OT Visit Date 03/05/22   Note Type   Note type Evaluation   Cancel Reasons Patient to operating room     OT orders received and chart reviewed  Pt currently at OR for ureteral procedure  Will see post-op        AMADA Sewell, OTR/L

## 2022-03-05 NOTE — DISCHARGE SUMMARY
[address] [facility]  Discharge- Helen Lockwood 1950, 66 y o  female MRN: [id number]  Unit/Bed#: [facility] 804-01 Encounter: [id number]  Primary Care Provider: Arlen Dillon DO   Date and time admitted to hospital: 3/5/2022  1:40 AM    * Ureteral stone with hydronephrosis  Assessment & Plan  Presented with abdominal pain, nausea  CT showed 6 mm obstructing stone at the R ureteropelvic junction with associated mild hydronephrosis and obstructive uropathy  · [facility]   · S/P cysto, ureteroscopy with laser lithotripsy and R ureteral stent insertion on 3/5  · Cleared for discharge from urology standpoint  · Can remove stent in 5 days  · Will need to f/u in office in 3 months with xray and U/S prior to appointment     Morbid obesity ([address] )  Assessment & Plan  Body mass index is 37 42 kg/m²  · Encourage weight loss    Hypothyroidism due to Hashimoto's thyroiditis  Assessment & Plan  · Continue synthroid      Medical Problems             Resolved Problems  Date Reviewed: 3/6/2022    None              Discharging Physician / Practitioner: Kesha Rich PA-C  PCP: Arlen Dillon DO  Admission Date:   Admission Orders (From admission, onward)     Ordered        03/05/22 1553  Inpatient Admission  Once            03/05/22 0212  Place in Observation  Once                      Discharge Date: 03/06/2022    Consultations During Hospital Stay:  · Urology    Procedures Performed:   · 03/05/2022: CYSTOSCOPY URETEROSCOPY WITH LITHOTRIPSY HOLMIUM LASER, RETROGRADE PYELOGRAM AND INSERTION STENT URETERAL (Right)  · CT abdomen pelvis with contrast:  · 6 mm obstructing stone at the right ureteropelvic junction with associated mild hydronephrosis and obstructive uropathy       Patchy opacities in the visualized lungs bilaterally, could represent infectious or inflammatory pneumonitis or scarring, correlates with the patient's history of known Covid 19 infection    Coronary atherosclerosis, fatty infiltration of the liver, bilateral renal cysts, colonic diverticulosis without evidence of acute diverticulitis, small hiatal hernia, and other findings as above  Significant Findings / Test Results:   · See above    Incidental Findings:   · See above     Test Results Pending at Discharge (will require follow up): · None     Outpatient Tests Requested:  · Follow-up with urology in 3 months, will require x-ray and ultrasound prior    Complications:  None    Reason for Admission:  Kidney stone    Hospital Course:   Dhara Davis is a 66 y o  female patient with past medical history of recent COVID-19 infection, obesity, hypothyroidism who originally presented to the hospital on 3/5/2022 due to kidney stone  She was transferred from [facility] ER to [facility] for urologic intervention  She underwent urologic procedure as above on 03/05  Patient was cleared by Urology for discharge home  She will be able to remove her stent in 5 days  They provided her with instructions  Patient will also follow up with them in 3 months with an ultrasound and x-ray prior  Please see above list of diagnoses and related plan for additional information  Condition at Discharge: stable    Discharge Day Visit / Exam:   Subjective:  Doing well  Feels much better today  Vitals: Blood Pressure: 114/61 (03/06/22 0736)  Pulse: 86 (03/06/22 0736)  Temperature: 98 1 °F (36 7 °C) (03/06/22 0736)  Temp Source: Temporal (03/05/22 1025)  Respirations: 16 (03/06/22 0736)  Height: 5' (152 4 cm) (03/05/22 0150)  Weight - Scale: 86 9 kg (191 lb 9 3 oz) (03/05/22 0150)  SpO2: 96 % (03/06/22 0736)  Exam:   Physical Exam  Vitals and nursing note reviewed  Constitutional:       General: She is not in acute distress  Appearance: She is obese  Cardiovascular:      Rate and Rhythm: Normal rate and regular rhythm  Pulmonary:      Effort: No respiratory distress     Abdominal: General: There is no distension  Tenderness: There is no abdominal tenderness  Musculoskeletal:      Right lower leg: No edema  Left lower leg: No edema  Psychiatric:         Mood and Affect: Mood normal           Discussion with Family: Patient declined call to   Discharge instructions/Information to patient and family:   See after visit summary for information provided to patient and family  Provisions for Follow-Up Care:  See after visit summary for information related to follow-up care and any pertinent home health orders  Disposition:   Home    Planned Readmission: no     Discharge Statement:  I spent 34 minutes discharging the patient  This time was spent on the day of discharge  I had direct contact with the patient on the day of discharge  Greater than 50% of the total time was spent examining patient, answering all patient questions, arranging and discussing plan of care with patient as well as directly providing post-discharge instructions  Additional time then spent on discharge activities  Discharge Medications:  See after visit summary for reconciled discharge medications provided to patient and/or family        **Please Note: This note may have been constructed using a voice recognition system**

## 2022-03-05 NOTE — OP NOTE
OPERATIVE REPORT  PATIENT NAME: Argelia Morgan    :  1950  MRN: [id number]  Pt Location: [facility] CYSTO ROOM 01    SURGERY DATE: 3/5/2022    Surgeon(s) and Role:     * Roge Delacruz MD - Primary    Preop Diagnosis:  Right ureteral stone [N20 1]    Post-Op Diagnosis Codes:     * Right ureteral stone [N20 1]    Procedure(s) (LRB):  CYSTOSCOPY URETEROSCOPY WITH LITHOTRIPSY HOLMIUM LASER, RETROGRADE PYELOGRAM AND INSERTION STENT URETERAL (Right)    Specimen(s):  * No specimens in log *    Estimated Blood Loss:   Minimal    Drains:  Ureteral Drain/Stent Right ureter 6 Fr  (Active)   Number of days: 0       Anesthesia Type:   General    Operative Indications:  Right ureteral stone [N20 1]      Operative Findings:  A ureteropelvic junction stone is noted on the right  This was engaged with dusting settings using a virtual basketing technique to prevent retropulsion  Stones were broken into dust   No fragments remained that were large enough for basketing  Successful placement of a 6 Urdu by 22 centimeter stent was placed  Two stents had to be opened as the string on the for stent broke  The 2nd stent was successfully placed with a Dangler being taped to the mons pubis  The patient can remove her stent in 5 days  She can see us back in the office in 3 months with an x-ray and ultrasound prior    Complications:   None    Procedure and Technique:    PROCEDURES PERFORMED:  1) Cystoscopy  2) right retrograde pyelography with fluoroscopic interpretation  3) Right ureteroscopy with laser lithotripsy of stone  4) Right ureteral stent placement (6F x 22cm )    SURGEON:  Roge Delacruz MD      NOTE:  There were no qualified teaching residents to assist with this case    ANESTHESIA: General     COMPLICATIONS:   None    ANTIBIOTICS:  Ancef    INTRAOPERATIVE THROMBOEMBOLISM PROPHYLAXIS:  Pneumatic compression stockings         FINDINGS:    1  The right calculus was not radiopaque on plain fluoroscopy    2  Retrograde pyelogram was performed on the right side using a 5 Fr open ended catheter  Approximately 8 milliliters of contrast was used    3  The following findings were noted: A retained nephrogram is noted indicating severe obstruction of the renal unit  With retrograde pyelography a filling defect is noted at the ureteropelvic junction consistent with the stone noted on CT scan      INDICATIONS FOR PROCEDURE:  Ana M Zaman is an 66 y o  old female with Right renal calculus  After discussing the options for treatment, including medical expulsive therapy, extracorporeal shockwave lithotripsy, and ureteroscopy, the patient elected to undergo ureteroscopy and ureteral stent placement  We discussed the procedure in detail, the alternatives, and the risks, and they signed informed consent to proceed (these are outlined in the surgical consent form)  PROCEDURE IN DETAIL:     The patient was identified by name, date of birth, and MRN  and brought to the OR  Antibiotic prophylaxis and DVT prophylaxis were administered as per the guidelines  They were placed in the dorsal lithotomy position with care to pad all pressure points  They were prepped and draped in the usual sterile fashion  A surgical time out was performed with all in the room in agreement with the correct patient, procedure, indications, and laterality  A 21-Romanian rigid cystoscope was used to enter the bladder  The bladder was inspected in its entirety and there were no lesions noted  The ureteral orifices were identified in their normal orthotopic positions  The Right ureteral orifice was identified and a 5 Fr open ended catheter was placed into the ureteral orifice  A retrograde pyelogram was performed with the findings as described above  A wire was advanced up to the kidney under fluoroscopic guidance  Leaving this safety wire in place, the bladder was drained        A 5 3 flexible scope was placed over the wire to level the renal pelvis with the scope functioning as the safety access device  The stone was encountered in the Renal pelvis location  The stone was not noted to be impacted  A holmium laser fiber was passed through the ureteroscope and laser lithotripsy was commenced at settings of 0 2 J and 50 Hz  The stones were fragmented to very small pieces and dust       Given the tight caliber the patient's ureter the decision was made to not attempt any stone basketing  Furthermore no fragments remained that were of a size appropriate for stone basketing  The ureteroscope was backed down the ureter under vision and there were no residual fragments and the ureter was noted to be intact with no injury and mild edema where the stone was located  A 6 Icelandic by 22 centimeter right JJ stent was then passed up the wire  under fluoroscopic guidance into the kidney with a good curl noted in the kidney and in the bladder  This was done after the initial attempt at stent placement was not successful due to the string breaking  The stent string was not removed  The bladder was drained  All instrument counts and sponge counts were correct  The patient was placed back into the supine position, awakened from general anesthesia and brought to recovery room in stable condition  ESTIMATED BLOOD LOSS:  Minimal      DRAINS:   Ureteral Drain/Stent Right ureter 6 Fr  (Active)       SPECIMENS:   * No orders in the log *     IMPLANTS:   Implant Name Type Inv  Item Serial No   Lot No  LRB No  Used Action   STENT URETERAL 6FR 22CM INLAY OPTIMA W/O GW - [id number]  STENT URETERAL 6FR 22CM INLAY OPTIMA W/O 3000 [id number] Right 1 Implanted        COMPLICATIONS:  None    DISPOSITION: PACU     PLAN:  The patient is status post ureteroscopy with laser lithotripsy    If doing well in the postoperative time frame (the next 2-3 hours without fevers or chills) she can be discharged home with a plan for outpatient follow-up in the urology office in 3 months with an x-ray and ultrasound prior  She can remove her ureteral stent in 5 days    I have arrange for follow-up and imaging follow-up as well     I was present for the entire procedure and A qualified resident physician was not available    Patient Disposition:  PACU       SIGNATURE: Lisa Mcgregor MD  DATE: March 5, 2022  TIME: 10:12 AM

## 2022-03-05 NOTE — ASSESSMENT & PLAN NOTE
Patient presented with abdominal pain associated with nausea    CT showed:6 mm obstructing stone at the right ureteropelvic junction with associated mild hydronephrosis and obstructive uropathy       Patient transferred from [facility] for Urology    NPO  IVF  flomax  Zofran  Pain control  Urology consulted  Hold DVT prophylaxis

## 2022-03-05 NOTE — CONSULTS
UROLOGY CONSULTATION NOTE     Patient Identifiers: Vadim Turner (MRN [[id number]])  Service Requesting Consultation:  [facility]  Service Providing Consultation:  Urology, Sujata Watson MD    Date of Service: 3/5/2022  Consults    Reason for Consultation:  Right ureteral stone    History of Present Illness:     Vadim Turner is a 66 y o  old with a history of right flank pain associated with nausea and some vomiting  Imaging shows a proximal right ureteral stone at the ureteropelvic junction  She has had stone surgery previously, many years ago  She has also been able to pass stones on her own  She was recently hospitalized for COVID-19  Unfortunately we do not have the option of medical expulsive therapy given the size and location of her stone  I spoke with her about the decision for surgery for ureteroscopic stone intervention  She has provided informed consent for this purpose and wishes to proceed  She was marked on her right arm  No aggravating or alleviating factors for her pain, no other associated symptoms      The following portions of the patient's history were reviewed and updated as appropriate: allergies, current medications, past family history, past medical history, past social history, past surgical history and problem list     Past Medical, Past Surgical History:     Past Medical History:   Diagnosis Date    Arthritis     Cataracts, bilateral     Disease of thyroid gland     Hypothyroid     Obesity (BMI 35 0-39 9 without comorbidity)     PNA (pneumonia) 05/2019   :    Past Surgical History:   Procedure Laterality Date    CHOLECYSTECTOMY  1968    open   :    Medications, Allergies:     Current Facility-Administered Medications   Medication Dose Route Frequency    [MAR Hold] acetaminophen (TYLENOL) tablet 650 mg  650 mg Oral Q6H PRN    [MAR Hold] benzonatate (TESSALON PERLES) capsule 200 mg  200 mg Oral TID    [MAR Hold] HYDROmorphone (DILAUDID) injection 0 5 mg  0 5 mg Intravenous Q6H PRN    Or    [MAR Hold] HYDROmorphone (DILAUDID) injection 1 mg  1 mg Intravenous Q6H PRN    [MAR Hold] levothyroxine tablet 50 mcg  50 mcg Oral Early Morning    [MAR Hold] ondansetron (ZOFRAN) injection 4 mg  4 mg Intravenous Q6H PRN    [MAR Hold] polyethylene glycol (MIRALAX) packet 17 g  17 g Oral Daily PRN    sodium chloride 0 9 % infusion  100 mL/hr Intravenous Continuous    [MAR Hold] tamsulosin (FLOMAX) capsule 0 4 mg  0 4 mg Oral Daily With Dinner       Allergies: Allergies   Allergen Reactions    Codeine Anaphylaxis     Increased Heart Rate, Palpitations   :    Social and Family History:   Social History:   Social History     Tobacco Use    Smoking status: Former Smoker    Smokeless tobacco: Never Used   Substance Use Topics    Alcohol use: Yes    Drug use: Never     Social History     Tobacco Use   Smoking Status Former Smoker   Smokeless Tobacco Never Used       Family History:  Family History   Problem Relation Age of Onset    Cancer Mother     Diabetes Mother     Cancer Father     Diabetes Father     Urolithiasis Sister    :     Review of Systems:     General: Fever, chills, or night sweats: negative  Cardiac: Negative for chest pain  Pulmonary: Negative for shortness of breath  Gastrointestinal: Abdominal pain positive  Nausea, vomiting, or diarrhea positive,  Genitourinary: See HPI above  Patient does not have hematuria  All other systems were queried and were negative except as listed above in HPI and here in the ROS  Physical Exam:   /79   Pulse 70   Temp (!) 97 3 °F (36 3 °C)   Resp 20   Ht 5' (1 524 m)   Wt 86 9 kg (191 lb 9 3 oz)   SpO2 98%   BMI 37 42 kg/m² Temp (24hrs), Av 7 °F (36 5 °C), Min:97 3 °F (36 3 °C), Max:98 2 °F (36 8 °C)  current; Temperature: (!) 97 3 °F (36 3 °C)  I/O last 24 hours: In: 251 7 [I V :251 7]  Out: -   General: Patient is pleasant and in NAD   Awake and alert    Cardiac: Peripheral edema: negative, peripheral pulses are present    Pulmonary: Non-labored breathing, speaking in full sentences, no wheezing, no coughing    Abdomen: Soft, non-tender, non-distended  Genitourinary:  Negative CVA tenderness, no suprapubic tenderness  Neurological: Cranial nerves II-XII are intact, no sensory deficits, no neurological deficits    Musculoskeletal: Extremities are warm, ROM is not assessed    Psychiatric: The patient's train of thought is linear and logical, mood and affect are normal    Lymphatic: There is not adenopathy in the abdominal region  Skin:  Intact    NDIAYE: none        Labs:     Lab Results   Component Value Date    HGB 14 3 03/04/2022    HCT 41 5 03/04/2022    WBC 10 33 (H) 03/04/2022     03/04/2022   ]    Lab Results   Component Value Date     05/10/2015    K 3 5 03/04/2022     03/04/2022    CO2 22 03/04/2022    BUN 12 03/04/2022    CREATININE 1 09 03/04/2022    CALCIUM 9 2 03/04/2022    GLUCOSE 89 05/10/2015   ]    Imaging:   I personally reviewed the images and report of the following studies, and reviewed them with the patient:    CT Abdomen/Pelvis: Imaging reviewed, right ureteropelvic junction stone is present      ASSESSMENT:     66 y o  old female with  right flank pain associated with nausea and intractable pain  I spoke with her about the decision for surgery for attempts at ureteroscopic stone intervention  She does understand that she may require only a stent be placed with subsequent, staged, ureteroscopic stone intervention  She understands that there is a potential risk of nephrostomy tube placement if I am unable to gain access in a retrograde fashion      The other risks of surgery were discussed with her as well which include infection, bleeding, pain, damage to surrounding structures, need for additional procedures, risk of failure of the procedure, risk of anesthesia, risk of positioning complications including neurapraxia, chronic pain, and paralysis as well as risk of rhabdomyolysis and compartment syndrome  Risk of deep venous thrombosis and venous thromboembolism as well as pneumonia and other potential unpredictable complications were also discussed with the patient  She was marked on her right arm  She has participated in the shared decision-making model and wishes to proceed with surgery  PLAN:     Proceed to the operating room for right ureteroscopy with laser lithotripsy and all indicated procedures  Portions of the above record have been created with voice recognition software  Occasional wrong word or "sound alike" substitution may have occurred due to the inherent limitations of voice recognition software  Read the chart carefully and recognize, using context, where substitution may have occurred  Thank you for allowing me to participate in this patients care  Please do not hesitate to call with any additional questions    Lisa Mcgregor MD

## 2022-03-05 NOTE — ASSESSMENT & PLAN NOTE
Presented with abdominal pain, nausea   CT showed 6 mm obstructing stone at the R ureteropelvic junction with associated mild hydronephrosis and obstructive uropathy  · [facility] u[facility] evaluation   · S/P cysto, ureteroscopy with laser lithotripsy and R ureteral stent insertion on 3/5  · Cleared for discharge from urology standpoint  · Can remove stent in 5 days  · Will need to f/u in office in 3 months with xray and U/S prior to appointment

## 2022-03-05 NOTE — ASSESSMENT & PLAN NOTE
Presented with abdominal pain, nausea  · CT showed 6 mm obstructing stone at the R ureteropelvic junction with associated mild hydronephrosis and obstructive uropathy  · Transferred from [facility] for urology  · NPO, IVF, Flomax  · Urology pending

## 2022-03-06 VITALS
SYSTOLIC BLOOD PRESSURE: 114 MMHG | HEART RATE: 86 BPM | RESPIRATION RATE: 16 BRPM | DIASTOLIC BLOOD PRESSURE: 61 MMHG | OXYGEN SATURATION: 96 % | HEIGHT: 60 IN | WEIGHT: 191.58 LBS | BODY MASS INDEX: 37.61 KG/M2 | TEMPERATURE: 98.1 F

## 2022-03-06 LAB
ANION GAP SERPL CALCULATED.3IONS-SCNC: 5 MMOL/L (ref 4–13)
BASOPHILS # BLD AUTO: 0.06 THOUSANDS/ΜL (ref 0–0.1)
BASOPHILS NFR BLD AUTO: 1 % (ref 0–1)
BUN SERPL-MCNC: 11 MG/DL (ref 5–25)
CALCIUM SERPL-MCNC: 9.1 MG/DL (ref 8.3–10.1)
CHLORIDE SERPL-SCNC: 107 MMOL/L (ref 100–108)
CO2 SERPL-SCNC: 25 MMOL/L (ref 21–32)
CREAT SERPL-MCNC: 0.83 MG/DL (ref 0.6–1.3)
EOSINOPHIL # BLD AUTO: 0.14 THOUSAND/ΜL (ref 0–0.61)
EOSINOPHIL NFR BLD AUTO: 2 % (ref 0–6)
ERYTHROCYTE [DISTWIDTH] IN BLOOD BY AUTOMATED COUNT: 15 % (ref 11.6–15.1)
GFR SERPL CREATININE-BSD FRML MDRD: 70 ML/MIN/1.73SQ M
GLUCOSE SERPL-MCNC: 104 MG/DL (ref 65–140)
HCT VFR BLD AUTO: 34.6 % (ref 34.8–46.1)
HGB BLD-MCNC: 11.4 G/DL (ref 11.5–15.4)
IMM GRANULOCYTES # BLD AUTO: 0.04 THOUSAND/UL (ref 0–0.2)
IMM GRANULOCYTES NFR BLD AUTO: 1 % (ref 0–2)
LYMPHOCYTES # BLD AUTO: 1.81 THOUSANDS/ΜL (ref 0.6–4.47)
LYMPHOCYTES NFR BLD AUTO: 25 % (ref 14–44)
MCH RBC QN AUTO: 29 PG (ref 26.8–34.3)
MCHC RBC AUTO-ENTMCNC: 32.9 G/DL (ref 31.4–37.4)
MCV RBC AUTO: 88 FL (ref 82–98)
MONOCYTES # BLD AUTO: 0.81 THOUSAND/ΜL (ref 0.17–1.22)
MONOCYTES NFR BLD AUTO: 11 % (ref 4–12)
NEUTROPHILS # BLD AUTO: 4.5 THOUSANDS/ΜL (ref 1.85–7.62)
NEUTS SEG NFR BLD AUTO: 60 % (ref 43–75)
NRBC BLD AUTO-RTO: 0 /100 WBCS
PLATELET # BLD AUTO: 180 THOUSANDS/UL (ref 149–390)
PMV BLD AUTO: 10.8 FL (ref 8.9–12.7)
POTASSIUM SERPL-SCNC: 3.5 MMOL/L (ref 3.5–5.3)
RBC # BLD AUTO: 3.93 MILLION/UL (ref 3.81–5.12)
SODIUM SERPL-SCNC: 137 MMOL/L (ref 136–145)
WBC # BLD AUTO: 7.36 THOUSAND/UL (ref 4.31–10.16)

## 2022-03-06 PROCEDURE — 85025 COMPLETE CBC W/AUTO DIFF WBC: CPT | Performed by: INTERNAL MEDICINE

## 2022-03-06 PROCEDURE — 80048 BASIC METABOLIC PNL TOTAL CA: CPT | Performed by: INTERNAL MEDICINE

## 2022-03-06 PROCEDURE — 99239 HOSP IP/OBS DSCHRG MGMT >30: CPT | Performed by: INTERNAL MEDICINE

## 2022-03-06 RX ORDER — PHENAZOPYRIDINE HYDROCHLORIDE 100 MG/1
100 TABLET, FILM COATED ORAL
Qty: 9 TABLET | Refills: 0 | Status: SHIPPED | OUTPATIENT
Start: 2022-03-06 | End: 2022-03-09

## 2022-03-06 RX ADMIN — LEVOTHYROXINE SODIUM 50 MCG: 50 TABLET ORAL at 05:29

## 2022-03-06 RX ADMIN — PHENAZOPYRIDINE 100 MG: 100 TABLET ORAL at 08:17

## 2022-03-06 RX ADMIN — OXYBUTYNIN CHLORIDE 5 MG: 5 TABLET ORAL at 08:17

## 2022-03-06 NOTE — PHYSICAL THERAPY NOTE
PHYSICAL THERAPY EVALUATION  NAME:  Maryse Myles  DATE: 03/06/22    AGE:   66 y o    Mrn:   [id number]  ADMIT DX:  Right ureteral stone [N20 1]    Past Medical History:   Diagnosis Date    Arthritis     Cataracts, bilateral     Disease of thyroid gland     Hypothyroid     Obesity (BMI 35 0-39 9 without comorbidity)     PNA (pneumonia) 05/2019     Past Surgical History:   Procedure Laterality Date    CHOLECYSTECTOMY  1968    open    FL RETROGRADE PYELOGRAM  3/5/2022       Length Of Stay: 1  PHYSICAL THERAPY EVALUATION :    03/06/22 0911   PT Last Visit   PT Visit Date 03/06/22   Note Type   Note type Evaluation   Pain Assessment   Pain Assessment Tool 0-10   Pain Score No Pain   Restrictions/Precautions   Weight Bearing Precautions Per Order No   Braces or Orthoses   (none)   Home Living   Type of [address] [place] [address] Multi-level;Stairs to enter with rails;Bed/bath upstairs   Home Equipment [employer]ondo  (does not use )   Additional Comments w/ spouse    Prior Function   Level of [place] Independent with ADLs and functional mobility   Lives With [place] Help From Family;Friend(s)   ADL Assistance Independent   IADLs Independent   Falls in the last 6 months 0   Vocational Retired   Comments I PTA- has very support spouse who has been assisting w/ IADL's- cooking/ cleaning; pt is I w/o use of AD (+) ;    Cognition   Overall Cognitive Status WFL   Arousal/Participation Alert   Orientation Level Oriented X4   Memory Within functional limits   Following Commands Follows all commands and directions without difficulty   Comments lpeasant and cooperative    Subjective   Subjective I feel good- I really want to go home today    RUE Assessment   RUE Assessment WFL   LUE Assessment   LUE Assessment WFL   RLE Assessment   RLE Assessment WFL   LLE Assessment   LLE Assessment WFL   Vision-Basic Assessment   Current Vision No visual deficits   Coordination   Movements are Fluid and Coordinated 1 Sensation WFL   Bed Mobility   Supine to Sit 7  Independent   Sit to Supine 7  Independent   Transfers   Sit to Stand 7  Independent   Stand to Sit 7  Independent   Stand pivot 7  Independent   Toilet transfer 7  Independent   Additional Comments all xfers w/o AD indep-    Ambulation/Elevation   Gait pattern WNL   Gait Assistance 5  Supervision  (> indep w/ inc distances )   Additional items Assist x 1   Assistive Device None   Distance > 350 w/o AD on RA ; Spo2 96% via spot checks throughout   Stair Management Assistance 5  Supervision  ( slow )   Stair Management Technique One rail R;Alternating pattern   Balance   Static Sitting Normal   Dynamic Sitting Normal   Static Standing Good   Dynamic Standing Good   Ambulatory Good  (w/o AD)   Endurance Deficit   Endurance Deficit No   Activity Tolerance   Activity Tolerance Patient tolerated treatment well   Medical Staff Made Aware RN Mario Ma)    Nurse Made Aware yes   Assessment   Prognosis Good   Problem List Decreased mobility   Assessment Pt is 66 y o  female seen for PT evaluation s/p admit to [facility] on 3/5/2022 Presented with abdominal pain, nausea  CT showed 6 mm obstructing stone at the R ureteropelvic junction with associated mild hydronephrosis and obstructive uropathy- dx w/ uretal stones w/ hydronephrosis  Pt underwent cysto, ureteroscopy with laser lithotripsy and R ureteral stent insertion on 3/5/22  Comorbidities affecting pt's physical performance at time of assessment listed above and significant for:  has a past medical history of Arthritis, Cataracts, bilateral, Disease of thyroid gland, Hypothyroid, Obesity (BMI 35 0-39 9 without comorbidity), and PNA (pneumonia) (05/2019)  Personal factors affecting pt at time of IE include: steps to enter environment, past experience; pain    Due to acute medical issues, ongoing medical management post op; intermittent pain, decreased activity tolerance compared to baseline, increased assistance needed from caregiver at current time, continuous monitoring; trending labs; multiple lines, decline in overall functional mobility status; health management issues; note potentially evolving clinical picture (mod complexity)   Prior to admission, pt was I and living w/ spouse in a multilevel home w/ HEIDI and steps to main bed and bath w/ rail  Spouse    Currently pt  is able to demo indep A for bed skills; indep for functional transfers and S progressing to indep for ambulation w/ o AD > 300' S> MI w/ stairs  Pt cleared for d/c home once medically stable  0 DME needs or therapy needs on d/c  Will d/c from caseload- pt instructed to ambulate 3-4x daily until time of d/c     Pt presents currently w/ overall mobility deficits 2* to:limited flexibility;  generalized weakness/ deconditioning; Intermittent pain  (Please find additional objective findings from PT assessment regarding body systems outlined above )    Goals   Patient Goals go home    Recommendation   PT Discharge Recommendation No rehabilitation needs   Equipment Recommended   (0)   AM-PAC Basic Mobility Inpatient   Turning in Bed Without Bedrails 4   Lying on Back to Sitting on Edge of Flat Bed 4   Moving Bed to Chair 4   Standing Up From Chair 4   Walk in Room 4   Climb 3-5 Stairs 4   Basic Mobility Inpatient Raw Score 24   Basic Mobility Standardized Score 57 68   Highest Level Of Mobility   JH-HLM Goal 8: Walk 250 feet or more   JH-HLM Highest Level of Mobility 8: Walk 250 feet ot more   JH-HLM Goal Achieved Yes     Pt is 66 y o  female seen for PT evaluation s/p admit to [facility] on 3/5/2022 Presented with abdominal pain, nausea  CT showed 6 mm obstructing stone at the R ureteropelvic junction with associated mild hydronephrosis and obstructive uropathy- dx w/ uretal stones w/ hydronephrosis  Pt underwent cysto, ureteroscopy with laser lithotripsy and R ureteral stent insertion on 3/5/22    Comorbidities affecting pt's physical performance at time of assessment listed above and significant for:  has a past medical history of Arthritis, Cataracts, bilateral, Disease of thyroid gland, Hypothyroid, Obesity (BMI 35 0-39 9 without comorbidity), and PNA (pneumonia) (05/2019)  Personal factors affecting pt at time of IE include: steps to enter environment, past experience; pain  Due to acute medical issues, ongoing medical management post op; intermittent pain, decreased activity tolerance compared to baseline, increased assistance needed from caregiver at current time, continuous monitoring; trending labs; multiple lines, decline in overall functional mobility status; health management issues; note potentially evolving clinical picture (mod complexity)   Prior to admission, pt was I and living w/ spouse in a multilevel home w/ HEIDI and steps to main bed and bath w/ rail  Spouse    Currently pt  is able to demo indep A for bed skills; indep for functional transfers and S progressing to indep for ambulation w/ o AD > 300' S> MI w/ stairs  Pt cleared for d/c home once medically stable  0 DME needs or therapy needs on d/c  Will d/c from caseload- pt instructed to ambulate 3-4x daily until time of d/c     Pt presents currently w/ overall mobility deficits 2* to:limited flexibility;  generalized weakness/ deconditioning; Intermittent pain  (Please find additional objective findings from PT assessment regarding body systems outlined above )            The patient's AM-PAC Basic Mobility Inpatient Short Form Raw Score is 24  A Raw score of greater than 16 suggests the patient may benefit from discharge to home  Please also refer to the recommendation of the Physical Therapist for safe discharge planning      Jeannette Barton, PT

## 2022-03-06 NOTE — PLAN OF CARE
Problem: PAIN - ADULT  Goal: Verbalizes/displays adequate comfort level or baseline comfort level  Description: Interventions:  - Encourage patient to monitor pain and request assistance  - Assess pain using appropriate pain scale  - Administer analgesics based on type and severity of pain and evaluate response  - Implement non-pharmacological measures as appropriate and evaluate response  - Consider cultural and social influences on pain and pain management  - Notify physician/advanced practitioner if interventions unsuccessful or patient reports new pain  Outcome: Progressing     Problem: INFECTION - ADULT  Goal: Absence or prevention of progression during hospitalization  Description: INTERVENTIONS:  - Assess and monitor for signs and symptoms of infection  - Monitor lab/diagnostic results  - Monitor all insertion sites, i e  indwelling lines, tubes, and drains  - Monitor endotracheal if appropriate and nasal secretions for changes in amount and color  - [place] appropriate cooling/warming therapies per order  - Administer medications as ordered  - Instruct and encourage patient and family to use good hand hygiene technique  - Identify and instruct in appropriate isolation precautions for identified infection/condition  Outcome: Progressing  Goal: Absence of fever/infection during neutropenic period  Description: INTERVENTIONS:  - Monitor WBC    Outcome: Progressing     Problem: SAFETY ADULT  Goal: Patient will remain free of falls  Description: INTERVENTIONS:  - Educate patient/family on patient safety including physical limitations  - Instruct patient to call for assistance with activity   - Consult OT/PT to assist with strengthening/mobility   - Keep Call bell within reach  - Keep bed low and locked with side rails adjusted as appropriate  - Keep care items and personal belongings within reach  - Initiate and maintain comfort rounds  - Make Fall Risk Sign visible to staff  - Apply yellow socks and bracelet for high fall risk patients  - Consider moving patient to room near nurses station  Outcome: Progressing  Goal: Maintain or return to baseline ADL function  Description: INTERVENTIONS:  -  Assess patient's ability to carry out ADLs; assess patient's baseline for ADL function and identify physical deficits which impact ability to perform ADLs (bathing, care of mouth/teeth, toileting, grooming, dressing, etc )  - Assess/evaluate cause of self-care deficits   - Assess range of motion  - Assess patient's mobility; develop plan if impaired  - Assess patient's need for assistive devices and provide as appropriate  - Encourage maximum independence but intervene and supervise when necessary  - Involve family in performance of ADLs  - Assess for home care needs following discharge   - Consider OT consult to assist with ADL evaluation and planning for discharge  - Provide patient education as appropriate  Outcome: Progressing  Goal: Maintains/Returns to pre admission functional level  Description: INTERVENTIONS:  - Perform BMAT or MOVE assessment daily    - Set and communicate daily mobility goal to care team and patient/family/caregiver     - Collaborate with rehabilitation services on mobility goals if consulted  - Out of bed for toileting  - Record patient progress and toleration of activity level   Outcome: Progressing     Problem: DISCHARGE PLANNING  Goal: Discharge to home or other facility with appropriate resources  Description: INTERVENTIONS:  - Identify barriers to discharge w/patient and caregiver  - Arrange for needed discharge resources and transportation as appropriate  - Identify discharge learning needs (meds, wound care, etc )  - Arrange for interpretive services to assist at discharge as needed  - Refer to Case Management Department for coordinating discharge planning if the patient needs post-hospital services based on physician/advanced practitioner order or complex needs related to functional status, cognitive ability, or social support system  Outcome: Progressing     Problem: Knowledge Deficit  Goal: Patient/family/caregiver demonstrates understanding of disease process, treatment plan, medications, and discharge instructions  Description: Complete learning assessment and assess knowledge base  Interventions:  - Provide teaching at level of understanding  - Provide teaching via preferred learning methods  Outcome: Progressing     Problem: GENITOURINARY - ADULT  Goal: Maintains or returns to baseline urinary function  Description: INTERVENTIONS:  - Assess urinary function  - Encourage oral fluids to ensure adequate hydration if ordered  - Administer IV fluids as ordered to ensure adequate hydration  - Administer ordered medications as needed  - Offer frequent toileting  - Follow urinary retention protocol if ordered  Outcome: Progressing  Goal: Absence of urinary retention  Description: INTERVENTIONS:  - Assess patients ability to void and empty bladder  - Monitor I/O  - Bladder scan as needed  - Discuss with physician/AP medications to alleviate retention as needed  - Discuss catheterization for long term situations as appropriate  Outcome: Progressing  Goal: Urinary catheter remains patent  Description: INTERVENTIONS:  - Assess patency of urinary catheter  - If patient has a chronic palmer, consider changing catheter if non-functioning  - Follow guidelines for intermittent irrigation of non-functioning urinary catheter  Outcome: Progressing     Problem: Nutrition/Hydration-ADULT  Goal: Nutrient/Hydration intake appropriate for improving, restoring or maintaining nutritional needs  Description: Monitor and assess patient's nutrition/hydration status for malnutrition  Collaborate with interdisciplinary team and initiate plan and interventions as ordered  Monitor patient's weight and dietary intake as ordered or per policy  Utilize nutrition screening tool and intervene as necessary   Determine patient's food preferences and provide high-protein, high-caloric foods as appropriate       INTERVENTIONS:  - Monitor oral intake, urinary output, labs, and treatment plans  - Assess nutrition and hydration status and recommend course of action  - Evaluate amount of meals eaten  - Assist patient with eating if necessary   - Allow adequate time for meals  - Recommend/ encourage appropriate diets, oral nutritional supplements, and vitamin/mineral supplements  - Order, calculate, and assess calorie counts as needed  - Recommend, monitor, and adjust tube feedings and TPN/PPN based on assessed needs  - Assess need for intravenous fluids  - Provide specific nutrition/hydration education as appropriate  - Include patient/family/caregiver in decisions related to nutrition  Outcome: Progressing     Problem: Potential for Falls  Goal: Patient will remain free of falls  Description: INTERVENTIONS:  - Educate patient/family on patient safety including physical limitations  - Instruct patient to call for assistance with activity   - Consult OT/PT to assist with strengthening/mobility   - Keep Call bell within reach  - Keep bed low and locked with side rails adjusted as appropriate  - Keep care items and personal belongings within reach  - Initiate and maintain comfort rounds  - Make Fall Risk Sign visible to staff  - Apply yellow socks and bracelet for high fall risk patients  - Consider moving patient to room near nurses station  Outcome: Progressing

## 2022-03-06 NOTE — PROGRESS NOTES
[facility] Post Admit Check:     Date of visit: 03/05/2022    The patient was admitted earlier to the [facility]  Please see initial intake history and physical for detailed admission history  This is a supplemental update following a post admit checkup  Subjective: was seen post procedure  Was doing well  Had not eaten yet  Agreeable to dc later if feeling okay  Exam:     Gen: awake, alert, oriented, obesity  CV: RRR  Lungs: CTAB    Assessment and Plan:   · Ureteral stone: urology following, s/p procedure   Plan to dc later    Shivani Byrd PA-C

## 2022-03-07 ENCOUNTER — TELEPHONE (OUTPATIENT)
Dept: FAMILY MEDICINE CLINIC | Facility: CLINIC | Age: 72
End: 2022-03-07

## 2022-03-07 ENCOUNTER — TRANSITIONAL CARE MANAGEMENT (OUTPATIENT)
Dept: FAMILY MEDICINE CLINIC | Facility: CLINIC | Age: 72
End: 2022-03-07

## 2022-03-07 NOTE — TELEPHONE ENCOUNTER
Patient  called asking for clarification on medication that was given to her during recent hospital visit  Made aware you are not the prescribing doctor      phenazopyridine (PYRIDIUM) 100 mg tablet   One set of directions tells her to take 1 tab 3 times a day    Another says 1 daily      oxybutynin (DITROPAN-XL) 10 MG 24 hr tablet   One set of directions tell her to take 1 tab at bedtime    Another says 1 tab in the morning      Please advise      Patient has TCM scheduled with you on 03/09

## 2022-03-08 DIAGNOSIS — N13.2 URETERAL STONE WITH HYDRONEPHROSIS: ICD-10-CM

## 2022-03-08 RX ORDER — TAMSULOSIN HYDROCHLORIDE 0.4 MG/1
0.4 CAPSULE ORAL
Qty: 7 CAPSULE | Refills: 0 | Status: SHIPPED | OUTPATIENT
Start: 2022-03-08 | End: 2022-06-23

## 2022-03-08 NOTE — TELEPHONE ENCOUNTER
Patient called and stated that she believes that her cat got in to the her medication from the hospital the tamsulosin  She stated that she only took  the medication on Sunday  She does have appointment tomorrow with you for her hospital follow up, but I wasn't sure if you want her to wait until her appointment tomorrow or if you wanted to send it over today  If you send it to the pharmacy please send to [facility] on [address]  Please advise

## 2022-03-08 NOTE — TELEPHONE ENCOUNTER
Called and spoke to patient  Educated patient on stent  Patient will remove stent at home on Thursday 10th  Patient provided with central scheduling phone number to have US scheduled prior to scheduled appointment in June with Marcelo Diana  Patient knows that kub is a walk in test and can be done with US  Patient will be called on Thursday afternoon to ensure successful removal of stent  Patient was very appreciative of call

## 2022-03-09 ENCOUNTER — OFFICE VISIT (OUTPATIENT)
Dept: FAMILY MEDICINE CLINIC | Facility: CLINIC | Age: 72
End: 2022-03-09
Payer: COMMERCIAL

## 2022-03-09 VITALS
BODY MASS INDEX: 37.97 KG/M2 | RESPIRATION RATE: 14 BRPM | DIASTOLIC BLOOD PRESSURE: 86 MMHG | HEIGHT: 60 IN | HEART RATE: 87 BPM | OXYGEN SATURATION: 95 % | SYSTOLIC BLOOD PRESSURE: 124 MMHG | WEIGHT: 193.4 LBS | TEMPERATURE: 99.6 F

## 2022-03-09 DIAGNOSIS — N13.2 URETERAL STONE WITH HYDRONEPHROSIS: Primary | ICD-10-CM

## 2022-03-09 DIAGNOSIS — Z86.16 PERSONAL HISTORY OF COVID-19: ICD-10-CM

## 2022-03-09 PROCEDURE — 1036F TOBACCO NON-USER: CPT | Performed by: FAMILY MEDICINE

## 2022-03-09 PROCEDURE — 1160F RVW MEDS BY RX/DR IN RCRD: CPT | Performed by: FAMILY MEDICINE

## 2022-03-09 PROCEDURE — 99214 OFFICE O/P EST MOD 30 MIN: CPT | Performed by: FAMILY MEDICINE

## 2022-03-09 PROCEDURE — 1111F DSCHRG MED/CURRENT MED MERGE: CPT | Performed by: FAMILY MEDICINE

## 2022-03-09 PROCEDURE — 3008F BODY MASS INDEX DOCD: CPT | Performed by: FAMILY MEDICINE

## 2022-03-09 NOTE — PROGRESS NOTES
FAMILY PRACTICE OFFICE VISIT       NAME: Amilcar Garnett  AGE: 66 y o  SEX: female       : 1950        MRN: [id number]    DATE: 3/9/2022  TIME: 5:36 PM    Assessment and Plan   1  Ureteral stone with hydronephrosis  Comments:  Cresenciano Bowels is doing much better  She will be removing her stent tomorrow, & has f/u US to do  Sees Urology in f/u in a couple months  Continue to hydrate well  2  Personal history of COVID-19  Comments:  Resolved - off self-isolation  Continues to improve   reports that pulse ox readings are stable at home  There are no Patient Instructions on file for this visit  TCM Call (since 2022)     Date and time call was made  3/7/2022  4:56 PM    Hospital care reviewed  Records not available        Patient was hospitialized at  [facility]        Date of Admission  22    Date of discharge  22    Diagnosis  Ureteral stone with hydronephrosis    Disposition  Home    Were the patients medications reviewed and updated  No    Current Symptoms  None      TCM Call (since 2022)     Post hospital issues  None    Should patient be enrolled in anticoag monitoring? No    Scheduled for follow up?   Not clinically warranted  Pt was seen for a TCM within the last 30 days    Did you obtain your prescribed medications  Yes    Do you need help managing your prescriptions or medications  No    Is transportation to your appointment needed  No    I have advised the patient to call PCP with any new or worsening symptoms  Rozella Dakin, MR    Living Arrangements  Spouse or Significiant other    Support System  None    Are you recieving any outpatient services  No    Are you recieving home care services  No    Are you using any community resources  No    Current waiver services  No    Have you fallen in the last 12 months  No    Interperter language line needed  No            Chief Complaint     Chief Complaint   Patient presents with    Transition of Care Management     patient being seen for TCM       History of Present Illness   Reyes Brand is a 66y o -year-old female who presents with her  Yandel Addison today - pt was hospitalized again 3/4 - 3/6/22 for a right ureteral kidney stone, with assoc hydronephrosis  6mm on CT  Records reviewed  Pt underwent cystoscopy, ureteroscopy, with laser lithotripsy and stent placement 3/5/22 with Dr Graciela Hernandez of Urology  Pt has a f/u US that she will schedule  Sees Urology in f/u 22  Is feeling better every day - still on Flomax  Will remove her stent tomorrow - pt will stop the Flomax after 7 more days  Day of discharge GFR stable/improved at 70  Review of Systems   Review of Systems   Constitutional: Negative for activity change and fever  Respiratory: Negative for shortness of breath  Cardiovascular: Negative for chest pain  Gastrointestinal: Negative for abdominal pain  Genitourinary: Positive for hematuria  Hematuria improving  Musculoskeletal: Positive for back pain  Back pain improving         Active Problem List     Patient Active Problem List   Diagnosis    Hypothyroidism due to Hashimoto's thyroiditis    Diverticulitis of large intestine with perforation without bleeding    Morbid obesity ([address] )    COVID-19    Hypokalemia    Acute respiratory failure ([address] )    Hyperglycemia    Ureteral stone with hydronephrosis         Past Medical History:  Past Medical History:   Diagnosis Date    Arthritis     Cataracts, bilateral     Disease of thyroid gland     Diverticulitis of colon     Hypothyroid     Kidney stone Not sure    Obesity (BMI 35 0-39 9 without comorbidity)     PNA (pneumonia) 2019       Past Surgical History:  Past Surgical History:   Procedure Laterality Date     SECTION  1983    CHOLECYSTECTOMY  1968    open    EYE SURGERY  10/2021    FL RETROGRADE PYELOGRAM  3/5/2022    NE CYSTO/URETERO W/LITHOTRIPSY &INDWELL STENT INSRT Right 3/5/2022    Procedure: CYSTOSCOPY URETEROSCOPY WITH LITHOTRIPSY HOLMIUM LASER, RETROGRADE PYELOGRAM AND INSERTION STENT URETERAL;  Surgeon: Olga Jorgensen MD;  Location: [facility];  Service: Urology       Family History:  Family History   Problem Relation Age of Onset    Cancer Mother     Diabetes Mother     Cancer Father     Diabetes Father     Urolithiasis Sister        Social History:  Social History     Socioeconomic History    Marital status: /Civil Union     Spouse name: Not on file    Number of children: Not on file    Years of education: Not on file    Highest education level: Not on file   Occupational History    Not on file   Tobacco Use    Smoking status: Former Smoker     Packs/day: 0 00     Years: 0 00     Pack years: 0 00    Smokeless tobacco: Never Used   Vaping Use    Vaping Use: Never used   Substance and Sexual Activity    Alcohol use: Not Currently     Alcohol/week: 0 0 standard drinks    Drug use: Never    Sexual activity: Not on file   Other Topics Concern    Not on file   Social History Narrative    Not on file     Social Determinants of Health     Financial Resource Strain: Not on file   Food Insecurity: Not on file   Transportation Needs: Not on file   Physical Activity: Not on file   Stress: Not on file   Social Connections: Not on file   Intimate Partner Violence: Not on file   Housing Stability: Not on file       Objective     Vitals:    03/09/22 1532   BP: 124/86   Pulse: 87   Resp: 14   Temp: 99 6 °F (37 6 °C)   SpO2: 95%     Wt Readings from Last 3 Encounters:   03/09/22 87 7 kg (193 lb 6 4 oz)   03/05/22 86 9 kg (191 lb 9 3 oz)   02/13/22 86 6 kg (190 lb 14 7 oz)       Physical Exam  Vitals and nursing note reviewed  Constitutional:       General: She is not in acute distress  Appearance: Normal appearance  She is not ill-appearing, toxic-appearing or diaphoretic        Comments: Nolan Landers appears a little tired today, but is non-toxic appearing; speaking in full sentences  HENT:      Head: Normocephalic and atraumatic  Eyes:      General: No scleral icterus  Conjunctiva/sclera: Conjunctivae normal    Cardiovascular:      Rate and Rhythm: Normal rate and regular rhythm  Heart sounds: Normal heart sounds  No murmur heard  No friction rub  No gallop  Pulmonary:      Effort: Pulmonary effort is normal  No respiratory distress  Breath sounds: Normal breath sounds  No stridor  No wheezing, rhonchi or rales  Abdominal:      General: Abdomen is flat  Bowel sounds are normal  There is no distension  Palpations: Abdomen is soft  There is no mass  Tenderness: There is no abdominal tenderness  There is no guarding or rebound  Musculoskeletal:      Cervical back: Normal range of motion and neck supple  No rigidity or tenderness  Lymphadenopathy:      Cervical: No cervical adenopathy  Neurological:      Mental Status: She is alert and oriented to person, place, and time  Psychiatric:         Mood and Affect: Mood normal          Behavior: Behavior normal          Thought Content:  Thought content normal          Judgment: Judgment normal          Pertinent Laboratory/Diagnostic Studies:  Lab Results   Component Value Date    GLUCOSE 89 05/10/2015    BUN 11 03/06/2022    CREATININE 0 83 03/06/2022    CALCIUM 9 1 03/06/2022     05/10/2015    K 3 5 03/06/2022    CO2 25 03/06/2022     03/06/2022     Lab Results   Component Value Date    ALT 29 03/04/2022    AST 21 03/04/2022    ALKPHOS 89 03/04/2022    BILITOT 0 4 05/10/2015       Lab Results   Component Value Date    WBC 7 36 03/06/2022    HGB 11 4 (L) 03/06/2022    HCT 34 6 (L) 03/06/2022    MCV 88 03/06/2022     03/06/2022       No results found for: TSH    No results found for: CHOL  Lab Results   Component Value Date    TRIG 153 (H) 10/03/2018     Lab Results   Component Value Date    HDL 41 10/03/2018     Lab Results   Component Value Date    LDLCALC 143 (H) 10/03/2018 Lab Results   Component Value Date    HGBA1C 6 0 (H) 03/04/2022       Results for orders placed or performed during the hospital encounter of 03/05/22   Hemoglobin A1C w/ EAG Estimation   Result Value Ref Range    Hemoglobin A1C 6 0 (H) Normal 3 8-5 6%; PreDiabetic 5 7-6 4%; Diabetic >=6 5%; Glycemic control for adults with diabetes <7 0% %     mg/dl   CBC and differential   Result Value Ref Range    WBC 7 36 4 31 - 10 16 Thousand/uL    RBC 3 93 3 81 - 5 12 Million/uL    Hemoglobin 11 4 (L) 11 5 - 15 4 g/dL    Hematocrit 34 6 (L) 34 8 - 46 1 %    MCV 88 82 - 98 fL    MCH 29 0 26 8 - 34 3 pg    MCHC 32 9 31 4 - 37 4 g/dL    RDW 15 0 11 6 - 15 1 %    MPV 10 8 8 9 - 12 7 fL    Platelets 959 224 - 199 Thousands/uL    nRBC 0 /100 WBCs    Neutrophils Relative 60 43 - 75 %    Immat GRANS % 1 0 - 2 %    Lymphocytes Relative 25 14 - 44 %    Monocytes Relative 11 4 - 12 %    Eosinophils Relative 2 0 - 6 %    Basophils Relative 1 0 - 1 %    Neutrophils Absolute 4 50 1 85 - 7 62 Thousands/µL    Immature Grans Absolute 0 04 0 00 - 0 20 Thousand/uL    Lymphocytes Absolute 1 81 0 60 - 4 47 Thousands/µL    Monocytes Absolute 0 81 0 17 - 1 22 Thousand/µL    Eosinophils Absolute 0 14 0 00 - 0 61 Thousand/µL    Basophils Absolute 0 06 0 00 - 0 10 Thousands/µL   Basic metabolic panel   Result Value Ref Range    Sodium 137 136 - 145 mmol/L    Potassium 3 5 3 5 - 5 3 mmol/L    Chloride 107 100 - 108 mmol/L    CO2 25 21 - 32 mmol/L    ANION GAP 5 4 - 13 mmol/L    BUN 11 5 - 25 mg/dL    Creatinine 0 83 0 60 - 1 30 mg/dL    Glucose 104 65 - 140 mg/dL    Calcium 9 1 8 3 - 10 1 mg/dL    eGFR 70 ml/min/1 73sq m       No orders of the defined types were placed in this encounter        ALLERGIES:  Allergies   Allergen Reactions    Codeine Anaphylaxis     Increased Heart Rate, Palpitations       Current Medications     Current Outpatient Medications   Medication Sig Dispense Refill    acetaminophen (TYLENOL) 325 mg tablet Take 2 tablets (650 mg total) by mouth every 6 (six) hours as needed for mild pain, headaches or fever  0    docusate sodium (COLACE) 100 mg capsule Take 1 capsule (100 mg total) by mouth 3 (three) times a day for 7 days (Patient taking differently: Take 100 mg by mouth if needed  ) 21 capsule 0    levothyroxine 50 mcg tablet TAKE 1 TABLET BY MOUTH EVERY DAY 90 tablet 1    ondansetron (Zofran ODT) 4 mg disintegrating tablet Take 1 tablet (4 mg total) by mouth every 6 (six) hours as needed for nausea or vomiting 20 tablet 0    oxybutynin (DITROPAN-XL) 10 MG 24 hr tablet Take 1 tablet (10 mg total) by mouth daily at bedtime for 7 days 7 tablet 0    phenazopyridine (PYRIDIUM) 100 mg tablet Take 1 tablet (100 mg total) by mouth 3 (three) times a day with meals for 3 days 9 tablet 0    tamsulosin (FLOMAX) 0 4 mg Take 1 capsule (0 4 mg total) by mouth daily with dinner for 7 days 7 capsule 0    benzonatate (TESSALON) 200 MG capsule Take 1 capsule (200 mg total) by mouth 3 (three) times a day (Patient not taking: Reported on 3/9/2022 ) 20 capsule 0     No current facility-administered medications for this visit           Health Maintenance     Health Maintenance   Topic Date Due    Hepatitis C Screening  Never done    COVID-19 Vaccine (1) Never done    DTaP,Tdap,and Td Vaccines (1 - Tdap) Never done    Breast Cancer Screening: Mammogram  Never done    Osteoporosis Screening  Never done    Annual Physical  12/18/2020    BMI: Followup Plan  02/25/2022    Colorectal Cancer Screening  08/05/2022    Fall Risk  02/11/2023    Depression Screening  02/11/2023    BMI: Adult  03/09/2023    Pneumococcal Vaccine: 65+ Years  Completed    Influenza Vaccine  Completed    HIB Vaccine  Aged Out    Hepatitis B Vaccine  Aged Out    IPV Vaccine  Aged Out    Hepatitis A Vaccine  Aged Out    Meningococcal ACWY Vaccine  Aged Out    HPV Vaccine  Aged [employer] History   Administered Date(s) Administered    INFLUENZA 12/07/2015, 10/31/2020    Influenza Split High Dose Preservative Free IM 12/07/2015, 09/18/2017    Influenza, high dose seasonal 0 7 mL 10/06/2018, 10/17/2019, 09/20/2021    Pneumococcal Conjugate 13-Valent 12/07/2015    Pneumococcal Polysaccharide PPV23 07/11/2018          Stan Zuluaga, DO

## 2022-03-10 ENCOUNTER — TELEPHONE (OUTPATIENT)
Dept: OTHER | Facility: OTHER | Age: 72
End: 2022-03-10

## 2022-03-10 NOTE — UTILIZATION REVIEW
Notification of Discharge   This is a Notification of Discharge from our facility [address]  Please be advised that this patient has been discharge from our facility  Below you will find the admission and discharge date and time including the patients disposition  UTILIZATION REVIEW CONTACT:  Walter Mccray  Utilization   Network Utilization Review Department  Phone: [phone] x carefully listen to the prompts  All voicemails are confidential   Email: [email or web address]     PHYSICIAN ADVISORY SERVICES:  FOR [id number] REVIEW - MEDICAL NECESSITY DENIAL  Phone: [phone]  Fax: [phone]  Email: [email or web address]     PRESENTATION DATE: 3/5/2022  1:40 AM  OBERVATION ADMISSION DATE:   INPATIENT ADMISSION DATE: 3/5/22  3:53 PM   DISCHARGE DATE: 3/6/2022  1:31 PM  DISPOSITION: Home/Self Care Home/Self Care      IMPORTANT INFORMATION:  Send all requests for admission clinical reviews, approved or denied determinations and any other requests to dedicated fax number below belonging to the campus where the patient is receiving treatment   List of dedicated fax numbers:  [address] DENIALS (Administrative/Medical Necessity) [phone]   [address] (Maternity/NICU/Pediatrics) [phone]   Elver Hayes [phone]   Cherylene Freud [phone]   Ena Bonilla [phone]   [address] [place] [address],[address] [phone]   [facility]  [phone]   [address] [facility]  [address] [phone]

## 2022-03-10 NOTE — TELEPHONE ENCOUNTER
Left message for patient to return call to confirm removal of stent  When patient returns call, please confirm that she was able to successfully remove stent  Please direct back to clinical if she has any questions or concerns

## 2022-03-10 NOTE — TELEPHONE ENCOUNTER
Patient returned phone call to state she successfully removed stent and has no other concerns at this time

## 2022-03-10 NOTE — TELEPHONE ENCOUNTER
Delia Sutton RN routed conversation to Sola Mccain; [place] [facility] Just now (10:52 AM)     Delia Sutton RN 2 minutes ago (10:50 AM)          Patient returned phone call to state she successfully removed stent and has no other concerns at this time            Documentation      Sherrian Moritz, RN

## 2022-03-16 ENCOUNTER — FOLLOW UP (OUTPATIENT)
Dept: URBAN - METROPOLITAN AREA CLINIC 6 | Facility: CLINIC | Age: 72
End: 2022-03-16

## 2022-03-16 DIAGNOSIS — H18.591: ICD-10-CM

## 2022-03-16 DIAGNOSIS — H26.493: ICD-10-CM

## 2022-03-16 DIAGNOSIS — H35.372: ICD-10-CM

## 2022-03-16 DIAGNOSIS — Z96.1: ICD-10-CM

## 2022-03-16 PROCEDURE — 92134 CPTRZ OPH DX IMG PST SGM RTA: CPT

## 2022-03-16 PROCEDURE — 92012 INTRM OPH EXAM EST PATIENT: CPT

## 2022-03-16 ASSESSMENT — KERATOMETRY
OD_AXISANGLE_DEGREES: 177
OD_K2POWER_DIOPTERS: 52.75
OS_K1POWER_DIOPTERS: 47.00
OS_K2POWER_DIOPTERS: 47.75
OD_AXISANGLE2_DEGREES: 87
OD_AXISANGLE2_DEGREES: 85
OD_AXISANGLE_DEGREES: 175
OS_AXISANGLE2_DEGREES: 94
OD_K2POWER_DIOPTERS: 50.75
OD_K1POWER_DIOPTERS: 47.00
OS_AXISANGLE_DEGREES: 4

## 2022-03-16 ASSESSMENT — VISUAL ACUITY
OD_SC: 20/30-1
OS_SC: 20/20-2

## 2022-03-16 ASSESSMENT — TONOMETRY
OS_IOP_MMHG: 16
OD_IOP_MMHG: 16

## 2022-04-04 ENCOUNTER — OFFICE VISIT (OUTPATIENT)
Dept: FAMILY MEDICINE CLINIC | Facility: CLINIC | Age: 72
End: 2022-04-04
Payer: COMMERCIAL

## 2022-04-04 VITALS
HEART RATE: 79 BPM | OXYGEN SATURATION: 100 % | BODY MASS INDEX: 38.21 KG/M2 | HEIGHT: 60 IN | WEIGHT: 194.6 LBS | RESPIRATION RATE: 14 BRPM | TEMPERATURE: 98.9 F | SYSTOLIC BLOOD PRESSURE: 124 MMHG | DIASTOLIC BLOOD PRESSURE: 80 MMHG

## 2022-04-04 DIAGNOSIS — U07.1 COVID-19: ICD-10-CM

## 2022-04-04 DIAGNOSIS — N13.2 URETERAL STONE WITH HYDRONEPHROSIS: Primary | ICD-10-CM

## 2022-04-04 PROCEDURE — 3008F BODY MASS INDEX DOCD: CPT | Performed by: FAMILY MEDICINE

## 2022-04-04 PROCEDURE — 1160F RVW MEDS BY RX/DR IN RCRD: CPT | Performed by: FAMILY MEDICINE

## 2022-04-04 PROCEDURE — 1111F DSCHRG MED/CURRENT MED MERGE: CPT | Performed by: FAMILY MEDICINE

## 2022-04-04 PROCEDURE — 99213 OFFICE O/P EST LOW 20 MIN: CPT | Performed by: FAMILY MEDICINE

## 2022-04-04 PROCEDURE — 1036F TOBACCO NON-USER: CPT | Performed by: FAMILY MEDICINE

## 2022-04-04 NOTE — PROGRESS NOTES
FAMILY PRACTICE OFFICE VISIT       NAME: Mendez Dorado  AGE: 66 y o  SEX: female       : 1950        MRN: [id number]    DATE: 2022  TIME: 7:59 PM    Assessment and Plan   1  Ureteral stone with hydronephrosis  Comments:  Right sided; resolved  Doing well  Pt to continue to hydrate well, and f/u with Urology as planned  2  COVID-19  Comments:  Hx of; resolved  Pt is doing much better, and continues to physically work herself back  There are no Patient Instructions on file for this visit  Chief Complaint     Chief Complaint   Patient presents with    Follow-up     patient being seen for 6 week follow up        History of Present Illness   Mendez Dorado is a 66y o -year-old female who presents in f/u with her  today  She is feeling well / much better  She is largely, fully back from her COV-19 infection approx 2 months ago - still some residual fatigue and "brain fog" at times, but is much better  She is looking to get vaccinated in a little over a month against COV-19 (had [facility] Antibody infusion)  Pt was able to successfully pull out her right ureteral stent, and is doing well  Has f/u renal US, and f/u with Urology, in 2022  Review of Systems   Review of Systems   Constitutional: Positive for fatigue  Negative for activity change  Respiratory: Negative for shortness of breath  Cardiovascular: Negative for chest pain  Genitourinary: Negative for dysuria and hematuria         Active Problem List     Patient Active Problem List   Diagnosis    Hypothyroidism due to Hashimoto's thyroiditis    Diverticulitis of large intestine with perforation without bleeding    Morbid obesity ([address] )    COVID-19    Hypokalemia    Acute respiratory failure ([address] )    Hyperglycemia    Ureteral stone with hydronephrosis         Past Medical History:  Past Medical History:   Diagnosis Date    Arthritis     Cataracts, bilateral     Disease of thyroid gland     Diverticulitis of colon     Hypothyroid     Kidney stone Not sure    Obesity (BMI 35 0-39 9 without comorbidity)     PNA (pneumonia) 2019       Past Surgical History:  Past Surgical History:   Procedure Laterality Date     SECTION  1983    CHOLECYSTECTOMY  1968    open    EYE SURGERY  10/2021    FL RETROGRADE PYELOGRAM  3/5/2022    MO CYSTO/URETERO W/LITHOTRIPSY &INDWELL STENT INSRT Right 3/5/2022    Procedure: CYSTOSCOPY URETEROSCOPY WITH LITHOTRIPSY HOLMIUM LASER, RETROGRADE PYELOGRAM AND INSERTION STENT URETERAL;  Surgeon: Sneha Lindsay MD;  Location: [facility];  Service: Urology       Family History:  Family History   Problem Relation Age of Onset    Cancer Mother     Diabetes Mother     Cancer Father     Diabetes Father     Urolithiasis Sister        Social History:  Social History     Socioeconomic History    Marital status: /Civil Union     Spouse name: Not on file    Number of children: Not on file    Years of education: Not on file    Highest education level: Not on file   Occupational History    Not on file   Tobacco Use    Smoking status: Former Smoker     Packs/day: 0 00     Years: 0 00     Pack years: 0 00    Smokeless tobacco: Never Used   Vaping Use    Vaping Use: Never used   Substance and Sexual Activity    Alcohol use: Not Currently     Alcohol/week: 0 0 standard drinks    Drug use: Never    Sexual activity: Not on file   Other Topics Concern    Not on file   Social History Narrative    Not on file     Social Determinants of Health     Financial Resource Strain: Not on file   Food Insecurity: Not on file   Transportation Needs: Not on file   Physical Activity: Not on file   Stress: Not on file   Social Connections: Not on file   Intimate Partner Violence: Not on file   Housing Stability: Not on file       Objective     Vitals:    22 1430   BP: 124/80   Pulse: 79   Resp: 14   Temp: 98 9 °F (37 2 °C)   SpO2: 100%     Wt Readings from Last 3 Encounters:   04/04/22 88 3 kg (194 lb 9 6 oz)   03/09/22 87 7 kg (193 lb 6 4 oz)   03/05/22 86 9 kg (191 lb 9 3 oz)       Physical Exam  Vitals and nursing note reviewed  Constitutional:       General: She is not in acute distress  Appearance: Normal appearance  She is not ill-appearing, toxic-appearing or diaphoretic  HENT:      Head: Normocephalic and atraumatic  Eyes:      General: No scleral icterus  Conjunctiva/sclera: Conjunctivae normal    Cardiovascular:      Rate and Rhythm: Normal rate and regular rhythm  Heart sounds: Normal heart sounds  No murmur heard  No friction rub  No gallop  Pulmonary:      Effort: Pulmonary effort is normal  No respiratory distress  Breath sounds: Normal breath sounds  No stridor  No wheezing, rhonchi or rales  Musculoskeletal:      Cervical back: Normal range of motion and neck supple  No rigidity or tenderness  Lymphadenopathy:      Cervical: No cervical adenopathy  Neurological:      Mental Status: She is alert and oriented to person, place, and time  Psychiatric:         Mood and Affect: Mood normal          Behavior: Behavior normal          Thought Content:  Thought content normal          Judgment: Judgment normal          Pertinent Laboratory/Diagnostic Studies:  Lab Results   Component Value Date    GLUCOSE 89 05/10/2015    BUN 11 03/06/2022    CREATININE 0 83 03/06/2022    CALCIUM 9 1 03/06/2022     05/10/2015    K 3 5 03/06/2022    CO2 25 03/06/2022     03/06/2022     Lab Results   Component Value Date    ALT 29 03/04/2022    AST 21 03/04/2022    ALKPHOS 89 03/04/2022    BILITOT 0 4 05/10/2015       Lab Results   Component Value Date    WBC 7 36 03/06/2022    HGB 11 4 (L) 03/06/2022    HCT 34 6 (L) 03/06/2022    MCV 88 03/06/2022     03/06/2022       No results found for: TSH    No results found for: CHOL  Lab Results   Component Value Date    TRIG 153 (H) 10/03/2018     Lab Results   Component Value Date HDL 41 10/03/2018     Lab Results   Component Value Date    LDLCALC 143 (H) 10/03/2018     Lab Results   Component Value Date    HGBA1C 6 0 (H) 03/04/2022       Results for orders placed or performed during the hospital encounter of 03/05/22   Hemoglobin A1C w/ EAG Estimation   Result Value Ref Range    Hemoglobin A1C 6 0 (H) Normal 3 8-5 6%; PreDiabetic 5 7-6 4%; Diabetic >=6 5%; Glycemic control for adults with diabetes <7 0% %     mg/dl   CBC and differential   Result Value Ref Range    WBC 7 36 4 31 - 10 16 Thousand/uL    RBC 3 93 3 81 - 5 12 Million/uL    Hemoglobin 11 4 (L) 11 5 - 15 4 g/dL    Hematocrit 34 6 (L) 34 8 - 46 1 %    MCV 88 82 - 98 fL    MCH 29 0 26 8 - 34 3 pg    MCHC 32 9 31 4 - 37 4 g/dL    RDW 15 0 11 6 - 15 1 %    MPV 10 8 8 9 - 12 7 fL    Platelets 556 791 - 552 Thousands/uL    nRBC 0 /100 WBCs    Neutrophils Relative 60 43 - 75 %    Immat GRANS % 1 0 - 2 %    Lymphocytes Relative 25 14 - 44 %    Monocytes Relative 11 4 - 12 %    Eosinophils Relative 2 0 - 6 %    Basophils Relative 1 0 - 1 %    Neutrophils Absolute 4 50 1 85 - 7 62 Thousands/µL    Immature Grans Absolute 0 04 0 00 - 0 20 Thousand/uL    Lymphocytes Absolute 1 81 0 60 - 4 47 Thousands/µL    Monocytes Absolute 0 81 0 17 - 1 22 Thousand/µL    Eosinophils Absolute 0 14 0 00 - 0 61 Thousand/µL    Basophils Absolute 0 06 0 00 - 0 10 Thousands/µL   Basic metabolic panel   Result Value Ref Range    Sodium 137 136 - 145 mmol/L    Potassium 3 5 3 5 - 5 3 mmol/L    Chloride 107 100 - 108 mmol/L    CO2 25 21 - 32 mmol/L    ANION GAP 5 4 - 13 mmol/L    BUN 11 5 - 25 mg/dL    Creatinine 0 83 0 60 - 1 30 mg/dL    Glucose 104 65 - 140 mg/dL    Calcium 9 1 8 3 - 10 1 mg/dL    eGFR 70 ml/min/1 73sq m       No orders of the defined types were placed in this encounter        ALLERGIES:  Allergies   Allergen Reactions    Codeine Anaphylaxis     Increased Heart Rate, Palpitations       Current Medications     Current Outpatient Medications Medication Sig Dispense Refill    acetaminophen (TYLENOL) 325 mg tablet Take 2 tablets (650 mg total) by mouth every 6 (six) hours as needed for mild pain, headaches or fever  0    levothyroxine 50 mcg tablet TAKE 1 TABLET BY MOUTH EVERY DAY 90 tablet 1    benzonatate (TESSALON) 200 MG capsule Take 1 capsule (200 mg total) by mouth 3 (three) times a day (Patient not taking: Reported on 3/9/2022 ) 20 capsule 0    docusate sodium (COLACE) 100 mg capsule Take 1 capsule (100 mg total) by mouth 3 (three) times a day for 7 days (Patient taking differently: Take 100 mg by mouth if needed  ) 21 capsule 0    ondansetron (Zofran ODT) 4 mg disintegrating tablet Take 1 tablet (4 mg total) by mouth every 6 (six) hours as needed for nausea or vomiting 20 tablet 0    oxybutynin (DITROPAN-XL) 10 MG 24 hr tablet Take 1 tablet (10 mg total) by mouth daily at bedtime for 7 days 7 tablet 0    tamsulosin (FLOMAX) 0 4 mg Take 1 capsule (0 4 mg total) by mouth daily with dinner for 7 days 7 capsule 0     No current facility-administered medications for this visit           Health Maintenance     Health Maintenance   Topic Date Due    Hepatitis C Screening  Never done    COVID-19 Vaccine (1) Never done    DTaP,Tdap,and Td Vaccines (1 - Tdap) Never done    Breast Cancer Screening: Mammogram  Never done    Osteoporosis Screening  Never done    Annual Physical  12/18/2020    BMI: Followup Plan  02/25/2022    Colorectal Cancer Screening  08/05/2022    Fall Risk  02/11/2023    Depression Screening  02/11/2023    BMI: Adult  04/04/2023    Pneumococcal Vaccine: 65+ Years  Completed    Influenza Vaccine  Completed    HIB Vaccine  Aged Out    Hepatitis B Vaccine  Aged Out    IPV Vaccine  Aged Out    Hepatitis A Vaccine  Aged Out    Meningococcal ACWY Vaccine  Aged Out    HPV Vaccine  Aged Out     Immunization History   Administered Date(s) Administered    INFLUENZA 12/07/2015, 10/31/2020    Influenza Split High Dose Preservative Free IM 12/07/2015, 09/18/2017    Influenza, high dose seasonal 0 7 mL 10/06/2018, 10/17/2019, 09/20/2021    Pneumococcal Conjugate 13-Valent 12/07/2015    Pneumococcal Polysaccharide PPV23 07/11/2018          Stan Zuluaga DO

## 2022-04-27 ENCOUNTER — TELEPHONE (OUTPATIENT)
Dept: FAMILY MEDICINE CLINIC | Facility: CLINIC | Age: 72
End: 2022-04-27

## 2022-04-27 NOTE — TELEPHONE ENCOUNTER
Could possibly be a post-COV-19 effect  Can try an OTC Vit B Complex daily, we could refer her to Dermatology, etc   Thanks    Jovanna

## 2022-04-27 NOTE — TELEPHONE ENCOUNTER
Just and [employer] with the patient and she will try the vit B first and then let us know if and when she will need Dermatology

## 2022-04-27 NOTE — TELEPHONE ENCOUNTER
Patient called and stated that her Hair is coming out in hand fulls,Noticed it about 2 weeks ago, but now its getting worse  She wanted to know what she can take to help with this   Please advise

## 2022-05-11 ENCOUNTER — TELEPHONE (OUTPATIENT)
Dept: FAMILY MEDICINE CLINIC | Facility: CLINIC | Age: 72
End: 2022-05-11

## 2022-05-31 ENCOUNTER — VBI (OUTPATIENT)
Dept: ADMINISTRATIVE | Facility: OTHER | Age: 72
End: 2022-05-31

## 2022-06-01 ENCOUNTER — TELEPHONE (OUTPATIENT)
Dept: UROLOGY | Facility: AMBULATORY SURGERY CENTER | Age: 72
End: 2022-06-01

## 2022-06-01 NOTE — TELEPHONE ENCOUNTER
Called patient to let her know that she will need her imagining completed prior to her appointment  Pt stated that she will to try to complete it prior to her appt

## 2022-06-09 DIAGNOSIS — E06.3 HYPOTHYROIDISM DUE TO HASHIMOTO'S THYROIDITIS: Chronic | ICD-10-CM

## 2022-06-09 DIAGNOSIS — E03.8 HYPOTHYROIDISM DUE TO HASHIMOTO'S THYROIDITIS: Chronic | ICD-10-CM

## 2022-06-09 RX ORDER — LEVOTHYROXINE SODIUM 0.05 MG/1
TABLET ORAL
Qty: 90 TABLET | Refills: 1 | Status: SHIPPED | OUTPATIENT
Start: 2022-06-09

## 2022-06-23 ENCOUNTER — COSMETIC (OUTPATIENT)
Dept: DERMATOLOGY | Age: 72
End: 2022-06-23

## 2022-06-23 ENCOUNTER — CONSULT (OUTPATIENT)
Dept: DERMATOLOGY | Age: 72
End: 2022-06-23
Payer: COMMERCIAL

## 2022-06-23 VITALS — HEIGHT: 60 IN | BODY MASS INDEX: 38.79 KG/M2 | WEIGHT: 197.6 LBS | TEMPERATURE: 98.4 F

## 2022-06-23 DIAGNOSIS — L82.1 SEBORRHEIC KERATOSES: Primary | ICD-10-CM

## 2022-06-23 DIAGNOSIS — L65.9 HAIR LOSS: ICD-10-CM

## 2022-06-23 PROCEDURE — 99203 OFFICE O/P NEW LOW 30 MIN: CPT | Performed by: DERMATOLOGY

## 2022-06-23 PROCEDURE — 3008F BODY MASS INDEX DOCD: CPT | Performed by: DERMATOLOGY

## 2022-06-23 PROCEDURE — LESIONRML10 LESION REMOVAL FIRST 10: Performed by: DERMATOLOGY

## 2022-06-23 PROCEDURE — 1036F TOBACCO NON-USER: CPT | Performed by: DERMATOLOGY

## 2022-06-23 NOTE — PATIENT INSTRUCTIONS
TELOGEN EFFLUVIUM    Assessment and Plan:  Based on a thorough discussion of this condition and the management approach to it (including a comprehensive discussion of the known risks, side effects and potential benefits of treatment), the patient (family) agrees to implement the following specific plan:  Over the counter Aminoacid called Cystine  Over the Counter Topical Rogaine foam or lotion    What is telogen effluvium? Telogen effluvium is the name for temporary hair loss due to the shedding of resting or telogen hair after some shock to the system  New hair continues to grow  Telogen hair has a bulb or club-shaped tip  It should be distinguished from anagen effluvium, in which the hair shedding is due to interruption of active or anagen hair growth by drugs, toxins or inflammation (eg, alopecia areata)  Anagen hair has a pointed or tapered tip  What is the cause of telogen effluvium? In a normal healthy person's scalp about 32% of the hair follicles are actively growing hair (anagen hair) and 15% are resting hair (telogen hair)  A hair follicle usually grows anagen hair for 4 years or so, then rests for about 4 months  The resting or telogen hair has a club or bulb at the tip  A new anagen hair begins to grow under the resting telogen hair and pushes it out  Thus, it is normal to lose up to about 100 hairs a day on one's comb, brush, in the basin or on the pillow, as a result of the normal scalp hair cycle  If there is some shock to the system, as many as 70% of the anagen hairs can be precipitated into telogen, thus reversing the usual ratio       Typical "shock" triggers include:  Illness, especially if there is fever   Surgical operation   Accident   Childbirth   Nervous shock   Weight loss or unusual diet   Certain medications   Discontinuing the contraceptive pill   Overseas travel resulting in jetlag   Excessive sun exposure    At first, the resting scalp hairs, now in the form of club hairs, remain firmly attached to the hair follicles  It is only about 2-4 months after the shock that the new hairs coming up through the scalp push out the 'dead' club hairs and increased hair fall are noticed  Thus, paradoxically, with this type of hair loss, hair fall is a sign of hair regrowth  As the new hair first comes up through the scalp and pushes out the dead hair a fine fringe of new hair is often evident along the forehead hairline  At first, the fall of club hairs is profuse and a general thinning of the scalp hair may become evident but after several months a peak is reached and hair fall begins to lessen, gradually tapering back to normal over 6-9 months  As the hair fall tapers off the scalp thickens back up to normal, but recovery may be incomplete in some cases  Because nail and hair growth are under the same influences, an arrest in hair growth is often mirrored in the nails by a groove across them coinciding with the time of the shock to the system  This is called Beau's line  The time of the shock can be estimated from the fact that a fingernail takes 5 months to grow from the posterior nail fold to the free edge  So if the groove in the nail is half way down the nail then the shock must have been 2 1/2 months ago  Chronic telogen effluvium  In some patients, hair shedding continues to be intermittently or continuously greater than normal for long periods of time, sometimes for years  The hair cycle appears to be reset so that the anagen period is shortened  Chronic telogen effluvium often presents in women that actually continue to have quite thick and moderately long hair - this is because they notice the shed hair more than those with finer or shorter hair  Telogen effluvium does not cause complete baldness, although it may unmask a genetic tendency to genetic balding i e  female pattern hair loss, or in men, male pattern hair loss      What is the treatment for telogen effluvium? Telogen effluvium is self-correcting  It is really not influenced by any treatment that can be given  However, gentle handling of the hair, avoiding over-vigorous combing, brushing and any type of scalp massage are important  You should also ensure a nutritious diet, with plenty of protein, fruit and vegetables  The doctor may check your thyroid function, and levels of iron, vitamin U17 and folic acid, as any deficiency in these can slow hair growth  The psychological effects of hair loss should not be ignored  SEBORRHEIC KERATOSIS; NON-INFLAMED    Assessment and Plan:  Based on a thorough discussion of this condition and the management approach to it (including a comprehensive discussion of the known risks, side effects and potential benefits of treatment), the patient (family) agrees to implement the following specific plan:  Reassured benign  For the spots treated with liquid nitrogen today, expect them to stay red and become crusty over the course of the next 7-10 days, and then the crust should fall off  If you develop a small blister in the area, this is normal  Use Vaseline for irritation  Seborrheic Keratosis  A seborrheic keratosis is a harmless warty spot that appears during adult life as a common sign of skin aging  Seborrheic keratoses can arise on any area of skin, covered or uncovered, with the usual exception of the palms and soles  They do not arise from mucous membranes  Seborrheic keratoses can have highly variable appearance  Seborrheic keratoses are extremely common  It has been estimated that over 90% of adults over the age of 61 years have one or more of them  They occur in males and females of all races, typically beginning to erupt in the 35s or 45s  They are uncommon under the age of 21 years  The precise cause of seborrhoeic keratoses is not known  Seborrhoeic keratoses are considered degenerative in nature   As time goes by, seborrheic keratoses tend to become more numerous  Some people inherit a tendency to develop a very large number of them; some people may have hundreds of them  The name "seborrheic keratosis" is misleading, because these lesions are not limited to a seborrhoeic distribution (scalp, mid-face, chest, upper back), nor are they formed from sebaceous glands, nor are they associated with sebum -- which is greasy  Seborrheic keratosis may also be called "SK," "Seb K," "basal cell papilloma," "senile wart," or "barnacle "      Researchers have noted:  Eruptive seborrhoeic keratoses can follow sunburn or dermatitis  Skin friction may be the reason they appear in body folds  Viral cause (e g , human papillomavirus) seems unlikely  Stable and clonal mutations or activation of FRFR3, PIK3CA, ELVA, AKT1 and EGFR genes are found in seborrhoeic keratoses  Seborrhoeic keratosis can arise from solar lentigo  FRFR3 mutations also arise in solar lentigines  These mutations are associated with increased age and location on the head and neck, suggesting a role of ultraviolet radiation in these lesions  Seborrheic keratoses do not harbour tumour suppressor gene mutations  Epidermal growth factor receptor inhibitors, which are used to treat some cancers, often result in an increase in verrucal (warty) keratoses  There is no easy way to remove multiple lesions on a single occasion  Unless a specific lesion is "inflamed" and is causing pain or stinging/burning or is bleeding, most insurance companies do not authorize treatment

## 2022-06-23 NOTE — PROGRESS NOTES
[facility]     Patient Name: Bertha Rosen  Encounter Date: 6/23/22     Have you been cared for by a [facility] D[facility]atologist in the last 3 years and, if so, which one? No    · Have you traveled outside of the [address] [place] [address] in the past 3 months or outside of the [facility] in the last 2 weeks? No     May we call your Preferred Phone number to discuss your specific medical information? Yes     May we leave a detailed message that includes your specific medical information? Yes      Today's Chief Concerns:   Concern #1:  Hair loss   Concern #2:      Past Medical History:  Have you personally ever had or currently have any of the following? · Skin cancer (such as Melanoma, Basal Cell Carcinoma, Squamous Cell Carcinoma? (If Yes, please provide more detail)- No  · Eczema: No  · Psoriasis: No  · HIV/AIDS: No  · Hepatitis B or C: No  · Tuberculosis: No  · Systemic Immunosuppression such as Diabetes, Biologic or Immunotherapy, Chemotherapy, Organ Transplantation, Bone Marrow Transplantation (If YES, please provide more detail): No  · Radiation Treatment (If YES, please provide more detail): No  · Any other major medical conditions/concerns? 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 No  · Eczema, Asthma, Hay Fever or Seasonal Allergies: No  · Psoriasis or Psoriatic Arthritis: No  · Do any other medical conditions seem to run in your family? If Yes, what condition and which relatives?   No    Current Medications:   (please update all dermatological medications before printing patient's AVS!)      Current Outpatient Medications:     acetaminophen (TYLENOL) 325 mg tablet, Take 2 tablets (650 mg total) by mouth every 6 (six) hours as needed for mild pain, headaches or fever, Disp: , Rfl: 0    levothyroxine 50 mcg tablet, TAKE 1 TABLET BY MOUTH EVERY DAY, Disp: 90 tablet, Rfl: 1    docusate sodium (COLACE) 100 mg capsule, Take 1 capsule (100 mg total) by mouth 3 (three) times a day for 7 days (Patient taking differently: Take 100 mg by mouth if needed  ), Disp: 21 capsule, Rfl: 0      Review of Systems:  Have you recently had or currently have any of the following? If YES, what are you doing for the problem? · Fever, chills or unintended weight loss: No  · Sudden loss or change in your vision: No  · Nausea, vomiting or blood in your stool: No  · Painful or swollen joints: No  · Wheezing or cough: No  · Changing mole or non-healing wound: No  · Nosebleeds: No  · Excessive sweating: No  · Easy or prolonged bleeding? No  · Over the last 2 weeks, how often have you been bothered by the following problems? · Taking little interest or pleasure in doing things: 1 - Not at All  · Feeling down, depressed, or hopeless: 1 - Not at All  · Rapid heartbeat with epinephrine:  No    · FEMALES ONLY:    · Are you pregnant or planning to become pregnant? No  · Are you currently or planning to be nursing or breast feeding? No    · Any known allergies?       Allergies   Allergen Reactions    Codeine Anaphylaxis     Increased Heart Rate, Palpitations   ·       Physical Exam:     Was a chaperone (Derm Clinical Assistant) present throughout the entire Physical Exam? Yes        CONSTITUTIONAL:   Vitals:    06/23/22 1026   Height: 5' (1 524 m)           PSYCH: Normal mood and affect  EYES: Normal conjunctiva  ENT: Normal lips and oral mucosa  CARDIOVASCULAR: No edema  RESPIRATORY: Normal respirations  HEME/LYMPH/IMMUNO:  No regional lymphadenopathy except as noted below in "ASSESSMENT AND PLAN BY DIAGNOSIS"    SKIN:  FULL ORGAN SYSTEM EXAM   Hair, Scalp, Ears, Face Normal except as noted below in Assessment   Neck, Cervical Chain Nodes Normal except as noted below in Assessment        Assessment and Plan by Diagnosis:    History of Present Condition:     Duration:  How long has this been an issue for you?    o  patient here for hair loss started a month and half ago   Location Affected:  Where on the body is this affecting you? o  scalp   Quality:  Is there any bleeding, pain, itch, burning/irritation, or redness associated with the skin lesion? o  shedding   Severity:  Describe any bleeding, pain, itch, burning/irritation, or redness on a scale of 1 to 10 (with 10 being the worst)  o  10   Timing:  Does this condition seem to be there pretty constantly or do you notice it more at specific times throughout the day? o  consistent   Context:  Have you ever noticed that this condition seems to be associated with specific activities you do?    o  nothing   Modifying Factors:    o Anything that seems to make the condition worse?    -  brushing  o What have you tried to do to make the condition better?    -  nothing   Associated Signs and Symptoms:  Does this skin lesion seem to be associated with any of the following:      TELOGEN EFFLUVIUM    Physical Exam:   Anatomic Location Affected:  Entire scalp   Morphological Description:  Diffuse thinning   Pertinent Positives:   Pertinent Negatives: Additional History of Present Condition:  Please see above, COVID+ in March 2022  intially a lot of shedding and Shedding has slowed in last few weeks    Assessment and Plan:  Based on a thorough discussion of this condition and the management approach to it (including a comprehensive discussion of the known risks, side effects and potential benefits of treatment), the patient (family) agrees to implement the following specific plan:   Over the counter Aminoacid called Cystine   Over the Counter Topical Rogaine foam or lotion    What is telogen effluvium?   Telogen effluvium is the name for temporary hair loss due to the shedding of resting or telogen hair after some shock to the system  New hair continues to grow  Telogen hair has a bulb or club-shaped tip  It should be distinguished from anagen effluvium, in which the hair shedding is due to interruption of active or anagen hair growth by drugs, toxins or inflammation (eg, alopecia areata)  Anagen hair has a pointed or tapered tip  What is the cause of telogen effluvium? In a normal healthy person's scalp about 95% of the hair follicles are actively growing hair (anagen hair) and 15% are resting hair (telogen hair)  A hair follicle usually grows anagen hair for 4 years or so, then rests for about 4 months  The resting or telogen hair has a club or bulb at the tip  A new anagen hair begins to grow under the resting telogen hair and pushes it out  Thus, it is normal to lose up to about 100 hairs a day on one's comb, brush, in the basin or on the pillow, as a result of the normal scalp hair cycle  If there is some shock to the system, as many as 70% of the anagen hairs can be precipitated into telogen, thus reversing the usual ratio  Typical "shock" triggers include:   Illness, especially if there is fever    Surgical operation    Accident    Childbirth    Nervous shock    Weight loss or unusual diet    Certain medications    Discontinuing the contraceptive pill    Overseas travel resulting in jetlag    Excessive sun exposure    At first, the resting scalp hairs, now in the form of club hairs, remain firmly attached to the hair follicles  It is only about 2-4 months after the shock that the new hairs coming up through the scalp push out the 'dead' club hairs and increased hair fall are noticed  Thus, paradoxically, with this type of hair loss, hair fall is a sign of hair regrowth  As the new hair first comes up through the scalp and pushes out the dead hair a fine fringe of new hair is often evident along the forehead hairline   At first, the fall of club hairs is profuse and a general thinning of the scalp hair may become evident but after several months a peak is reached and hair fall begins to lessen, gradually tapering back to normal over 6-9 months  As the hair fall tapers off the scalp thickens back up to normal, but recovery may be incomplete in some cases  Because nail and hair growth are under the same influences, an arrest in hair growth is often mirrored in the nails by a groove across them coinciding with the time of the shock to the system  This is called Beau's line  The time of the shock can be estimated from the fact that a fingernail takes 5 months to grow from the posterior nail fold to the free edge  So if the groove in the nail is half way down the nail then the shock must have been 2 1/2 months ago  Chronic telogen effluvium  In some patients, hair shedding continues to be intermittently or continuously greater than normal for long periods of time, sometimes for years  The hair cycle appears to be reset so that the anagen period is shortened  Chronic telogen effluvium often presents in women that actually continue to have quite thick and moderately long hair - this is because they notice the shed hair more than those with finer or shorter hair  Telogen effluvium does not cause complete baldness, although it may unmask a genetic tendency to genetic balding i e  female pattern hair loss, or in men, male pattern hair loss  What is the treatment for telogen effluvium? Telogen effluvium is self-correcting  It is really not influenced by any treatment that can be given  However, gentle handling of the hair, avoiding over-vigorous combing, brushing and any type of scalp massage are important  You should also ensure a nutritious diet, with plenty of protein, fruit and vegetables  The doctor may check your thyroid function, and levels of iron, vitamin J27 and folic acid, as any deficiency in these can slow hair growth   The psychological effects of hair loss should not be ignored  SEBORRHEIC KERATOSIS; NON-INFLAMED    Physical Exam:   Anatomic Location Affected:  face   Morphological Description:  Flat and raised, waxy, smooth to warty textured, yellow to brownish-grey to dark brown to blackish, discrete, "stuck-on" appearing papules   Pertinent Positives:   Pertinent Negatives: Additional History of Present Condition:  Patient reports new bumps on the skin  Denies itch, burn, pain, bleeding or ulceration  Present constantly; nothing seems to make it worse or better  No prior treatment  Assessment and Plan:  Based on a thorough discussion of this condition and the management approach to it (including a comprehensive discussion of the known risks, side effects and potential benefits of treatment), the patient (family) agrees to implement the following specific plan:   Reassured benign   For the spots treated with liquid nitrogen today, expect them to stay red and become crusty over the course of the next 7-10 days, and then the crust should fall off  If you develop a small blister in the area, this is normal  Use Vaseline for irritation  Seborrheic Keratosis  A seborrheic keratosis is a harmless warty spot that appears during adult life as a common sign of skin aging  Seborrheic keratoses can arise on any area of skin, covered or uncovered, with the usual exception of the palms and soles  They do not arise from mucous membranes  Seborrheic keratoses can have highly variable appearance  Seborrheic keratoses are extremely common  It has been estimated that over 90% of adults over the age of 61 years have one or more of them  They occur in males and females of all races, typically beginning to erupt in the 35s or 45s  They are uncommon under the age of 21 years  The precise cause of seborrhoeic keratoses is not known  Seborrhoeic keratoses are considered degenerative in nature   As time goes by, seborrheic keratoses tend to become more numerous  Some people inherit a tendency to develop a very large number of them; some people may have hundreds of them  The name "seborrheic keratosis" is misleading, because these lesions are not limited to a seborrhoeic distribution (scalp, mid-face, chest, upper back), nor are they formed from sebaceous glands, nor are they associated with sebum -- which is greasy  Seborrheic keratosis may also be called "SK," "Seb K," "basal cell papilloma," "senile wart," or "barnacle "      Researchers have noted:   Eruptive seborrhoeic keratoses can follow sunburn or dermatitis   Skin friction may be the reason they appear in body folds   Viral cause (e g , human papillomavirus) seems unlikely   Stable and clonal mutations or activation of FRFR3, PIK3CA, ELVA, AKT1 and EGFR genes are found in seborrhoeic keratoses   Seborrhoeic keratosis can arise from solar lentigo   FRFR3 mutations also arise in solar lentigines  These mutations are associated with increased age and location on the head and neck, suggesting a role of ultraviolet radiation in these lesions   Seborrheic keratoses do not harbour tumour suppressor gene mutations   Epidermal growth factor receptor inhibitors, which are used to treat some cancers, often result in an increase in verrucal (warty) keratoses  There is no easy way to remove multiple lesions on a single occasion  Unless a specific lesion is "inflamed" and is causing pain or stinging/burning or is bleeding, most insurance companies do not authorize treatment    Scribe Attestation    I,:  Lidia Velez am acting as a scribe while in the presence of the attending physician :       I,:  Baljinder Goff MD personally performed the services described in this documentation    as scribed in my presence :

## 2022-06-23 NOTE — PROGRESS NOTES
COSMETIC VISIT      SEBORRHEIC KERATOSIS; NON-INFLAMED    Physical Exam:   Anatomic Location Affected:  face   Morphological Description:  Flat and raised, waxy, smooth to warty textured, yellow to brownish-grey to dark brown to blackish, discrete, "stuck-on" appearing papules   Pertinent Positives:   Pertinent Negatives: Additional History of Present Condition:  Patient reports new bumps on the skin  Denies itch, burn, pain, bleeding or ulceration  Present constantly; nothing seems to make it worse or better  No prior treatment  Assessment and Plan:  Based on a thorough discussion of this condition and the management approach to it (including a comprehensive discussion of the known risks, side effects and potential benefits of treatment), the patient (family) agrees to implement the following specific plan:   Cosmetic procedure of Liquid Nitrogen was applied to the face   A total of 10 lesion were treated with a total cost of $150 00 as the bundle price  Seborrheic Keratosis  A seborrheic keratosis is a harmless warty spot that appears during adult life as a common sign of skin aging  Seborrheic keratoses can arise on any area of skin, covered or uncovered, with the usual exception of the palms and soles  They do not arise from mucous membranes  Seborrheic keratoses can have highly variable appearance  Seborrheic keratoses are extremely common  It has been estimated that over 90% of adults over the age of 61 years have one or more of them  They occur in males and females of all races, typically beginning to erupt in the 35s or 45s  They are uncommon under the age of 21 years  The precise cause of seborrhoeic keratoses is not known  Seborrhoeic keratoses are considered degenerative in nature  As time goes by, seborrheic keratoses tend to become more numerous   Some people inherit a tendency to develop a very large number of them; some people may have hundreds of them  The name "seborrheic keratosis" is misleading, because these lesions are not limited to a seborrhoeic distribution (scalp, mid-face, chest, upper back), nor are they formed from sebaceous glands, nor are they associated with sebum -- which is greasy  Seborrheic keratosis may also be called "SK," "Seb K," "basal cell papilloma," "senile wart," or "barnacle "      Researchers have noted:   Eruptive seborrhoeic keratoses can follow sunburn or dermatitis   Skin friction may be the reason they appear in body folds   Viral cause (e g , human papillomavirus) seems unlikely   Stable and clonal mutations or activation of FRFR3, PIK3CA, ELVA, AKT1 and EGFR genes are found in seborrhoeic keratoses   Seborrhoeic keratosis can arise from solar lentigo   FRFR3 mutations also arise in solar lentigines  These mutations are associated with increased age and location on the head and neck, suggesting a role of ultraviolet radiation in these lesions   Seborrheic keratoses do not harbour tumour suppressor gene mutations   Epidermal growth factor receptor inhibitors, which are used to treat some cancers, often result in an increase in verrucal (warty) keratoses  There is no easy way to remove multiple lesions on a single occasion  Unless a specific lesion is "inflamed" and is causing pain or stinging/burning or is bleeding, most insurance companies do not authorize treatment  PROCEDURE:  DESTRUCTION OF BENIGN LESIONS  After a thorough discussion of treatment options and risk/benefits/alternatives (including but not limited to local pain, scarring, dyspigmentation, blistering, and possible superinfection), verbal and written consent were obtained and the aforementioned lesions were treated on with cryotherapy using liquid nitrogen x 1 cycle for 5-10 seconds   TOTAL NUMBER of 10 lesions were treated today on the ANATOMIC LOCATION: face       The patient tolerated the procedure well, and after-care instructions were provided  DO NOT BILL INSURANCE  PAID IN FULL A TOTAL OF $150 00 PAID THIS VISIT        Scribe Attestation    I,:  Tawny Maher am acting as a scribe while in the presence of the attending physician :       I,:  Selam Gabriel MD personally performed the services described in this documentation    as scribed in my presence :

## 2022-07-13 ENCOUNTER — OFFICE VISIT (OUTPATIENT)
Dept: FAMILY MEDICINE CLINIC | Facility: CLINIC | Age: 72
End: 2022-07-13
Payer: MEDICARE

## 2022-07-13 VITALS
OXYGEN SATURATION: 99 % | HEART RATE: 82 BPM | TEMPERATURE: 97.4 F | RESPIRATION RATE: 14 BRPM | SYSTOLIC BLOOD PRESSURE: 124 MMHG | WEIGHT: 195 LBS | BODY MASS INDEX: 38.28 KG/M2 | DIASTOLIC BLOOD PRESSURE: 82 MMHG | HEIGHT: 60 IN

## 2022-07-13 DIAGNOSIS — Z12.31 ENCOUNTER FOR SCREENING MAMMOGRAM FOR BREAST CANCER: ICD-10-CM

## 2022-07-13 DIAGNOSIS — R73.02 IGT (IMPAIRED GLUCOSE TOLERANCE): Primary | ICD-10-CM

## 2022-07-13 DIAGNOSIS — M81.0 AGE-RELATED OSTEOPOROSIS WITHOUT CURRENT PATHOLOGICAL FRACTURE: ICD-10-CM

## 2022-07-13 DIAGNOSIS — E03.8 HYPOTHYROIDISM DUE TO HASHIMOTO'S THYROIDITIS: ICD-10-CM

## 2022-07-13 DIAGNOSIS — Z91.89 AT RISK FOR OSTEOPOROSIS: ICD-10-CM

## 2022-07-13 DIAGNOSIS — N20.0 NEPHROLITHIASIS: ICD-10-CM

## 2022-07-13 DIAGNOSIS — Z12.11 SCREENING FOR COLON CANCER: ICD-10-CM

## 2022-07-13 DIAGNOSIS — K57.90 DIVERTICULOSIS: ICD-10-CM

## 2022-07-13 DIAGNOSIS — Z13.6 SCREENING FOR CARDIOVASCULAR CONDITION: ICD-10-CM

## 2022-07-13 DIAGNOSIS — Z86.010 PERSONAL HISTORY OF COLONIC POLYPS: ICD-10-CM

## 2022-07-13 DIAGNOSIS — Z11.59 NEED FOR HEPATITIS C SCREENING TEST: ICD-10-CM

## 2022-07-13 DIAGNOSIS — Z00.00 MEDICARE ANNUAL WELLNESS VISIT, SUBSEQUENT: ICD-10-CM

## 2022-07-13 DIAGNOSIS — E06.3 HYPOTHYROIDISM DUE TO HASHIMOTO'S THYROIDITIS: ICD-10-CM

## 2022-07-13 PROCEDURE — 3288F FALL RISK ASSESSMENT DOCD: CPT | Performed by: FAMILY MEDICINE

## 2022-07-13 PROCEDURE — 1160F RVW MEDS BY RX/DR IN RCRD: CPT | Performed by: FAMILY MEDICINE

## 2022-07-13 PROCEDURE — 1125F AMNT PAIN NOTED PAIN PRSNT: CPT | Performed by: FAMILY MEDICINE

## 2022-07-13 PROCEDURE — 1124F ACP DISCUSS-NO DSCNMKR DOCD: CPT | Performed by: FAMILY MEDICINE

## 2022-07-13 PROCEDURE — G0438 PPPS, INITIAL VISIT: HCPCS | Performed by: FAMILY MEDICINE

## 2022-07-13 PROCEDURE — 99214 OFFICE O/P EST MOD 30 MIN: CPT | Performed by: FAMILY MEDICINE

## 2022-07-13 PROCEDURE — 1170F FXNL STATUS ASSESSED: CPT | Performed by: FAMILY MEDICINE

## 2022-07-13 NOTE — PROGRESS NOTES
Assessment and Plan:     Problem List Items Addressed This Visit        Endocrine    Hypothyroidism due to Hashimoto's thyroiditis    Relevant Orders    TSH, 3rd generation with Free T4 reflex      Other Visit Diagnoses     IGT (impaired glucose tolerance)    -  Primary    Melani Shabazz appears well  She is to continue a healthy, lower carb diet, and remain active  FBW with A1c ordered  Relevant Orders    Comprehensive metabolic panel    HEMOGLOBIN A1C W/ EAG ESTIMATION    Medicare annual wellness visit, subsequent        Urged pt to consider vaccination against COV-19  Nephrolithiasis        Hx of; followed by Urology  Personal history of colonic polyps        Hx of - pt due to return for colon cancer screening  Referred back to General Surgery  Relevant Orders    Ambulatory Referral to General Surgery    Screening for colon cancer        As above  Relevant Orders    Ambulatory Referral to General Surgery    Diverticulosis        As above  Relevant Orders    Ambulatory Referral to General Surgery    Encounter for screening mammogram for breast cancer        Mammogram ordered  Relevant Orders    Mammo screening bilateral w 3d & cad    At risk for osteoporosis        DXA Scan ordered  Relevant Orders    DXA bone density spine hip and pelvis    Age-related osteoporosis without current pathological fracture         As above  Relevant Orders    DXA bone density spine hip and pelvis    Need for hepatitis C screening test        One time screen  Relevant Orders    Hepatitis C Antibody ([facility])    Screening for cardiovascular condition        Relevant Orders    Lipid Panel with Direct LDL reflex        BMI Counseling: Body mass index is 38 08 kg/m²  The BMI is above normal  Nutrition recommendations include moderation in carbohydrate intake  Exercise recommendations include exercising 3-5 times per week  No pharmacotherapy was ordered  Patient referred to PCP   Rationale for BMI follow-up plan is due to patient being overweight or obese  Preventive health issues were discussed with patient, and age appropriate screening tests were ordered as noted in patient's After Visit Summary  Personalized health advice and appropriate referrals for health education or preventive services given if needed, as noted in patient's After Visit Summary  History of Present Illness:     Patient presents for a Medicare Wellness Visit    AWV Visit today  Had colonoscopy in 08/2019 (pt was hospitalized for acute diverticulitis)  1 polyp removed - due again in 2022  Has seen Dermatology for her hair loss  Considering Botox injections  She had mammogram ordered previously - ordered again today  Pt has not had COV-19 vaccination still; has had Prevnar-13 and PPSV23 already  Discussed the Shingrix Vaccine series  Working with Urology with hx of nephrolithiasis  Patient Care Team:  Yasmine Gipson DO as PCP - General  Yasmine Gipson DO as PCP - PCP-[facility]     Review of Systems:     Review of Systems   Constitutional: Negative for activity change  Respiratory: Negative for shortness of breath  Cardiovascular: Negative for chest pain  Gastrointestinal: Negative for abdominal pain and blood in stool  Genitourinary: Negative for vaginal bleeding  No new breast lumps on self-examination  Psychiatric/Behavioral: Negative for dysphoric mood  The patient is not nervous/anxious           Problem List:     Patient Active Problem List   Diagnosis    Hypothyroidism due to Hashimoto's thyroiditis    Diverticulitis of large intestine with perforation without bleeding    Morbid obesity ([address] )    COVID-19    Hypokalemia    Acute respiratory failure (HCC)    Hyperglycemia    Ureteral stone with hydronephrosis      Past Medical and Surgical History:     Past Medical History:   Diagnosis Date    Arthritis     Cataracts, bilateral     Disease of thyroid gland     Diverticulitis of colon     Hypothyroid     Kidney stone Not sure    Obesity (BMI 35 0-39 9 without comorbidity)     PNA (pneumonia) 2019     Past Surgical History:   Procedure Laterality Date     SECTION  1983    CHOLECYSTECTOMY  1968    open    EYE SURGERY  10/2021    FL RETROGRADE PYELOGRAM  3/5/2022    MS CYSTO/URETERO W/LITHOTRIPSY &INDWELL STENT INSRT Right 3/5/2022    Procedure: CYSTOSCOPY URETEROSCOPY WITH LITHOTRIPSY HOLMIUM LASER, RETROGRADE PYELOGRAM AND INSERTION STENT URETERAL;  Surgeon: Carlene Morris MD;  [facility];  Service: Urology      Family History:     Family History   Problem Relation Age of Onset    Cancer Mother     Diabetes Mother    Jodelle Rasp Father     Diabetes Father     Urolithiasis Sister       Social History:     Social History     Socioeconomic History    Marital status: /Civil Union     Spouse name: None    Number of children: None    Years of education: None    Highest education level: None   Occupational History    None   Tobacco Use    Smoking status: Former Smoker     Packs/day: 0 00     Years: 0 00     Pack years: 0 00    Smokeless tobacco: Never Used   Vaping Use    Vaping Use: Never used   Substance and Sexual Activity    Alcohol use: Not Currently     Alcohol/week: 0 0 standard drinks    Drug use: Never    Sexual activity: None   Other Topics Concern    None   Social History Narrative    None     Social Determinants of Health     Financial Resource Strain: Not on file   Food Insecurity: Not on file   Transportation Needs: Not on file   Physical Activity: Not on file   Stress: Not on file   Social Connections: Not on file   Intimate Partner Violence: Not on file   Housing Stability: Not on file      Medications and Allergies:     Current Outpatient Medications   Medication Sig Dispense Refill    acetaminophen (TYLENOL) 325 mg tablet Take 2 tablets (650 mg total) by mouth every 6 (six) hours as needed for mild pain, headaches or fever  0    levothyroxine 50 mcg tablet TAKE 1 TABLET BY MOUTH EVERY DAY 90 tablet 1    docusate sodium (COLACE) 100 mg capsule Take 1 capsule (100 mg total) by mouth 3 (three) times a day for 7 days (Patient taking differently: Take 100 mg by mouth if needed  ) 21 capsule 0     No current facility-administered medications for this visit  Allergies   Allergen Reactions    Codeine Anaphylaxis     Increased Heart Rate, Palpitations      Immunizations:     Immunization History   Administered Date(s) Administered    INFLUENZA 12/07/2015, 10/31/2020    Influenza Split High Dose Preservative Free IM 12/07/2015, 09/18/2017    Influenza, high dose seasonal 0 7 mL 10/06/2018, 10/17/2019, 09/20/2021    Pneumococcal Conjugate 13-Valent 12/07/2015    Pneumococcal Polysaccharide PPV23 07/11/2018      Health Maintenance:         Topic Date Due    Hepatitis C Screening  Never done    Breast Cancer Screening: Mammogram  Never done    Colorectal Cancer Screening  08/05/2022         Topic Date Due    COVID-19 Vaccine (1) Never done    Influenza Vaccine (1) 09/01/2022      Medicare Screening Tests and Risk Assessments:     Kelly Pineda is here for her Subsequent Wellness visit  Last Medicare Wellness visit information reviewed, patient interviewed, no change since last AWV  Health Risk Assessment:   Patient rates overall health as excellent  Patient feels that their physical health rating is same  Patient is satisfied with their life  Eyesight was rated as same  Hearing was rated as same  Patient feels that their emotional and mental health rating is same  Patients states they are never, rarely angry  Patient states they are sometimes unusually tired/fatigued  Pain experienced in the last 7 days has been none  Patient states that she has experienced no weight loss or gain in last 6 months  Fall Risk Screening:    In the past year, patient has experienced: no history of falling in past year Urinary Incontinence Screening:   Patient has not leaked urine accidently in the last six months  Home Safety:  Patient does not have trouble with stairs inside or outside of their home  Patient has working smoke alarms and has working carbon monoxide detector  Home safety hazards include: none  Nutrition:   Current diet is Regular  Medications:   Patient is not currently taking any over-the-counter supplements  Patient is able to manage medications  Activities of Daily Living (ADLs)/Instrumental Activities of Daily Living (IADLs):   Walk and transfer into and out of bed and chair?: Yes  Dress and groom yourself?: Yes    Bathe or shower yourself?: Yes    Feed yourself?  Yes  Do your laundry/housekeeping?: Yes  Manage your money, pay your bills and track your expenses?: Yes  Make your own meals?: Yes    Do your own shopping?: Yes    Previous Hospitalizations:   Any hospitalizations or ED visits within the last 12 months?: Yes    How many hospitalizations have you had in the last year?: 3-4    PREVENTIVE SCREENINGS      Cardiovascular Screening:    General: Screening Current      Diabetes Screening:     General: Screening Current      Colorectal Cancer Screening:     General: Screening Current      Cervical Cancer Screening:    General: Screening Not Indicated      Osteoporosis Screening:    General: Screening Not Indicated and History Osteoporosis      Lung Cancer Screening:     General: Screening Not Indicated    Screening, Brief Intervention, and Referral to Treatment (SBIRT)    Screening      Single Item Drug Screening:  How often have you used an illegal drug (including marijuana) or a prescription medication for non-medical reasons in the past year? never    Single Item Drug Screen Score: 0  Interpretation: Negative screen for possible drug use disorder    No exam data present     Physical Exam:     /82 (BP Location: Left arm, Patient Position: Sitting, Cuff Size: Large)   Pulse 82 Temp (!) 97 4 °F (36 3 °C) (Tympanic)   Resp 14   Ht 5' (1 524 m)   Wt 88 5 kg (195 lb)   SpO2 99%   BMI 38 08 kg/m²     Physical Exam  Vitals and nursing note reviewed  Constitutional:       General: She is not in acute distress  Appearance: Normal appearance  She is not ill-appearing, toxic-appearing or diaphoretic  HENT:      Head: Normocephalic and atraumatic  Eyes:      General: No scleral icterus  Conjunctiva/sclera: Conjunctivae normal    Cardiovascular:      Rate and Rhythm: Normal rate and regular rhythm  Heart sounds: Normal heart sounds  No murmur heard  No friction rub  No gallop  Pulmonary:      Effort: Pulmonary effort is normal  No respiratory distress  Breath sounds: Normal breath sounds  No stridor  No wheezing, rhonchi or rales  Abdominal:      General: Abdomen is flat  Bowel sounds are normal  There is no distension  Palpations: Abdomen is soft  There is no mass  Tenderness: There is no abdominal tenderness  There is no guarding or rebound  Musculoskeletal:      Cervical back: Normal range of motion and neck supple  No rigidity or tenderness  Lymphadenopathy:      Cervical: No cervical adenopathy  Neurological:      Mental Status: She is alert and oriented to person, place, and time  Psychiatric:         Mood and Affect: Mood normal          Behavior: Behavior normal          Thought Content: Thought content normal          Judgment: Judgment normal       Kelly Pineda was seen today for medicare wellness visit  Diagnoses and all orders for this visit:    IGT (impaired glucose tolerance)  Comments:  Kelly Pineda appears well  She is to continue a healthy, lower carb diet, and remain active  FBW with A1c ordered  Orders:  -     Comprehensive metabolic panel; Future  -     HEMOGLOBIN A1C W/ EAG ESTIMATION; Future    Medicare annual wellness visit, subsequent  Comments:  Urged pt to consider vaccination against COV-19        Hypothyroidism due to Hashimoto's thyroiditis  Comments:  Stable; on Levothyroxine level  Check TSH with other labs ordered  Orders:  -     TSH, 3rd generation with Free T4 reflex; Future    Nephrolithiasis  Comments:  Hx of; followed by Urology  Personal history of colonic polyps  Comments:  Hx of - pt due to return for colon cancer screening  Referred back to General Surgery  Orders:  -     Ambulatory Referral to General Surgery; Future    Screening for colon cancer  Comments:  As above  Orders:  -     Ambulatory Referral to General Surgery; Future    Diverticulosis  Comments:  As above  Orders:  -     Ambulatory Referral to General Surgery; Future    Encounter for screening mammogram for breast cancer  Comments:  Mammogram ordered  Orders:  -     Mammo screening bilateral w 3d & cad; Future    At risk for osteoporosis  Comments:  DXA Scan ordered  Orders:  -     DXA bone density spine hip and pelvis; Future    Age-related osteoporosis without current pathological fracture   Comments:  As above  Orders:  -     DXA bone density spine hip and pelvis; Future    Need for hepatitis C screening test  Comments:  One time screen  Orders:  -     Hepatitis C Antibody ([facility]); Future    Screening for cardiovascular condition  -     Lipid Panel with Direct LDL reflex;  Future          [address] Tang[address] Perry DO

## 2022-07-13 NOTE — PATIENT INSTRUCTIONS
Medicare Preventive Visit Patient Instructions  Thank you for completing your Welcome to Medicare Visit or Medicare Annual Wellness Visit today  Your next wellness visit will be due in one year (7/14/2023)  The screening/preventive services that you may require over the next 5-10 years are detailed below  Some tests may not apply to you based off risk factors and/or age  Screening tests ordered at today's visit but not completed yet may show as past due  Also, please note that scanned in results may not display below  Preventive Screenings:  Service Recommendations Previous Testing/Comments   Colorectal Cancer Screening  * Colonoscopy    * Fecal Occult Blood Test (FOBT)/Fecal Immunochemical Test (FIT)  * Fecal DNA/Cologuard Test  * Flexible Sigmoidoscopy Age: 54-65 years old   Colonoscopy: every 10 years (may be performed more frequently if at higher risk)  OR  FOBT/FIT: every 1 year  OR  Cologuard: every 3 years  OR  Sigmoidoscopy: every 5 years  Screening may be recommended earlier than age 48 if at higher risk for colorectal cancer  Also, an individualized decision between you and your healthcare provider will decide whether screening between the ages of 74-80 would be appropriate  Colonoscopy: 08/05/2019  FOBT/FIT: Not on file  Cologuard: Not on file  Sigmoidoscopy: Not on file    Screening Current     Breast Cancer Screening Age: 36 years old  Frequency: every 1-2 years  Not required if history of left and right mastectomy Mammogram: Not on file        Cervical Cancer Screening Between the ages of 21-29, pap smear recommended once every 3 years  Between the ages of 33-67, can perform pap smear with HPV co-testing every 5 years     Recommendations may differ for women with a history of total hysterectomy, cervical cancer, or abnormal pap smears in past  Pap Smear: 07/12/2017    Screening Not Indicated   Hepatitis C Screening Once for adults born between 1945 and 1965  More frequently in patients at high risk for Hepatitis C Hep C Antibody: Not on file        Diabetes Screening 1-2 times per year if you're at risk for diabetes or have pre-diabetes Fasting glucose: 98 mg/dL   A1C: 6 0 %    Screening Current   Cholesterol Screening Once every 5 years if you don't have a lipid disorder  May order more often based on risk factors  Lipid panel: 10/03/2018    Screening Current     Other Preventive Screenings Covered by Medicare:  1  Abdominal Aortic Aneurysm (AAA) Screening: covered once if your at risk  You're considered to be at risk if you have a family history of AAA  2  Lung Cancer Screening: covers low dose CT scan once per year if you meet all of the following conditions: (1) Age 50-69; (2) No signs or symptoms of lung cancer; (3) Current smoker or have quit smoking within the last 15 years; (4) You have a tobacco smoking history of at least 30 pack years (packs per day multiplied by number of years you smoked); (5) You get a written order from a healthcare provider  3  Glaucoma Screening: covered annually if you're considered high risk: (1) You have diabetes OR (2) Family history of glaucoma OR (3)  aged 48 and older OR (3)  American aged 72 and older  3  Osteoporosis Screening: covered every 2 years if you meet one of the following conditions: (1) You're estrogen deficient and at risk for osteoporosis based off medical history and other findings; (2) Have a vertebral abnormality; (3) On glucocorticoid therapy for more than 3 months; (4) Have primary hyperparathyroidism; (5) On osteoporosis medications and need to assess response to drug therapy  · Last bone density test (DXA Scan): Not on file  5  HIV Screening: covered annually if you're between the age of 12-76  Also covered annually if you are younger than 13 and older than 72 with risk factors for HIV infection  For pregnant patients, it is covered up to 3 times per pregnancy      Immunizations:  Immunization Recommendations Influenza Vaccine Annual influenza vaccination during flu season is recommended for all persons aged >= 6 months who do not have contraindications   Pneumococcal Vaccine (Prevnar and Pneumovax)  * Prevnar = PCV13  * Pneumovax = PPSV23   Adults 25-60 years old: 1-3 doses may be recommended based on certain risk factors  Adults 72 years old: Prevnar (PCV13) vaccine recommended followed by Pneumovax (PPSV23) vaccine  If already received PPSV23 since turning 65, then PCV13 recommended at least one year after PPSV23 dose  Hepatitis B Vaccine 3 dose series if at intermediate or high risk (ex: diabetes, end stage renal disease, liver disease)   Tetanus (Td) Vaccine - COST NOT COVERED BY MEDICARE PART B Following completion of primary series, a booster dose should be given every 10 years to maintain immunity against tetanus  Td may also be given as tetanus wound prophylaxis  Tdap Vaccine - COST NOT COVERED BY MEDICARE PART B Recommended at least once for all adults  For pregnant patients, recommended with each pregnancy  Shingles Vaccine (Shingrix) - COST NOT COVERED BY MEDICARE PART B  2 shot series recommended in those aged 48 and above     Health Maintenance Due:      Topic Date Due    Hepatitis C Screening  Never done    Breast Cancer Screening: Mammogram  Never done    Colorectal Cancer Screening  08/05/2022     Immunizations Due:      Topic Date Due    COVID-19 Vaccine (1) Never done    Influenza Vaccine (1) 09/01/2022     Advance Directives   What are advance directives? Advance directives are legal documents that state your wishes and plans for medical care  These plans are made ahead of time in case you lose your ability to make decisions for yourself  Advance directives can apply to any medical decision, such as the treatments you want, and if you want to donate organs  What are the types of advance directives?   There are many types of advance directives, and each state has rules about how to use them  You may choose a combination of any of the following:  · Living will: This is a written record of the treatment you want  You can also choose which treatments you do not want, which to limit, and which to stop at a certain time  This includes surgery, medicine, IV fluid, and tube feedings  · Durable power of  for healthcare [place] [facility]): This is a written record that states who you want to make healthcare choices for you when you are unable to make them for yourself  This person, called a proxy, is usually a family member or a friend  You may choose more than 1 proxy  · Do not resuscitate (DNR) order:  A DNR order is used in case your heart stops beating or you stop breathing  It is a request not to have certain forms of treatment, such as CPR  A DNR order may be included in other types of advance directives  · Medical directive: This covers the care that you want if you are in a coma, near death, or unable to make decisions for yourself  You can list the treatments you want for each condition  Treatment may include pain medicine, surgery, blood transfusions, dialysis, IV or tube feedings, and a ventilator (breathing machine)  · Values history: This document has questions about your views, beliefs, and how you feel and think about life  This information can help others choose the care that you would choose  Why are advance directives important? An advance directive helps you control your care  Although spoken wishes may be used, it is better to have your wishes written down  Spoken wishes can be misunderstood, or not followed  Treatments may be given even if you do not want them  An advance directive may make it easier for your family to make difficult choices about your care  Weight Management   Why it is important to manage your weight:  Being overweight increases your risk of health conditions such as heart disease, high blood pressure, type 2 diabetes, and certain types of cancer   It can also increase your risk for osteoarthritis, sleep apnea, and other respiratory problems  Aim for a slow, steady weight loss  Even a small amount of weight loss can lower your risk of health problems  How to lose weight safely:  A safe and healthy way to lose weight is to eat fewer calories and get regular exercise  You can lose up about 1 pound a week by decreasing the number of calories you eat by 500 calories each day  Healthy meal plan for weight management:  A healthy meal plan includes a variety of foods, contains fewer calories, and helps you stay healthy  A healthy meal plan includes the following:  · Eat whole-grain foods more often  A healthy meal plan should contain fiber  Fiber is the part of grains, fruits, and vegetables that is not broken down by your body  Whole-grain foods are healthy and provide extra fiber in your diet  Some examples of whole-grain foods are whole-wheat breads and pastas, oatmeal, brown rice, and bulgur  · Eat a variety of vegetables every day  Include dark, leafy greens such as spinach, kale, adele greens, and mustard greens  Eat yellow and orange vegetables such as carrots, sweet potatoes, and winter squash  · Eat a variety of fruits every day  Choose fresh or canned fruit (canned in its own juice or light syrup) instead of juice  Fruit juice has very little or no fiber  · Eat low-fat dairy foods  Drink fat-free (skim) milk or 1% milk  Eat fat-free yogurt and low-fat cottage cheese  Try low-fat cheeses such as mozzarella and other reduced-fat cheeses  · Choose meat and other protein foods that are low in fat  Choose beans or other legumes such as split peas or lentils  Choose fish, skinless poultry (chicken or turkey), or lean cuts of red meat (beef or pork)  Before you cook meat or poultry, cut off any visible fat  · Use less fat and oil  Try baking foods instead of frying them   Add less fat, such as margarine, sour cream, regular salad dressing and mayonnaise to foods  Eat fewer high-fat foods  Some examples of high-fat foods include french fries, doughnuts, ice cream, and cakes  · Eat fewer sweets  Limit foods and drinks that are high in sugar  This includes candy, cookies, regular soda, and sweetened drinks  Exercise:  Exercise at least 30 minutes per day on most days of the week  Some examples of exercise include walking, biking, dancing, and swimming  You can also fit in more physical activity by taking the stairs instead of the elevator or parking farther away from stores  Ask your healthcare provider about the best exercise plan for you  © Copyright [employer] 2018 Information is for End User's use only and may not be sold, redistributed or otherwise used for commercial purposes   All illustrations and images included in CareNotes® are the copyrighted property of [employer]  or [address] [place], [place] [address]

## 2022-07-14 ENCOUNTER — COSMETIC (OUTPATIENT)
Dept: DERMATOLOGY | Age: 72
End: 2022-07-14

## 2022-07-14 VITALS — HEIGHT: 60 IN | BODY MASS INDEX: 38.08 KG/M2

## 2022-07-14 DIAGNOSIS — L98.8 AGE-RELATED FACIAL WRINKLES: Primary | ICD-10-CM

## 2022-07-14 PROCEDURE — BOTOX1U PR BOTOX BY THE UNIT: Performed by: DERMATOLOGY

## 2022-07-14 NOTE — PATIENT INSTRUCTIONS
Botulinum Toxin (Botox): PRE & POST-TREATMENT PATIENT INSTRUCTIONS    Pre-Treatment Instructions    Stop 3 DAYS BEFORE treatment to prevent bruising: AVOID blood thinning over-the-counter medications and herbal supplements such as Aspirin, Motrin, Ibuprofen, Aleve, Garlic, Vitamin E, Ginkgo Biloba, St  Sens Wort, Fish Oil, Omega-3 capsules  Please note: If you are taking aspirin, warfarin, Plavix, or any other blood thinner under the guidance of a physician for heart disease, you should continue taking the medication as prescribed  Do not drink alcoholic beverages 24 hours before (or after) your treatment to avoid extra bruising  Do not use botulinum toxin if you are pregnant or breastfeeding, are allergic to any of its ingredients, or suffer from any neurological disorders  Please inform your provider if you have any questions about this prior to the treatment  Day of Treatment    Arrive to the office with irving Lamas  Please do not wear makeup  You may bring your own makeup to apply after your treatment  You may experience a mild amount of tenderness or a stinging sensation following injection  The injection sites will resemble bee stings but this should settle down within 20 minutes  You may experience some tenderness at the treatment site(s) that can last for a few hours or a few days  You may have bruises in the areas treated  Immediately After Treatment    It is best to try to exercise your treated muscles for 1-2 hours after treatment (e g  practice frowning, raising your eyebrows, and squinting)  This helps to work BOTOX® into your muscles  Stay in a vertical position for 6 hours following injection  DO NOT rest your head or lie down; sit upright  You may apply an ice or cold gel pack to the area(s) treated (avoiding pressure) as this helps reduce swelling and the potential for bruising      AVOID placing excessive pressure on the treated area(s) for the first few days; when cleansing your face or applying makeup, be very gentle  Do not work out for 24 hours  Any bruising can be covered up with make-up  You can also get topical Arnica (over the counter) to help th bruising resolve faster     Botox takes 7-10 days to kick in and lasts 3-4 months  If you want more Botox injected to the areas treated today, wait 2 weeks before coming back to the office

## 2022-07-14 NOTE — PROGRESS NOTES
[facility] Follow Up Note                                                             COSMETIC VISIT FOR BOTOX INJECTION      Patient Name: Ana M Zaman  Encounter Date: 7/14/22    Today's Chief Concerns:  [facility] Concern #1:  BOTOX INJECTION      Current Medications:    Current Outpatient Medications:     acetaminophen (TYLENOL) 325 mg tablet, Take 2 tablets (650 mg total) by mouth every 6 (six) hours as needed for mild pain, headaches or fever, Disp: , Rfl: 0    levothyroxine 50 mcg tablet, TAKE 1 TABLET BY MOUTH EVERY DAY, Disp: 90 tablet, Rfl: 1    docusate sodium (COLACE) 100 mg capsule, Take 1 capsule (100 mg total) by mouth 3 (three) times a day for 7 days (Patient taking differently: Take 100 mg by mouth if needed  ), Disp: 21 capsule, Rfl: 0    CONSTITUTIONAL:   Vitals:    07/14/22 1023   Height: 5' (1 524 m)         Specific Alerts:    Have you been seen by a [facility] Dermatologist in the last 3 years? YES    Are you pregnant or planning to become pregnant? No    Are you currently or planning to be nursing or breast feeding? No    Allergies   Allergen Reactions    Codeine Anaphylaxis     Increased Heart Rate, Palpitations       May we call your Preferred Phone number to discuss your specific medical information? YES    May we leave a detailed message that includes your specific medical information? YES    Have you traveled outside of the [facility] in the past 3 months? No    Do you currently have a pacemaker or defibrillator? No    Do you have any artificial heart valves, joints, plates, screws, rods, stents, pins, etc? No   - If Yes, were any placed within the last 2 years? Do you require any medications prior to a surgical procedure? No   - If Yes, for which procedure? - If Yes, what medications to you require?      Are you taking any medications that cause you to bleed more easily ("blood thinners") No    Have you ever experienced a rapid heartbeat with epinephrine? No    Have you ever been treated with "gold" (gold sodium thiomalate) therapy? No    [facility] can help with wrinkles, "laugh lines," facial volume loss, "double chin," "love handles," age spots, and more  Are you interested in learning today about some of the skin enhancement procedures that we offer? (If Yes, please provide more detail) No    Review of Systems:  Have you recently had or currently have any of the following?     · Fever or chills: No  · Night Sweats: No  · Headaches: No  · Weight Gain: No  · Weight Loss: No  · Blurry Vision: No  · Nausea: No  · Vomiting: No  · Diarrhea: No  · Blood in Stool: No  · Abdominal Pain: No  · Itchy Skin: No  · Painful Joints: No  · Swollen Joints: No  · Muscle Pain: No  · Irregular Mole: No  · Sun Burn: No  · Dry Skin: No  · Skin Color Changes: No  · Scar or Keloid: No  · Cold Sores/Fever Blisters: No  · Bacterial Infections/MRSA: No  · Anxiety: No  · Depression: No  · Suicidal or Homicidal Thoughts: No      PSYCH: Normal mood and affect  EYES: Normal conjunctiva  ENT: Normal lips and oral mucosa  CARDIOVASCULAR: No edema  RESPIRATORY: Normal respirations  HEME/LYMPH/IMMUNO:  No regional lymphadenopathy except as noted below in ASSESSMENT AND PLAN BY DIAGNOSIS    FULL ORGAN SYSTEM SKIN EXAM (SKIN)  Hair, Scalp, Ears, Face Normal except as noted below in Assessment        Botox Procedure Note    Screening Questions   Are you pregnant or breastfeeding? no  Do you take aspirin, fish oil or any other blood thinning medicines? no  Have you had an allergic reaction to any injectables before? no  Have you had any surgeries on your face? no  Have you been diagnosed with a muscle or nerve condition like myasthenia gravis, ALS or Lambert-Eaton syndrome? no    Diagnosis: rhytides    Location: forehead, chin    Informed consent: Discussed risks (infection, pain, bleeding, bruising, swelling, allergic reaction, paralysis of nearby muscles, eyelid droop, double vision, neck weakness, difficulty breathing, headache, undesirable cosmetic result, need for additional treatment, and death) and benefits of the procedure, as well as the alternatives  Informed consent was obtained  Photographs taken in chart after verbal consent     Preparation: The area was cleansed with alcohol  Procedure Details: Botox was injected into the dermis with a 30-gauge needle  Pressure applied to any bleeding  Lot Number: [id number]  Expiration: 2024/05  Total Units Injected: 24 U forehead, 4 U Chin    Plan: Patient instructed to remain upright for 6 hours  Tylenol may be used for headache  Allow 2 weeks before returning to clinic for additional dosing as needed  Call for any problems  Written instructions provided  THIS IS A COSMETIC PATIENT WHO PAID OUT OF POCKET AT TIME OF VISIT  DO NOT BILL  TOTAL UNITS USED: 28  TOTAL COST: $420 00  Normal cost: $15 x 28 units        Botulinum Toxin (Botox): PRE & POST-TREATMENT PATIENT INSTRUCTIONS    Pre-Treatment Instructions    Stop 3 DAYS BEFORE treatment to prevent bruising: AVOID blood thinning over-the-counter medications and herbal supplements such as Aspirin, Motrin, Ibuprofen, Aleve, Garlic, Vitamin E, Ginkgo Biloba, St  Sens Wort, Fish Oil, Omega-3 capsules  Please note: If you are taking aspirin, warfarin, Plavix, or any other blood thinner under the guidance of a physician for heart disease, you should continue taking the medication as prescribed   Do not drink alcoholic beverages 24 hours before (or after) your treatment to avoid extra bruising   Do not use botulinum toxin if you are pregnant or breastfeeding, are allergic to any of its ingredients, or suffer from any neurological disorders  Please inform your provider if you have any questions about this prior to the treatment  Day of Treatment    Arrive to the office with a Nighat Dale  Please do not wear makeup   You may bring your own makeup to apply after your treatment   You may experience a mild amount of tenderness or a stinging sensation following injection  The injection sites will resemble bee stings but this should settle down within 20 minutes   You may experience some tenderness at the treatment site(s) that can last for a few hours or a few days  You may have bruises in the areas treated  Immediately After Treatment    It is best to try to exercise your treated muscles for 1-2 hours after treatment (e g  practice frowning, raising your eyebrows, and squinting)  This helps to work BOTOX® into your muscles   Stay in a vertical position for 6 hours following injection  DO NOT rest your head or lie down; sit upright   You may apply an ice or cold gel pack to the area(s) treated (avoiding pressure) as this helps reduce swelling and the potential for bruising   AVOID placing excessive pressure on the treated area(s) for the first few days; when cleansing your face or applying makeup, be very gentle   Do not work out for 24 hours   Any bruising can be covered up with make-up  You can also get topical Arnica (over the counter) to help th bruising resolve faster     Botox takes 7-10 days to kick in and lasts 3-4 months  If you want more Botox injected to the areas treated today, wait 2 weeks before coming back to the office           Scribe Attestation    I,:  Azra Johns am acting as a scribe while in the presence of the attending physician :       I,:  Makayla Matute MD personally performed the services described in this documentation    as scribed in my presence :

## 2022-07-20 ENCOUNTER — FOLLOW UP (OUTPATIENT)
Dept: URBAN - METROPOLITAN AREA CLINIC 6 | Facility: CLINIC | Age: 72
End: 2022-07-20

## 2022-07-20 DIAGNOSIS — H18.591: ICD-10-CM

## 2022-07-20 DIAGNOSIS — H26.493: ICD-10-CM

## 2022-07-20 DIAGNOSIS — Z96.1: ICD-10-CM

## 2022-07-20 DIAGNOSIS — H35.372: ICD-10-CM

## 2022-07-20 PROCEDURE — 92012 INTRM OPH EXAM EST PATIENT: CPT

## 2022-07-20 ASSESSMENT — KERATOMETRY
OS_K2POWER_DIOPTERS: 47.75
OD_K2POWER_DIOPTERS: 50.75
OD_AXISANGLE_DEGREES: 175
OD_K2POWER_DIOPTERS: 52.75
OD_AXISANGLE_DEGREES: 177
OS_K1POWER_DIOPTERS: 47.00
OD_AXISANGLE2_DEGREES: 87
OD_AXISANGLE2_DEGREES: 85
OD_K1POWER_DIOPTERS: 47.00
OS_AXISANGLE_DEGREES: 4
OS_AXISANGLE2_DEGREES: 94

## 2022-07-20 ASSESSMENT — VISUAL ACUITY
OS_SC: 20/25
OD_SC: 20/25

## 2022-07-20 ASSESSMENT — TONOMETRY
OS_IOP_MMHG: 21
OD_IOP_MMHG: 13

## 2022-10-10 ENCOUNTER — IMMUNIZATIONS (OUTPATIENT)
Dept: FAMILY MEDICINE CLINIC | Facility: CLINIC | Age: 72
End: 2022-10-10
Payer: MEDICARE

## 2022-10-10 DIAGNOSIS — Z23 ENCOUNTER FOR IMMUNIZATION: Primary | ICD-10-CM

## 2022-10-10 PROCEDURE — G0008 ADMIN INFLUENZA VIRUS VAC: HCPCS

## 2022-10-10 PROCEDURE — 90662 IIV NO PRSV INCREASED AG IM: CPT

## 2022-11-15 ENCOUNTER — RA CDI HCC (OUTPATIENT)
Dept: OTHER | Facility: HOSPITAL | Age: 72
End: 2022-11-15

## 2022-11-15 NOTE — PROGRESS NOTES
[address]  coding opportunities       Chart reviewed, no opportunity found: CHART REVIEWED, NO OPPORTUNITY FOUND        Patients Insurance     Medicare Insurance: Medicare

## 2022-11-21 ENCOUNTER — OFFICE VISIT (OUTPATIENT)
Dept: FAMILY MEDICINE CLINIC | Facility: CLINIC | Age: 72
End: 2022-11-21

## 2022-11-21 VITALS
RESPIRATION RATE: 16 BRPM | TEMPERATURE: 97.7 F | HEIGHT: 60 IN | HEART RATE: 72 BPM | WEIGHT: 202.8 LBS | SYSTOLIC BLOOD PRESSURE: 118 MMHG | BODY MASS INDEX: 39.82 KG/M2 | DIASTOLIC BLOOD PRESSURE: 78 MMHG | OXYGEN SATURATION: 99 %

## 2022-11-21 DIAGNOSIS — M25.50 ARTHRALGIA, UNSPECIFIED JOINT: Primary | ICD-10-CM

## 2022-11-21 RX ORDER — MELOXICAM 7.5 MG/1
7.5 TABLET ORAL DAILY
Qty: 30 TABLET | Refills: 3 | Status: SHIPPED | OUTPATIENT
Start: 2022-11-21

## 2022-11-21 NOTE — PROGRESS NOTES
FAMILY PRACTICE OFFICE VISIT       NAME: Jeanne Arellano  AGE: 74 y o  SEX: female       : 1950        MRN: [id number]    DATE: 2022  TIME: 10:42 AM    Assessment and Plan   1  Arthralgia, unspecified joint  Comments:  Likely OA - pt stable  Check labs at this time  Start Meloxicam - disc potential R/SE  Can supplement with OTC Tylenol PRN  Orders:  -     meloxicam (Mobic) 7 5 mg tablet; Take 1 tablet (7 5 mg total) by mouth daily  -     Basic metabolic panel; Future  -     CBC and differential; Future  -     Lyme Antibody Profile with reflex to WB; Future  -     Sedimentation rate, automated; Future  -     C-reactive protein; Future  -     RF Screen w/ Reflex to Titer; Future  -     MARGE Screen w/ Reflex to Titer/Pattern; Future  -     Sjogren's Antibodies; Future  -     Cyclic citrul peptide antibody, IgG; Future         There are no Patient Instructions on file for this visit  Chief Complaint     Chief Complaint   Patient presents with   • Joint Pain     Patient being seen for joint pain x 2-3 months        History of Present Illness   Jeanne Arellano is a 74y o -year-old female who presents with ongoing issues with both shoulders, hips, knees  Taking Advil-Tylenol, and helps some  Has had issues now over the last 3 months  Pain getting out of a chair  Review of Systems   Review of Systems   Constitutional: Negative for activity change  Eyes: Negative for visual disturbance  Respiratory: Negative for shortness of breath  Cardiovascular: Negative for chest pain  Musculoskeletal: Positive for arthralgias  Neurological: Negative for headaches         Active Problem List     Patient Active Problem List   Diagnosis   • Hypothyroidism due to Hashimoto's thyroiditis   • Diverticulitis of large intestine with perforation without bleeding   • Morbid obesity ([address] )   • COVID-19   • Hypokalemia   • Acute respiratory failure ([address] )   • Hyperglycemia   • Ureteral stone with hydronephrosis         Past Medical History:  Past Medical History:   Diagnosis Date   • Arthritis    • Cataracts, bilateral    • Disease of thyroid gland    • Diverticulitis of colon    • Hypothyroid    • Kidney stone Not sure   • Obesity (BMI 35 0-39 9 without comorbidity)    • PNA (pneumonia) 2019       Past Surgical History:  Past Surgical History:   Procedure Laterality Date   •  SECTION  1983   • CHOLECYSTECTOMY  1968    open   • EYE SURGERY  10/2021   • FL RETROGRADE PYELOGRAM  3/5/2022   • MS CYSTO/URETERO W/LITHOTRIPSY &INDWELL STENT INSRT Right 3/5/2022    Procedure: CYSTOSCOPY URETEROSCOPY WITH LITHOTRIPSY HOLMIUM LASER, RETROGRADE PYELOGRAM AND INSERTION STENT URETERAL;  Surgeon: Kris Pineda MD;  Location: [facility];  Service: Urology       Family History:  Family History   Problem Relation Age of Onset   • Cancer Mother    • Diabetes Mother    • Cancer Father    • Diabetes Father    • Urolithiasis Sister        Social History:  Social History     Socioeconomic History   • Marital status: /Civil Union     Spouse name: Not on file   • Number of children: Not on file   • Years of education: Not on file   • Highest education level: Not on file   Occupational History   • Not on file   Tobacco Use   • Smoking status: Former     Packs/day: 0 00     Years: 0 00     Pack years: 0 00     Types: Cigarettes   • Smokeless tobacco: Never   Vaping Use   • Vaping Use: Never used   Substance and Sexual Activity   • Alcohol use: Not Currently     Alcohol/week: 0 0 standard drinks   • Drug use: Never   • Sexual activity: Not on file   Other Topics Concern   • Not on file   Social History Narrative   • Not on file     Social Determinants of Health     Financial Resource Strain: Not on file   Food Insecurity: Not on file   Transportation Needs: Not on file   Physical Activity: Not on file   Stress: Not on file   Social Connections: Not on file   Intimate Partner Violence: Not on file Housing Stability: Not on file       Objective     Vitals:    11/21/22 1014   BP: 118/78   Pulse: 72   Resp: 16   Temp: 97 7 °F (36 5 °C)   SpO2: 99%     Wt Readings from Last 3 Encounters:   11/21/22 92 kg (202 lb 12 8 oz)   07/13/22 88 5 kg (195 lb)   06/23/22 89 6 kg (197 lb 9 6 oz)       Physical Exam  Vitals and nursing note reviewed  Constitutional:       General: She is not in acute distress  Appearance: Normal appearance  She is not ill-appearing, toxic-appearing or diaphoretic  HENT:      Head: Normocephalic and atraumatic  Comments: No TTP over the bilateral temporal regions  Eyes:      Conjunctiva/sclera: Conjunctivae normal    Cardiovascular:      Rate and Rhythm: Normal rate and regular rhythm  Heart sounds: Normal heart sounds  No murmur heard  No friction rub  No gallop  Pulmonary:      Effort: Pulmonary effort is normal  No respiratory distress  Breath sounds: Normal breath sounds  No stridor  No wheezing, rhonchi or rales  Musculoskeletal:      Cervical back: Normal range of motion and neck supple  No rigidity or tenderness  Comments: No TTP over the bilateral deltoid regions  Lymphadenopathy:      Cervical: No cervical adenopathy  Neurological:      Mental Status: She is alert and oriented to person, place, and time  Psychiatric:         Mood and Affect: Mood normal          Behavior: Behavior normal          Thought Content:  Thought content normal          Judgment: Judgment normal          Pertinent Laboratory/Diagnostic Studies:  Lab Results   Component Value Date    GLUCOSE 89 05/10/2015    BUN 11 03/06/2022    CREATININE 0 83 03/06/2022    CALCIUM 9 1 03/06/2022     05/10/2015    K 3 5 03/06/2022    CO2 25 03/06/2022     03/06/2022     Lab Results   Component Value Date    ALT 29 03/04/2022    AST 21 03/04/2022    ALKPHOS 89 03/04/2022    BILITOT 0 4 05/10/2015       Lab Results   Component Value Date    WBC 7 36 03/06/2022    HGB 11 4 (L) 03/06/2022    HCT 34 6 (L) 03/06/2022    MCV 88 03/06/2022     03/06/2022       No results found for: TSH    No results found for: CHOL  Lab Results   Component Value Date    TRIG 153 (H) 10/03/2018     Lab Results   Component Value Date    HDL 41 10/03/2018     Lab Results   Component Value Date    LDLCALC 143 (H) 10/03/2018     Lab Results   Component Value Date    HGBA1C 6 0 (H) 03/04/2022       Results for orders placed or performed during the hospital encounter of 03/05/22   Hemoglobin A1C w/ EAG Estimation   Result Value Ref Range    Hemoglobin A1C 6 0 (H) Normal 3 8-5 6%; PreDiabetic 5 7-6 4%;  Diabetic >=6 5%; Glycemic control for adults with diabetes <7 0% %     mg/dl   CBC and differential   Result Value Ref Range    WBC 7 36 4 31 - 10 16 Thousand/uL    RBC 3 93 3 81 - 5 12 Million/uL    Hemoglobin 11 4 (L) 11 5 - 15 4 g/dL    Hematocrit 34 6 (L) 34 8 - 46 1 %    MCV 88 82 - 98 fL    MCH 29 0 26 8 - 34 3 pg    MCHC 32 9 31 4 - 37 4 g/dL    RDW 15 0 11 6 - 15 1 %    MPV 10 8 8 9 - 12 7 fL    Platelets 770 010 - 436 Thousands/uL    nRBC 0 /100 WBCs    Neutrophils Relative 60 43 - 75 %    Immat GRANS % 1 0 - 2 %    Lymphocytes Relative 25 14 - 44 %    Monocytes Relative 11 4 - 12 %    Eosinophils Relative 2 0 - 6 %    Basophils Relative 1 0 - 1 %    Neutrophils Absolute 4 50 1 85 - 7 62 Thousands/µL    Immature Grans Absolute 0 04 0 00 - 0 20 Thousand/uL    Lymphocytes Absolute 1 81 0 60 - 4 47 Thousands/µL    Monocytes Absolute 0 81 0 17 - 1 22 Thousand/µL    Eosinophils Absolute 0 14 0 00 - 0 61 Thousand/µL    Basophils Absolute 0 06 0 00 - 0 10 Thousands/µL   Basic metabolic panel   Result Value Ref Range    Sodium 137 136 - 145 mmol/L    Potassium 3 5 3 5 - 5 3 mmol/L    Chloride 107 100 - 108 mmol/L    CO2 25 21 - 32 mmol/L    ANION GAP 5 4 - 13 mmol/L    BUN 11 5 - 25 mg/dL    Creatinine 0 83 0 60 - 1 30 mg/dL    Glucose 104 65 - 140 mg/dL    Calcium 9 1 8 3 - 10 1 mg/dL    eGFR 70 ml/min/1 73sq m       Orders Placed This Encounter   Procedures   • Basic metabolic panel   • CBC and differential   • Lyme Antibody Profile with reflex to WB   • Sedimentation rate, automated   • C-reactive protein   • RF Screen w/ Reflex to Titer   • MARGE Screen w/ Reflex to Titer/Pattern   • Sjogren's Antibodies   • Cyclic citrul peptide antibody, IgG       ALLERGIES:  Allergies   Allergen Reactions   • Codeine Anaphylaxis     Increased Heart Rate, Palpitations       Current Medications     Current Outpatient Medications   Medication Sig Dispense Refill   • acetaminophen (TYLENOL) 325 mg tablet Take 2 tablets (650 mg total) by mouth every 6 (six) hours as needed for mild pain, headaches or fever  0   • Ibuprofen-Acetaminophen (ADVIL DUAL ACTION PO) Take by mouth if needed     • levothyroxine 50 mcg tablet TAKE 1 TABLET BY MOUTH EVERY DAY 90 tablet 1   • meloxicam (Mobic) 7 5 mg tablet Take 1 tablet (7 5 mg total) by mouth daily 30 tablet 3     No current facility-administered medications for this visit           Health Maintenance     Health Maintenance   Topic Date Due   • Hepatitis C Screening  Never done   • Hepatitis B Vaccine (1 of 3 - 3-dose series) Never done   • COVID-19 Vaccine (1) Never done   • Breast Cancer Screening: Mammogram  Never done   • Osteoporosis Screening  Never done   • Colorectal Cancer Screening  08/05/2022   • Fall Risk  07/13/2023   • Urinary Incontinence Screening  07/13/2023   • Medicare Annual Wellness Visit (AWV)  07/13/2023   • BMI: Followup Plan  07/13/2023   • Depression Screening  11/21/2023   • BMI: Adult  11/21/2023   • Pneumococcal Vaccine: 65+ Years  Completed   • Influenza Vaccine  Completed   • HIB Vaccine  Aged Out   • IPV Vaccine  Aged Out   • Hepatitis A Vaccine  Aged Out   • Meningococcal ACWY Vaccine  Aged Out   • HPV Vaccine  Aged Out     Immunization History   Administered Date(s) Administered   • INFLUENZA 12/07/2015, 10/31/2020   • Influenza Split High Dose Preservative Free IM 12/07/2015, 09/18/2017   • Influenza, high dose seasonal 0 7 mL 10/06/2018, 10/17/2019, 09/20/2021, 10/10/2022   • Pneumococcal Conjugate 13-Valent 12/07/2015   • Pneumococcal Polysaccharide PPV23 07/11/2018          Stan Zuluaga DO

## 2022-12-12 ENCOUNTER — APPOINTMENT (OUTPATIENT)
Dept: LAB | Facility: CLINIC | Age: 72
End: 2022-12-12

## 2022-12-12 DIAGNOSIS — Z13.6 SCREENING FOR CARDIOVASCULAR CONDITION: ICD-10-CM

## 2022-12-12 DIAGNOSIS — R73.02 IGT (IMPAIRED GLUCOSE TOLERANCE): ICD-10-CM

## 2022-12-12 DIAGNOSIS — E03.8 HYPOTHYROIDISM DUE TO HASHIMOTO'S THYROIDITIS: ICD-10-CM

## 2022-12-12 DIAGNOSIS — R73.9 HYPERGLYCEMIA: ICD-10-CM

## 2022-12-12 DIAGNOSIS — M25.50 ARTHRALGIA, UNSPECIFIED JOINT: ICD-10-CM

## 2022-12-12 DIAGNOSIS — Z11.59 NEED FOR HEPATITIS C SCREENING TEST: ICD-10-CM

## 2022-12-12 DIAGNOSIS — E06.3 HYPOTHYROIDISM DUE TO HASHIMOTO'S THYROIDITIS: ICD-10-CM

## 2022-12-12 LAB
ALBUMIN SERPL BCP-MCNC: 4.3 G/DL (ref 3.5–5)
ALP SERPL-CCNC: 76 U/L (ref 34–104)
ALT SERPL W P-5'-P-CCNC: 11 U/L (ref 7–52)
ANION GAP SERPL CALCULATED.3IONS-SCNC: 6 MMOL/L (ref 4–13)
AST SERPL W P-5'-P-CCNC: 17 U/L (ref 13–39)
BASOPHILS # BLD AUTO: 0.07 THOUSANDS/ÂΜL (ref 0–0.1)
BASOPHILS NFR BLD AUTO: 1 % (ref 0–1)
BILIRUB SERPL-MCNC: 0.52 MG/DL (ref 0.2–1)
BUN SERPL-MCNC: 16 MG/DL (ref 5–25)
CALCIUM SERPL-MCNC: 9.3 MG/DL (ref 8.4–10.2)
CHLORIDE SERPL-SCNC: 103 MMOL/L (ref 96–108)
CHOLEST SERPL-MCNC: 219 MG/DL
CO2 SERPL-SCNC: 29 MMOL/L (ref 21–32)
CREAT SERPL-MCNC: 0.8 MG/DL (ref 0.6–1.3)
CRP SERPL QL: 9.7 MG/L
EOSINOPHIL # BLD AUTO: 0.17 THOUSAND/ÂΜL (ref 0–0.61)
EOSINOPHIL NFR BLD AUTO: 2 % (ref 0–6)
ERYTHROCYTE [DISTWIDTH] IN BLOOD BY AUTOMATED COUNT: 14.2 % (ref 11.6–15.1)
ERYTHROCYTE [SEDIMENTATION RATE] IN BLOOD: 26 MM/HOUR (ref 0–29)
EST. AVERAGE GLUCOSE BLD GHB EST-MCNC: 111 MG/DL
GFR SERPL CREATININE-BSD FRML MDRD: 72 ML/MIN/1.73SQ M
GLUCOSE P FAST SERPL-MCNC: 100 MG/DL (ref 65–99)
HBA1C MFR BLD: 5.5 %
HCT VFR BLD AUTO: 45.8 % (ref 34.8–46.1)
HCV AB SER QL: NORMAL
HDLC SERPL-MCNC: 37 MG/DL
HGB BLD-MCNC: 14.8 G/DL (ref 11.5–15.4)
IMM GRANULOCYTES # BLD AUTO: 0.03 THOUSAND/UL (ref 0–0.2)
IMM GRANULOCYTES NFR BLD AUTO: 0 % (ref 0–2)
LDLC SERPL CALC-MCNC: 138 MG/DL (ref 0–100)
LYMPHOCYTES # BLD AUTO: 2.32 THOUSANDS/ÂΜL (ref 0.6–4.47)
LYMPHOCYTES NFR BLD AUTO: 33 % (ref 14–44)
MCH RBC QN AUTO: 29.1 PG (ref 26.8–34.3)
MCHC RBC AUTO-ENTMCNC: 32.3 G/DL (ref 31.4–37.4)
MCV RBC AUTO: 90 FL (ref 82–98)
MONOCYTES # BLD AUTO: 0.64 THOUSAND/ÂΜL (ref 0.17–1.22)
MONOCYTES NFR BLD AUTO: 9 % (ref 4–12)
NEUTROPHILS # BLD AUTO: 3.78 THOUSANDS/ÂΜL (ref 1.85–7.62)
NEUTS SEG NFR BLD AUTO: 55 % (ref 43–75)
NRBC BLD AUTO-RTO: 0 /100 WBCS
PLATELET # BLD AUTO: 266 THOUSANDS/UL (ref 149–390)
PMV BLD AUTO: 9.8 FL (ref 8.9–12.7)
POTASSIUM SERPL-SCNC: 4.9 MMOL/L (ref 3.5–5.3)
PROT SERPL-MCNC: 7.6 G/DL (ref 6.4–8.4)
RBC # BLD AUTO: 5.08 MILLION/UL (ref 3.81–5.12)
SODIUM SERPL-SCNC: 138 MMOL/L (ref 135–147)
T4 FREE SERPL-MCNC: 0.93 NG/DL (ref 0.76–1.46)
TRIGL SERPL-MCNC: 221 MG/DL
TSH SERPL DL<=0.05 MIU/L-ACNC: 11.02 UIU/ML (ref 0.45–4.5)
WBC # BLD AUTO: 7.01 THOUSAND/UL (ref 4.31–10.16)

## 2022-12-13 LAB
ANA SER QL IA: NEGATIVE
B BURGDOR IGG+IGM SER-ACNC: <0.2 AI
ENA SS-A AB SER-ACNC: <0.2 AI (ref 0–0.9)
ENA SS-B AB SER-ACNC: <0.2 AI (ref 0–0.9)
RHEUMATOID FACT SER QL LA: NEGATIVE

## 2022-12-16 DIAGNOSIS — E03.8 HYPOTHYROIDISM DUE TO HASHIMOTO'S THYROIDITIS: Chronic | ICD-10-CM

## 2022-12-16 DIAGNOSIS — E06.3 HYPOTHYROIDISM DUE TO HASHIMOTO'S THYROIDITIS: Chronic | ICD-10-CM

## 2022-12-16 LAB — CCP AB SER IA-ACNC: 0.7

## 2022-12-16 RX ORDER — LEVOTHYROXINE SODIUM 0.05 MG/1
TABLET ORAL
Qty: 90 TABLET | Refills: 1 | Status: SHIPPED | OUTPATIENT
Start: 2022-12-16 | End: 2022-12-19 | Stop reason: DRUGHIGH

## 2022-12-19 ENCOUNTER — TELEPHONE (OUTPATIENT)
Dept: FAMILY MEDICINE CLINIC | Facility: CLINIC | Age: 72
End: 2022-12-19

## 2022-12-19 DIAGNOSIS — E03.8 HYPOTHYROIDISM DUE TO HASHIMOTO'S THYROIDITIS: Primary | ICD-10-CM

## 2022-12-19 DIAGNOSIS — E06.3 HYPOTHYROIDISM DUE TO HASHIMOTO'S THYROIDITIS: Primary | ICD-10-CM

## 2022-12-19 RX ORDER — LEVOTHYROXINE SODIUM 0.07 MG/1
75 TABLET ORAL DAILY
Qty: 90 TABLET | Refills: 1 | Status: SHIPPED | OUTPATIENT
Start: 2022-12-19

## 2022-12-19 NOTE — TELEPHONE ENCOUNTER
Patient called and stated that you sent her a [employer] message about her levothyroxine 50 mg, if she was still taking the medication, patient stated that she is taking medication  The patient stated that you wanted to change the medication from 50 mg to 75 mg  Please advise

## 2022-12-30 ENCOUNTER — OFFICE VISIT (OUTPATIENT)
Dept: FAMILY MEDICINE CLINIC | Facility: CLINIC | Age: 72
End: 2022-12-30

## 2022-12-30 VITALS
DIASTOLIC BLOOD PRESSURE: 68 MMHG | HEART RATE: 69 BPM | BODY MASS INDEX: 39.61 KG/M2 | OXYGEN SATURATION: 98 % | SYSTOLIC BLOOD PRESSURE: 148 MMHG | TEMPERATURE: 97.2 F | HEIGHT: 60 IN | RESPIRATION RATE: 16 BRPM

## 2022-12-30 DIAGNOSIS — E06.3 HYPOTHYROIDISM DUE TO HASHIMOTO'S THYROIDITIS: ICD-10-CM

## 2022-12-30 DIAGNOSIS — E03.8 HYPOTHYROIDISM DUE TO HASHIMOTO'S THYROIDITIS: ICD-10-CM

## 2022-12-30 DIAGNOSIS — M79.89 MASS OF SOFT TISSUE OF NECK: Primary | ICD-10-CM
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
## 2022-12-30 RX ORDER — AMOXICILLIN AND CLAVULANATE POTASSIUM 875; 125 MG/1; MG/1
1 TABLET, FILM COATED ORAL EVERY 12 HOURS SCHEDULED
Qty: 20 TABLET | Refills: 0 | Status: SHIPPED | OUTPATIENT
Start: 2022-12-30 | End: 2023-01-09

## 2022-12-30 NOTE — PROGRESS NOTES
FAMILY PRACTICE OFFICE VISIT       NAME: Merle Hernandez  AGE: 74 y o  SEX: female       : 1950        MRN: [id number]    DATE: 2022  TIME: 5:20 PM    Assessment and Plan   1  Mass of soft tissue of neck  -     CT soft tissue neck w contrast; Future; Expected date: 2022    2  Cat scratch  -     amoxicillin-clavulanate (AUGMENTIN) 875-125 mg per tablet; Take 1 tablet by mouth every 12 (twelve) hours for 10 days    3  Hypothyroidism due to Hashimoto's thyroiditis  Assessment & Plan:  Cont current meds                   Chief Complaint     Chief Complaint   Patient presents with   • Follow-up     Lump on the right side of the neck x 2 days       History of Present Illness   Merle Hernandez is a 74y o -year-old female who is here for lump on neck  Noticed it a "while" ago  Past two days bothering her  Red now  Annoying now  Not painful  No trouble swallowing  Lumpy as well  Just changed thyroid med     Cat scratch left arm, red and painful  No drainage  Cat is her own cat, vaccinated    Review of Systems   Review of Systems   Constitutional: Negative for fatigue and fever  HENT: Negative for congestion, postnasal drip and rhinorrhea  Eyes: Negative for photophobia and visual disturbance  Respiratory: Negative for cough, shortness of breath and wheezing  Cardiovascular: Negative for chest pain and palpitations  Gastrointestinal: Negative for constipation, diarrhea, nausea and vomiting  Genitourinary: Negative for dysuria and frequency  Musculoskeletal: Negative for arthralgias and myalgias  Skin: Positive for wound  Neurological: Negative for dizziness, light-headedness and headaches  Hematological: Positive for adenopathy  Psychiatric/Behavioral: Negative for dysphoric mood and sleep disturbance  The patient is not nervous/anxious          Active Problem List     Patient Active Problem List   Diagnosis   • Hypothyroidism due to Hashimoto's thyroiditis   • Diverticulitis of large intestine with perforation without bleeding   • Morbid obesity ([address] )   • Hyperglycemia   • Ureteral stone with hydronephrosis   • Mass of soft tissue of neck   • Cat scratch   • History of COVID-19         Past Medical History:  Past Medical History:   Diagnosis Date   • Arthritis    • Cataracts, bilateral    • Disease of thyroid gland    • Diverticulitis of colon    • Hypothyroid    • Kidney stone Not sure   • Obesity (BMI 35 0-39 9 without comorbidity)    • PNA (pneumonia) 2019       Past Surgical History:  Past Surgical History:   Procedure Laterality Date   •  SECTION  1983   • CHOLECYSTECTOMY  1968    open   • EYE SURGERY  10/2021   • FL RETROGRADE PYELOGRAM  3/5/2022   • [place] CYSTO/URETERO W/LITHOTRIPSY &INDWELL STENT INSRT Right 3/5/2022    Procedure: CYSTOSCOPY URETEROSCOPY WITH LITHOTRIPSY HOLMIUM LASER, RETROGRADE PYELOGRAM AND INSERTION STENT URETERAL;  Surgeon: Pratibha Reno MD;  Location: [facility];  Service: Urology       Family History:  Family History   Problem Relation Age of Onset   • Cancer Mother    • Diabetes Mother    • Cancer Father    • Diabetes Father    • Urolithiasis Sister        Social History:  Social History     Socioeconomic History   • Marital status: /Civil Union     Spouse name: Not on file   • Number of children: Not on file   • Years of education: Not on file   • Highest education level: Not on file   Occupational History   • Not on file   Tobacco Use   • Smoking status: Former     Packs/day: 0 00     Years: 0 00     Pack years: 0 00     Types: Cigarettes   • Smokeless tobacco: Never   Vaping Use   • Vaping Use: Never used   Substance and Sexual Activity   • Alcohol use: Not Currently     Alcohol/week: 0 0 standard drinks   • Drug use: Never   • Sexual activity: Not on file   Other Topics Concern   • Not on file   Social History Narrative   • Not on file     Social Determinants of Health     Financial Resource Strain: Not on file   Food Insecurity: Not on file   Transportation Needs: Not on file   Physical Activity: Not on file   Stress: Not on file   Social Connections: Not on file   Intimate Partner Violence: Not on file   Housing Stability: Not on file       Objective     Vitals:    12/30/22 1048   BP: 148/68   Pulse: 69   Resp: 16   Temp: (!) 97 2 °F (36 2 °C)   SpO2: 98%     Wt Readings from Last 3 Encounters:   11/21/22 92 kg (202 lb 12 8 oz)   07/13/22 88 5 kg (195 lb)   06/23/22 89 6 kg (197 lb 9 6 oz)       Physical Exam  Vitals and nursing note reviewed  Constitutional:       Appearance: Normal appearance  She is obese  HENT:      Head: Normocephalic and atraumatic  Right Ear: Tympanic membrane, ear canal and external ear normal       Left Ear: Tympanic membrane, ear canal and external ear normal       Nose: Nose normal       Mouth/Throat:      Mouth: Mucous membranes are moist       Pharynx: Oropharynx is clear  Eyes:      Conjunctiva/sclera: Conjunctivae normal    Neck:     Cardiovascular:      Rate and Rhythm: Normal rate and regular rhythm  Pulses: Normal pulses  Heart sounds: Normal heart sounds  Pulmonary:      Effort: Pulmonary effort is normal       Breath sounds: Normal breath sounds  Abdominal:      General: Bowel sounds are normal    Musculoskeletal:         General: Normal range of motion  Cervical back: Normal range of motion and neck supple  Skin:     General: Skin is warm  Capillary Refill: Capillary refill takes less than 2 seconds  Comments: Cat scatch approx 3cm long  Erythema and scab noted  Tender to palpation     Neurological:      General: No focal deficit present  Mental Status: She is alert and oriented to person, place, and time  Psychiatric:         Mood and Affect: Mood normal          Behavior: Behavior normal          Thought Content:  Thought content normal          Judgment: Judgment normal          Pertinent Laboratory/Diagnostic Studies:  Lab Results Component Value Date    GLUCOSE 89 05/10/2015    BUN 16 12/12/2022    CREATININE 0 80 12/12/2022    CALCIUM 9 3 12/12/2022     05/10/2015    K 4 9 12/12/2022    CO2 29 12/12/2022     12/12/2022     Lab Results   Component Value Date    ALT 11 12/12/2022    AST 17 12/12/2022    ALKPHOS 76 12/12/2022    BILITOT 0 4 05/10/2015       Lab Results   Component Value Date    WBC 7 01 12/12/2022    HGB 14 8 12/12/2022    HCT 45 8 12/12/2022    MCV 90 12/12/2022     12/12/2022       No results found for: TSH    No results found for: CHOL  Lab Results   Component Value Date    TRIG 221 (H) 12/12/2022     Lab Results   Component Value Date    HDL 37 (L) 12/12/2022     Lab Results   Component Value Date    LDLCALC 138 (H) 12/12/2022     Lab Results   Component Value Date    HGBA1C 5 5 12/12/2022       Results for orders placed or performed in visit on 12/12/22   Comprehensive metabolic panel   Result Value Ref Range    Sodium 138 135 - 147 mmol/L    Potassium 4 9 3 5 - 5 3 mmol/L    Chloride 103 96 - 108 mmol/L    CO2 29 21 - 32 mmol/L    ANION GAP 6 4 - 13 mmol/L    BUN 16 5 - 25 mg/dL    Creatinine 0 80 0 60 - 1 30 mg/dL    Glucose, Fasting 100 (H) 65 - 99 mg/dL    Calcium 9 3 8 4 - 10 2 mg/dL    AST 17 13 - 39 U/L    ALT 11 7 - 52 U/L    Alkaline Phosphatase 76 34 - 104 U/L    Total Protein 7 6 6 4 - 8 4 g/dL    Albumin 4 3 3 5 - 5 0 g/dL    Total Bilirubin 0 52 0 20 - 1 00 mg/dL    eGFR 72 ml/min/1 73sq m   HEMOGLOBIN A1C W/ EAG ESTIMATION   Result Value Ref Range    Hemoglobin A1C 5 5 Normal 3 8-5 6%; PreDiabetic 5 7-6 4%;  Diabetic >=6 5%; Glycemic control for adults with diabetes <7 0% %     mg/dl   Lipid Panel with Direct LDL reflex   Result Value Ref Range    Cholesterol 219 (H) See Comment mg/dL    Triglycerides 221 (H) See Comment mg/dL    HDL, Direct 37 (L) >=50 mg/dL    LDL Calculated 138 (H) 0 - 100 mg/dL   Hepatitis C Antibody ([facility])   Result Value Ref Range    Hepatitis C Ab Non-reactive Non-reactive   TSH, 3rd generation with Free T4 reflex   Result Value Ref Range    TSH 3RD GENERATON 11 016 (H) 0 450 - 4 500 uIU/mL   CBC and differential   Result Value Ref Range    WBC 7 01 4 31 - 10 16 Thousand/uL    RBC 5 08 3 81 - 5 12 Million/uL    Hemoglobin 14 8 11 5 - 15 4 g/dL    Hematocrit 45 8 34 8 - 46 1 %    MCV 90 82 - 98 fL    MCH 29 1 26 8 - 34 3 pg    MCHC 32 3 31 4 - 37 4 g/dL    RDW 14 2 11 6 - 15 1 %    MPV 9 8 8 9 - 12 7 fL    Platelets 155 034 - 069 Thousands/uL    nRBC 0 /100 WBCs    Neutrophils Relative 55 43 - 75 %    Immat GRANS % 0 0 - 2 %    Lymphocytes Relative 33 14 - 44 %    Monocytes Relative 9 4 - 12 %    Eosinophils Relative 2 0 - 6 %    Basophils Relative 1 0 - 1 %    Neutrophils Absolute 3 78 1 85 - 7 62 Thousands/µL    Immature Grans Absolute 0 03 0 00 - 0 20 Thousand/uL    Lymphocytes Absolute 2 32 0 60 - 4 47 Thousands/µL    Monocytes Absolute 0 64 0 17 - 1 22 Thousand/µL    Eosinophils Absolute 0 17 0 00 - 0 61 Thousand/µL    Basophils Absolute 0 07 0 00 - 0 10 Thousands/µL   Lyme Antibody Profile with reflex to WB   Result Value Ref Range    Lyme Total Antibodies <0 2 0 2 - 8 0 AI   Sedimentation rate, automated   Result Value Ref Range    Sed Rate 26 0 - 29 mm/hour   C-reactive protein   Result Value Ref Range    CRP 9 7 (H) <3 0 mg/L   RF Screen w/ Reflex to Titer   Result Value Ref Range    Rheumatoid Factor Negative Negative   MARGE Screen w/ Reflex to Titer/Pattern   Result Value Ref Range    MAREG Negative Negative   Sjogren's Antibodies   Result Value Ref Range    SS-A (RO) Ab <0 2 0 0 - 0 9 AI    SS-B (LA) Ab <0 2 0 0 - 0 9 AI   Cyclic citrul peptide antibody, IgG   Result Value Ref Range    Cyclic Citrullinated Peptide Ab  0 7 See comment   T4, free   Result Value Ref Range    Free T4 0 93 0 76 - 1 46 ng/dL       Orders Placed This Encounter   Procedures   • CT soft tissue neck w contrast       ALLERGIES:  Allergies   Allergen Reactions   • Codeine Anaphylaxis     Increased Heart Rate, Palpitations       Current Medications     Current Outpatient Medications   Medication Sig Dispense Refill   • amoxicillin-clavulanate (AUGMENTIN) 875-125 mg per tablet Take 1 tablet by mouth every 12 (twelve) hours for 10 days 20 tablet 0   • acetaminophen (TYLENOL) 325 mg tablet Take 2 tablets (650 mg total) by mouth every 6 (six) hours as needed for mild pain, headaches or fever  0   • levothyroxine (Synthroid) 75 mcg tablet Take 1 tablet (75 mcg total) by mouth daily 90 tablet 1   • meloxicam (Mobic) 7 5 mg tablet Take 1 tablet (7 5 mg total) by mouth daily 30 tablet 3     No current facility-administered medications for this visit           Health Maintenance     Health Maintenance   Topic Date Due   • Hepatitis B Vaccine (1 of 3 - 3-dose series) Never done   • COVID-19 Vaccine (1) Never done   • Breast Cancer Screening: Mammogram  Never done   • Osteoporosis Screening  Never done   • Colorectal Cancer Screening  08/05/2022   • Fall Risk  07/13/2023   • Urinary Incontinence Screening  07/13/2023   • Medicare Annual Wellness Visit (AWV)  07/13/2023   • BMI: Followup Plan  07/13/2023   • Depression Screening  11/21/2023   • BMI: Adult  11/21/2023   • Hepatitis C Screening  Completed   • Pneumococcal Vaccine: 65+ Years  Completed   • Influenza Vaccine  Completed   • HIB Vaccine  Aged Out   • IPV Vaccine  Aged Out   • Hepatitis A Vaccine  Aged Out   • Meningococcal ACWY Vaccine  Aged Out   • HPV Vaccine  Aged Out     Immunization History   Administered Date(s) Administered   • INFLUENZA 12/07/2015, 10/31/2020   • Influenza Split High Dose Preservative Free IM 12/07/2015, 09/18/2017   • Influenza, high dose seasonal 0 7 mL 10/06/2018, 10/17/2019, 09/20/2021, 10/10/2022   • Pneumococcal Conjugate 13-Valent 12/07/2015   • Pneumococcal Polysaccharide PPV23 07/11/2018          RILEY Briceno

## 2022-12-31 PROBLEM — J96.00 ACUTE RESPIRATORY FAILURE (HCC): Status: RESOLVED | Noted: 2022-02-15 | Resolved: 2022-12-31

## 2022-12-31 PROBLEM — Z86.16 HISTORY OF COVID-19: Status: ACTIVE | Noted: 2022-12-31

## 2022-12-31 PROBLEM — U07.1 COVID-19: Status: RESOLVED | Noted: 2022-02-09 | Resolved: 2022-12-31
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
## 2022-12-31 PROBLEM — M79.89 MASS OF SOFT TISSUE OF NECK: Status: ACTIVE | Noted: 2022-12-31

## 2022-12-31 PROBLEM — E87.6 HYPOKALEMIA: Status: RESOLVED | Noted: 2022-02-15 | Resolved: 2022-12-31

## 2023-01-13 ENCOUNTER — HOSPITAL ENCOUNTER (OUTPATIENT)
Dept: CT IMAGING | Facility: HOSPITAL | Age: 73
Discharge: HOME | End: 2023-01-13

## 2023-01-13 DIAGNOSIS — M79.89 MASS OF SOFT TISSUE OF NECK: ICD-10-CM

## 2023-01-13 RX ADMIN — IOHEXOL 72 ML: 350 INJECTION, SOLUTION INTRAVENOUS at 15:19

## 2023-01-16 ENCOUNTER — OFFICE VISIT (OUTPATIENT)
Dept: FAMILY MEDICINE CLINIC | Facility: CLINIC | Age: 73
End: 2023-01-16

## 2023-01-16 VITALS
RESPIRATION RATE: 14 BRPM | SYSTOLIC BLOOD PRESSURE: 114 MMHG | OXYGEN SATURATION: 99 % | DIASTOLIC BLOOD PRESSURE: 74 MMHG | TEMPERATURE: 97.6 F | BODY MASS INDEX: 39.61 KG/M2 | HEIGHT: 60 IN | HEART RATE: 71 BPM

## 2023-01-16 DIAGNOSIS — E06.3 HYPOTHYROIDISM DUE TO HASHIMOTO'S THYROIDITIS: ICD-10-CM

## 2023-01-16 DIAGNOSIS — R73.02 IGT (IMPAIRED GLUCOSE TOLERANCE): ICD-10-CM

## 2023-01-16 DIAGNOSIS — E78.5 MILD HYPERLIPIDEMIA: ICD-10-CM

## 2023-01-16 DIAGNOSIS — M79.89 MASS OF SOFT TISSUE OF NECK: ICD-10-CM

## 2023-01-16 DIAGNOSIS — M25.50 ARTHRALGIA, UNSPECIFIED JOINT: Primary | ICD-10-CM

## 2023-01-16 DIAGNOSIS — E03.8 HYPOTHYROIDISM DUE TO HASHIMOTO'S THYROIDITIS: ICD-10-CM

## 2023-01-16 NOTE — PROGRESS NOTES
FAMILY PRACTICE OFFICE VISIT       NAME: Darek Nair  AGE: 74 y o  SEX: female       : 1950        MRN: [id number]    DATE: 2023  TIME: 12:37 PM    Assessment and Plan   1  Arthralgia, unspecified joint  Comments:  Pt stable - on Meloxicam   Doing much better with the Meloxicam     2  Hypothyroidism due to Hashimoto's thyroiditis  Comments: On increased dose of Levothyroxine - will recheck TSH in 1 more month  Orders:  -     TSH, 3rd generation with Free T4 reflex; Future    3  IGT (impaired glucose tolerance)  Comments:  Pt to continue to watch her carb / saturated fat intake, remain active  4  Mild hyperlipidemia  Comments:  As above  Pt not agreeable to adding a Statin  5  Mass of soft tissue of neck  Comments:  Seen recently in clinic by another provider -> CT soft tissue of neck results still pending at this time  There are no Patient Instructions on file for this visit  Chief Complaint     Chief Complaint   Patient presents with   • Follow-up     Patient being seen for 6 month follow up        History of Present Illness   Darek Nair is a 74y o -year-old female who presents in f/u with her   Discussed  Labs from 2022 - TSH up to 11 (pt aware; on new dose of Levothyroxine x 1 month) - had missed no doses of her medication; A1c 5 5%;  - pt declines Statin  CT Neck  - results pending  Review of Systems   Review of Systems   Constitutional: Positive for fatigue  Negative for activity change  Respiratory: Negative for shortness of breath  Cardiovascular: Negative for chest pain  Musculoskeletal: Negative for arthralgias         Active Problem List     Patient Active Problem List   Diagnosis   • Hypothyroidism due to Hashimoto's thyroiditis   • Diverticulitis of large intestine with perforation without bleeding   • Morbid obesity ([address] )   • Hyperglycemia   • Ureteral stone with hydronephrosis   • Mass of soft tissue of neck   • Cat scratch • History of COVID-19         Past Medical History:  Past Medical History:   Diagnosis Date   • Arthritis    • Cataracts, bilateral    • Disease of thyroid gland    • Diverticulitis of colon    • Hypothyroid    • Kidney stone Not sure   • Obesity (BMI 35 0-39 9 without comorbidity)    • PNA (pneumonia) 2019       Past Surgical History:  Past Surgical History:   Procedure Laterality Date   •  SECTION  1983   • CHOLECYSTECTOMY  1968    open   • EYE SURGERY  10/2021   • FL RETROGRADE PYELOGRAM  3/5/2022   • [place] CYSTO/URETERO W/LITHOTRIPSY &INDWELL STENT INSRT Right 3/5/2022    Procedure: CYSTOSCOPY URETEROSCOPY WITH LITHOTRIPSY HOLMIUM LASER, RETROGRADE PYELOGRAM AND INSERTION STENT URETERAL;  Surgeon: Gabi Long MD;  Location: [facility];  Service: Urology       Family History:  Family History   Problem Relation Age of Onset   • Cancer Mother    • Diabetes Mother    • Cancer Father    • Diabetes Father    • Urolithiasis Sister        Social History:  Social History     Socioeconomic History   • Marital status: /Civil Union     Spouse name: Not on file   • Number of children: Not on file   • Years of education: Not on file   • Highest education level: Not on file   Occupational History   • Not on file   Tobacco Use   • Smoking status: Former     Packs/day: 0 00     Years: 0 00     Pack years: 0 00     Types: Cigarettes   • Smokeless tobacco: Never   Vaping Use   • Vaping Use: Never used   Substance and Sexual Activity   • Alcohol use: Not Currently     Alcohol/week: 0 0 standard drinks   • Drug use: Never   • Sexual activity: Not on file   Other Topics Concern   • Not on file   Social History Narrative   • Not on file     Social Determinants of Health     Financial Resource Strain: Not on file   Food Insecurity: Not on file   Transportation Needs: Not on file   Physical Activity: Not on file   Stress: Not on file   Social Connections: Not on file   Intimate Partner Violence: Not on file   Housing Stability: Not on file       Objective     Vitals:    01/16/23 1201   BP: 114/74   Pulse: 71   Resp: 14   Temp: 97 6 °F (36 4 °C)   SpO2: 99%     Wt Readings from Last 3 Encounters:   11/21/22 92 kg (202 lb 12 8 oz)   07/13/22 88 5 kg (195 lb)   06/23/22 89 6 kg (197 lb 9 6 oz)       Physical Exam  Vitals and nursing note reviewed  Constitutional:       General: She is not in acute distress  Appearance: Normal appearance  She is not ill-appearing, toxic-appearing or diaphoretic  HENT:      Head: Normocephalic and atraumatic  Eyes:      General: No scleral icterus  Conjunctiva/sclera: Conjunctivae normal    Neck:      Thyroid: No thyromegaly  Cardiovascular:      Rate and Rhythm: Normal rate and regular rhythm  Heart sounds: Normal heart sounds  No murmur heard  No friction rub  No gallop  Pulmonary:      Effort: Pulmonary effort is normal  No respiratory distress  Breath sounds: Normal breath sounds  No stridor  No wheezing, rhonchi or rales  Musculoskeletal:      Cervical back: Normal range of motion and neck supple  No rigidity or tenderness  Lymphadenopathy:      Cervical: No cervical adenopathy  Neurological:      Mental Status: She is alert and oriented to person, place, and time  Psychiatric:         Mood and Affect: Mood normal          Behavior: Behavior normal          Thought Content:  Thought content normal          Judgment: Judgment normal          Pertinent Laboratory/Diagnostic Studies:  Lab Results   Component Value Date    GLUCOSE 89 05/10/2015    BUN 16 12/12/2022    CREATININE 0 80 12/12/2022    CALCIUM 9 3 12/12/2022     05/10/2015    K 4 9 12/12/2022    CO2 29 12/12/2022     12/12/2022     Lab Results   Component Value Date    ALT 11 12/12/2022    AST 17 12/12/2022    ALKPHOS 76 12/12/2022    BILITOT 0 4 05/10/2015       Lab Results   Component Value Date    WBC 7 01 12/12/2022    HGB 14 8 12/12/2022    HCT 45 8 12/12/2022    MCV 90 12/12/2022     12/12/2022       No results found for: TSH    No results found for: CHOL  Lab Results   Component Value Date    TRIG 221 (H) 12/12/2022     Lab Results   Component Value Date    HDL 37 (L) 12/12/2022     Lab Results   Component Value Date    LDLCALC 138 (H) 12/12/2022     Lab Results   Component Value Date    HGBA1C 5 5 12/12/2022       Results for orders placed or performed in visit on 12/12/22   Comprehensive metabolic panel   Result Value Ref Range    Sodium 138 135 - 147 mmol/L    Potassium 4 9 3 5 - 5 3 mmol/L    Chloride 103 96 - 108 mmol/L    CO2 29 21 - 32 mmol/L    ANION GAP 6 4 - 13 mmol/L    BUN 16 5 - 25 mg/dL    Creatinine 0 80 0 60 - 1 30 mg/dL    Glucose, Fasting 100 (H) 65 - 99 mg/dL    Calcium 9 3 8 4 - 10 2 mg/dL    AST 17 13 - 39 U/L    ALT 11 7 - 52 U/L    Alkaline Phosphatase 76 34 - 104 U/L    Total Protein 7 6 6 4 - 8 4 g/dL    Albumin 4 3 3 5 - 5 0 g/dL    Total Bilirubin 0 52 0 20 - 1 00 mg/dL    eGFR 72 ml/min/1 73sq m   HEMOGLOBIN A1C W/ EAG ESTIMATION   Result Value Ref Range    Hemoglobin A1C 5 5 Normal 3 8-5 6%; PreDiabetic 5 7-6 4%;  Diabetic >=6 5%; Glycemic control for adults with diabetes <7 0% %     mg/dl   Lipid Panel with Direct LDL reflex   Result Value Ref Range    Cholesterol 219 (H) See Comment mg/dL    Triglycerides 221 (H) See Comment mg/dL    HDL, Direct 37 (L) >=50 mg/dL    LDL Calculated 138 (H) 0 - 100 mg/dL   Hepatitis C Antibody ([facility])   Result Value Ref Range    Hepatitis C Ab Non-reactive Non-reactive   TSH, 3rd generation with Free T4 reflex   Result Value Ref Range    TSH 3RD GENERATON 11 016 (H) 0 450 - 4 500 uIU/mL   CBC and differential   Result Value Ref Range    WBC 7 01 4 31 - 10 16 Thousand/uL    RBC 5 08 3 81 - 5 12 Million/uL    Hemoglobin 14 8 11 5 - 15 4 g/dL    Hematocrit 45 8 34 8 - 46 1 %    MCV 90 82 - 98 fL    MCH 29 1 26 8 - 34 3 pg    MCHC 32 3 31 4 - 37 4 g/dL    RDW 14 2 11 6 - 15 1 %    MPV 9 8 8 9 - 12 7 fL    Platelets 818 948 - 049 Thousands/uL    nRBC 0 /100 WBCs    Neutrophils Relative 55 43 - 75 %    Immat GRANS % 0 0 - 2 %    Lymphocytes Relative 33 14 - 44 %    Monocytes Relative 9 4 - 12 %    Eosinophils Relative 2 0 - 6 %    Basophils Relative 1 0 - 1 %    Neutrophils Absolute 3 78 1 85 - 7 62 Thousands/µL    Immature Grans Absolute 0 03 0 00 - 0 20 Thousand/uL    Lymphocytes Absolute 2 32 0 60 - 4 47 Thousands/µL    Monocytes Absolute 0 64 0 17 - 1 22 Thousand/µL    Eosinophils Absolute 0 17 0 00 - 0 61 Thousand/µL    Basophils Absolute 0 07 0 00 - 0 10 Thousands/µL   Lyme Antibody Profile with reflex to WB   Result Value Ref Range    Lyme Total Antibodies <0 2 0 2 - 8 0 AI   Sedimentation rate, automated   Result Value Ref Range    Sed Rate 26 0 - 29 mm/hour   C-reactive protein   Result Value Ref Range    CRP 9 7 (H) <3 0 mg/L   RF Screen w/ Reflex to Titer   Result Value Ref Range    Rheumatoid Factor Negative Negative   MARGE Screen w/ Reflex to Titer/Pattern   Result Value Ref Range    MARGE Negative Negative   Sjogren's Antibodies   Result Value Ref Range    SS-A (RO) Ab <0 2 0 0 - 0 9 AI    SS-B (LA) Ab <0 2 0 0 - 0 9 AI   Cyclic citrul peptide antibody, IgG   Result Value Ref Range    Cyclic Citrullinated Peptide Ab  0 7 See comment   T4, free   Result Value Ref Range    Free T4 0 93 0 76 - 1 46 ng/dL       Orders Placed This Encounter   Procedures   • TSH, 3rd generation with Free T4 reflex       ALLERGIES:  Allergies   Allergen Reactions   • Codeine Anaphylaxis     Increased Heart Rate, Palpitations       Current Medications     Current Outpatient Medications   Medication Sig Dispense Refill   • acetaminophen (TYLENOL) 325 mg tablet Take 2 tablets (650 mg total) by mouth every 6 (six) hours as needed for mild pain, headaches or fever  0   • levothyroxine (Synthroid) 75 mcg tablet Take 1 tablet (75 mcg total) by mouth daily 90 tablet 1   • meloxicam (Mobic) 7 5 mg tablet Take 1 tablet (7 5 mg total) by mouth daily 30 tablet 3     No current facility-administered medications for this visit  Health Maintenance     Health Maintenance   Topic Date Due   • COVID-19 Vaccine (1) Never done   • Breast Cancer Screening: Mammogram  Never done   • Osteoporosis Screening  Never done   • Colorectal Cancer Screening  08/05/2022   • BMI: Followup Plan  07/13/2023   • Fall Risk  07/13/2023   • Urinary Incontinence Screening  07/13/2023   • Medicare Annual Wellness Visit (AWV)  07/13/2023   • Depression Screening  11/21/2023   • BMI: Adult  11/21/2023   • Hepatitis C Screening  Completed   • Pneumococcal Vaccine: 65+ Years  Completed   • Influenza Vaccine  Completed   • HIB Vaccine  Aged Out   • IPV Vaccine  Aged Out   • Hepatitis A Vaccine  Aged Out   • Meningococcal ACWY Vaccine  Aged Out   • HPV Vaccine  Aged Out     Immunization History   Administered Date(s) Administered   • INFLUENZA 12/07/2015, 10/31/2020   • Influenza Split High Dose Preservative Free IM 12/07/2015, 09/18/2017   • Influenza, high dose seasonal 0 7 mL 10/06/2018, 10/17/2019, 09/20/2021, 10/10/2022   • Pneumococcal Conjugate 13-Valent 12/07/2015   • Pneumococcal Polysaccharide PPV23 07/11/2018     BMI Counseling: Body mass index is 39 61 kg/m²  The BMI is above normal  Nutrition recommendations include moderation in carbohydrate intake and reducing intake of saturated and trans fat  Exercise recommendations include exercising 3-5 times per week  No pharmacotherapy was ordered  Patient referred to PCP  Rationale for BMI follow-up plan is due to patient being overweight or obese           [address] DO

## 2023-02-08 ENCOUNTER — OFFICE VISIT (OUTPATIENT)
Dept: FAMILY MEDICINE CLINIC | Facility: CLINIC | Age: 73
End: 2023-02-08

## 2023-02-08 VITALS
TEMPERATURE: 98.2 F | OXYGEN SATURATION: 97 % | BODY MASS INDEX: 39.61 KG/M2 | RESPIRATION RATE: 16 BRPM | HEART RATE: 79 BPM | HEIGHT: 60 IN | DIASTOLIC BLOOD PRESSURE: 82 MMHG | SYSTOLIC BLOOD PRESSURE: 122 MMHG

## 2023-02-08 DIAGNOSIS — R93.89 ABNORMAL CT OF THE CHEST: ICD-10-CM

## 2023-02-08 DIAGNOSIS — E88.89 MADELUNG'S DISEASE (HCC): ICD-10-CM

## 2023-02-08 DIAGNOSIS — I65.22 STENOSIS OF LEFT INTERNAL CAROTID ARTERY: ICD-10-CM

## 2023-02-08 DIAGNOSIS — E03.8 HYPOTHYROIDISM DUE TO HASHIMOTO'S THYROIDITIS: ICD-10-CM

## 2023-02-08 DIAGNOSIS — M79.89 MASS OF SOFT TISSUE OF NECK: Primary | ICD-10-CM

## 2023-02-08 DIAGNOSIS — E06.3 HYPOTHYROIDISM DUE TO HASHIMOTO'S THYROIDITIS: ICD-10-CM

## 2023-02-08 DIAGNOSIS — E78.5 MILD HYPERLIPIDEMIA: ICD-10-CM

## 2023-02-08 DIAGNOSIS — Z86.16 HISTORY OF COVID-19: ICD-10-CM

## 2023-02-08 DIAGNOSIS — R73.02 IGT (IMPAIRED GLUCOSE TOLERANCE): ICD-10-CM

## 2023-02-08 RX ORDER — ROSUVASTATIN CALCIUM 20 MG/1
20 TABLET, COATED ORAL DAILY
Qty: 90 TABLET | Refills: 3 | Status: SHIPPED | OUTPATIENT
Start: 2023-02-08

## 2023-02-08 RX ORDER — CEPHALEXIN 500 MG/1
1 CAPSULE ORAL 3 TIMES DAILY
COMMUNITY
Start: 2023-01-30 | End: 2023-02-09

## 2023-02-08 NOTE — PROGRESS NOTES
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[address] )  Comments:  As above  3  Stenosis of left internal carotid artery  Comments:  Discussed -> start Rosuvastatin, 81mg ASA  Check labs 3 months  VAS Carotid Doppler  Orders:  -     VAS carotid complete study; Future; Expected date: 2023    4  Mild hyperlipidemia  Comments:  As above  Orders:  -     VAS carotid complete study; Future; Expected date: 2023  -     rosuvastatin (CRESTOR) 20 MG tablet; Take 1 tablet (20 mg total) by mouth daily  -     Lipid Panel with Direct LDL reflex; Future    5  IGT (impaired glucose tolerance)  Comments:  Pt to to continue to limit her carb intake  Orders:  -     Comprehensive metabolic panel; Future    6  Abnormal CT of the chest  Comments:  Discussed - getting CT of the Chest   Orders:  -     CT chest wo contrast; Future; Expected date: 2023    7  History of COVID-19  Comments:  As above  Orders:  -     CT chest wo contrast; Future; Expected date: 2023    8  Hypothyroidism due to Hashimoto's thyroiditis  Comments:  Repeat TSH to be done soon to reassess  Pt on Levothyroxine daily  There are no Patient Instructions on file for this visit  Chief Complaint     Chief Complaint   Patient presents with   • Follow-up     Patient being seen for follow up on test results        History of Present Illness   Ashkan Mccabe is a 74y o -year-old female who presents in f/u with her , Aneudy   Discussing CT Neck done for clinic recently  Discussed results at length  Hx of COV-19 in the past; not vaccinated      Review of Systems   Review of Systems   Constitutional: Negative for activity change and fever  Respiratory: Negative for cough and shortness of breath  Cardiovascular: Negative for chest pain and leg swelling  Musculoskeletal: Positive for neck pain  Some residual neck          Active Problem List     Patient Active Problem List   Diagnosis   • Hypothyroidism due to Hashimoto's thyroiditis   • Diverticulitis of large intestine with perforation without bleeding   • Morbid obesity ([address] )   • Hyperglycemia   • Ureteral stone with hydronephrosis   • Mass of soft tissue of neck   • Cat scratch   • History of COVID-19         Past Medical History:  Past Medical History:   Diagnosis Date   • Arthritis    • Cataracts, bilateral    • Disease of thyroid gland    • Diverticulitis of colon    • Hypothyroid    • Kidney stone Not sure   • Obesity (BMI 35 0-39 9 without comorbidity)    • PNA (pneumonia) 2019       Past Surgical History:  Past Surgical History:   Procedure Laterality Date   •  SECTION  1983   • CHOLECYSTECTOMY  1968    open   • EYE SURGERY  10/2021   • FL RETROGRADE PYELOGRAM  3/5/2022   • NM CYSTO/URETERO W/LITHOTRIPSY &INDWELL STENT INSRT Right 3/5/2022    Procedure: CYSTOSCOPY URETEROSCOPY WITH LITHOTRIPSY HOLMIUM LASER, RETROGRADE PYELOGRAM AND INSERTION STENT URETERAL;  Surgeon: Jay Maldonado MD;  Location: [facility];  Service: Urology       Family History:  Family History   Problem Relation Age of Onset   • Cancer Mother    • Diabetes Mother    • Cancer Father    • Diabetes Father    • Urolithiasis Sister        Social History:  Social History     Socioeconomic History   • Marital status: /Civil Union     Spouse name: Not on file   • Number of children: Not on file   • Years of education: Not on file   • Highest education level: Not on file   Occupational History   • Not on file   Tobacco Use   • Smoking status: Former     Packs/day: 0 00     Years: 0 00     Pack years: 0 00     Types: Cigarettes   • Smokeless tobacco: Never Vaping Use   • Vaping Use: Never used   Substance and Sexual Activity   • Alcohol use: Not Currently     Alcohol/week: 0 0 standard drinks   • Drug use: Never   • Sexual activity: Not on file   Other Topics Concern   • Not on file   Social History Narrative   • Not on file     Social Determinants of Health     Financial Resource Strain: Not on file   Food Insecurity: Not on file   Transportation Needs: Not on file   Physical Activity: Not on file   Stress: Not on file   Social Connections: Not on file   Intimate Partner Violence: Not on file   Housing Stability: Not on file       Objective     Vitals:    02/08/23 0936   BP: 122/82   Pulse: 79   Resp: 16   Temp: 98 2 °F (36 8 °C)   SpO2: 97%     Wt Readings from Last 3 Encounters:   11/21/22 92 kg (202 lb 12 8 oz)   07/13/22 88 5 kg (195 lb)   06/23/22 89 6 kg (197 lb 9 6 oz)       Physical Exam  Vitals and nursing note reviewed  Constitutional:       General: She is not in acute distress  Appearance: Normal appearance  She is not ill-appearing, toxic-appearing or diaphoretic  HENT:      Head: Normocephalic and atraumatic  Eyes:      General: No scleral icterus  Conjunctiva/sclera: Conjunctivae normal    Neck:      Vascular: No carotid bruit  Comments: Mild soft tissue build up at the right lower neck; no erythema  Cardiovascular:      Rate and Rhythm: Normal rate and regular rhythm  Heart sounds: Normal heart sounds  No murmur heard  No friction rub  No gallop  Pulmonary:      Effort: Pulmonary effort is normal  No respiratory distress  Breath sounds: Normal breath sounds  No stridor  No wheezing, rhonchi or rales  Musculoskeletal:      Cervical back: Normal range of motion and neck supple  No rigidity or tenderness  Lymphadenopathy:      Cervical: No cervical adenopathy  Neurological:      Mental Status: She is alert and oriented to person, place, and time     Psychiatric:         Mood and Affect: Mood normal  Behavior: Behavior normal          Thought Content: Thought content normal          Judgment: Judgment normal          Pertinent Laboratory/Diagnostic Studies:  Lab Results   Component Value Date    GLUCOSE 89 05/10/2015    BUN 16 12/12/2022    CREATININE 0 80 12/12/2022    CALCIUM 9 3 12/12/2022     05/10/2015    K 4 9 12/12/2022    CO2 29 12/12/2022     12/12/2022     Lab Results   Component Value Date    ALT 11 12/12/2022    AST 17 12/12/2022    ALKPHOS 76 12/12/2022    BILITOT 0 4 05/10/2015       Lab Results   Component Value Date    WBC 7 01 12/12/2022    HGB 14 8 12/12/2022    HCT 45 8 12/12/2022    MCV 90 12/12/2022     12/12/2022       No results found for: TSH    No results found for: CHOL  Lab Results   Component Value Date    TRIG 221 (H) 12/12/2022     Lab Results   Component Value Date    HDL 37 (L) 12/12/2022     Lab Results   Component Value Date    LDLCALC 138 (H) 12/12/2022     Lab Results   Component Value Date    HGBA1C 5 5 12/12/2022       Results for orders placed or performed in visit on 12/12/22   Comprehensive metabolic panel   Result Value Ref Range    Sodium 138 135 - 147 mmol/L    Potassium 4 9 3 5 - 5 3 mmol/L    Chloride 103 96 - 108 mmol/L    CO2 29 21 - 32 mmol/L    ANION GAP 6 4 - 13 mmol/L    BUN 16 5 - 25 mg/dL    Creatinine 0 80 0 60 - 1 30 mg/dL    Glucose, Fasting 100 (H) 65 - 99 mg/dL    Calcium 9 3 8 4 - 10 2 mg/dL    AST 17 13 - 39 U/L    ALT 11 7 - 52 U/L    Alkaline Phosphatase 76 34 - 104 U/L    Total Protein 7 6 6 4 - 8 4 g/dL    Albumin 4 3 3 5 - 5 0 g/dL    Total Bilirubin 0 52 0 20 - 1 00 mg/dL    eGFR 72 ml/min/1 73sq m   HEMOGLOBIN A1C W/ EAG ESTIMATION   Result Value Ref Range    Hemoglobin A1C 5 5 Normal 3 8-5 6%; PreDiabetic 5 7-6 4%;  Diabetic >=6 5%; Glycemic control for adults with diabetes <7 0% %     mg/dl   Lipid Panel with Direct LDL reflex   Result Value Ref Range    Cholesterol 219 (H) See Comment mg/dL    Triglycerides 221 (H) See Comment mg/dL    HDL, Direct 37 (L) >=50 mg/dL    LDL Calculated 138 (H) 0 - 100 mg/dL   Hepatitis C Antibody ([facility])   Result Value Ref Range    Hepatitis C Ab Non-reactive Non-reactive   TSH, 3rd generation with Free T4 reflex   Result Value Ref Range    TSH 3RD GENERATON 11 016 (H) 0 450 - 4 500 uIU/mL   CBC and differential   Result Value Ref Range    WBC 7 01 4 31 - 10 16 Thousand/uL    RBC 5 08 3 81 - 5 12 Million/uL    Hemoglobin 14 8 11 5 - 15 4 g/dL    Hematocrit 45 8 34 8 - 46 1 %    MCV 90 82 - 98 fL    MCH 29 1 26 8 - 34 3 pg    MCHC 32 3 31 4 - 37 4 g/dL    RDW 14 2 11 6 - 15 1 %    MPV 9 8 8 9 - 12 7 fL    Platelets 900 168 - 945 Thousands/uL    nRBC 0 /100 WBCs    Neutrophils Relative 55 43 - 75 %    Immat GRANS % 0 0 - 2 %    Lymphocytes Relative 33 14 - 44 %    Monocytes Relative 9 4 - 12 %    Eosinophils Relative 2 0 - 6 %    Basophils Relative 1 0 - 1 %    Neutrophils Absolute 3 78 1 85 - 7 62 Thousands/µL    Immature Grans Absolute 0 03 0 00 - 0 20 Thousand/uL    Lymphocytes Absolute 2 32 0 60 - 4 47 Thousands/µL    Monocytes Absolute 0 64 0 17 - 1 22 Thousand/µL    Eosinophils Absolute 0 17 0 00 - 0 61 Thousand/µL    Basophils Absolute 0 07 0 00 - 0 10 Thousands/µL   Lyme Antibody Profile with reflex to WB   Result Value Ref Range    Lyme Total Antibodies <0 2 0 2 - 8 0 AI   Sedimentation rate, automated   Result Value Ref Range    Sed Rate 26 0 - 29 mm/hour   C-reactive protein   Result Value Ref Range    CRP 9 7 (H) <3 0 mg/L   RF Screen w/ Reflex to Titer   Result Value Ref Range    Rheumatoid Factor Negative Negative   MARGE Screen w/ Reflex to Titer/Pattern   Result Value Ref Range    MARGE Negative Negative   Sjogren's Antibodies   Result Value Ref Range    SS-A (RO) Ab <0 2 0 0 - 0 9 AI    SS-B (LA) Ab <0 2 0 0 - 0 9 AI   Cyclic citrul peptide antibody, IgG   Result Value Ref Range    Cyclic Citrullinated Peptide Ab  0 7 See comment   T4, free   Result Value Ref Range    Free T4 0  93 0 76 - 1 46 ng/dL       Orders Placed This Encounter   Procedures   • CT chest wo contrast   • Lipid Panel with Direct LDL reflex   • Comprehensive metabolic panel       ALLERGIES:  Allergies   Allergen Reactions   • Codeine Anaphylaxis     Increased Heart Rate, Palpitations       Current Medications     Current Outpatient Medications   Medication Sig Dispense Refill   • acetaminophen (TYLENOL) 325 mg tablet Take 2 tablets (650 mg total) by mouth every 6 (six) hours as needed for mild pain, headaches or fever  0   • levothyroxine (Synthroid) 75 mcg tablet Take 1 tablet (75 mcg total) by mouth daily 90 tablet 1   • meloxicam (Mobic) 7 5 mg tablet Take 1 tablet (7 5 mg total) by mouth daily 30 tablet 3   • rosuvastatin (CRESTOR) 20 MG tablet Take 1 tablet (20 mg total) by mouth daily 90 tablet 3   • cephalexin (KEFLEX) 500 mg capsule Take 1 capsule by mouth 3 (three) times a day       No current facility-administered medications for this visit           Health Maintenance     Health Maintenance   Topic Date Due   • COVID-19 Vaccine (1) Never done   • Breast Cancer Screening: Mammogram  Never done   • Osteoporosis Screening  Never done   • Colorectal Cancer Screening  08/05/2022   • Fall Risk  07/13/2023   • Urinary Incontinence Screening  07/13/2023   • Medicare Annual Wellness Visit (AWV)  07/13/2023   • Depression Screening  11/21/2023   • BMI: Adult  11/21/2023   • BMI: Followup Plan  01/16/2024   • Hepatitis C Screening  Completed   • Pneumococcal Vaccine: 65+ Years  Completed   • Influenza Vaccine  Completed   • HIB Vaccine  Aged Out   • IPV Vaccine  Aged Out   • Hepatitis A Vaccine  Aged Out   • Meningococcal ACWY Vaccine  Aged Out   • HPV Vaccine  Aged Out     Immunization History   Administered Date(s) Administered   • INFLUENZA 12/07/2015, 10/31/2020   • Influenza Split High Dose Preservative Free IM 12/07/2015, 09/18/2017   • Influenza, high dose seasonal 0 7 mL 10/06/2018, 10/17/2019, 09/20/2021, 10/10/2022   • Pneumococcal Conjugate 13-Valent 12/07/2015   • Pneumococcal Polysaccharide PPV23 07/11/2018          Stan Zuluaga DO

## 2023-02-22 DIAGNOSIS — I70.90 ATHEROSCLEROSIS OF ARTERY: Primary | ICD-10-CM

## 2023-02-27 ENCOUNTER — OFFICE VISIT (OUTPATIENT)
Dept: FAMILY MEDICINE CLINIC | Facility: CLINIC | Age: 73
End: 2023-02-27

## 2023-02-27 VITALS
OXYGEN SATURATION: 100 % | SYSTOLIC BLOOD PRESSURE: 122 MMHG | HEIGHT: 60 IN | HEART RATE: 68 BPM | DIASTOLIC BLOOD PRESSURE: 84 MMHG | RESPIRATION RATE: 16 BRPM | TEMPERATURE: 97.3 F | BODY MASS INDEX: 39.61 KG/M2

## 2023-02-27 DIAGNOSIS — M25.561 RIGHT KNEE PAIN, UNSPECIFIED CHRONICITY: Primary | ICD-10-CM

## 2023-02-27 RX ORDER — TRIAMCINOLONE ACETONIDE 40 MG/ML
40 INJECTION, SUSPENSION INTRA-ARTICULAR; INTRAMUSCULAR
Status: COMPLETED | OUTPATIENT
Start: 2023-02-27 | End: 2023-02-27

## 2023-02-27 RX ORDER — LIDOCAINE HYDROCHLORIDE 20 MG/ML
2 INJECTION, SOLUTION INFILTRATION; PERINEURAL
Status: COMPLETED | OUTPATIENT
Start: 2023-02-27 | End: 2023-02-27

## 2023-02-27 RX ADMIN — LIDOCAINE HYDROCHLORIDE 2 ML: 20 INJECTION, SOLUTION INFILTRATION; PERINEURAL at 09:44

## 2023-02-27 RX ADMIN — TRIAMCINOLONE ACETONIDE 40 MG: 40 INJECTION, SUSPENSION INTRA-ARTICULAR; INTRAMUSCULAR at 09:44

## 2023-02-27 NOTE — PROGRESS NOTES
FAMILY PRACTICE OFFICE VISIT       NAME: Alma Montez  AGE: 74 y o  SEX: female       : 1950        MRN: [id number]    DATE: 2023  TIME: 9:45 AM    Assessment and Plan   1  Right knee pain, unspecified chronicity  Comments:  Demetrio Blakely tolerated the injection well, without immediate complication  Pt to call if any issues  Orders:  -     Large joint arthrocentesis: R knee         There are no Patient Instructions on file for this visit  Chief Complaint     Chief Complaint   Patient presents with   • Knee Pain     Patient being seen for right knee pain-- ongoing, getting worse        History of Present Illness   Alma Montez is a 74y o -year-old female who presents today with her  Warnell Fabry), with the c/o right knee pain for some time now; desires injection  [place]MP wash checked  Review of Systems   Review of Systems   Constitutional: Negative for activity change  Musculoskeletal: Positive for arthralgias         Active Problem List     Patient Active Problem List   Diagnosis   • Hypothyroidism due to Hashimoto's thyroiditis   • Diverticulitis of large intestine with perforation without bleeding   • Morbid obesity ([address] )   • Hyperglycemia   • Ureteral stone with hydronephrosis   • Mass of soft tissue of neck   • Cat scratch   • History of COVID-19         Past Medical History:  Past Medical History:   Diagnosis Date   • Arthritis    • Cataracts, bilateral    • Disease of thyroid gland    • Diverticulitis of colon    • Hypothyroid    • Kidney stone Not sure   • Obesity (BMI 35 0-39 9 without comorbidity)    • PNA (pneumonia) 2019       Past Surgical History:  Past Surgical History:   Procedure Laterality Date   •  SECTION  1983   • CHOLECYSTECTOMY  1968    open   • EYE SURGERY  10/2021   • FL RETROGRADE PYELOGRAM  3/5/2022   • MA CYSTO/URETERO W/LITHOTRIPSY &INDWELL STENT INSRT Right 3/5/2022    Procedure: CYSTOSCOPY URETEROSCOPY WITH LITHOTRIPSY HOLMIUM LASER, RETROGRADE PYELOGRAM AND INSERTION STENT URETERAL;  Surgeon: Kavita Lezama MD;  Location: [facility];  Service: Urology       Family History:  Family History   Problem Relation Age of Onset   • Cancer Mother    • Diabetes Mother    • Cancer Father    • Diabetes Father    • Urolithiasis Sister        Social History:  Social History     Socioeconomic History   • Marital status: /Civil Union     Spouse name: Not on file   • Number of children: Not on file   • Years of education: Not on file   • Highest education level: Not on file   Occupational History   • Not on file   Tobacco Use   • Smoking status: Former     Packs/day: 0 00     Years: 0 00     Pack years: 0 00     Types: Cigarettes   • Smokeless tobacco: Never   Vaping Use   • Vaping Use: Never used   Substance and Sexual Activity   • Alcohol use: Not Currently     Alcohol/week: 0 0 standard drinks   • Drug use: Never   • Sexual activity: Not on file   Other Topics Concern   • Not on file   Social History Narrative   • Not on file     Social Determinants of Health     Financial Resource Strain: Not on file   Food Insecurity: Not on file   Transportation Needs: Not on file   Physical Activity: Not on file   Stress: Not on file   Social Connections: Not on file   Intimate Partner Violence: Not on file   Housing Stability: Not on file       Objective     Vitals:    02/27/23 0857   BP: 122/84   Pulse: 68   Resp: 16   Temp: (!) 97 3 °F (36 3 °C)   SpO2: 100%     Wt Readings from Last 3 Encounters:   11/21/22 92 kg (202 lb 12 8 oz)   07/13/22 88 5 kg (195 lb)   06/23/22 89 6 kg (197 lb 9 6 oz)       Physical Exam  Vitals and nursing note reviewed  Constitutional:       General: She is not in acute distress  Appearance: Normal appearance  She is not ill-appearing, toxic-appearing or diaphoretic  HENT:      Head: Normocephalic and atraumatic  Eyes:      General: No scleral icterus       Conjunctiva/sclera: Conjunctivae normal    Musculoskeletal: Right knee: No effusion or erythema  Decreased range of motion  Neurological:      Mental Status: She is alert and oriented to person, place, and time  Psychiatric:         Mood and Affect: Mood normal          Behavior: Behavior normal          Thought Content: Thought content normal          Judgment: Judgment normal      Large joint arthrocentesis: R knee  Universal Protocol:  Consent: Verbal consent obtained  Risks and benefits: risks, benefits and alternatives were discussed  Consent given by: patient and spouse  Time out: Immediately prior to procedure a "time out" was called to verify the correct patient, procedure, equipment, support staff and site/side marked as required    Timeout called at: 2/27/2023 9:41 AM   Patient understanding: patient states understanding of the procedure being performed  Patient consent: the patient's understanding of the procedure matches consent given  Procedure consent: procedure consent matches procedure scheduled  Site marked: the operative site was marked  Patient identity confirmed: verbally with patient    Supporting Documentation  Indications: pain   Procedure Details  Location: knee - R knee  Preparation: Patient was prepped and draped in the usual sterile fashion  Needle size: 25 G  Ultrasound guidance: no  Approach: anterior  Medications administered: 2 mL lidocaine 2 %; 40 mg triamcinolone acetonide 40 mg/mL    Patient tolerance: patient tolerated the procedure well with no immediate complications  Dressing:  Sterile dressing applied            Pertinent Laboratory/Diagnostic Studies:  Lab Results   Component Value Date    GLUCOSE 89 05/10/2015    BUN 16 12/12/2022    CREATININE 0 80 12/12/2022    CALCIUM 9 3 12/12/2022     05/10/2015    K 4 9 12/12/2022    CO2 29 12/12/2022     12/12/2022     Lab Results   Component Value Date    ALT 11 12/12/2022    AST 17 12/12/2022    ALKPHOS 76 12/12/2022    BILITOT 0 4 05/10/2015       Lab Results   Component Value Date    WBC 7 01 12/12/2022    HGB 14 8 12/12/2022    HCT 45 8 12/12/2022    MCV 90 12/12/2022     12/12/2022       No results found for: TSH    No results found for: CHOL  Lab Results   Component Value Date    TRIG 221 (H) 12/12/2022     Lab Results   Component Value Date    HDL 37 (L) 12/12/2022     Lab Results   Component Value Date    LDLCALC 138 (H) 12/12/2022     Lab Results   Component Value Date    HGBA1C 5 5 12/12/2022       Results for orders placed or performed in visit on 12/12/22   Comprehensive metabolic panel   Result Value Ref Range    Sodium 138 135 - 147 mmol/L    Potassium 4 9 3 5 - 5 3 mmol/L    Chloride 103 96 - 108 mmol/L    CO2 29 21 - 32 mmol/L    ANION GAP 6 4 - 13 mmol/L    BUN 16 5 - 25 mg/dL    Creatinine 0 80 0 60 - 1 30 mg/dL    Glucose, Fasting 100 (H) 65 - 99 mg/dL    Calcium 9 3 8 4 - 10 2 mg/dL    AST 17 13 - 39 U/L    ALT 11 7 - 52 U/L    Alkaline Phosphatase 76 34 - 104 U/L    Total Protein 7 6 6 4 - 8 4 g/dL    Albumin 4 3 3 5 - 5 0 g/dL    Total Bilirubin 0 52 0 20 - 1 00 mg/dL    eGFR 72 ml/min/1 73sq m   HEMOGLOBIN A1C W/ EAG ESTIMATION   Result Value Ref Range    Hemoglobin A1C 5 5 Normal 3 8-5 6%; PreDiabetic 5 7-6 4%;  Diabetic >=6 5%; Glycemic control for adults with diabetes <7 0% %     mg/dl   Lipid Panel with Direct LDL reflex   Result Value Ref Range    Cholesterol 219 (H) See Comment mg/dL    Triglycerides 221 (H) See Comment mg/dL    HDL, Direct 37 (L) >=50 mg/dL    LDL Calculated 138 (H) 0 - 100 mg/dL   Hepatitis C Antibody ([facility])   Result Value Ref Range    Hepatitis C Ab Non-reactive Non-reactive   TSH, 3rd generation with Free T4 reflex   Result Value Ref Range    TSH 3RD GENERATON 11 016 (H) 0 450 - 4 500 uIU/mL   CBC and differential   Result Value Ref Range    WBC 7 01 4 31 - 10 16 Thousand/uL    RBC 5 08 3 81 - 5 12 Million/uL    Hemoglobin 14 8 11 5 - 15 4 g/dL    Hematocrit 45 8 34 8 - 46 1 %    MCV 90 82 - 98 fL    MCH 29 1 26 8 - 34 3 pg    MCHC 32 3 31 4 - 37 4 g/dL    RDW 14 2 11 6 - 15 1 %    MPV 9 8 8 9 - 12 7 fL    Platelets 119 511 - 105 Thousands/uL    nRBC 0 /100 WBCs    Neutrophils Relative 55 43 - 75 %    Immat GRANS % 0 0 - 2 %    Lymphocytes Relative 33 14 - 44 %    Monocytes Relative 9 4 - 12 %    Eosinophils Relative 2 0 - 6 %    Basophils Relative 1 0 - 1 %    Neutrophils Absolute 3 78 1 85 - 7 62 Thousands/µL    Immature Grans Absolute 0 03 0 00 - 0 20 Thousand/uL    Lymphocytes Absolute 2 32 0 60 - 4 47 Thousands/µL    Monocytes Absolute 0 64 0 17 - 1 22 Thousand/µL    Eosinophils Absolute 0 17 0 00 - 0 61 Thousand/µL    Basophils Absolute 0 07 0 00 - 0 10 Thousands/µL   Lyme Antibody Profile with reflex to WB   Result Value Ref Range    Lyme Total Antibodies <0 2 0 2 - 8 0 AI   Sedimentation rate, automated   Result Value Ref Range    Sed Rate 26 0 - 29 mm/hour   C-reactive protein   Result Value Ref Range    CRP 9 7 (H) <3 0 mg/L   RF Screen w/ Reflex to Titer   Result Value Ref Range    Rheumatoid Factor Negative Negative   MARGE Screen w/ Reflex to Titer/Pattern   Result Value Ref Range    MARGE Negative Negative   Sjogren's Antibodies   Result Value Ref Range    SS-A (RO) Ab <0 2 0 0 - 0 9 AI    SS-B (LA) Ab <0 2 0 0 - 0 9 AI   Cyclic citrul peptide antibody, IgG   Result Value Ref Range    Cyclic Citrullinated Peptide Ab  0 7 See comment   T4, free   Result Value Ref Range    Free T4 0 93 0 76 - 1 46 ng/dL       Orders Placed This Encounter   Procedures   • Large joint arthrocentesis: R knee       ALLERGIES:  Allergies   Allergen Reactions   • Codeine Anaphylaxis     Increased Heart Rate, Palpitations       Current Medications     Current Outpatient Medications   Medication Sig Dispense Refill   • acetaminophen (TYLENOL) 325 mg tablet Take 2 tablets (650 mg total) by mouth every 6 (six) hours as needed for mild pain, headaches or fever  0   • levothyroxine (Synthroid) 75 mcg tablet Take 1 tablet (75 mcg total) by mouth daily 90 tablet 1   • meloxicam (Mobic) 7 5 mg tablet Take 1 tablet (7 5 mg total) by mouth daily 30 tablet 3   • rosuvastatin (CRESTOR) 20 MG tablet Take 1 tablet (20 mg total) by mouth daily 90 tablet 3     No current facility-administered medications for this visit           Health Maintenance     Health Maintenance   Topic Date Due   • COVID-19 Vaccine (1) Never done   • Breast Cancer Screening: Mammogram  Never done   • Osteoporosis Screening  Never done   • Colorectal Cancer Screening  08/05/2022   • Fall Risk  07/13/2023   • Urinary Incontinence Screening  07/13/2023   • Medicare Annual Wellness Visit (AWV)  07/13/2023   • Depression Screening  11/21/2023   • BMI: Adult  11/21/2023   • BMI: Followup Plan  01/16/2024   • Hepatitis C Screening  Completed   • Pneumococcal Vaccine: 65+ Years  Completed   • Influenza Vaccine  Completed   • HIB Vaccine  Aged Out   • IPV Vaccine  Aged Out   • Hepatitis A Vaccine  Aged Out   • Meningococcal ACWY Vaccine  Aged Out   • HPV Vaccine  Aged Out     Immunization History   Administered Date(s) Administered   • INFLUENZA 12/07/2015, 10/31/2020   • Influenza Split High Dose Preservative Free IM 12/07/2015, 09/18/2017   • Influenza, high dose seasonal 0 7 mL 10/06/2018, 10/17/2019, 09/20/2021, 10/10/2022   • Pneumococcal Conjugate 13-Valent 12/07/2015   • Pneumococcal Polysaccharide PPV23 07/11/2018          Stan Zuluaga DO

## 2023-03-01 ENCOUNTER — HOSPITAL ENCOUNTER (OUTPATIENT)
Dept: CT IMAGING | Facility: HOSPITAL | Age: 73
Discharge: HOME | End: 2023-03-01
Attending: FAMILY MEDICINE

## 2023-03-01 ENCOUNTER — APPOINTMENT (OUTPATIENT)
Dept: NON INVASIVE DIAGNOSTICS | Facility: CLINIC | Age: 73
End: 2023-03-01

## 2023-03-01 ENCOUNTER — HOSPITAL ENCOUNTER (OUTPATIENT)
Dept: VASCULAR ULTRASOUND | Facility: HOSPITAL | Age: 73
Discharge: HOME | End: 2023-03-01
Attending: FAMILY MEDICINE

## 2023-03-01 DIAGNOSIS — R93.89 ABNORMAL CT OF THE CHEST: ICD-10-CM

## 2023-03-01 DIAGNOSIS — I65.22 STENOSIS OF LEFT INTERNAL CAROTID ARTERY: ICD-10-CM

## 2023-03-01 DIAGNOSIS — E78.5 MILD HYPERLIPIDEMIA: ICD-10-CM

## 2023-03-01 DIAGNOSIS — Z86.16 HISTORY OF COVID-19: ICD-10-CM

## 2023-03-04 DIAGNOSIS — I65.22 LEFT CAROTID ARTERY STENOSIS: Primary | ICD-10-CM

## 2023-03-13 DIAGNOSIS — M25.50 ARTHRALGIA, UNSPECIFIED JOINT: ICD-10-CM

## 2023-03-13 RX ORDER — MELOXICAM 7.5 MG/1
TABLET ORAL
Qty: 30 TABLET | Refills: 2 | Status: SHIPPED | OUTPATIENT
Start: 2023-03-13

## 2023-03-16 ENCOUNTER — TELEPHONE (OUTPATIENT)
Dept: FAMILY MEDICINE CLINIC | Facility: CLINIC | Age: 73
End: 2023-03-16

## 2023-03-16 NOTE — TELEPHONE ENCOUNTER
Pt called to say the Meloxicam is working for her however she recently developed a side effect  Last week she was taking baby aspirin and the Meloxicam together with food in the AM and then about 15 min later, she would vomit  It was only once and then she felt better       For the past 2 days she has not taken it and has not gotten sick    Please advise

## 2023-05-18 ENCOUNTER — RA CDI HCC (OUTPATIENT)
Dept: OTHER | Facility: HOSPITAL | Age: 73
End: 2023-05-18

## 2023-05-24 ENCOUNTER — TELEPHONE (OUTPATIENT)
Dept: OTHER | Facility: OTHER | Age: 73
End: 2023-05-24

## 2023-05-24 NOTE — TELEPHONE ENCOUNTER
Spoke to patient her knee seem to be feeling fine and will call back to r/s her injection when it starts to flare up again

## 2023-06-08 DIAGNOSIS — E06.3 HYPOTHYROIDISM DUE TO HASHIMOTO'S THYROIDITIS: ICD-10-CM

## 2023-06-08 DIAGNOSIS — E03.8 HYPOTHYROIDISM DUE TO HASHIMOTO'S THYROIDITIS: ICD-10-CM

## 2023-06-08 RX ORDER — LEVOTHYROXINE SODIUM 0.07 MG/1
TABLET ORAL
Qty: 90 TABLET | Refills: 1 | Status: SHIPPED | OUTPATIENT
Start: 2023-06-08

## 2023-06-11 DIAGNOSIS — M25.50 ARTHRALGIA, UNSPECIFIED JOINT: ICD-10-CM

## 2023-06-11 RX ORDER — MELOXICAM 7.5 MG/1
TABLET ORAL
Qty: 30 TABLET | Refills: 2 | Status: SHIPPED | OUTPATIENT
Start: 2023-06-11

## 2023-07-17 ENCOUNTER — OFFICE VISIT (OUTPATIENT)
Dept: FAMILY MEDICINE CLINIC | Facility: CLINIC | Age: 73
End: 2023-07-17
Payer: MEDICARE

## 2023-07-17 VITALS
TEMPERATURE: 98.6 F | SYSTOLIC BLOOD PRESSURE: 118 MMHG | HEIGHT: 60 IN | RESPIRATION RATE: 16 BRPM | BODY MASS INDEX: 41.94 KG/M2 | DIASTOLIC BLOOD PRESSURE: 84 MMHG | HEART RATE: 78 BPM | WEIGHT: 213.6 LBS | OXYGEN SATURATION: 98 %

## 2023-07-17 DIAGNOSIS — E03.8 HYPOTHYROIDISM DUE TO HASHIMOTO'S THYROIDITIS: ICD-10-CM

## 2023-07-17 DIAGNOSIS — Z00.00 MEDICARE ANNUAL WELLNESS VISIT, SUBSEQUENT: ICD-10-CM

## 2023-07-17 DIAGNOSIS — M25.50 ARTHRALGIA, UNSPECIFIED JOINT: ICD-10-CM

## 2023-07-17 DIAGNOSIS — K57.90 DIVERTICULOSIS: ICD-10-CM

## 2023-07-17 DIAGNOSIS — E78.00 PURE HYPERCHOLESTEROLEMIA: ICD-10-CM

## 2023-07-17 DIAGNOSIS — E06.3 HYPOTHYROIDISM DUE TO HASHIMOTO'S THYROIDITIS: ICD-10-CM

## 2023-07-17 DIAGNOSIS — I65.23 ATHEROSCLEROSIS OF BOTH CAROTID ARTERIES: Primary | ICD-10-CM

## 2023-07-17 DIAGNOSIS — E88.89 MADELUNG'S DISEASE (HCC): ICD-10-CM

## 2023-07-17 DIAGNOSIS — Z12.11 SCREENING FOR COLON CANCER: ICD-10-CM

## 2023-07-17 DIAGNOSIS — R73.02 IGT (IMPAIRED GLUCOSE TOLERANCE): ICD-10-CM

## 2023-07-17 DIAGNOSIS — Z86.010 PERSONAL HISTORY OF COLONIC POLYPS: ICD-10-CM

## 2023-07-17 PROCEDURE — 99214 OFFICE O/P EST MOD 30 MIN: CPT | Performed by: FAMILY MEDICINE

## 2023-07-17 PROCEDURE — G0439 PPPS, SUBSEQ VISIT: HCPCS | Performed by: FAMILY MEDICINE

## 2023-07-17 NOTE — PROGRESS NOTES
Assessment and Plan:     Problem List Items Addressed This Visit        Endocrine    Hypothyroidism due to Hashimoto's thyroiditis   Other Visit Diagnoses     Atherosclerosis of both carotid arteries    -  Primary    Pt stable - on Rosuvastatin. Pt will be repeating VAS Screening (previously ordered; reprinted today). Medicare annual wellness visit, subsequent        Immunizations reviewed. FBW ordered previously - lab slips reprinted today, and pt will complete. 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 IGT (impaired glucose tolerance)        Continuing to follow A1c's. Pure hypercholesterolemia        Stable; on Rosuvastatin. Arthralgia, unspecified joint        Likely OA - pt stable. Check labs at this time. Start Meloxicam - disc potential R/SE. Can supplement with OTC Tylenol PRN. Personal history of colonic polyps        Pt due to return for colonoscopy - referral placed back to Dr Austen Wallis of General Surgery. Relevant Orders    Ambulatory Referral to General Surgery    Diverticulosis        As above. Relevant Orders    Ambulatory Referral to General Surgery    Screening for colon cancer        As above. Relevant Orders    Ambulatory Referral to General Surgery    BMI 40.0-44.9, adult [facility])        Referring pt to Weight Loss Management. Pt to continue a lower carb diet, and remain active. Relevant Orders    Ambulatory Referral to Weight Management          Depression Screening and Follow-up Plan: Patient was screened for depression during today's encounter. They screened negative with a PHQ-2 score of 0. Preventive health issues were discussed with patient, and age appropriate screening tests were ordered as noted in patient's After Visit Summary. Personalized health advice and appropriate referrals for health education or preventive services given if needed, as noted in patient's After Visit Summary. History of Present Illness:     Patient presents for a Medicare Wellness Visit    AWV Visit today. Pt had colonoscopy in 2019 (pt was hospitalized for acute diverticulitis that year). 1 polyp removed - due again now. She is set for mammogram in 2023. She had HD Flu Vaccine earlier this year; has had Prevnar-13 and PPSV23 already. Pt is not vaccinated against COV-19. Discussed the Shingrix Vaccine series again. Previously ordered FBW not done yet. Reprinted today. Patient Care Team:  Timothy Joiner DO as PCP - General  Timothy Joiner DO as PCP - PCP-[facility] Attributed-Roster     Review of Systems:     Review of Systems   Constitutional: Negative for activity change. Respiratory: Negative for shortness of breath. Cardiovascular: Negative for chest pain. Gastrointestinal: Negative for abdominal pain and blood in stool. Genitourinary: Negative for vaginal bleeding. No new breast lumps on self-examination. Psychiatric/Behavioral: Negative for dysphoric mood. The patient is not nervous/anxious.          Problem List:     Patient Active Problem List   Diagnosis   • Hypothyroidism due to Hashimoto's thyroiditis   • Diverticulitis of large intestine with perforation without bleeding   • Morbid obesity ([address])   • Hyperglycemia   • Ureteral stone with hydronephrosis   • Mass of soft tissue of neck   • Cat scratch   • History of COVID-19      Past Medical and Surgical History:     Past Medical History:   Diagnosis Date   • Arthritis    • Cataracts, bilateral    • Disease of thyroid gland    • Diverticulitis of colon    • Hypothyroid    • Kidney stone Not sure   • Obesity (BMI 35.0-39.9 without comorbidity)    • PNA (pneumonia) 2019     Past Surgical History:   Procedure Laterality Date   •  SECTION  1983   • CHOLECYSTECTOMY  1968    open   • EYE SURGERY  10/2021   • FL RETROGRADE PYELOGRAM  3/5/2022   • SC CYSTO/URETERO W/LITHOTRIPSY &INDWELL STENT INSRT Right 3/5/2022    Procedure: CYSTOSCOPY URETEROSCOPY WITH LITHOTRIPSY HOLMIUM LASER, RETROGRADE PYELOGRAM AND INSERTION STENT URETERAL;  Surgeon: Ricardo Love MD;  Location: [facility];  Service: Urology      Family History:     Family History   Problem Relation Age of Onset   • Cancer Mother    • Diabetes Mother    • Cancer Father    • Diabetes Father    • Urolithiasis Sister       Social History:     Social History     Socioeconomic History   • Marital status: /Civil Union     Spouse name: None   • Number of children: None   • Years of education: None   • Highest education level: None   Occupational History   • None   Tobacco Use   • Smoking status: Former     Packs/day: 0.00     Years: 0.00     Total pack years: 0.00     Types: Cigarettes   • Smokeless tobacco: Never   Vaping Use   • Vaping Use: Never used   Substance and Sexual Activity   • Alcohol use: Not Currently     Alcohol/week: 0.0 standard drinks of alcohol   • Drug use: Never   • Sexual activity: None   Other Topics Concern   • None   Social History Narrative   • None     Social Determinants of Health     Financial Resource Strain: Low Risk  (7/17/2023)    Overall Financial Resource Strain (CARDIA)    • Difficulty of Paying Living Expenses: Not hard at all   Food Insecurity: Not on file   Transportation Needs: No Transportation Needs (7/17/2023)    PRAPARE - Transportation    • Lack of Transportation (Medical): No    • Lack of Transportation (Non-Medical):  No   Physical Activity: Not on file   Stress: Not on file   Social Connections: Not on file   Intimate Partner Violence: Not on file   Housing Stability: Not on file      Medications and Allergies:     Current Outpatient Medications   Medication Sig Dispense Refill   • acetaminophen (TYLENOL) 325 mg tablet Take 2 tablets (650 mg total) by mouth every 6 (six) hours as needed for mild pain, headaches or fever  0   • levothyroxine 75 mcg tablet TAKE 1 TABLET BY MOUTH EVERY DAY 90 tablet 1   • meloxicam (MOBIC) 7.5 mg tablet TAKE 1 TABLET BY MOUTH EVERY DAY 30 tablet 2   • rosuvastatin (CRESTOR) 20 MG tablet Take 1 tablet (20 mg total) by mouth daily 90 tablet 3     No current facility-administered medications for this visit. Allergies   Allergen Reactions   • Codeine Anaphylaxis     Increased Heart Rate, Palpitations      Immunizations:     Immunization History   Administered Date(s) Administered   • INFLUENZA 12/07/2015, 10/31/2020   • Influenza Split High Dose Preservative Free IM 12/07/2015, 09/18/2017   • Influenza, high dose seasonal 0.7 mL 10/06/2018, 10/17/2019, 09/20/2021, 10/10/2022   • Pneumococcal Conjugate 13-Valent 12/07/2015   • Pneumococcal Polysaccharide PPV23 07/11/2018      Health Maintenance:         Topic Date Due   • Breast Cancer Screening: Mammogram  Never done   • Colorectal Cancer Screening  08/05/2022   • Hepatitis C Screening  Completed         Topic Date Due   • COVID-19 Vaccine (1) Never done   • Influenza Vaccine (1) 09/01/2023      Medicare Screening Tests and Risk Assessments:     Brandon Retana is here for her Subsequent Wellness visit. Last Medicare Wellness visit information reviewed, patient interviewed, no change since last AWV. Health Risk Assessment:   Patient rates overall health as very good. Patient feels that their physical health rating is much better. Patient is very satisfied with their life. Eyesight was rated as same. Hearing was rated as same. Patient feels that their emotional and mental health rating is slightly better. Patients states they are never, rarely angry. Patient states they are never, rarely unusually tired/fatigued. Pain experienced in the last 7 days has been none. Patient states that she has experienced weight loss or gain in last 6 months. Depression Screening:   PHQ-2 Score: 0      Fall Risk Screening:    In the past year, patient has experienced: no history of falling in past year      Urinary Incontinence Screening:   Patient has not leaked urine accidently in the last six months. Home Safety:  Patient does not have trouble with stairs inside or outside of their home. Patient has working smoke alarms and has working carbon monoxide detector. Home safety hazards include: none. Nutrition:   Current diet is Regular. Medications:   Patient is not currently taking any over-the-counter supplements. Patient is able to manage medications. Activities of Daily Living (ADLs)/Instrumental Activities of Daily Living (IADLs):   Walk and transfer into and out of bed and chair?: Yes  Dress and groom yourself?: Yes    Bathe or shower yourself?: Yes    Feed yourself?  Yes  Do your laundry/housekeeping?: Yes  Manage your money, pay your bills and track your expenses?: Yes  Make your own meals?: Yes    Do your own shopping?: Yes    Previous Hospitalizations:   Any hospitalizations or ED visits within the last 12 months?: No      Advance Care Planning:   Living will: No    Durable POA for healthcare: No    Advanced directive: No    Advanced directive counseling given: Yes    End of Life Decisions reviewed with patient: Yes      Cognitive Screening:   Provider or family/friend/caregiver concerned regarding cognition?: No    PREVENTIVE SCREENINGS      Cardiovascular Screening:    General: Screening Not Indicated and History Lipid Disorder      Diabetes Screening:     General: Screening Current      Colorectal Cancer Screening:     General: Screening Current      Cervical Cancer Screening:    General: Screening Not Indicated      Lung Cancer Screening:     General: Screening Not Indicated      Hepatitis C Screening:    General: Screening Current    Screening, Brief Intervention, and Referral to Treatment (SBIRT)    Screening      AUDIT-C Screenin) How often did you have a drink containing alcohol in the past year? never  2) How many drinks did you have on a typical day when you were drinking in the past year? 0  3) How often did you have 6 or more drinks on one occasion in the past year? never    AUDIT-C Score: 0  Interpretation: Score 0-2 (female): Negative screen for alcohol misuse    Single Item Drug Screening:  How often have you used an illegal drug (including marijuana) or a prescription medication for non-medical reasons in the past year? never    Single Item Drug Screen Score: 0  Interpretation: Negative screen for possible drug use disorder    No results found. Physical Exam:     /84 (BP Location: Left arm, Patient Position: Sitting, Cuff Size: Large)   Pulse 78   Temp 98.6 °F (37 °C) (Tympanic)   Resp 16   Ht 5' (1.524 m)   Wt 96.9 kg (213 lb 9.6 oz)   SpO2 98%   BMI 41.72 kg/m²     Physical Exam  Vitals and nursing note reviewed. Constitutional:       General: She is not in acute distress. Appearance: Normal appearance. She is not ill-appearing, toxic-appearing or diaphoretic. HENT:      Head: Normocephalic and atraumatic. Eyes:      General: No scleral icterus. Conjunctiva/sclera: Conjunctivae normal.   Cardiovascular:      Rate and Rhythm: Normal rate and regular rhythm. Heart sounds: Normal heart sounds. No murmur heard. No friction rub. No gallop. Pulmonary:      Effort: Pulmonary effort is normal. No respiratory distress. Breath sounds: Normal breath sounds. No stridor. No wheezing, rhonchi or rales. Abdominal:      General: Abdomen is flat. Bowel sounds are normal. There is no distension. Palpations: Abdomen is soft. There is no mass. Tenderness: There is no abdominal tenderness. There is no guarding or rebound. Musculoskeletal:      Cervical back: Normal range of motion and neck supple. No rigidity or tenderness. Lymphadenopathy:      Cervical: No cervical adenopathy. Neurological:      Mental Status: She is alert and oriented to person, place, and time.    Psychiatric:         Mood and Affect: Mood normal.         Behavior: Behavior normal.         Thought Content: Thought content normal.         Judgment: Judgment normal.          Rosa Gonzalez was seen today for medicare wellness visit. Diagnoses and all orders for this visit:    Atherosclerosis of both carotid arteries  Comments:  Pt stable - on Rosuvastatin. Pt will be repeating VAS Screening (previously ordered; reprinted today). Medicare annual wellness visit, subsequent  Comments:  Immunizations reviewed. FBW ordered previously - lab slips reprinted today, and pt will complete. 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 IGT (impaired glucose tolerance)  Comments:  Continuing to follow A1c's. Pure hypercholesterolemia  Comments:  Stable; on Rosuvastatin. Hypothyroidism due to Hashimoto's thyroiditis  Comments:  Stable; on Levothyroxine. Arthralgia, unspecified joint  Comments:  Likely OA - pt stable. Check labs at this time. Start Meloxicam - disc potential R/SE. Can supplement with OTC Tylenol PRN. Personal history of colonic polyps  Comments:  Pt due to return for colonoscopy - referral placed back to Dr Arambula Leader of General Surgery. Orders:  -     Ambulatory Referral to General Surgery; Future    Diverticulosis  Comments:  As above. Orders:  -     Ambulatory Referral to General Surgery; Future    Screening for colon cancer  Comments:  As above. Orders:  -     Ambulatory Referral to General Surgery; Future    BMI 40.0-44.9, adult [facility])  Comments:  Referring pt to Weight Loss Management. Pt to continue a lower carb diet, and remain active. Orders:  -     Ambulatory Referral to Weight Management;  Future          [facility],

## 2023-08-04 ENCOUNTER — HOSPITAL ENCOUNTER (OUTPATIENT)
Dept: MAMMOGRAPHY | Facility: HOSPITAL | Age: 73
Discharge: HOME | End: 2023-08-04
Attending: FAMILY MEDICINE
Payer: MEDICARE

## 2023-08-04 VITALS — BODY MASS INDEX: 41.82 KG/M2 | HEIGHT: 60 IN | WEIGHT: 213 LBS

## 2023-08-04 DIAGNOSIS — Z12.31 ENCOUNTER FOR SCREENING MAMMOGRAM FOR MALIGNANT NEOPLASM OF BREAST: ICD-10-CM

## 2023-08-04 PROCEDURE — 77063 BREAST TOMOSYNTHESIS BI: CPT

## 2023-08-04 PROCEDURE — 77067 SCR MAMMO BI INCL CAD: CPT

## 2023-08-15 ENCOUNTER — TELEPHONE (OUTPATIENT)
Dept: FAMILY MEDICINE CLINIC | Facility: CLINIC | Age: 73
End: 2023-08-15

## 2023-08-15 NOTE — TELEPHONE ENCOUNTER
Pt aware you are out of office     Pt had a mammogram done on 8/4 and asked if you could review it. She was contacted by someone at [facility] who ordered a follow up mammogram and US.  Pt does not remember what department called     Please advise

## 2023-09-05 ENCOUNTER — HOSPITAL ENCOUNTER (OUTPATIENT)
Dept: VASCULAR ULTRASOUND | Facility: HOSPITAL | Age: 73
Discharge: HOME | End: 2023-09-05
Attending: FAMILY MEDICINE
Payer: MEDICARE

## 2023-09-05 DIAGNOSIS — I65.22 LEFT CAROTID ARTERY STENOSIS: ICD-10-CM

## 2023-09-05 PROCEDURE — 93880 EXTRACRANIAL BILAT STUDY: CPT

## 2023-09-05 PROCEDURE — 93880 EXTRACRANIAL BILAT STUDY: CPT | Performed by: SURGERY

## 2023-09-06 ENCOUNTER — TELEPHONE (OUTPATIENT)
Dept: FAMILY MEDICINE CLINIC | Facility: CLINIC | Age: 73
End: 2023-09-06

## 2023-09-06 NOTE — TELEPHONE ENCOUNTER
Patient called and would like a call back regarding Results for VAS CEREBROVASCULAR STUDY done on 09/06/2023    Thanks

## 2023-09-06 NOTE — TELEPHONE ENCOUNTER
Spoke with pt about the result of her carotid duplex which shows R carotid stenosis that was not present on the scan from March. She is on rosuvastatin 20 mg, discussed option to increase dose to 40 mg. She is already taking low dose aspirin. Discussed referral to vascular; pt prefers to discuss with new pcp when she sees him next month. Pt also requests that it be removed from the report that she has a history of smoking. Will message the provider who wrote the study on her behalf.

## 2023-09-14 ENCOUNTER — HOSPITAL ENCOUNTER (OUTPATIENT)
Dept: RADIOLOGY | Facility: HOSPITAL | Age: 73
Discharge: HOME | End: 2023-09-14
Payer: MEDICARE

## 2023-09-14 DIAGNOSIS — R92.8 ABNORMAL MAMMOGRAM: ICD-10-CM

## 2023-09-14 PROCEDURE — 77065 DX MAMMO INCL CAD UNI: CPT

## 2023-09-14 PROCEDURE — G0279 TOMOSYNTHESIS, MAMMO: HCPCS

## 2023-09-14 PROCEDURE — 76642 ULTRASOUND BREAST LIMITED: CPT

## 2023-09-19 DIAGNOSIS — M25.50 ARTHRALGIA, UNSPECIFIED JOINT: ICD-10-CM

## 2023-09-19 RX ORDER — MELOXICAM 7.5 MG/1
7.5 TABLET ORAL DAILY
Qty: 30 TABLET | Refills: 2 | Status: SHIPPED | OUTPATIENT
Start: 2023-09-19

## 2023-09-19 NOTE — TELEPHONE ENCOUNTER
Medication Refill Request     Name  meloxicam (MOBIC) 7.5 mg tablet  Dose/Frequency TAKE 1 TABLET BY MOUTH EVERY DAY  Quantity 30 tablet  Verified pharmacy   [x]  Verified ordering Provider   [x]  Does patient have enough for the next 3 days?  Yes [x] No []

## 2023-10-10 ENCOUNTER — RA CDI HCC (OUTPATIENT)
Dept: OTHER | Facility: HOSPITAL | Age: 73
End: 2023-10-10

## 2023-10-10 NOTE — PROGRESS NOTES
[address] [facility] coding opportunities        E66.01  Chart Reviewed number of suggestions sent to Provider: 1     Patients Insurance     Medicare Insurance: [employer]

## 2023-10-13 ENCOUNTER — CONSULT (OUTPATIENT)
Dept: BARIATRICS | Facility: CLINIC | Age: 73
End: 2023-10-13
Payer: MEDICARE

## 2023-10-13 VITALS
HEART RATE: 71 BPM | HEIGHT: 60 IN | WEIGHT: 213 LBS | BODY MASS INDEX: 41.82 KG/M2 | SYSTOLIC BLOOD PRESSURE: 132 MMHG | DIASTOLIC BLOOD PRESSURE: 86 MMHG | RESPIRATION RATE: 16 BRPM

## 2023-10-13 DIAGNOSIS — E06.3 HYPOTHYROIDISM DUE TO HASHIMOTO'S THYROIDITIS: ICD-10-CM

## 2023-10-13 DIAGNOSIS — E66.01 CLASS 3 SEVERE OBESITY DUE TO EXCESS CALORIES WITH SERIOUS COMORBIDITY AND BODY MASS INDEX (BMI) OF 40.0 TO 44.9 IN ADULT (HCC): Primary | ICD-10-CM

## 2023-10-13 DIAGNOSIS — E03.8 HYPOTHYROIDISM DUE TO HASHIMOTO'S THYROIDITIS: ICD-10-CM

## 2023-10-13 DIAGNOSIS — N13.2 URETERAL STONE WITH HYDRONEPHROSIS: ICD-10-CM

## 2023-10-13 DIAGNOSIS — Z12.11 SCREENING FOR COLON CANCER: ICD-10-CM

## 2023-10-13 PROBLEM — E66.813 CLASS 3 SEVERE OBESITY DUE TO EXCESS CALORIES WITH SERIOUS COMORBIDITY AND BODY MASS INDEX (BMI) OF 40.0 TO 44.9 IN ADULT (HCC): Status: ACTIVE | Noted: 2019-05-24

## 2023-10-13 PROCEDURE — 99204 OFFICE O/P NEW MOD 45 MIN: CPT | Performed by: NURSE PRACTITIONER

## 2023-10-13 RX ORDER — BUPROPION HYDROCHLORIDE 150 MG/1
150 TABLET ORAL DAILY
Qty: 30 TABLET | Refills: 1 | Status: SHIPPED | OUTPATIENT
Start: 2023-10-13 | End: 2023-12-12

## 2023-10-13 NOTE — PROGRESS NOTES
Assessment/Plan:    Class 3 severe obesity due to excess calories with serious comorbidity and body mass index (BMI) of 40.0 to 44.9 in adult [facility])  - Discussed options of HealthyCORE-Intensive Lifestyle Intervention Program and Conservative Program and the role of weight loss medications. - Explained the importance of making lifestyle changes in addition to starting anti-obesity medications. - Patient is interested in pursuing Conservative Program and follow up visits with medical weight management provider. - Initial weight loss goal of 5-10% weight loss for improved health  - Weight loss can improve patient's co-morbid conditions and/or prevent weight-related complications. - Labs reviewed from 2/2023 - will be getting updated lab work from PCP  -Patient lifestyle habits were reviewed and barriers to weight loss were addressed. Patient will be starting to follow a more consistent eating pattern starting with breakfast every morning. She was given a meal plan with a calorie goal of 1100 to 1300 sarahy/day. She will start to incorporate more protein with her meals and some protein rich snacks throughout the day. Discussed nutritious snacking throughout the day versus structured meals and patient was given to snack lists to utilize as a guide for healthier snacks. Patient will also start to limit her unhealthy snacks by packaging proportioned cookies and candies and limiting herself to just 1 portion. Medications were reviewed:  GLP-1 medications are not covered under Medicare  Would avoid topiramate due to history of kidney stones  Phentermine contraindicated due to age  Bupropion was discussed to help with cravings and stress eating, patient was interested in starting this medication. Patient denies any history of seizures or glaucoma.     Bupropion instructions:  Begin with bupropion 150mg once daily in the evening     The potential side effects of bupropion may include: abdominal upset, headache, dizziness, trouble sleeping, increased blood pressure, depression/anxiety, and fatigue. Please call/return if you develops symptoms of depression or anxiety. You should go to the  ER for evaluation if you develop any thoughts of harming yourself or others occur. Goals:  Do not skip meals. Food log via khadar - options include  www. Hall.com, sparkpeople. com, loseit.com, calorieking. com, baritastBillShrink  No sugary beverages. At least 64oz of water daily. Increase physical activity by 10 minutes daily. Gradually increase physical activity to a goal of 5 days per week for 30 minutes of MODERATE intensity PLUS 2 days per week of FULL BODY resistance training     Ureteral stone with hydronephrosis  Would avoid topiramate for weight loss    Hypothyroidism due to Hashimoto's thyroiditis  Treatment currently includes levothyroxine 75 mcg  Patient encouraged to get updated blood work         Liseth Garcia was seen today for consult. Diagnoses and all orders for this visit:    Class 3 severe obesity due to excess calories with serious comorbidity and body mass index (BMI) of 40.0 to 44.9 in adult ([facility])  -     buPROPion (Wellbutrin XL) 150 mg 24 hr tablet; Take 1 tablet (150 mg total) by mouth daily    Screening for colon cancer    BMI 40.0-44.9, adult ([address])  Comments:  Referring pt to Weight Loss Management. Pt to continue a lower carb diet, and remain active. Orders:  -     Ambulatory Referral to Weight Management    Ureteral stone with hydronephrosis    Hypothyroidism due to Hashimoto's thyroiditis           Total time spent reviewing chart, interviewing patient, examining patient, discussing plan, answering all questions, and documentin min, with >50% face-to-face time spent counseling patient on nonsurgical interventions for the treatment of excess weight.  Discussed in detail nonsurgical options including intensive lifestyle intervention program, very low-calorie diet program and conservative program. Discussed the role of weight loss medications. Counseled patient on diet behavior and exercise modification for weight loss. Follow up in approximately 2 months with Non-Surgical Physician/Advanced Practitioner. Subjective:   Chief Complaint   Patient presents with    Consult     Initial visit with oliviaian       Patient ID: Gelacio Barkley  is a 74 y.o. female with excess weight/obesity here to pursue weight management. Previous notes and records have been reviewed.     Past Medical History:   Diagnosis Date    Arthritis     Cataracts, bilateral     Disease of thyroid gland     Diverticulitis of colon 2019    Hypothyroid     Kidney stone Not sure    Obesity (BMI 35.0-39.9 without comorbidity)     PNA (pneumonia) 2019     Past Surgical History:   Procedure Laterality Date     SECTION  1983    CHOLECYSTECTOMY  1968    open    EYE SURGERY  10/2021    FL RETROGRADE PYELOGRAM  3/5/2022    MD CYSTO/URETERO W/LITHOTRIPSY &INDWELL STENT INSRT Right 3/5/2022    Procedure: CYSTOSCOPY URETEROSCOPY WITH LITHOTRIPSY HOLMIUM LASER, RETROGRADE PYELOGRAM AND INSERTION STENT URETERAL;  Surgeon: Arizona Lombard, MD;  [facility];  Service: Urology       HPI:  Wt Readings from Last 20 Encounters:   10/13/23 96.6 kg (213 lb)   23 96.6 kg (213 lb)   23 96.9 kg (213 lb 9.6 oz)   22 92 kg (202 lb 12.8 oz)   22 88.5 kg (195 lb)   22 89.6 kg (197 lb 9.6 oz)   22 88.3 kg (194 lb 9.6 oz)   22 87.7 kg (193 lb 6.4 oz)   22 86.9 kg (191 lb 9.3 oz)   22 86.6 kg (190 lb 14.7 oz)   21 89.8 kg (198 lb)   21 93.4 kg (205 lb 12.8 oz)   21 92.5 kg (204 lb)   21 92.7 kg (204 lb 6.4 oz)   21 93.8 kg (206 lb 12.8 oz)   20 95.3 kg (210 lb 3.2 oz)   19 91 kg (200 lb 9.6 oz)   19 92.1 kg (203 lb)   19 94 kg (207 lb 3.7 oz)   19 93.7 kg (206 lb 9.6 oz)       Patient presents today to medical weight management office for consult. Patient is struggled with her weight for many years. She began to gain weight after having her child 35 years ago. She has been struggling with her weight on and off since then. Patient admits that she does not have a good eating pattern and often will skip meals or go long periods without food. Patient is struggling with hip and knee pain and is unable to be as active as she would like. Patient struggles from cravings for sweets and cookies. Her  is present during the appointment and is willing to help patient with balanced meals and to keep on track.       Obesity/Excess Weight:  Severity: Severe  Onset:  30+ years    Modifiers: Diet and Exercise  Contributing factors: Poor Food Choices, Insufficient Physical Activity, Stress/Emotional Eating, Lack of knowledge of appropriate lifestyle changes, and Insufficient time to make appropriate lifestyle changes  Associated symptoms: comorbid conditions, fatigue, increased joint pain, decreased exercise capacity, body image issues, increased shortness of breath, decreased mobility, and inability to do certain activities    Diet recall:  B: skips OR tea and crackers OR tea and fruit OR tea and egg  L: egg sandwich OR toast and jelly  S: cookies and candy OR popcorn  D: salad with protein (chicken) OR chickpea pasta  S: cookies and candy OR popcorn    Hydration: tea and water - 24-32 oz  Alcohol: no  Smoking: no  Exercise: no  Occupation: [employer]  Sleep: well  STOP bang: 3/8      Current weight: 213 lbs      Mammogram: 9/2023    The following portions of the patient's history were reviewed and updated as appropriate: allergies, current medications, past family history, past medical history, past social history, past surgical history, and problem list.    Family History   Problem Relation Age of Onset    Cancer Mother     Diabetes Mother     Cancer Father     Diabetes Father     Urolithiasis Sister     No Known Problems Daughter     No Known Problems Maternal Grandmother     No Known Problems Maternal Grandfather     No Known Problems Paternal Grandmother     No Known Problems Paternal Grandfather     No Known Problems Maternal Aunt     No Known Problems Paternal Aunt     No Known Problems Paternal Aunt         Review of Systems   Constitutional:  Negative for fatigue. HENT:  Negative for sore throat. Respiratory:  Negative for cough and shortness of breath. Cardiovascular:  Negative for chest pain, palpitations and leg swelling. Gastrointestinal:  Negative for abdominal pain, constipation, diarrhea and nausea. Genitourinary:  Negative for dysuria. Musculoskeletal:  Positive for arthralgias. Negative for back pain. Skin:  Negative for rash. Neurological:  Negative for headaches. Psychiatric/Behavioral:  Negative for dysphoric mood. The patient is not nervous/anxious. Objective:  /86 (BP Location: Left arm, Patient Position: Sitting, Cuff Size: Large)   Pulse 71   Resp 16   Ht 5' 0.43" (1.535 m)   Wt 96.6 kg (213 lb)   BMI 41.00 kg/m²     Physical Exam  Vitals and nursing note reviewed. Constitutional:       Appearance: Normal appearance. She is obese. HENT:      Head: Normocephalic. Pulmonary:      Effort: Pulmonary effort is normal.   Neurological:      General: No focal deficit present. Mental Status: She is alert and oriented to person, place, and time. Psychiatric:         Mood and Affect: Mood normal.         Behavior: Behavior normal.         Thought Content: Thought content normal.         Judgment: Judgment normal.                Labs and Imaging  Recent labs and imaging have been personally reviewed.   Lab Results   Component Value Date    WBC 7.01 12/12/2022    HGB 14.8 12/12/2022    HCT 45.8 12/12/2022    MCV 90 12/12/2022     12/12/2022     Lab Results   Component Value Date     05/10/2015    SODIUM 138 12/12/2022    K 4.9 12/12/2022     12/12/2022    CO2 29 12/12/2022    ANIONGAP 5 05/10/2015    AGAP 6 12/12/2022    BUN 16 12/12/2022    CREATININE 0.80 12/12/2022    GLUC 104 03/06/2022    GLUF 100 (H) 12/12/2022    CALCIUM 9.3 12/12/2022    AST 17 12/12/2022    ALT 11 12/12/2022    ALKPHOS 76 12/12/2022    PROT 7.6 05/10/2015    TP 7.6 12/12/2022    BILITOT 0.4 05/10/2015    TBILI 0.52 12/12/2022    EGFR 72 12/12/2022     Lab Results   Component Value Date    HGBA1C 5.5 12/12/2022     Lab Results   Component Value Date    VAI2BBSVPYAY 11.016 (H) 12/12/2022     Lab Results   Component Value Date    CHOLESTEROL 219 (H) 12/12/2022     Lab Results   Component Value Date    HDL 37 (L) 12/12/2022     Lab Results   Component Value Date    TRIG 221 (H) 12/12/2022     Lab Results   Component Value Date    LDLCALC 138 (H) 12/12/2022

## 2023-10-13 NOTE — ASSESSMENT & PLAN NOTE
- Discussed options of HealthyCORE-Intensive Lifestyle Intervention Program and Conservative Program and the role of weight loss medications. - Explained the importance of making lifestyle changes in addition to starting anti-obesity medications. - Patient is interested in pursuing Conservative Program and follow up visits with medical weight management provider. - Initial weight loss goal of 5-10% weight loss for improved health  - Weight loss can improve patient's co-morbid conditions and/or prevent weight-related complications. - Labs reviewed from 2/2023 - will be getting updated lab work from PCP  -Patient lifestyle habits were reviewed and barriers to weight loss were addressed. Patient will be starting to follow a more consistent eating pattern starting with breakfast every morning. She was given a meal plan with a calorie goal of 1100 to 1300 sarahy/day. She will start to incorporate more protein with her meals and some protein rich snacks throughout the day. Discussed nutritious snacking throughout the day versus structured meals and patient was given to snack lists to utilize as a guide for healthier snacks. Patient will also start to limit her unhealthy snacks by packaging proportioned cookies and candies and limiting herself to just 1 portion. Medications were reviewed:  GLP-1 medications are not covered under Medicare  Would avoid topiramate due to history of kidney stones  Phentermine contraindicated due to age  Bupropion was discussed to help with cravings and stress eating, patient was interested in starting this medication. Patient denies any history of seizures or glaucoma. Bupropion instructions:  Begin with bupropion 150mg once daily in the evening     The potential side effects of bupropion may include: abdominal upset, headache, dizziness, trouble sleeping, increased blood pressure, depression/anxiety, and fatigue.  Please call/return if you develops symptoms of depression or anxiety. You should go to the  ER for evaluation if you develop any thoughts of harming yourself or others occur. Goals:  Do not skip meals. Food log via khadar - options include  www. dELiAs.com, sparkpeople. com, loseit.com, calorieking. com, [employer]  No sugary beverages. At least 64oz of water daily. Increase physical activity by 10 minutes daily.  Gradually increase physical activity to a goal of 5 days per week for 30 minutes of MODERATE intensity PLUS 2 days per week of FULL BODY resistance training

## 2023-10-16 ENCOUNTER — TELEPHONE (OUTPATIENT)
Dept: GASTROENTEROLOGY | Facility: CLINIC | Age: 73
End: 2023-10-16

## 2023-10-16 ENCOUNTER — OFFICE VISIT (OUTPATIENT)
Dept: INTERNAL MEDICINE CLINIC | Facility: CLINIC | Age: 73
End: 2023-10-16
Payer: MEDICARE

## 2023-10-16 VITALS
SYSTOLIC BLOOD PRESSURE: 122 MMHG | HEIGHT: 60 IN | DIASTOLIC BLOOD PRESSURE: 74 MMHG | OXYGEN SATURATION: 99 % | BODY MASS INDEX: 41.74 KG/M2 | HEART RATE: 70 BPM | WEIGHT: 212.6 LBS

## 2023-10-16 DIAGNOSIS — I65.23 BILATERAL CAROTID ARTERY STENOSIS: ICD-10-CM

## 2023-10-16 DIAGNOSIS — R73.01 IFG (IMPAIRED FASTING GLUCOSE): ICD-10-CM

## 2023-10-16 DIAGNOSIS — Z23 ENCOUNTER FOR IMMUNIZATION: ICD-10-CM

## 2023-10-16 DIAGNOSIS — E78.00 PURE HYPERCHOLESTEROLEMIA: ICD-10-CM

## 2023-10-16 DIAGNOSIS — E03.8 HYPOTHYROIDISM DUE TO HASHIMOTO'S THYROIDITIS: ICD-10-CM

## 2023-10-16 DIAGNOSIS — Z76.89 ENCOUNTER TO ESTABLISH CARE: Primary | ICD-10-CM

## 2023-10-16 DIAGNOSIS — E06.3 HYPOTHYROIDISM DUE TO HASHIMOTO'S THYROIDITIS: ICD-10-CM

## 2023-10-16 PROBLEM — R73.9 HYPERGLYCEMIA: Status: RESOLVED | Noted: 2022-02-16 | Resolved: 2023-10-16

## 2023-10-16 PROBLEM — D17.0 LIPOMA OF NECK: Status: ACTIVE | Noted: 2023-10-16
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
## 2023-10-16 PROBLEM — Z86.16 HISTORY OF COVID-19: Status: RESOLVED | Noted: 2022-12-31 | Resolved: 2023-10-16

## 2023-10-16 PROCEDURE — 90662 IIV NO PRSV INCREASED AG IM: CPT

## 2023-10-16 PROCEDURE — G0008 ADMIN INFLUENZA VIRUS VAC: HCPCS

## 2023-10-16 PROCEDURE — 99204 OFFICE O/P NEW MOD 45 MIN: CPT | Performed by: INTERNAL MEDICINE

## 2023-10-16 NOTE — PROGRESS NOTES
Name: Issac Bruce      : 1950      MRN: [id number]  Encounter Provider: Raudel Nuñez MD  Encounter Date: 10/16/2023   Encounter department: [facility]     1. Encounter to establish care    2. Bilateral carotid artery stenosis  Assessment & Plan:  -6 month f/u carotid study ordered     Orders:  -     VAS carotid complete study; Future; Expected date: 2024    3. Hypothyroidism due to Hashimoto's thyroiditis  Assessment & Plan:  -Last TSH above goal at 11  -Check TSH level     Orders:  -     TSH, 3rd generation with Free T4 reflex; Future  -     Comprehensive metabolic panel; Future    4. IFG (impaired fasting glucose)  -     Hemoglobin A1C; Future  -     Comprehensive metabolic panel; Future    5. Pure hypercholesterolemia  Assessment & Plan:  -Tolerating statin     Orders:  -     Lipid Panel with Direct LDL reflex; Future    6. Encounter for immunization  -     influenza vaccine, high-dose, PF 0.7 mL (FLUZONE HIGH-DOSE)           Subjective     HPI  Patient presents today to establish care. She is a former patient of Dr. Rena Schmidt. Overall she reports she is doing well and has no major complaints. She states she recently established with our weight management center and was started on Wellbutrin. She reports that she is tolerating it well without any side effects. All other systems negative except for pertinent findings noted in HPI.        Past Medical History:   Diagnosis Date   • Arthritis    • Cat scratch 2022   • Cataracts, bilateral    • Disease of thyroid gland    • Diverticulitis of colon    • History of COVID-19 2022   • Hypothyroid    • Kidney stone Not sure   • Obesity (BMI 35.0-39.9 without comorbidity)    • PNA (pneumonia) 2019     Past Surgical History:   Procedure Laterality Date   •  SECTION  1983   • CHOLECYSTECTOMY  1968    open   • EYE SURGERY  10/2021   • FL RETROGRADE PYELOGRAM 3/5/2022   • CT CYSTO/URETERO W/LITHOTRIPSY &INDWELL STENT INSRT Right 3/5/2022    Procedure: CYSTOSCOPY URETEROSCOPY WITH LITHOTRIPSY HOLMIUM LASER, RETROGRADE PYELOGRAM AND INSERTION STENT URETERAL;  Surgeon: Deanne Mosqueda MD;  Location:  MAIN OR;  Service: Urology     Family History   Problem Relation Age of Onset   • Cancer Mother    • Diabetes Mother    • Cancer Father    • Diabetes Father    • Urolithiasis Sister    • No Known Problems Daughter    • No Known Problems Maternal Grandmother    • No Known Problems Maternal Grandfather    • No Known Problems Paternal Grandmother    • No Known Problems Paternal Grandfather    • No Known Problems Maternal Aunt    • No Known Problems Paternal Aunt    • No Known Problems Paternal Aunt      Social History     Socioeconomic History   • Marital status: /Civil Union     Spouse name: None   • Number of children: None   • Years of education: None   • Highest education level: None   Occupational History   • None   Tobacco Use   • Smoking status: Former     Packs/day: 0.00     Years: 0.00     Total pack years: 0.00     Types: Cigarettes   • Smokeless tobacco: Never   Vaping Use   • Vaping Use: Never used   Substance and Sexual Activity   • Alcohol use: Not Currently     Alcohol/week: 0.0 standard drinks of alcohol   • Drug use: Never   • Sexual activity: None   Other Topics Concern   • None   Social History Narrative   • None     Social Determinants of Health     Financial Resource Strain: Low Risk  (7/17/2023)    Overall Financial Resource Strain (CARDIA)    • Difficulty of Paying Living Expenses: Not hard at all   Food Insecurity: Not on file   Transportation Needs: No Transportation Needs (7/17/2023)    PRAPARE - Transportation    • Lack of Transportation (Medical): No    • Lack of Transportation (Non-Medical):  No   Physical Activity: Not on file   Stress: Not on file   Social Connections: Not on file   Intimate Partner Violence: Not on file   Housing Stability: Not on file     Current Outpatient Medications on File Prior to Visit   Medication Sig   • acetaminophen (TYLENOL) 325 mg tablet Take 2 tablets (650 mg total) by mouth every 6 (six) hours as needed for mild pain, headaches or fever   • buPROPion (Wellbutrin XL) 150 mg 24 hr tablet Take 1 tablet (150 mg total) by mouth daily   • levothyroxine 75 mcg tablet TAKE 1 TABLET BY MOUTH EVERY DAY   • meloxicam (MOBIC) 7.5 mg tablet Take 1 tablet (7.5 mg total) by mouth daily   • rosuvastatin (CRESTOR) 20 MG tablet Take 1 tablet (20 mg total) by mouth daily     Allergies   Allergen Reactions   • Codeine Anaphylaxis     Increased Heart Rate, Palpitations     Immunization History   Administered Date(s) Administered   • INFLUENZA 12/07/2015, 10/31/2020   • Influenza Split High Dose Preservative Free IM 12/07/2015, 09/18/2017   • Influenza, high dose seasonal 0.7 mL 10/06/2018, 10/17/2019, 09/20/2021, 10/10/2022   • Pneumococcal Conjugate 13-Valent 12/07/2015   • Pneumococcal Polysaccharide PPV23 07/11/2018       Objective     /74   Pulse 70   Ht 5' (1.524 m)   Wt 96.4 kg (212 lb 9.6 oz)   SpO2 99%   BMI 41.52 kg/m²     BP Readings from Last 3 Encounters:   10/16/23 122/74   10/13/23 132/86   07/17/23 118/84        Wt Readings from Last 3 Encounters:   10/16/23 96.4 kg (212 lb 9.6 oz)   10/13/23 96.6 kg (213 lb)   08/04/23 96.6 kg (213 lb)        Physical Exam    General: NAD, Alert and oriented x3   HEENT: NCAT, EOMI, normal conjunctiva  Cardiovascular: RRR, normal S1 and S2, no m/r/g  Pulmonary: Normal respiratory effort, no wheezes, rales or rhonchi  GI: Soft, nontender, nondistended, normoactive bowel sounds  MSK: Normal bulk and tone  Neuro: Non-focal, ambulating without difficulty, non-antalgic gait  Extremities: No lower extremity edema  Skin: Normal skin color, no rashes     Vargas Herring MD

## 2023-10-16 NOTE — TELEPHONE ENCOUNTER
Last colon 08/05/2019  with Dr Erin Chacon, repeat recommended in 3 yrs, hx polyps    Scheduled date of colonoscopy (as of today):11.20.23  Physician performing colonoscopy:DR SMITH  Location of colonoscopy:[facility]  Bowel prep reviewed with patient:DULCOLAX/MIRALAX  Instructions reviewed with patient by:MAILED  Clearances: N/A    10/16/23  Screened by: Lurdes Camera    Referring Provider Josef De La Paz    Pre- Screening: Body mass index is 41.52 kg/m². Has patient been referred for a routine screening Colonoscopy? yes  Is the patient between 43-73 years old? yes      Previous Colonoscopy yes   If yes:    Date: 08/05/2019    Facility: [address] [place] [address]    Reason:       SCHEDULING STAFF: If the patient is between 45yrs-49yrs, please advise patient to confirm benefits/coverage with their insurance company for a routine screening colonoscopy, some insurance carriers will only cover at [address] [place] [address], [facility] or older. If the patient is over 66years old, please schedule an office visit. Does the patient want to see a Gastroenterologist prior to their procedure OR are they having any GI symptoms? no    Has the patient been hospitalized or had abdominal surgery in the past 6 months? no    Does the patient use supplemental oxygen? no    Does the patient take Coumadin, Lovenox, Plavix, Elliquis, Xarelto, or other blood thinning medication? no    Has the patient had a stroke, cardiac event, or stent placed in the past year? no    SCHEDULING STAFF: If patient answers NO to above questions, then schedule procedure. If patient answers YES to above questions, then schedule office appointment. If patient is between 45yrs - 49yrs, please advise patient that we will have to confirm benefits & coverage with their insurance company for a routine screening colonoscopy.

## 2023-11-05 DIAGNOSIS — E66.01 CLASS 3 SEVERE OBESITY DUE TO EXCESS CALORIES WITH SERIOUS COMORBIDITY AND BODY MASS INDEX (BMI) OF 40.0 TO 44.9 IN ADULT (HCC): ICD-10-CM

## 2023-11-06 ENCOUNTER — ANESTHESIA (OUTPATIENT)
Dept: ANESTHESIOLOGY | Facility: HOSPITAL | Age: 73
End: 2023-11-06

## 2023-11-06 ENCOUNTER — ANESTHESIA EVENT (OUTPATIENT)
Dept: ANESTHESIOLOGY | Facility: HOSPITAL | Age: 73
End: 2023-11-06

## 2023-11-06 RX ORDER — BUPROPION HYDROCHLORIDE 150 MG/1
150 TABLET ORAL DAILY
Qty: 90 TABLET | Refills: 1 | Status: SHIPPED | OUTPATIENT
Start: 2023-11-06

## 2023-11-10 DIAGNOSIS — E78.5 MILD HYPERLIPIDEMIA: ICD-10-CM

## 2023-11-13 RX ORDER — ROSUVASTATIN CALCIUM 20 MG/1
20 TABLET, COATED ORAL DAILY
Qty: 90 TABLET | Refills: 1 | Status: SHIPPED | OUTPATIENT
Start: 2023-11-13

## 2023-11-20 ENCOUNTER — ANESTHESIA EVENT (OUTPATIENT)
Dept: GASTROENTEROLOGY | Facility: AMBULARY SURGERY CENTER | Age: 73
End: 2023-11-20

## 2023-11-20 ENCOUNTER — ANESTHESIA (OUTPATIENT)
Dept: GASTROENTEROLOGY | Facility: AMBULARY SURGERY CENTER | Age: 73
End: 2023-11-20

## 2023-11-20 ENCOUNTER — HOSPITAL ENCOUNTER (OUTPATIENT)
Dept: GASTROENTEROLOGY | Facility: AMBULARY SURGERY CENTER | Age: 73
Setting detail: OUTPATIENT SURGERY
Discharge: HOME | End: 2023-11-20
Attending: INTERNAL MEDICINE
Payer: MEDICARE

## 2023-11-20 VITALS
WEIGHT: 205 LBS | TEMPERATURE: 97.4 F | SYSTOLIC BLOOD PRESSURE: 108 MMHG | OXYGEN SATURATION: 100 % | DIASTOLIC BLOOD PRESSURE: 64 MMHG | RESPIRATION RATE: 15 BRPM | HEART RATE: 68 BPM | HEIGHT: 60 IN | BODY MASS INDEX: 40.25 KG/M2

## 2023-11-20 DIAGNOSIS — Z86.010 HX OF COLONIC POLYPS: ICD-10-CM

## 2023-11-20 PROCEDURE — 88305 TISSUE EXAM BY PATHOLOGIST: CPT | Performed by: STUDENT IN AN ORGANIZED HEALTH CARE EDUCATION/TRAINING PROGRAM

## 2023-11-20 RX ORDER — PROPOFOL 10 MG/ML
INJECTION, EMULSION INTRAVENOUS AS NEEDED
Status: DISCONTINUED | OUTPATIENT
Start: 2023-11-20 | End: 2023-11-20

## 2023-11-20 RX ORDER — SODIUM CHLORIDE, SODIUM LACTATE, POTASSIUM CHLORIDE, CALCIUM CHLORIDE 600; 310; 30; 20 MG/100ML; MG/100ML; MG/100ML; MG/100ML
INJECTION, SOLUTION INTRAVENOUS CONTINUOUS PRN
Status: DISCONTINUED | OUTPATIENT
Start: 2023-11-20 | End: 2023-11-20

## 2023-11-20 RX ADMIN — PROPOFOL 20 MG: 10 INJECTION, EMULSION INTRAVENOUS at 09:38

## 2023-11-20 RX ADMIN — PROPOFOL 20 MG: 10 INJECTION, EMULSION INTRAVENOUS at 09:26

## 2023-11-20 RX ADMIN — PROPOFOL 20 MG: 10 INJECTION, EMULSION INTRAVENOUS at 09:30

## 2023-11-20 RX ADMIN — PROPOFOL 20 MG: 10 INJECTION, EMULSION INTRAVENOUS at 09:32

## 2023-11-20 RX ADMIN — SODIUM CHLORIDE, SODIUM LACTATE, POTASSIUM CHLORIDE, AND CALCIUM CHLORIDE: .6; .31; .03; .02 INJECTION, SOLUTION INTRAVENOUS at 08:39

## 2023-11-20 RX ADMIN — PROPOFOL 20 MG: 10 INJECTION, EMULSION INTRAVENOUS at 09:44

## 2023-11-20 RX ADMIN — PROPOFOL 20 MG: 10 INJECTION, EMULSION INTRAVENOUS at 09:28

## 2023-11-20 RX ADMIN — PROPOFOL 100 MG: 10 INJECTION, EMULSION INTRAVENOUS at 09:24

## 2023-11-20 RX ADMIN — PROPOFOL 20 MG: 10 INJECTION, EMULSION INTRAVENOUS at 09:41

## 2023-11-20 RX ADMIN — Medication 40 MG: at 09:53

## 2023-11-20 RX ADMIN — PROPOFOL 20 MG: 10 INJECTION, EMULSION INTRAVENOUS at 09:35

## 2023-11-20 NOTE — H&P
History and Physical - [facility]  Tam Morales 74 y.o. female MRN: [id number]                  HPI: Tam Morales is a 74y.o. year old female who presents for surveillance, hxo f polyps      REVIEW OF SYSTEMS: Per the HPI, and otherwise unremarkable.     Historical Information   Past Medical History:   Diagnosis Date    Arthritis     Cat scratch 2022    Cataracts, bilateral     Colon polyp     Disease of thyroid gland     Diverticulitis of colon     History of COVID-19 2022    Hypothyroid     Kidney stone Not sure    Obesity (BMI 35.0-39.9 without comorbidity)     PNA (pneumonia) 2019     Past Surgical History:   Procedure Laterality Date     SECTION  1983    CHOLECYSTECTOMY      open    COLONOSCOPY      EYE SURGERY  10/2021    FL RETROGRADE PYELOGRAM  2022    MT CYSTO/URETERO W/LITHOTRIPSY &INDWELL STENT INSRT Right 2022    Procedure: CYSTOSCOPY URETEROSCOPY WITH LITHOTRIPSY HOLMIUM LASER, RETROGRADE PYELOGRAM AND INSERTION STENT URETERAL;  Surgeon: David Chinchilla MD;  Location: [facility];  Service: Urology     Social History   Social History     Substance and Sexual Activity   Alcohol Use Not Currently    Alcohol/week: 0.0 standard drinks of alcohol     Social History     Substance and Sexual Activity   Drug Use Never     Social History     Tobacco Use   Smoking Status Former    Packs/day: 0.00    Years: 0.00    Total pack years: 0.00    Types: Cigarettes   Smokeless Tobacco Never     Family History   Problem Relation Age of Onset    Cancer Mother     Diabetes Mother     Cancer Father     Diabetes Father     Urolithiasis Sister     No Known Problems Daughter     No Known Problems Maternal Grandmother     No Known Problems Maternal Grandfather     No Known Problems Paternal Grandmother     No Known Problems Paternal Grandfather     No Known Problems Maternal Aunt     No Known Problems Paternal Aunt     No Known Problems Paternal Aunt        Meds/Allergies       Current Outpatient Medications:     acetaminophen (TYLENOL) 325 mg tablet    buPROPion (WELLBUTRIN XL) 150 mg 24 hr tablet    levothyroxine 75 mcg tablet    meloxicam (MOBIC) 7.5 mg tablet    rosuvastatin (CRESTOR) 20 MG tablet    Allergies   Allergen Reactions    Codeine Anaphylaxis     Increased Heart Rate, Palpitations       Objective     Pulse 78   Temp 97.7 °F (36.5 °C) (Temporal)   Resp 14   Ht 5' (1.524 m)   Wt 93 kg (205 lb)   SpO2 97%   BMI 40.04 kg/m²       PHYSICAL EXAM    Gen: NAD  Head: NCAT  CV: RRR  CHEST: Clear  ABD: soft, NT/ND  EXT: no edema      ASSESSMENT/PLAN:  This is a 74y.o. year old female here for colonoscopy, and she is stable and optimized for her procedure.

## 2023-11-20 NOTE — ANESTHESIA PREPROCEDURE EVALUATION
Procedure:  COLONOSCOPY    Relevant Problems   CARDIO   (+) Pure hypercholesterolemia      ENDO   (+) Hypothyroidism due to Hashimoto's thyroiditis      /RENAL   (+) Ureteral stone with hydronephrosis      Disease of thyroid gland    PNA (pneumonia)    Arthritis    Obesity (BMI 35.0-39.9 without comorbidity)    Hypothyroid    Cataracts, bilateral        Physical Exam    Airway    Mallampati score: II  TM Distance: >3 FB  Neck ROM: full     Dental       Cardiovascular  Cardiovascular exam normal    Pulmonary  Pulmonary exam normal     Other Findings  post-pubertal.      Anesthesia Plan  ASA Score- 3     Anesthesia Type- IV sedation with anesthesia with ASA Monitors. Additional Monitors:     Airway Plan:            Plan Factors-Exercise tolerance (METS): >4 METS. Chart reviewed. EKG reviewed. Imaging results reviewed. Existing labs reviewed. Patient summary reviewed. Patient is not a current smoker. Patient not instructed to abstain from smoking on day of procedure. Patient did not smoke on day of surgery. Induction- intravenous. Postoperative Plan- Plan for postoperative opioid use. Planned trial extubation    Informed Consent- Anesthetic plan and risks discussed with patient. I personally reviewed this patient with the CRNA. Discussed and agreed on the Anesthesia Plan with the CRNA. Meche Wolff

## 2023-11-20 NOTE — ANESTHESIA POSTPROCEDURE EVALUATION
Post-Op Assessment Note    CV Status:  Stable  Pain Score: 0    Pain management: adequate       Mental Status:  Sleepy and arousable   PONV Controlled:  None   Airway Patency:  Patent     Post Op Vitals Reviewed: Yes    No anethesia notable event occurred.     Staff: CRNA               BP  118/59   Temp 97   Pulse 75   Resp 16   SpO2 97

## 2023-11-24 PROCEDURE — 88305 TISSUE EXAM BY PATHOLOGIST: CPT | Performed by: STUDENT IN AN ORGANIZED HEALTH CARE EDUCATION/TRAINING PROGRAM

## 2023-12-18 DIAGNOSIS — M25.50 ARTHRALGIA, UNSPECIFIED JOINT: ICD-10-CM

## 2023-12-18 RX ORDER — MELOXICAM 7.5 MG/1
7.5 TABLET ORAL DAILY
Qty: 30 TABLET | Refills: 2 | Status: SHIPPED | OUTPATIENT
Start: 2023-12-18

## 2024-01-02 ENCOUNTER — RA CDI HCC (OUTPATIENT)
Dept: OTHER | Facility: HOSPITAL | Age: 74
End: 2024-01-02

## 2024-01-08 ENCOUNTER — OFFICE VISIT (OUTPATIENT)
Dept: BARIATRICS | Facility: CLINIC | Age: 74
End: 2024-01-08
Payer: MEDICARE

## 2024-01-08 ENCOUNTER — OFFICE VISIT (OUTPATIENT)
Dept: INTERNAL MEDICINE CLINIC | Facility: CLINIC | Age: 74
End: 2024-01-08
Payer: MEDICARE

## 2024-01-08 VITALS
WEIGHT: 206.6 LBS | HEART RATE: 80 BPM | OXYGEN SATURATION: 99 % | DIASTOLIC BLOOD PRESSURE: 86 MMHG | BODY MASS INDEX: 40.35 KG/M2 | SYSTOLIC BLOOD PRESSURE: 142 MMHG

## 2024-01-08 VITALS
HEIGHT: 60 IN | WEIGHT: 206 LBS | DIASTOLIC BLOOD PRESSURE: 78 MMHG | SYSTOLIC BLOOD PRESSURE: 128 MMHG | HEART RATE: 72 BPM | BODY MASS INDEX: 40.44 KG/M2

## 2024-01-08 DIAGNOSIS — M25.551 ACUTE RIGHT HIP PAIN: Primary | ICD-10-CM

## 2024-01-08 DIAGNOSIS — E66.01 CLASS 3 SEVERE OBESITY DUE TO EXCESS CALORIES WITH SERIOUS COMORBIDITY AND BODY MASS INDEX (BMI) OF 40.0 TO 44.9 IN ADULT (HCC): ICD-10-CM

## 2024-01-08 DIAGNOSIS — K21.9 GASTROESOPHAGEAL REFLUX DISEASE, UNSPECIFIED WHETHER ESOPHAGITIS PRESENT: ICD-10-CM

## 2024-01-08 PROCEDURE — 99214 OFFICE O/P EST MOD 30 MIN: CPT | Performed by: INTERNAL MEDICINE

## 2024-01-08 PROCEDURE — 99214 OFFICE O/P EST MOD 30 MIN: CPT | Performed by: NURSE PRACTITIONER

## 2024-01-08 RX ORDER — BUPROPION HYDROCHLORIDE 150 MG/1
150 TABLET ORAL DAILY
Qty: 90 TABLET | Refills: 1 | Status: SHIPPED | OUTPATIENT
Start: 2024-01-08

## 2024-01-08 RX ORDER — OMEPRAZOLE 20 MG/1
20 CAPSULE, DELAYED RELEASE ORAL DAILY
Qty: 30 CAPSULE | Refills: 0 | Status: SHIPPED | OUTPATIENT
Start: 2024-01-08 | End: 2024-01-15 | Stop reason: SDUPTHER

## 2024-01-08 NOTE — ASSESSMENT & PLAN NOTE
-Suspect right hip osteoarthritis and hip bursitis  -An x-ray of the hip has been ordered  -A referral has been made to orthopedics for consideration of a hip injection

## 2024-01-08 NOTE — ASSESSMENT & PLAN NOTE
- Patient is pursuing Conservative Program and follow up visits with medical weight management provider  - Initial weight loss goal of 5-10% weight loss for improved health  -Patient was congratulated on her weight loss since last office visit.  Patient is going to continue to work on balancing her nutrition and monitoring her portions.  She is going to continue to avoid skipping meals and to incorporate small snacks within her day to avoid excess hunger.  Patient was also encouraged to consider increasing her activity level by incorporating some exercise 2 to 3 days a week.  Patient is tolerating bupropion 150 mg once daily without any side effects.  Patient would like to continue with this medication as she feels that has helped with her cravings and urges to eat.      Goals:  Calorie Goal: 1220-3436 calories per day  Do not skip meals.  Food log via khadar or provided paper log (khadar options include www.Addynesspal.com, sparkpeople.com, loseit.com, calorieking.com, [employer])  No sugary beverages.   At least 64oz of water daily.  Increase physical activity by 10 minutes daily. Gradually increase physical activity to a goal of 5 days per week for 30 minutes of MODERATE intensity PLUS 2 days per week of FULL BODY resistance training

## 2024-01-08 NOTE — PROGRESS NOTES
Name: Yue Art      : 1950      MRN: [id number]  Encounter Provider: Vargas Hinojosa MD  Encounter Date: 2024   Enc[facility]: [facility]    Assessment & Plan     1. Acute right hip pain  Assessment & Plan:  -Suspect right hip osteoarthritis and hip bursitis  -An x-ray of the hip has been ordered  -A referral has been made to orthopedics for consideration of a hip injection    Orders:  -     XR hip/pelv 2-3 vws right if performed; Future; Expected date: 2024  -     Ambulatory Referral to Orthopedic Surgery; Future    2. Gastroesophageal reflux disease, unspecified whether esophagitis present  Assessment & Plan:  -Symptoms improved with Pepcid  -Recommended a 2 to 4-week trial of Prilosec  -If no improvement and/or she continues to have occasional sensation of food sticking will recommend a referral to GI for an upper EGD    Orders:  -     omeprazole (PriLOSEC) 20 mg delayed release capsule; Take 1 capsule (20 mg total) by mouth daily    3. BMI 40.0-44.9, adult (HCC)  Assessment & Plan:  -Discussed dietary and lifestyle changes to help with reflux           Subjective      HPI  Patient presents today as an acute visit with multiple complaints.  Her main complaint is right hip pain which she states started 6 months ago.  She denies any triggering event.  Her pain is worse with walking and bending over.  She describes it as sharp and that the intensity is often 10 out of 10.    She also complains of acid reflux over the past week.  She reports it has been occurring on a daily basis.  She notes her discomfort is worse after meals.  She denies any regurgitation of food.  She states at times it feels as though there may be something stuck in the lower portion of her chest.  Her symptoms improved significantly with Pepcid.      All other systems negative except for pertinent findings noted in HPI.       Current Outpatient Medications on File Prior to Visit    Medication Sig   • acetaminophen (TYLENOL) 325 mg tablet Take 2 tablets (650 mg total) by mouth every 6 (six) hours as needed for mild pain, headaches or fever   • buPROPion (WELLBUTRIN XL) 150 mg 24 hr tablet TAKE 1 TABLET BY MOUTH EVERY DAY   • levothyroxine 75 mcg tablet TAKE 1 TABLET BY MOUTH EVERY DAY   • meloxicam (MOBIC) 7.5 mg tablet Take 1 tablet (7.5 mg total) by mouth daily   • rosuvastatin (CRESTOR) 20 MG tablet TAKE 1 TABLET BY MOUTH EVERY DAY       Objective     /86 (BP Location: Left arm, Patient Position: Sitting, Cuff Size: Standard)   Pulse 80   Wt 93.7 kg (206 lb 9.6 oz)   SpO2 99%   BMI 40.35 kg/m²     BP Readings from Last 3 Encounters:   01/08/24 142/86   11/20/23 108/64   10/16/23 122/74        Wt Readings from Last 3 Encounters:   01/08/24 93.7 kg (206 lb 9.6 oz)   11/20/23 93 kg (205 lb)   10/16/23 96.4 kg (212 lb 9.6 oz)       Physical Exam    General: NAD  HEENT: NCAT, EOMI, normal conjunctiva  Cardiovascular: RRR, normal S1 and S2, no m/r/g  Pulmonary: Normal respiratory effort, no wheezes, rales or rhonchi  GI: Soft, nontender, nondistended, normoactive bowel sounds  MSK: Normal bulk and tone, CATALINA positive, tenderness to palpation over the right greater trochanteric region  Neuro: Antalgic gait   Extremities: No lower extremity edema  Skin: Normal skin color, no rashes     Vargas Hinojosa MD

## 2024-01-08 NOTE — ASSESSMENT & PLAN NOTE
-Symptoms improved with Pepcid  -Recommended a 2 to 4-week trial of Prilosec  -If no improvement and/or she continues to have occasional sensation of food sticking will recommend a referral to GI for an upper EGD

## 2024-01-08 NOTE — PROGRESS NOTES
Assessment/Plan:     Class 3 severe obesity due to excess calories with serious comorbidity and body mass index (BMI) of 40.0 to 44.9 in adult ([facility])  - Patient is pursuing Conservative Program and follow up visits with medical weight management provider  - Initial weight loss goal of 5-10% weight loss for improved health  -Patient was congratulated on her weight loss since last office visit.  Patient is going to continue to work on balancing her nutrition and monitoring her portions.  She is going to continue to avoid skipping meals and to incorporate small snacks within her day to avoid excess hunger.  Patient was also encouraged to consider increasing her activity level by incorporating some exercise 2 to 3 days a week.  Patient is tolerating bupropion 150 mg once daily without any side effects.  Patient would like to continue with this medication as she feels that has helped with her cravings and urges to eat.      Goals:  Calorie Goal: 5429-4136 calories per day  Do not skip meals.  Food log via khadar or provided paper log (khadar options include www.myfitnesspal.com, sparkpeople.com, loseit.com, calorieking.com, TrumpIT)  No sugary beverages.   At least 64oz of water daily.  Increase physical activity by 10 minutes daily. Gradually increase physical activity to a goal of 5 days per week for 30 minutes of MODERATE intensity PLUS 2 days per week of FULL BODY resistance training          Yue was seen today for follow-up.    Diagnoses and all orders for this visit:    Class 3 severe obesity due to excess calories with serious comorbidity and body mass index (BMI) of 40.0 to 44.9 in adult ([facility])  -     buPROPion (WELLBUTRIN XL) 150 mg 24 hr tablet; Take 1 tablet (150 mg total) by mouth daily        Total time spent reviewing chart, interviewing patient, examining patient, discussing plan, answering all questions, and documentin minutes with >50% face-to-face time with the patient.    Follow up in approximately  2 months with Non-Surgical Physician/Advanced Practitioner.    Subjective:   Chief Complaint   Patient presents with    Follow-up     Pt is here for MWM f/u       Patient ID: Yue Art  is a 73 y.o. female with excess weight/obesity here to pursue weight management.  Patient is pursuing Conservative Program.   Most recent notes and records were reviewed.    HPI    Wt Readings from Last 20 Encounters:   01/08/24 93.4 kg (206 lb)   01/08/24 93.7 kg (206 lb 9.6 oz)   11/20/23 93 kg (205 lb)   10/16/23 96.4 kg (212 lb 9.6 oz)   10/13/23 96.6 kg (213 lb)   08/04/23 96.6 kg (213 lb)   07/17/23 96.9 kg (213 lb 9.6 oz)   11/21/22 92 kg (202 lb 12.8 oz)   07/13/22 88.5 kg (195 lb)   06/23/22 89.6 kg (197 lb 9.6 oz)   04/04/22 88.3 kg (194 lb 9.6 oz)   03/09/22 87.7 kg (193 lb 6.4 oz)   03/05/22 86.9 kg (191 lb 9.3 oz)   02/13/22 86.6 kg (190 lb 14.7 oz)   09/20/21 89.8 kg (198 lb)   06/21/21 93.4 kg (205 lb 12.8 oz)   06/14/21 92.5 kg (204 lb)   02/26/21 92.7 kg (204 lb 6.4 oz)   02/23/21 93.8 kg (206 lb 12.8 oz)   06/17/20 95.3 kg (210 lb 3.2 oz)       Patient presents today to medical weight management office for follow up.  Last office visit patient was started on bupropion 150 mg daily and states that this has been very successful to help control her cravings.  Patient has started to avoid skipping meals and is eating a more balanced diet.  She is incorporating protein with her morning meal which has helped with her hunger throughout the day.  Patient has also started snacking on lower calorie options such as rice cakes and fruit which are keeping her sustained throughout the day.  Patient is very happy with her progress and feels encouraged as she was build to lose weight even through the holidays.    Weight loss medication and dose: bupropion 150mg daily  Started weight and date: 213 lbs in 10/2023  Current weight: 206 lbs  Difference: -7 lbs    Starting BMI: 41.0 in 10/2023  Current BMI: 39.7    Waist  "Measurements:  10/2023: 45 in  1/2024: 44 in      Diet recall:  B: egg with 1/2 English Muffin  L: 1/2 sandwich (chicken breast   S: rice cakes  D: salad with protein (chicken) OR chickpea pasta  S: rice cakes    Hydration: tea and water - 24-32 oz  Alcohol: no  Smoking: no  Exercise: no  Occupation: [employer]  Sleep: well  STOP bang: 3/8    Mammogram: 9/2023      The following portions of the patient's history were reviewed and updated as appropriate: allergies, current medications, past family history, past medical history, past social history, past surgical history, and problem list.    Family History   Problem Relation Age of Onset    Cancer Mother     Diabetes Mother     Cancer Father     Diabetes Father     Urolithiasis Sister     No Known Problems Daughter     No Known Problems Maternal Grandmother     No Known Problems Maternal Grandfather     No Known Problems Paternal Grandmother     No Known Problems Paternal Grandfather     No Known Problems Maternal Aunt     No Known Problems Paternal Aunt     No Known Problems Paternal Aunt         Review of Systems   Constitutional:  Negative for fatigue.   HENT:  Negative for sore throat.    Respiratory:  Negative for cough and shortness of breath.    Cardiovascular:  Negative for chest pain, palpitations and leg swelling.   Gastrointestinal:  Negative for abdominal pain, constipation, diarrhea and nausea.        + GERD   Genitourinary:  Negative for dysuria.   Musculoskeletal:  Negative for arthralgias and back pain.   Skin:  Negative for rash.   Neurological:  Negative for headaches.   Psychiatric/Behavioral:  Negative for dysphoric mood. The patient is not nervous/anxious.        Objective:  /78   Pulse 72   Ht 5' 0.4\" (1.534 m)   Wt 93.4 kg (206 lb)   BMI 39.70 kg/m²     Physical Exam  Vitals and nursing note reviewed.   Constitutional:       Appearance: Normal appearance. She is obese.   HENT:      Head: Normocephalic.   Pulmonary:      Effort: " Pulmonary effort is normal.   Neurological:      General: No focal deficit present.      Mental Status: She is alert and oriented to person, place, and time.   Psychiatric:         Mood and Affect: Mood normal.         Behavior: Behavior normal.         Thought Content: Thought content normal.         Judgment: Judgment normal.            Labs   Most recent labs reviewed   Lab Results   Component Value Date     05/10/2015    SODIUM 138 12/12/2022    K 4.9 12/12/2022     12/12/2022    CO2 29 12/12/2022    ANIONGAP 5 05/10/2015    AGAP 6 12/12/2022    BUN 16 12/12/2022    CREATININE 0.80 12/12/2022    GLUC 104 03/06/2022    GLUF 100 (H) 12/12/2022    CALCIUM 9.3 12/12/2022    AST 17 12/12/2022    ALT 11 12/12/2022    ALKPHOS 76 12/12/2022    PROT 7.6 05/10/2015    TP 7.6 12/12/2022    BILITOT 0.4 05/10/2015    TBILI 0.52 12/12/2022    EGFR 72 12/12/2022     Lab Results   Component Value Date    HGBA1C 5.5 12/12/2022     Lab Results   Component Value Date    PKN0VXGTSHLJ 11.016 (H) 12/12/2022     Lab Results   Component Value Date    CHOLESTEROL 219 (H) 12/12/2022     Lab Results   Component Value Date    HDL 37 (L) 12/12/2022     Lab Results   Component Value Date    TRIG 221 (H) 12/12/2022     Lab Results   Component Value Date    LDLCALC 138 (H) 12/12/2022

## 2024-01-09 ENCOUNTER — HOSPITAL ENCOUNTER (OUTPATIENT)
Dept: RADIOLOGY | Facility: HOSPITAL | Age: 74
Discharge: HOME | End: 2024-01-09
Payer: MEDICARE

## 2024-01-09 ENCOUNTER — APPOINTMENT (OUTPATIENT)
Dept: LAB | Facility: CLINIC | Age: 74
End: 2024-01-09
Payer: MEDICARE

## 2024-01-09 DIAGNOSIS — E03.8 HYPOTHYROIDISM DUE TO HASHIMOTO'S THYROIDITIS: ICD-10-CM

## 2024-01-09 DIAGNOSIS — E06.3 HYPOTHYROIDISM DUE TO HASHIMOTO'S THYROIDITIS: ICD-10-CM

## 2024-01-09 DIAGNOSIS — E78.00 PURE HYPERCHOLESTEROLEMIA: ICD-10-CM

## 2024-01-09 DIAGNOSIS — R73.01 IFG (IMPAIRED FASTING GLUCOSE): ICD-10-CM

## 2024-01-09 DIAGNOSIS — M25.551 ACUTE RIGHT HIP PAIN: ICD-10-CM

## 2024-01-09 LAB
ALBUMIN SERPL BCP-MCNC: 4.3 G/DL (ref 3.5–5)
ALP SERPL-CCNC: 81 U/L (ref 34–104)
ALT SERPL W P-5'-P-CCNC: 10 U/L (ref 7–52)
ANION GAP SERPL CALCULATED.3IONS-SCNC: 8 MMOL/L
AST SERPL W P-5'-P-CCNC: 17 U/L (ref 13–39)
BILIRUB SERPL-MCNC: 0.57 MG/DL (ref 0.2–1)
BUN SERPL-MCNC: 15 MG/DL (ref 5–25)
CALCIUM SERPL-MCNC: 9.3 MG/DL (ref 8.4–10.2)
CHLORIDE SERPL-SCNC: 101 MMOL/L (ref 96–108)
CHOLEST SERPL-MCNC: 107 MG/DL
CO2 SERPL-SCNC: 29 MMOL/L (ref 21–32)
CREAT SERPL-MCNC: 0.86 MG/DL (ref 0.6–1.3)
EST. AVERAGE GLUCOSE BLD GHB EST-MCNC: 128 MG/DL
GFR SERPL CREATININE-BSD FRML MDRD: 66 ML/MIN/1.73SQ M
GLUCOSE P FAST SERPL-MCNC: 97 MG/DL (ref 65–99)
HBA1C MFR BLD: 6.1 %
HDLC SERPL-MCNC: 38 MG/DL
LDLC SERPL CALC-MCNC: 43 MG/DL (ref 0–100)
POTASSIUM SERPL-SCNC: 4.3 MMOL/L (ref 3.5–5.3)
PROT SERPL-MCNC: 7.6 G/DL (ref 6.4–8.4)
SODIUM SERPL-SCNC: 138 MMOL/L (ref 135–147)
TRIGL SERPL-MCNC: 129 MG/DL
TSH SERPL DL<=0.05 MIU/L-ACNC: 3.33 UIU/ML (ref 0.45–4.5)

## 2024-01-09 PROCEDURE — 84443 ASSAY THYROID STIM HORMONE: CPT

## 2024-01-09 PROCEDURE — 36415 COLL VENOUS BLD VENIPUNCTURE: CPT

## 2024-01-09 PROCEDURE — 73502 X-RAY EXAM HIP UNI 2-3 VIEWS: CPT

## 2024-01-09 PROCEDURE — 80061 LIPID PANEL: CPT

## 2024-01-09 PROCEDURE — 80053 COMPREHEN METABOLIC PANEL: CPT

## 2024-01-09 PROCEDURE — 83036 HEMOGLOBIN GLYCOSYLATED A1C: CPT

## 2024-01-12 ENCOUNTER — OFFICE VISIT (OUTPATIENT)
Dept: OBGYN CLINIC | Facility: CLINIC | Age: 74
End: 2024-01-12
Payer: MEDICARE

## 2024-01-12 DIAGNOSIS — M76.11 PSOAS TENDINITIS OF RIGHT SIDE: ICD-10-CM

## 2024-01-12 DIAGNOSIS — M70.61 GREATER TROCHANTERIC BURSITIS OF RIGHT HIP: ICD-10-CM

## 2024-01-12 DIAGNOSIS — M25.551 ACUTE RIGHT HIP PAIN: ICD-10-CM

## 2024-01-12 DIAGNOSIS — M16.11 PRIMARY OSTEOARTHRITIS OF RIGHT HIP: Primary | ICD-10-CM

## 2024-01-12 PROCEDURE — 20610 DRAIN/INJ JOINT/BURSA W/O US: CPT | Performed by: ORTHOPAEDIC SURGERY

## 2024-01-12 PROCEDURE — 99204 OFFICE O/P NEW MOD 45 MIN: CPT | Performed by: ORTHOPAEDIC SURGERY

## 2024-01-12 RX ORDER — BETAMETHASONE SODIUM PHOSPHATE AND BETAMETHASONE ACETATE 3; 3 MG/ML; MG/ML
12 INJECTION, SUSPENSION INTRA-ARTICULAR; INTRALESIONAL; INTRAMUSCULAR; SOFT TISSUE
Status: COMPLETED | OUTPATIENT
Start: 2024-01-12 | End: 2024-01-12

## 2024-01-12 RX ORDER — LIDOCAINE HYDROCHLORIDE 10 MG/ML
1 INJECTION, SOLUTION INFILTRATION; PERINEURAL
Status: COMPLETED | OUTPATIENT
Start: 2024-01-12 | End: 2024-01-12

## 2024-01-12 RX ORDER — BUPIVACAINE HYDROCHLORIDE 2.5 MG/ML
1 INJECTION, SOLUTION INFILTRATION; PERINEURAL
Status: COMPLETED | OUTPATIENT
Start: 2024-01-12 | End: 2024-01-12

## 2024-01-12 RX ADMIN — LIDOCAINE HYDROCHLORIDE 1 ML: 10 INJECTION, SOLUTION INFILTRATION; PERINEURAL at 14:45

## 2024-01-12 RX ADMIN — BETAMETHASONE SODIUM PHOSPHATE AND BETAMETHASONE ACETATE 12 MG: 3; 3 INJECTION, SUSPENSION INTRA-ARTICULAR; INTRALESIONAL; INTRAMUSCULAR; SOFT TISSUE at 14:45

## 2024-01-12 RX ADMIN — BUPIVACAINE HYDROCHLORIDE 1 ML: 2.5 INJECTION, SOLUTION INFILTRATION; PERINEURAL at 14:45

## 2024-01-12 NOTE — PROGRESS NOTES
Assessment:  1. Primary osteoarthritis of right hip  Ambulatory Referral to Physical Therapy      2. Greater trochanteric bursitis of right hip  Ambulatory Referral to Physical Therapy    Large joint arthrocentesis: R greater trochanteric bursa      3. Psoas tendinitis of right side  Ambulatory Referral to Physical Therapy    Ambulatory Referral to Sports Medicine      4. Acute right hip pain  Ambulatory Referral to Orthopedic Surgery    Ambulatory Referral to Physical Therapy        Patient Active Problem List   Diagnosis   • Hypothyroidism due to Hashimoto's thyroiditis   • Diverticulitis of large intestine with perforation without bleeding   • Class 3 severe obesity due to excess calories with serious comorbidity and body mass index (BMI) of 40.0 to 44.9 in adult ([facility])   • Ureteral stone with hydronephrosis   • Mass of soft tissue of neck   • Bilateral carotid artery stenosis   • IFG (impaired fasting glucose)   • Pure hypercholesterolemia   • Lipoma of neck   • Gastroesophageal reflux disease   • Acute right hip pain   • BMI 40.0-44.9, adult ([facility])           Plan      The patient's x-rays were reviewed today. The patient is experiencing symptoms consistent with moderate right hip osteoarthritis, right greater trochanteric bursitis, psoas tendonitis. Treatment options were discussed in the form of physical therapy, corticosteroid injection for greater trochanteric bursitis, ultrasound-guided psoas tendon sheath injection with Dr. Paul, articular corticosteroid injection to right hip if previous injections do not fully resolve the patient's pain. The patient was offered a corticosteroid injection for their right greater trochanteric bursa. They tolerated the procedure well.  The patient was educated they may have some irritation in the next few days and should rest, ice, elevate and perform gentle range of motion exercises. They were advised the medicine should begin to work in a few days time.  The patient was  informed corticosteroid injections can be repeated at the earliest every 3 months. I will see the patient back as needed.           Subjective:     Patient ID:    Chief Complaint:Yue Art 73 y.o. female      HPI    Patient comes in today with regards to right hip pain.  The patient reports that the pain is in the groin and lateral thigh, occasionally into the knee and has been going on for the past year and has worsened over the past 6 months.  The pain is rated at 7 at its best and 10 at its worst.    The pain is described as achy, burning.  It is worsened with forward bending, and is made better with avoiding the offending activity.  The patient has difficulty picking something up off the floor, tieing her shoes, performing right hip flexion. The patient has taken Meloxicam and Tylenol for treatment. The patient has no history of injections or surgery to the right hip. The patient is not a diabetic. 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 The patient is accompanied by her  for her exam today.        The following portions of the patient's history were reviewed and updated as appropriate: allergies, current medications, past family history, past social history, past surgical history and problem list.        Social History     Socioeconomic History   • Marital status: /Civil Union     Spouse name: Not on file   • Number of children: Not on file   • Years of education: Not on file   • Highest education level: Not on file   Occupational History   • Not on file   Tobacco Use   • Smoking status: Former     Current packs/day: 0.00     Types: Cigarettes   • Smokeless tobacco: Never   Vaping Use   • Vaping status: Never Used   Substance and Sexual Activity   • Alcohol use: Not Currently     Alcohol/week: 0.0 standard drinks of alcohol   • Drug use: Never   • Sexual activity: Not on file   Other Topics Concern   • Not on file    Social History Narrative   • Not on file     Social Determinants of Health     Financial Resource Strain: Low Risk  (2023)    Overall Financial Resource Strain (CARDIA)    • Difficulty of Paying Living Expenses: Not hard at all   Food Insecurity: Not on file   Transportation Needs: No Transportation Needs (2023)    PRAPARE - Transportation    • Lack of Transportation (Medical): No    • Lack of Transportation (Non-Medical): No   Physical Activity: Not on file   Stress: Not on file   Social Connections: Not on file   Intimate Partner Violence: Not on file   Housing Stability: Not on file     Past Medical History:   Diagnosis Date   • Arthritis    • Cat scratch 2022   • Cataracts, bilateral    • Colon polyp    • Disease of thyroid gland    • Diverticulitis of colon    • History of COVID-19 2022   • Hypothyroid    • Kidney stone Not sure   • Obesity (BMI 35.0-39.9 without comorbidity)    • PNA (pneumonia) 2019     Past Surgical History:   Procedure Laterality Date   •  SECTION  1983   • CHOLECYSTECTOMY  1968    open   • COLONOSCOPY     • EYE SURGERY  10/2021   • [place] RETROGRADE PYELOGRAM  2022   • [place] CYSTO/URETERO W/LITHOTRIPSY &INDWELL STENT INSRT Right 2022    Procedure: CYSTOSCOPY URETEROSCOPY WITH LITHOTRIPSY HOLMIUM LASER, RETROGRADE PYELOGRAM AND INSERTION STENT URETERAL;  Surgeon: Bill Serrano MD;  Location: [facility];  Service: Urology     Allergies   Allergen Reactions   • Codeine Anaphylaxis     Increased Heart Rate, Palpitations     Current Outpatient Medications on File Prior to Visit   Medication Sig Dispense Refill   • acetaminophen (TYLENOL) 325 mg tablet Take 2 tablets (650 mg total) by mouth every 6 (six) hours as needed for mild pain, headaches or fever  0   • buPROPion (WELLBUTRIN XL) 150 mg 24 hr tablet Take 1 tablet (150 mg total) by mouth daily 90 tablet 1   • levothyroxine 75 mcg tablet TAKE 1 TABLET BY MOUTH EVERY DAY 90 tablet 1   •  meloxicam (MOBIC) 7.5 mg tablet Take 1 tablet (7.5 mg total) by mouth daily 30 tablet 2   • omeprazole (PriLOSEC) 20 mg delayed release capsule Take 1 capsule (20 mg total) by mouth daily (Patient not taking: Reported on 1/8/2024) 30 capsule 0   • rosuvastatin (CRESTOR) 20 MG tablet TAKE 1 TABLET BY MOUTH EVERY DAY 90 tablet 1     No current facility-administered medications on file prior to visit.              Objective:    Review of Systems   Constitutional:  Negative for appetite change and unexpected weight change.   HENT:  Negative for congestion and trouble swallowing.    Eyes:  Negative for visual disturbance.   Respiratory:  Negative for cough and shortness of breath.    Cardiovascular:  Negative for chest pain and palpitations.   Gastrointestinal:  Negative for nausea and vomiting.   Endocrine: Negative for cold intolerance and heat intolerance.   Musculoskeletal:  Positive for arthralgias. Negative for joint swelling and myalgias.   Skin:  Negative for rash.   Neurological:  Negative for numbness.       Ortho Exam  Right Hip:  Patient sits comfortably with hips flexed to 90 degrees in no apparent distress   TTP greater trochanteric bursa, psoas, SI, sciatic notch  Painful circumduction of the hip    30 degrees of internal rotation 40 degrees on contralateral  Internal rotation and abduction of hip is minimal   Able to perform straight leg raise   Able to perform hip flexion   Able to perform hip abduction with resistance   Able to resist dorsiflexion   Resisted external rotation caused an increase of pain  CATALINA testing is negative   Patient ambulates without aid.   Calf is supple and non-tender.   Sensation intact to L2-S1 dermatomes   Extremity warm and well perfused       Physical Exam  Vitals and nursing note reviewed.   HENT:      Head: Normocephalic and atraumatic.   Eyes:      General: No scleral icterus.     Conjunctiva/sclera: Conjunctivae normal.   Cardiovascular:      Rate and Rhythm: Normal  "rate.   Pulmonary:      Effort: Pulmonary effort is normal. No respiratory distress.   Musculoskeletal:         General: Tenderness present.      Cervical back: Normal range of motion and neck supple.   Skin:     General: Skin is warm and dry.   Neurological:      Mental Status: She is alert and oriented to person, place, and time.   Psychiatric:         Behavior: Behavior normal.         Large joint arthrocentesis: R greater trochanteric bursa  Universal Protocol:  Risks and benefits: risks, benefits and alternatives were discussed  Consent given by: patient  Time out: Immediately prior to procedure a \"time out\" was called to verify the correct patient, procedure, equipment, support staff and site/side marked as required.  Patient understanding: patient states understanding of the procedure being performed  Site marked: the operative site was marked  Patient identity confirmed: verbally with patient  Supporting Documentation  Indications: pain   Procedure Details  Location: hip - R greater trochanteric bursa  Needle size: 22 G  Ultrasound guidance: no  Approach: lateral  Medications administered: 1 mL bupivacaine 0.25 %; 1 mL lidocaine 1 %; 12 mg betamethasone acetate-betamethasone sodium phosphate 6 (3-3) mg/mL    Patient tolerance: patient tolerated the procedure well with no immediate complications  Dressing:  Sterile dressing applied             I have personally reviewed pertinent films in PACS and my interpretation is moderate degenerative changes of the right hip with osteophyte formation. Degenerative changes of the lumbar spine visualized. No acute fracture.    Scribe Attestation    I,:  Shari Julian am acting as a scribe while in the presence of the attending physician.:       I,:  Jamel Hassan DO personally performed the services described in this documentation    as scribed in my presence.:           Portions of the record may have been created with voice recognition software.  Occasional wrong word or " "\"sound a like\" substitutions may have occurred due to the inherent limitations of voice recognition software.  Read the chart carefully and recognize, using context, where substitutions have occurred.  "

## 2024-01-15 DIAGNOSIS — K21.9 GASTROESOPHAGEAL REFLUX DISEASE, UNSPECIFIED WHETHER ESOPHAGITIS PRESENT: ICD-10-CM

## 2024-01-15 RX ORDER — OMEPRAZOLE 20 MG/1
20 CAPSULE, DELAYED RELEASE ORAL DAILY
Qty: 30 CAPSULE | Refills: 5 | Status: SHIPPED | OUTPATIENT
Start: 2024-01-15

## 2024-02-02 ENCOUNTER — TELEPHONE (OUTPATIENT)
Age: 74
End: 2024-02-02

## 2024-02-02 NOTE — TELEPHONE ENCOUNTER
Called and spoke w/pt and relayed Dr Paul's msg.  Pt states that she is nervous about this procedure.  Informed that Dr Paul is experienced in this area. She can also contact her PCP and let him know that she is nervous.  No further questions or concerns.  Did advise pt that if she does take something for nerves she will need to have someone drive her and also let Dr Paul know this as well.

## 2024-02-06 ENCOUNTER — OFFICE VISIT (OUTPATIENT)
Dept: OBGYN CLINIC | Facility: CLINIC | Age: 74
End: 2024-02-06
Payer: MEDICARE

## 2024-02-06 VITALS — BODY MASS INDEX: 39.7 KG/M2 | WEIGHT: 206 LBS

## 2024-02-06 DIAGNOSIS — M76.11 PSOAS TENDINITIS OF RIGHT SIDE: Primary | ICD-10-CM

## 2024-02-06 PROCEDURE — 99214 OFFICE O/P EST MOD 30 MIN: CPT | Performed by: PHYSICAL MEDICINE & REHABILITATION

## 2024-02-06 PROCEDURE — 20611 DRAIN/INJ JOINT/BURSA W/US: CPT | Performed by: PHYSICAL MEDICINE & REHABILITATION

## 2024-02-06 RX ORDER — ROPIVACAINE HYDROCHLORIDE 5 MG/ML
10 INJECTION, SOLUTION EPIDURAL; INFILTRATION; PERINEURAL
Status: COMPLETED | OUTPATIENT
Start: 2024-02-06 | End: 2024-02-06

## 2024-02-06 RX ORDER — TRIAMCINOLONE ACETONIDE 40 MG/ML
80 INJECTION, SUSPENSION INTRA-ARTICULAR; INTRAMUSCULAR
Status: COMPLETED | OUTPATIENT
Start: 2024-02-06 | End: 2024-02-06

## 2024-02-06 RX ADMIN — TRIAMCINOLONE ACETONIDE 80 MG: 40 INJECTION, SUSPENSION INTRA-ARTICULAR; INTRAMUSCULAR at 09:30

## 2024-02-06 RX ADMIN — ROPIVACAINE HYDROCHLORIDE 10 ML: 5 INJECTION, SOLUTION EPIDURAL; INFILTRATION; PERINEURAL at 09:30

## 2024-02-06 NOTE — PROGRESS NOTES
1. Psoas tendinitis of right side  Ambulatory Referral to Sports Medicine    Large joint arthrocentesis: R iliopsoas bursa        Orders Placed This Encounter   Procedures    Large joint arthrocentesis: R iliopsoas bursa        Impression:  This is a patient referred by Dr. Hassan's team with right hip pain likely multifactorial and secondary to iliopsoas tendinitis, GT pain syndrome and hip OA.  The patient is a good candidate for sound guided iliopsoas tendon injection.  Please see procedure note below.  Patient tolerated the procedure.  She reports feeling well since she received her greater trochanteric injection by Dr. Hassan.  We will see her back if needed.    Imaging Studies (I personally reviewed images in PACS and report):  Right hip x-rays most recent to this encounter reviewed.  These images show mild-moderate osteoarthritic changes of the right hip joint.    No follow-ups on file.    Patient is in agreement with the above plan.    HPI:  Yue Art is a 73 y.o. female  who presents for evaluation of   Chief Complaint   Patient presents with    Right Hip - Pain, Injections       History of present illness from referring clinician's notes reviewed.  Patient has had chronic pain for the past few months.  She has received injections for her bursa and this has helped.  She also has pain in the front of her hip.    Following history reviewed and updated:  Past Medical History:   Diagnosis Date    Arthritis     Cat scratch 2022    Cataracts, bilateral     Colon polyp     Disease of thyroid gland     Diverticulitis of colon     History of COVID-19 2022    Hypothyroid     Kidney stone Not sure    Obesity (BMI 35.0-39.9 without comorbidity)     PNA (pneumonia) 2019     Past Surgical History:   Procedure Laterality Date     SECTION  1983    CHOLECYSTECTOMY  1968    open    COLONOSCOPY      EYE SURGERY  10/2021    [place] RETROGRADE PYELOGRAM  2022    [place] CYSTO/URETERO  W/LITHOTRIPSY &INDWELL STENT INSRT Right 03/05/2022    Procedure: CYSTOSCOPY URETEROSCOPY WITH LITHOTRIPSY HOLMIUM LASER, RETROGRADE PYELOGRAM AND INSERTION STENT URETERAL;  Surgeon: Bill Serrano MD;  Location: [facility];  Service: Urology     Social History   Social History     Substance and Sexual Activity   Alcohol Use Not Currently    Alcohol/week: 0.0 standard drinks of alcohol     Social History     Substance and Sexual Activity   Drug Use Never     Social History     Tobacco Use   Smoking Status Former    Current packs/day: 0.00    Types: Cigarettes   Smokeless Tobacco Never     Family History   Problem Relation Age of Onset    Cancer Mother     Diabetes Mother     Cancer Father     Diabetes Father     Urolithiasis Sister     No Known Problems Daughter     No Known Problems Maternal Grandmother     No Known Problems Maternal Grandfather     No Known Problems Paternal Grandmother     No Known Problems Paternal Grandfather     No Known Problems Maternal Aunt     No Known Problems Paternal Aunt     No Known Problems Paternal Aunt      Allergies   Allergen Reactions    Codeine Anaphylaxis     Increased Heart Rate, Palpitations        Constitutional:  Wt 93.4 kg (206 lb)   BMI 39.70 kg/m²    General: NAD.  Eyes: Anicteric sclerae.  Neck: Supple.  Lungs: Unlabored breathing.  Cardiovascular: No lower extremity edema.  Skin: Intact without erythema.  Neurologic: Sensation intact to light touch.  Psychiatric: Mood and affect are appropriate.    Right Hip Exam     Tenderness   The patient is experiencing tenderness in the anterior and greater trochanter.    Other   Erythema: absent  Scars: absent  Sensation: normal  Pulse: present    Comments:  Pain with passive hip internal rotation             Large joint arthrocentesis: R iliopsoas bursa  Universal Protocol:  Procedure performed by:  Consent: Verbal consent obtained. Written consent not obtained.  Consent given by: patient  Timeout called at: 2/6/2024 9:22  ZOE.  Patient understanding: patient states understanding of the procedure being performed  Site marked: the operative site was marked  Radiology Images displayed and confirmed. If images not available, report reviewed: imaging studies available  Patient identity confirmed: verbally with patient  Supporting Documentation  Indications: pain and diagnostic evaluation   Procedure Details  Location: hip - R iliopsoas bursa  Ultrasound guidance: yes (US guidance was used to find the area of interest.)  Medications administered: 80 mg triamcinolone acetonide 40 mg/mL; 10 mL ropivacaine 0.5 %    Patient tolerance: patient tolerated the procedure well with no immediate complications  Dressing:  Sterile dressing applied    The right iliopsoas peritendon was visualized with ultrasound and injected with steroid/anesthetic solution as indicated.    Prior to the injection, the ultrasound was used to evaluate for any neural or vascular structures.  Care was taken to avoid these structures.    The images (and video if taken) were saved to the [employer] ultrasound system.    Risks of this procedure include:    - Risk of bleeding since a needle is involved.  - Risk of infection (1/10,000 chance as per recent studies).  Signs/symptoms were discussed and they would prompt an urgent evaluation at an emergency department.  - Risk of pigmentation or skin dimpling in the skin (2-3% chance as per recent studies) from the steroid.  - Risk of increased pain from steroid flare (1% chance as per recent studies) that typically lasts 24-48 hours.  - Risk of increased blood sugars from the steroid medication that can last for a few weeks.  If the patient is a diabetic or pre-diabetic, they were encouraged to closely monitor their blood sugars and discuss with PCP if elevated more than usual or if having symptoms.    We decided that the benefits outweigh the risks and so we proceeded with the procedure.

## 2024-02-18 DIAGNOSIS — K21.9 GASTROESOPHAGEAL REFLUX DISEASE, UNSPECIFIED WHETHER ESOPHAGITIS PRESENT: ICD-10-CM

## 2024-02-18 RX ORDER — OMEPRAZOLE 20 MG/1
20 CAPSULE, DELAYED RELEASE ORAL DAILY
Qty: 90 CAPSULE | Refills: 2 | Status: SHIPPED | OUTPATIENT
Start: 2024-02-18

## 2024-03-18 DIAGNOSIS — M25.50 ARTHRALGIA, UNSPECIFIED JOINT: ICD-10-CM

## 2024-03-18 RX ORDER — MELOXICAM 7.5 MG/1
7.5 TABLET ORAL DAILY
Qty: 30 TABLET | Refills: 2 | Status: CANCELLED | OUTPATIENT
Start: 2024-03-18

## 2024-03-20 DIAGNOSIS — M25.50 ARTHRALGIA, UNSPECIFIED JOINT: ICD-10-CM

## 2024-03-20 RX ORDER — MELOXICAM 7.5 MG/1
7.5 TABLET ORAL DAILY
Qty: 30 TABLET | Refills: 2 | Status: SHIPPED | OUTPATIENT
Start: 2024-03-20

## 2024-04-01 ENCOUNTER — HOSPITAL ENCOUNTER (OUTPATIENT)
Dept: VASCULAR ULTRASOUND | Facility: HOSPITAL | Age: 74
Discharge: HOME | End: 2024-04-01
Attending: INTERNAL MEDICINE

## 2024-04-01 DIAGNOSIS — I65.23 BILATERAL CAROTID ARTERY STENOSIS: ICD-10-CM

## 2024-04-15 ENCOUNTER — OFFICE VISIT (OUTPATIENT)
Dept: INTERNAL MEDICINE CLINIC | Facility: CLINIC | Age: 74
End: 2024-04-15
Payer: MEDICARE

## 2024-04-15 VITALS
BODY MASS INDEX: 40.09 KG/M2 | HEART RATE: 87 BPM | SYSTOLIC BLOOD PRESSURE: 142 MMHG | OXYGEN SATURATION: 97 % | HEIGHT: 60 IN | WEIGHT: 204.2 LBS | DIASTOLIC BLOOD PRESSURE: 74 MMHG

## 2024-04-15 DIAGNOSIS — M76.11 PSOAS TENDINITIS OF RIGHT SIDE: ICD-10-CM

## 2024-04-15 DIAGNOSIS — E53.8 LOW SERUM VITAMIN B12: ICD-10-CM

## 2024-04-15 DIAGNOSIS — E03.8 HYPOTHYROIDISM DUE TO HASHIMOTO'S THYROIDITIS: Primary | ICD-10-CM

## 2024-04-15 DIAGNOSIS — E78.00 PURE HYPERCHOLESTEROLEMIA: ICD-10-CM

## 2024-04-15 DIAGNOSIS — R53.83 FATIGUE, UNSPECIFIED TYPE: ICD-10-CM

## 2024-04-15 DIAGNOSIS — E55.9 VITAMIN D DEFICIENCY: ICD-10-CM

## 2024-04-15 DIAGNOSIS — R25.1 TREMOR OF BOTH HANDS: ICD-10-CM

## 2024-04-15 DIAGNOSIS — E06.3 HYPOTHYROIDISM DUE TO HASHIMOTO'S THYROIDITIS: Primary | ICD-10-CM

## 2024-04-15 PROBLEM — K21.9 GASTROESOPHAGEAL REFLUX DISEASE: Status: RESOLVED | Noted: 2024-01-08 | Resolved: 2024-04-15

## 2024-04-15 PROCEDURE — G2211 COMPLEX E/M VISIT ADD ON: HCPCS | Performed by: INTERNAL MEDICINE

## 2024-04-15 PROCEDURE — 99214 OFFICE O/P EST MOD 30 MIN: CPT | Performed by: INTERNAL MEDICINE

## 2024-04-15 NOTE — ASSESSMENT & PLAN NOTE
-Chronic issue since she was diagnosed with COVID 2 years ago  -Check TSH, CBC, B12 and 25-hydroxy vitamin D level  -Recent CMP in January was normal

## 2024-04-15 NOTE — PROGRESS NOTES
Name: Yue Art      : 1950      MRN: [id number]  Encounter Provider: Vargas Hinojosa MD  Encounter Date: 4/15/2024   Enc[facility]: [facility]    Assessment & Plan     1. Hypothyroidism due to Hashimoto's thyroiditis  Assessment & Plan:  -Last TSH level well within a normal range   -Continue current dose of Synthroid       2. Psoas tendinitis of right side  Assessment & Plan:  -Appreciate Ortho eval and treatment  -Pain resolved s/p corticosteroid injection       3. Tremor of both hands  Assessment & Plan:  -Mild  -Likely due to an essential tremor  -Check TSH to r/o hyperthyroid state as a potential cause     Orders:  -     TSH, 3rd generation with Free T4 reflex; Future    4. Pure hypercholesterolemia  Assessment & Plan:  -Cholesterol at goal.   -Tolerating statin well.  -Continue rosuvastatin 20mg daily.       5. Fatigue, unspecified type  Assessment & Plan:  -Chronic issue since she was diagnosed with COVID 2 years ago  -Check TSH, CBC, B12 and 25-hydroxy vitamin D level  -Recent CMP in January was normal    Orders:  -     CBC and differential; Future    6. Vitamin D deficiency  -     Vitamin D 25 hydroxy; Future    7. Low serum vitamin B12  -     Vitamin B12; Future         Subjective      HPI  Patient presents today for chronic follow-up.  Her history is most notable for hypothyroidism.  Since her last visit she was seen by orthopedic surgery for evaluation and treatment of her right hip pain.  She was diagnosed with a psoas tendinitis and subsequently underwent corticosteroid injection.  She notes since the injection she has experienced significant pain relief.    Today she endorses low endurance.  She reports this has been an issue ever since she was diagnosed and hospitalized with COVID 2 years ago.  She notes throughout the day while working she has to take multiple breaks to get her energy back.  She denies any associated chest pain or shortness of  "breath.    Lastly, she endorses mild tremor involving both hands.  She states this has been going on over the past 2 months.  She states it is particularly noticeable when she is actively doing things with her hands such as writing or holding a glass.  She denies any resting tremor.    All other systems negative except for pertinent findings noted in HPI.       Current Outpatient Medications on File Prior to Visit   Medication Sig   • acetaminophen (TYLENOL) 325 mg tablet Take 2 tablets (650 mg total) by mouth every 6 (six) hours as needed for mild pain, headaches or fever   • buPROPion (WELLBUTRIN XL) 150 mg 24 hr tablet Take 1 tablet (150 mg total) by mouth daily   • levothyroxine 75 mcg tablet TAKE 1 TABLET BY MOUTH EVERY DAY   • meloxicam (MOBIC) 7.5 mg tablet Take 1 tablet (7.5 mg total) by mouth daily   • rosuvastatin (CRESTOR) 20 MG tablet TAKE 1 TABLET BY MOUTH EVERY DAY   • [DISCONTINUED] omeprazole (PriLOSEC) 20 mg delayed release capsule TAKE 1 CAPSULE BY MOUTH EVERY DAY       Objective     /74 (BP Location: Left arm, Patient Position: Sitting, Cuff Size: Large)   Pulse 87   Ht 5' 0.04\" (1.525 m)   Wt 92.6 kg (204 lb 3.2 oz)   SpO2 97%   BMI 39.83 kg/m²     BP Readings from Last 3 Encounters:   04/15/24 142/74   01/08/24 128/78   01/08/24 142/86        Wt Readings from Last 3 Encounters:   04/15/24 92.6 kg (204 lb 3.2 oz)   02/06/24 93.4 kg (206 lb)   01/08/24 93.4 kg (206 lb)       Physical Exam  General: NAD  HEENT: NCAT, EOMI, normal conjunctiva  Cardiovascular: RRR, normal S1 and S2, no m/r/g  Pulmonary: Normal respiratory effort, no wheezes, rales or rhonchi  Neuro: Mild bilateral hand tremor with intention  Extremities: No lower extremity edema  Skin: Normal skin color, no rashes   Psychiatric: Normal mood and affect      Vargas Hinojosa MD  "

## 2024-04-15 NOTE — ASSESSMENT & PLAN NOTE
-Mild  -Likely due to an essential tremor  -Check TSH to r/o hyperthyroid state as a potential cause

## 2024-04-17 ENCOUNTER — APPOINTMENT (OUTPATIENT)
Dept: LAB | Facility: CLINIC | Age: 74
End: 2024-04-17
Payer: MEDICARE

## 2024-04-17 DIAGNOSIS — E55.9 VITAMIN D DEFICIENCY: ICD-10-CM

## 2024-04-17 DIAGNOSIS — R53.83 FATIGUE, UNSPECIFIED TYPE: ICD-10-CM

## 2024-04-17 DIAGNOSIS — E53.8 LOW SERUM VITAMIN B12: ICD-10-CM

## 2024-04-17 DIAGNOSIS — R25.1 TREMOR OF BOTH HANDS: ICD-10-CM

## 2024-04-17 LAB
BASOPHILS # BLD AUTO: 0.1 THOUSANDS/ÂΜL (ref 0–0.1)
BASOPHILS NFR BLD AUTO: 1 % (ref 0–1)
EOSINOPHIL # BLD AUTO: 0.34 THOUSAND/ÂΜL (ref 0–0.61)
EOSINOPHIL NFR BLD AUTO: 4 % (ref 0–6)
ERYTHROCYTE [DISTWIDTH] IN BLOOD BY AUTOMATED COUNT: 14.7 % (ref 11.6–15.1)
HCT VFR BLD AUTO: 45 % (ref 34.8–46.1)
HGB BLD-MCNC: 14.2 G/DL (ref 11.5–15.4)
IMM GRANULOCYTES # BLD AUTO: 0.04 THOUSAND/UL (ref 0–0.2)
IMM GRANULOCYTES NFR BLD AUTO: 0 % (ref 0–2)
LYMPHOCYTES # BLD AUTO: 2.2 THOUSANDS/ÂΜL (ref 0.6–4.47)
LYMPHOCYTES NFR BLD AUTO: 23 % (ref 14–44)
MCH RBC QN AUTO: 28.6 PG (ref 26.8–34.3)
MCHC RBC AUTO-ENTMCNC: 31.6 G/DL (ref 31.4–37.4)
MCV RBC AUTO: 91 FL (ref 82–98)
MONOCYTES # BLD AUTO: 0.82 THOUSAND/ÂΜL (ref 0.17–1.22)
MONOCYTES NFR BLD AUTO: 9 % (ref 4–12)
NEUTROPHILS # BLD AUTO: 6.17 THOUSANDS/ÂΜL (ref 1.85–7.62)
NEUTS SEG NFR BLD AUTO: 63 % (ref 43–75)
NRBC BLD AUTO-RTO: 0 /100 WBCS
PLATELET # BLD AUTO: 277 THOUSANDS/UL (ref 149–390)
PMV BLD AUTO: 10.6 FL (ref 8.9–12.7)
RBC # BLD AUTO: 4.97 MILLION/UL (ref 3.81–5.12)
TSH SERPL DL<=0.05 MIU/L-ACNC: 2.37 UIU/ML (ref 0.45–4.5)
VIT B12 SERPL-MCNC: 132 PG/ML (ref 180–914)
WBC # BLD AUTO: 9.67 THOUSAND/UL (ref 4.31–10.16)

## 2024-04-17 PROCEDURE — 82306 VITAMIN D 25 HYDROXY: CPT

## 2024-04-17 PROCEDURE — 85025 COMPLETE CBC W/AUTO DIFF WBC: CPT

## 2024-04-17 PROCEDURE — 82607 VITAMIN B-12: CPT

## 2024-04-17 PROCEDURE — 84443 ASSAY THYROID STIM HORMONE: CPT

## 2024-04-17 PROCEDURE — 36415 COLL VENOUS BLD VENIPUNCTURE: CPT

## 2024-04-18 DIAGNOSIS — E53.8 B12 DEFICIENCY: Primary | ICD-10-CM

## 2024-04-18 RX ORDER — CYANOCOBALAMIN 1000 UG/ML
1000 INJECTION, SOLUTION INTRAMUSCULAR; SUBCUTANEOUS WEEKLY
Status: SHIPPED | OUTPATIENT
Start: 2024-04-19 | End: 2024-05-17

## 2024-04-19 ENCOUNTER — TELEPHONE (OUTPATIENT)
Age: 74
End: 2024-04-19

## 2024-04-19 DIAGNOSIS — E55.9 VITAMIN D DEFICIENCY: Primary | ICD-10-CM

## 2024-04-19 LAB — 25(OH)D3 SERPL-MCNC: 12.6 NG/ML (ref 30–100)

## 2024-04-19 RX ORDER — CHOLECALCIFEROL (VITAMIN D3) 1250 MCG
50000 CAPSULE ORAL WEEKLY
Qty: 8 CAPSULE | Refills: 0 | Status: SHIPPED | OUTPATIENT
Start: 2024-04-19 | End: 2024-06-08

## 2024-04-22 ENCOUNTER — CLINICAL SUPPORT (OUTPATIENT)
Dept: INTERNAL MEDICINE CLINIC | Facility: CLINIC | Age: 74
End: 2024-04-22
Payer: MEDICARE

## 2024-04-22 DIAGNOSIS — E53.8 B12 DEFICIENCY: Primary | ICD-10-CM

## 2024-04-22 PROCEDURE — 96372 THER/PROPH/DIAG INJ SC/IM: CPT

## 2024-04-22 RX ADMIN — CYANOCOBALAMIN 1000 MCG: 1000 INJECTION, SOLUTION INTRAMUSCULAR; SUBCUTANEOUS at 10:33

## 2024-04-29 ENCOUNTER — CLINICAL SUPPORT (OUTPATIENT)
Dept: INTERNAL MEDICINE CLINIC | Facility: CLINIC | Age: 74
End: 2024-04-29
Payer: MEDICARE

## 2024-04-29 DIAGNOSIS — E53.8 B12 DEFICIENCY: Primary | ICD-10-CM

## 2024-04-29 PROCEDURE — 96372 THER/PROPH/DIAG INJ SC/IM: CPT

## 2024-04-29 RX ADMIN — CYANOCOBALAMIN 1000 MCG: 1000 INJECTION, SOLUTION INTRAMUSCULAR; SUBCUTANEOUS at 10:33

## 2024-05-06 ENCOUNTER — CLINICAL SUPPORT (OUTPATIENT)
Dept: INTERNAL MEDICINE CLINIC | Facility: CLINIC | Age: 74
End: 2024-05-06
Payer: MEDICARE

## 2024-05-06 DIAGNOSIS — E53.8 B12 DEFICIENCY: Primary | ICD-10-CM

## 2024-05-06 PROCEDURE — 96372 THER/PROPH/DIAG INJ SC/IM: CPT

## 2024-05-06 RX ADMIN — CYANOCOBALAMIN 1000 MCG: 1000 INJECTION, SOLUTION INTRAMUSCULAR; SUBCUTANEOUS at 15:47

## 2024-05-13 ENCOUNTER — CLINICAL SUPPORT (OUTPATIENT)
Dept: INTERNAL MEDICINE CLINIC | Facility: CLINIC | Age: 74
End: 2024-05-13
Payer: MEDICARE

## 2024-05-13 DIAGNOSIS — E53.8 LOW SERUM VITAMIN B12: Primary | ICD-10-CM

## 2024-05-13 PROCEDURE — 96372 THER/PROPH/DIAG INJ SC/IM: CPT

## 2024-05-13 RX ADMIN — CYANOCOBALAMIN 1000 MCG: 1000 INJECTION, SOLUTION INTRAMUSCULAR; SUBCUTANEOUS at 10:50

## 2024-05-14 DIAGNOSIS — E03.8 HYPOTHYROIDISM DUE TO HASHIMOTO'S THYROIDITIS: ICD-10-CM

## 2024-05-14 DIAGNOSIS — E06.3 HYPOTHYROIDISM DUE TO HASHIMOTO'S THYROIDITIS: ICD-10-CM

## 2024-05-15 RX ORDER — LEVOTHYROXINE SODIUM 0.07 MG/1
TABLET ORAL
Qty: 90 TABLET | Refills: 1 | Status: SHIPPED | OUTPATIENT
Start: 2024-05-15

## 2024-05-30 DIAGNOSIS — M25.50 ARTHRALGIA, UNSPECIFIED JOINT: ICD-10-CM

## 2024-05-31 RX ORDER — MELOXICAM 7.5 MG/1
7.5 TABLET ORAL DAILY
Qty: 30 TABLET | Refills: 5 | Status: SHIPPED | OUTPATIENT
Start: 2024-05-31

## 2024-06-24 ENCOUNTER — TELEPHONE (OUTPATIENT)
Age: 74
End: 2024-06-24

## 2024-06-24 DIAGNOSIS — E53.8 B12 DEFICIENCY: Primary | ICD-10-CM

## 2024-06-24 NOTE — TELEPHONE ENCOUNTER
Patient is having difficulty lifting her right leg at the knee. She asked if her meloxicam can be increased to help alleviate the pain.    She was getting B12 injections and taking Vitamim D and would like to know if she should go for labs prior to her next apt.to determine her levels. She is no longer taking either. Please advise.

## 2024-06-25 NOTE — TELEPHONE ENCOUNTER
I will defer the decision to increase the meloxicam to her PCP Dr Hinojosa who is in the office as of tomorrow

## 2024-06-25 NOTE — TELEPHONE ENCOUNTER
Notify patient follow-up B12 level has been ordered.  An order for her vitamin D level is already in her chart.    Regarding her Mobic she may increase the dose to 15 mg daily.

## 2024-06-27 ENCOUNTER — TELEPHONE (OUTPATIENT)
Age: 74
End: 2024-06-27

## 2024-06-27 DIAGNOSIS — M25.50 ARTHRALGIA, UNSPECIFIED JOINT: ICD-10-CM

## 2024-06-27 RX ORDER — MELOXICAM 15 MG/1
15 TABLET ORAL DAILY
Qty: 30 TABLET | Refills: 5 | Status: SHIPPED | OUTPATIENT
Start: 2024-06-27

## 2024-07-03 ENCOUNTER — OFFICE VISIT (OUTPATIENT)
Dept: OBGYN CLINIC | Facility: CLINIC | Age: 74
End: 2024-07-03
Payer: MEDICARE

## 2024-07-03 VITALS
SYSTOLIC BLOOD PRESSURE: 173 MMHG | HEIGHT: 60 IN | HEART RATE: 76 BPM | BODY MASS INDEX: 40.05 KG/M2 | WEIGHT: 204 LBS | DIASTOLIC BLOOD PRESSURE: 96 MMHG

## 2024-07-03 DIAGNOSIS — M70.61 GREATER TROCHANTERIC BURSITIS OF RIGHT HIP: Primary | ICD-10-CM

## 2024-07-03 DIAGNOSIS — M16.11 PRIMARY OSTEOARTHRITIS OF RIGHT HIP: ICD-10-CM

## 2024-07-03 DIAGNOSIS — M76.11 PSOAS TENDINITIS OF RIGHT SIDE: ICD-10-CM

## 2024-07-03 PROCEDURE — 99213 OFFICE O/P EST LOW 20 MIN: CPT | Performed by: ORTHOPAEDIC SURGERY

## 2024-07-03 PROCEDURE — 20610 DRAIN/INJ JOINT/BURSA W/O US: CPT | Performed by: ORTHOPAEDIC SURGERY

## 2024-07-03 RX ORDER — BUPIVACAINE HYDROCHLORIDE 2.5 MG/ML
2 INJECTION, SOLUTION INFILTRATION; PERINEURAL
Status: COMPLETED | OUTPATIENT
Start: 2024-07-03 | End: 2024-07-03

## 2024-07-03 RX ORDER — TRIAMCINOLONE ACETONIDE 40 MG/ML
80 INJECTION, SUSPENSION INTRA-ARTICULAR; INTRAMUSCULAR
Status: COMPLETED | OUTPATIENT
Start: 2024-07-03 | End: 2024-07-03

## 2024-07-03 RX ADMIN — TRIAMCINOLONE ACETONIDE 80 MG: 40 INJECTION, SUSPENSION INTRA-ARTICULAR; INTRAMUSCULAR at 14:30

## 2024-07-03 RX ADMIN — BUPIVACAINE HYDROCHLORIDE 2 ML: 2.5 INJECTION, SOLUTION INFILTRATION; PERINEURAL at 14:30

## 2024-07-03 NOTE — PROGRESS NOTES
Assessment:  No diagnosis found.    Patient Active Problem List   Diagnosis    Hypothyroidism due to Hashimoto's thyroiditis    Diverticulitis of large intestine with perforation without bleeding    Class 3 severe obesity due to excess calories with serious comorbidity and body mass index (BMI) of 40.0 to 44.9 in adult (HCC)    Ureteral stone with hydronephrosis    Mass of soft tissue of neck    Bilateral carotid artery stenosis    IFG (impaired fasting glucose)    Pure hypercholesterolemia    Lipoma of neck    Acute right hip pain    BMI 40.0-44.9, adult (HCC)    Fatigue    Psoas tendinitis of right side    Tremor of both hands    Vitamin D deficiency    Low serum vitamin B12           Plan      73 year old female with right hip greater trochanteric bursitis, psoas tendonitis, right hip osteoarthritis  Diagnostics reviewed and physical exam performed.  Diagnosis, treatment options and associated risks were discussed with the patient including no treatment, nonsurgical treatment and potential for surgical intervention.  The patient was given the opportunity to ask questions regarding each.   Referral placed today for Yue to follow up with Dr. Paul in 3-4 weeks for possible psoas tendon vs hip joint cortisone injection  Patient was offered, accepted, and received a cortisone injection of the right greater trochanteric bursa today. Patient tolerated procedure well with no immediate complications. Post-injection protocols and expectations were discussed with the patient.   Patient may resume work and recreational activities as tolerated  Apply Voltaren gel to painful area up to 4 times a day  May use ice or heat as needed for pain relief  May take NSAIDs/Tylenol as needed for pain control        Subjective:     Patient ID:    Chief Complaint:Yue Rosasllo 73 y.o. female      HPI    Yue presents today for follow-up evaluation of right hip pain that has been ongoing for approximately 7 years and  progressively worsening over the past 6 months.  At her last visit on 1/12/2024 she received greater trochanteric bursa cortisone injection which she states provided significant relief, she also received right psoas tendon cortisone injection with Dr. Paul on 2/6/2024 which also provided her significant pain relief.  Hip joint injection was discussed but deferred because of the relief she was experiencing at the time.  Today she describes intermittent, 10/10 at worst anterior groin pain as well as lateral hip pain.  This can be achy or sharp in nature depending on her activity level or position.  Pain is aggravated with bending to the floor, rising from a chair, laying on her right side, getting in and out of the car, rising from prolonged rest especially first thing in the morning, and ascending and descending stairs.  She has not initiated any physical therapy.  She takes meloxicam 15 mg for pain which does help, as well as Tylenol for breakthrough pain.  She denies mechanical symptoms of the hip, paresthesias.  She states she feels like she is walking different, dragging her right leg behind her.  She also notes a newer onset of medial based knee pain in the area of the Pez anserine that is aggravated with increased walking activity.  She does believe she is externally rotating her leg at times while walking.            The following portions of the patient's history were reviewed and updated as appropriate: allergies, current medications, past family history, past social history, past surgical history and problem list.        Social History     Socioeconomic History    Marital status: /Civil Union     Spouse name: Not on file    Number of children: Not on file    Years of education: Not on file    Highest education level: Not on file   Occupational History    Not on file   Tobacco Use    Smoking status: Former     Current packs/day: 0.00     Types: Cigarettes    Smokeless tobacco: Never   Vaping Use     Vaping status: Never Used   Substance and Sexual Activity    Alcohol use: Not Currently     Alcohol/week: 0.0 standard drinks of alcohol    Drug use: Never    Sexual activity: Not on file   Other Topics Concern    Not on file   Social History Narrative    Not on file     Social Determinants of Health     Financial Resource Strain: Low Risk  (2023)    Overall Financial Resource Strain (CARDIA)     Difficulty of Paying Living Expenses: Not hard at all   Food Insecurity: Not on file   Transportation Needs: No Transportation Needs (2023)    PRAPARE - Transportation     Lack of Transportation (Medical): No     Lack of Transportation (Non-Medical): No   Physical Activity: Not on file   Stress: Not on file   Social Connections: Not on file   Intimate Partner Violence: Not on file   Housing Stability: Not on file     Past Medical History:   Diagnosis Date    Arthritis     Cat scratch 2022    Cataracts, bilateral     Colon polyp     Disease of thyroid gland     Diverticulitis of colon     Gastroesophageal reflux disease 2024    History of COVID-19 2022    Hypothyroid     Kidney stone Not sure    Obesity (BMI 35.0-39.9 without comorbidity)     PNA (pneumonia) 2019     Past Surgical History:   Procedure Laterality Date     SECTION  1983    CHOLECYSTECTOMY  1968    open    COLONOSCOPY      EYE SURGERY  10/2021    FL RETROGRADE PYELOGRAM  2022    LA CYSTO/URETERO W/LITHOTRIPSY &INDWELL STENT INSRT Right 2022    Procedure: CYSTOSCOPY URETEROSCOPY WITH LITHOTRIPSY HOLMIUM LASER, RETROGRADE PYELOGRAM AND INSERTION STENT URETERAL;  Surgeon: Bill Serrano MD;  Location: [facility];  Service: Urology     Allergies   Allergen Reactions    Codeine Anaphylaxis     Increased Heart Rate, Palpitations     Current Outpatient Medications on File Prior to Visit   Medication Sig Dispense Refill    acetaminophen (TYLENOL) 325 mg tablet Take 2 tablets (650 mg total) by mouth every 6  (six) hours as needed for mild pain, headaches or fever  0    buPROPion (WELLBUTRIN XL) 150 mg 24 hr tablet Take 1 tablet (150 mg total) by mouth daily 90 tablet 1    Cholecalciferol (Vitamin D3) 1.25 MG (62054 UT) capsule Take 1 capsule (50,000 Units total) by mouth once a week for 8 doses 8 capsule 0    levothyroxine 75 mcg tablet TAKE 1 TABLET BY MOUTH EVERY DAY 90 tablet 1    meloxicam (MOBIC) 15 mg tablet Take 1 tablet (15 mg total) by mouth daily 30 tablet 5    rosuvastatin (CRESTOR) 20 MG tablet TAKE 1 TABLET BY MOUTH EVERY DAY 90 tablet 1     No current facility-administered medications on file prior to visit.              Objective:    Review of Systems   Constitutional:  Negative for appetite change and unexpected weight change.   HENT:  Negative for congestion and trouble swallowing.    Eyes:  Negative for visual disturbance.   Respiratory:  Negative for cough and shortness of breath.    Cardiovascular:  Negative for chest pain and palpitations.   Gastrointestinal:  Negative for nausea and vomiting.   Endocrine: Negative for cold intolerance and heat intolerance.   Musculoskeletal:  Positive for arthralgias. Negative for joint swelling and myalgias.   Skin:  Negative for rash.   Neurological:  Negative for numbness.       Ortho Exam  Right Hip:  Patient sits comfortably with hips flexed to 90 degrees in no apparent distress   TTP greater trochanteric bursa, psoas, SI, sciatic notch  Painful circumduction of the hip    10 degrees of internal rotation 40 degrees on contralateral  Internal rotation and abduction of hip is minimal   Able to perform straight leg raise   Able to perform hip flexion   Able to perform hip abduction with resistance   Able to resist dorsiflexion   Resisted external rotation caused an increase of pain  CATALINA testing is negative   Patient ambulates without aid.   Calf is supple and non-tender.   Sensation intact to L2-S1 dermatomes   Extremity warm and well perfused       Physical  "Exam  Vitals and nursing note reviewed.   HENT:      Head: Normocephalic and atraumatic.   Eyes:      General: No scleral icterus.     Conjunctiva/sclera: Conjunctivae normal.   Cardiovascular:      Rate and Rhythm: Normal rate.   Pulmonary:      Effort: Pulmonary effort is normal. No respiratory distress.   Musculoskeletal:         General: Tenderness present.      Cervical back: Normal range of motion and neck supple.   Skin:     General: Skin is warm and dry.   Neurological:      Mental Status: She is alert and oriented to person, place, and time.   Psychiatric:         Behavior: Behavior normal.         Large joint arthrocentesis: L greater trochanteric bursa  Universal Protocol:  Consent: Verbal consent obtained.  Risks and benefits: risks, benefits and alternatives were discussed  Consent given by: patient  Time out: Immediately prior to procedure a \"time out\" was called to verify the correct patient, procedure, equipment, support staff and site/side marked as required.  Patient understanding: patient states understanding of the procedure being performed  Site marked: the operative site was marked  Supporting Documentation  Indications: pain and diagnostic evaluation   Procedure Details  Location: hip - L greater trochanteric bursa  Preparation: Patient was prepped and draped in the usual sterile fashion  Needle size: 22 G  Approach: lateral  Medications administered: 2 mL bupivacaine 0.25 %; 80 mg triamcinolone acetonide 40 mg/mL    Patient tolerance: patient tolerated the procedure well with no immediate complications  Dressing:  Sterile dressing applied             I have personally reviewed pertinent films in PACS and my interpretation is moderate degenerative changes of the right hip with osteophyte formation. Degenerative changes of the lumbar spine visualized. No acute fracture.    Scribe Attestation      I,:  Angelica Mccormick am acting as a scribe while in the presence of the attending physician.:     " "  I,:  Jamel Hassan, DO personally performed the services described in this documentation    as scribed in my presence.:             Portions of the record may have been created with voice recognition software.  Occasional wrong word or \"sound a like\" substitutions may have occurred due to the inherent limitations of voice recognition software.  Read the chart carefully and recognize, using context, where substitutions have occurred.  "

## 2024-07-16 ENCOUNTER — RA CDI HCC (OUTPATIENT)
Dept: OTHER | Facility: HOSPITAL | Age: 74
End: 2024-07-16

## 2024-07-22 ENCOUNTER — OFFICE VISIT (OUTPATIENT)
Dept: INTERNAL MEDICINE CLINIC | Facility: CLINIC | Age: 74
End: 2024-07-22
Payer: MEDICARE

## 2024-07-22 VITALS
HEART RATE: 72 BPM | WEIGHT: 186.4 LBS | DIASTOLIC BLOOD PRESSURE: 76 MMHG | OXYGEN SATURATION: 99 % | BODY MASS INDEX: 36.4 KG/M2 | SYSTOLIC BLOOD PRESSURE: 140 MMHG

## 2024-07-22 DIAGNOSIS — E53.8 LOW SERUM VITAMIN B12: ICD-10-CM

## 2024-07-22 DIAGNOSIS — E78.00 PURE HYPERCHOLESTEROLEMIA: ICD-10-CM

## 2024-07-22 DIAGNOSIS — E55.9 VITAMIN D DEFICIENCY: ICD-10-CM

## 2024-07-22 DIAGNOSIS — E06.3 HYPOTHYROIDISM DUE TO HASHIMOTO'S THYROIDITIS: ICD-10-CM

## 2024-07-22 DIAGNOSIS — U07.1 COVID-19: Primary | ICD-10-CM

## 2024-07-22 DIAGNOSIS — M54.50 CHRONIC BILATERAL LOW BACK PAIN WITHOUT SCIATICA: ICD-10-CM

## 2024-07-22 DIAGNOSIS — E03.8 HYPOTHYROIDISM DUE TO HASHIMOTO'S THYROIDITIS: ICD-10-CM

## 2024-07-22 DIAGNOSIS — G89.29 CHRONIC BILATERAL LOW BACK PAIN WITHOUT SCIATICA: ICD-10-CM

## 2024-07-22 DIAGNOSIS — Z78.0 POST-MENOPAUSE: ICD-10-CM

## 2024-07-22 LAB
SARS-COV-2 AG UPPER RESP QL IA: POSITIVE
VALID CONTROL: ABNORMAL

## 2024-07-22 PROCEDURE — G2211 COMPLEX E/M VISIT ADD ON: HCPCS | Performed by: INTERNAL MEDICINE

## 2024-07-22 PROCEDURE — 99214 OFFICE O/P EST MOD 30 MIN: CPT | Performed by: INTERNAL MEDICINE

## 2024-07-22 PROCEDURE — 87811 SARS-COV-2 COVID19 W/OPTIC: CPT | Performed by: INTERNAL MEDICINE

## 2024-07-22 RX ORDER — NIRMATRELVIR AND RITONAVIR 300-100 MG
3 KIT ORAL 2 TIMES DAILY
Qty: 30 TABLET | Refills: 0 | Status: SHIPPED | OUTPATIENT
Start: 2024-07-22 | End: 2024-07-27

## 2024-07-22 NOTE — PROGRESS NOTES
Name: Yue Art      : 1950      MRN: [id number]  Encounter Provider: Varags Hinojosa MD  Encounter Date: 2024   Encounter department: [facility]    Assessment & Plan     1. COVID-19  Assessment & Plan:  -Start Paxlovid  -Mucinex DM prn   -Started  who is present today with similar symptoms on Molnupiravir     Orders:  -     POCT Rapid Covid Ag  -     nirmatrelvir & ritonavir (Paxlovid, 300/100,) tablet therapy pack; Take 3 tablets by mouth 2 (two) times a day for 5 days Take 2 nirmatrelvir tablets + 1 ritonavir tablet together per dose  2. Chronic bilateral low back pain without sciatica  Assessment & Plan:  -Continue Tylenoland Mobic prn  -Referral made to PT  Orders:  -     Ambulatory Referral to Physical Therapy; Future  3. Hypothyroidism due to Hashimoto's thyroiditis  Assessment & Plan:  -Last TSH within normal range  -Continue current dose of Levothyroxine   4. Pure hypercholesterolemia  Assessment & Plan:  -Hold statin while on Paxlovid   5. Low serum vitamin B12  Assessment & Plan:  -S/p B12 injections  -Repeat B12 level pending   6. Vitamin D deficiency  Assessment & Plan:  -Repeat Vit D level pending   7. Post-menopause  -     DXA bone density spine hip and pelvis; Future; Expected date: 2024       Subjective      HPI  Patient presents today for chronic follow-up. She alos reports she started feeling very ill last night. She endorses sinus pressure, cough, chills and shakes. She denies any fever.     She also complains of chronic bilateral low back pain. She denies any radicular symptoms. She notes her pain is worse with long periods of standing. She takes Tylenol and Mobic prn.       All other systems negative except for pertinent findings noted in HPI.       Current Outpatient Medications on File Prior to Visit   Medication Sig   • acetaminophen (TYLENOL) 325 mg tablet Take 2 tablets (650 mg total) by mouth every 6 (six) hours as needed  for mild pain, headaches or fever   • buPROPion (WELLBUTRIN XL) 150 mg 24 hr tablet Take 1 tablet (150 mg total) by mouth daily   • levothyroxine 75 mcg tablet TAKE 1 TABLET BY MOUTH EVERY DAY   • meloxicam (MOBIC) 15 mg tablet Take 1 tablet (15 mg total) by mouth daily   • rosuvastatin (CRESTOR) 20 MG tablet TAKE 1 TABLET BY MOUTH EVERY DAY   • Cholecalciferol (Vitamin D3) 1.25 MG (05922 UT) capsule Take 1 capsule (50,000 Units total) by mouth once a week for 8 doses       Objective     /76 (BP Location: Left arm, Patient Position: Sitting, Cuff Size: Large)   Pulse 72   Wt 84.6 kg (186 lb 6.4 oz)   SpO2 99%   BMI 36.40 kg/m²     BP Readings from Last 3 Encounters:   07/22/24 140/76   07/03/24 (!) 173/96   04/15/24 142/74        Wt Readings from Last 3 Encounters:   07/22/24 84.6 kg (186 lb 6.4 oz)   07/03/24 92.5 kg (204 lb)   04/15/24 92.6 kg (204 lb 3.2 oz)       Physical Exam    General: NAD  HEENT: NCAT, EOMI, normal conjunctiva, bilateral maxillary and frontal sinus tenderness  Cardiovascular: RRR, normal S1 and S2, no m/r/g  Pulmonary: Normal respiratory effort, no wheezes, rales or rhonchi  MSK: Normal bulk and tone, bilateral TTP over quadratus lumborum   Extremities: No lower extremity edema  Skin: Normal skin color, no rashes     Vargas Hinojosa MD

## 2024-07-22 NOTE — ASSESSMENT & PLAN NOTE
-Start Paxlovid  -Mucinex DM prn   -Started  who is present today with similar symptoms on Molnupiravir

## 2024-07-22 NOTE — PATIENT INSTRUCTIONS
-You will be started on Paxlovid for COVID   -Stop rosuvastatin for 7 days   -A referral has been made for Physical Therapy   -A bone density scan has been ordered

## 2024-07-25 DIAGNOSIS — E66.01 CLASS 3 SEVERE OBESITY DUE TO EXCESS CALORIES WITH SERIOUS COMORBIDITY AND BODY MASS INDEX (BMI) OF 40.0 TO 44.9 IN ADULT (HCC): ICD-10-CM

## 2024-07-25 RX ORDER — BUPROPION HYDROCHLORIDE 150 MG/1
150 TABLET ORAL DAILY
Qty: 90 TABLET | Refills: 1 | Status: SHIPPED | OUTPATIENT
Start: 2024-07-25

## 2024-07-29 ENCOUNTER — TELEPHONE (OUTPATIENT)
Age: 74
End: 2024-07-29

## 2024-07-29 NOTE — TELEPHONE ENCOUNTER
Patient called in stating her and her  were exposed to mold because they didn't know their air conditioner had it (which they did clean out and no longer has mold). Patient would like to know if there is some type of testing they could have done to make sure they are okay. Please advise. Thank you.

## 2024-07-29 NOTE — TELEPHONE ENCOUNTER
No additional testing is needed unless they are actively experiencing symptoms. Mold remediation is the most important thing which it sounds as though they have accomplished.

## 2024-08-09 DIAGNOSIS — E78.5 MILD HYPERLIPIDEMIA: ICD-10-CM

## 2024-08-10 RX ORDER — ROSUVASTATIN CALCIUM 20 MG/1
20 TABLET, COATED ORAL DAILY
Qty: 90 TABLET | Refills: 1 | Status: SHIPPED | OUTPATIENT
Start: 2024-08-10

## 2024-10-05 ENCOUNTER — IMMUNIZATIONS (OUTPATIENT)
Dept: INTERNAL MEDICINE CLINIC | Facility: CLINIC | Age: 74
End: 2024-10-05
Payer: MEDICARE

## 2024-10-05 DIAGNOSIS — Z23 ENCOUNTER FOR IMMUNIZATION: Primary | ICD-10-CM

## 2024-10-05 PROCEDURE — G0008 ADMIN INFLUENZA VIRUS VAC: HCPCS

## 2024-10-05 PROCEDURE — 90662 IIV NO PRSV INCREASED AG IM: CPT

## 2024-10-28 ENCOUNTER — OFFICE VISIT (OUTPATIENT)
Dept: INTERNAL MEDICINE CLINIC | Facility: CLINIC | Age: 74
End: 2024-10-28
Payer: MEDICARE

## 2024-10-28 ENCOUNTER — APPOINTMENT (OUTPATIENT)
Dept: LAB | Facility: CLINIC | Age: 74
End: 2024-10-28
Payer: MEDICARE

## 2024-10-28 VITALS
HEART RATE: 67 BPM | SYSTOLIC BLOOD PRESSURE: 134 MMHG | WEIGHT: 193 LBS | DIASTOLIC BLOOD PRESSURE: 76 MMHG | HEIGHT: 62 IN | BODY MASS INDEX: 35.51 KG/M2 | OXYGEN SATURATION: 98 %

## 2024-10-28 DIAGNOSIS — E53.8 B12 DEFICIENCY: ICD-10-CM

## 2024-10-28 DIAGNOSIS — E66.9 NON MORBID OBESITY: ICD-10-CM

## 2024-10-28 DIAGNOSIS — Z12.31 ENCOUNTER FOR SCREENING MAMMOGRAM FOR BREAST CANCER: ICD-10-CM

## 2024-10-28 DIAGNOSIS — I65.23 BILATERAL CAROTID ARTERY STENOSIS: ICD-10-CM

## 2024-10-28 DIAGNOSIS — R73.03 PREDIABETES: ICD-10-CM

## 2024-10-28 DIAGNOSIS — E78.00 PURE HYPERCHOLESTEROLEMIA: ICD-10-CM

## 2024-10-28 DIAGNOSIS — E55.9 VITAMIN D DEFICIENCY: ICD-10-CM

## 2024-10-28 DIAGNOSIS — K21.9 GASTROESOPHAGEAL REFLUX DISEASE, UNSPECIFIED WHETHER ESOPHAGITIS PRESENT: ICD-10-CM

## 2024-10-28 DIAGNOSIS — Z00.00 MEDICARE ANNUAL WELLNESS VISIT, SUBSEQUENT: Primary | ICD-10-CM

## 2024-10-28 DIAGNOSIS — E06.3 HYPOTHYROIDISM DUE TO HASHIMOTO'S THYROIDITIS: ICD-10-CM

## 2024-10-28 PROBLEM — U07.1 COVID-19: Status: RESOLVED | Noted: 2022-02-09 | Resolved: 2024-10-28

## 2024-10-28 PROBLEM — E66.01 CLASS 3 SEVERE OBESITY DUE TO EXCESS CALORIES WITH SERIOUS COMORBIDITY AND BODY MASS INDEX (BMI) OF 40.0 TO 44.9 IN ADULT (HCC): Status: RESOLVED | Noted: 2019-05-24 | Resolved: 2024-10-28

## 2024-10-28 PROBLEM — E66.813 CLASS 3 SEVERE OBESITY DUE TO EXCESS CALORIES WITH SERIOUS COMORBIDITY AND BODY MASS INDEX (BMI) OF 40.0 TO 44.9 IN ADULT (HCC): Status: RESOLVED | Noted: 2019-05-24 | Resolved: 2024-10-28

## 2024-10-28 LAB
25(OH)D3 SERPL-MCNC: 35.7 NG/ML (ref 30–100)
SL AMB POCT HEMOGLOBIN AIC: 5.4 (ref ?–6.5)
VIT B12 SERPL-MCNC: 165 PG/ML (ref 180–914)

## 2024-10-28 PROCEDURE — 82607 VITAMIN B-12: CPT

## 2024-10-28 PROCEDURE — 82306 VITAMIN D 25 HYDROXY: CPT

## 2024-10-28 PROCEDURE — 36415 COLL VENOUS BLD VENIPUNCTURE: CPT

## 2024-10-28 PROCEDURE — G0439 PPPS, SUBSEQ VISIT: HCPCS | Performed by: INTERNAL MEDICINE

## 2024-10-28 PROCEDURE — 83036 HEMOGLOBIN GLYCOSYLATED A1C: CPT | Performed by: INTERNAL MEDICINE

## 2024-10-28 RX ORDER — FAMOTIDINE 20 MG/1
20 TABLET, FILM COATED ORAL DAILY PRN
Qty: 30 TABLET | Refills: 0 | Status: SHIPPED | OUTPATIENT
Start: 2024-10-28 | End: 2025-10-23

## 2024-10-28 NOTE — ASSESSMENT & PLAN NOTE
-Last TSH within normal range  -Continue current dose of levothyroxine  -Order repeat TSH during next visit

## 2024-10-28 NOTE — ASSESSMENT & PLAN NOTE
-Congratulated patient on weight loss.  Appreciate weight management follow-up.  Continue Wellbutrin as prescribed.

## 2024-10-28 NOTE — ASSESSMENT & PLAN NOTE
-Patient status post IM B12 injections.  Follow-up B12 level pending.  Recommended she start oral B12 1000 mcg daily.

## 2024-10-28 NOTE — PATIENT INSTRUCTIONS
-Start over the counter fiber supplement such as psyllium husk or benefiber   -Drink at least 64oz of water daily   -Take oral B12, 1000mcg daily   -Please schedule your carotid artery imaging test         Medicare Preventive Visit Patient Instructions  Thank you for completing your Welcome to Medicare Visit or Medicare Annual Wellness Visit today. Your next wellness visit will be due in one year (10/29/2025).  The screening/preventive services that you may require over the next 5-10 years are detailed below. Some tests may not apply to you based off risk factors and/or age. Screening tests ordered at today's visit but not completed yet may show as past due. Also, please note that scanned in results may not display below.  Preventive Screenings:  Service Recommendations Previous Testing/Comments   Colorectal Cancer Screening  * Colonoscopy    * Fecal Occult Blood Test (FOBT)/Fecal Immunochemical Test (FIT)  * Fecal DNA/Cologuard Test  * Flexible Sigmoidoscopy Age: 45-75 years old   Colonoscopy: every 10 years (may be performed more frequently if at higher risk)  OR  FOBT/FIT: every 1 year  OR  Cologuard: every 3 years  OR  Sigmoidoscopy: every 5 years  Screening may be recommended earlier than age 45 if at higher risk for colorectal cancer. Also, an individualized decision between you and your healthcare provider will decide whether screening between the ages of 76-85 would be appropriate. Colonoscopy: 11/20/2023  FOBT/FIT: Not on file  Cologuard: Not on file  Sigmoidoscopy: Not on file          Breast Cancer Screening Age: 40+ years old  Frequency: every 1-2 years  Not required if history of left and right mastectomy Mammogram: 08/04/2023        Cervical Cancer Screening Between the ages of 21-29, pap smear recommended once every 3 years.   Between the ages of 30-65, can perform pap smear with HPV co-testing every 5 years.   Recommendations may differ for women with a history of total hysterectomy, cervical cancer,  or abnormal pap smears in past. Pap Smear: 07/12/2017        Hepatitis C Screening Once for adults born between 1945 and 1965  More frequently in patients at high risk for Hepatitis C Hep C Antibody: 12/12/2022        Diabetes Screening 1-2 times per year if you're at risk for diabetes or have pre-diabetes Fasting glucose: 97 mg/dL (1/9/2024)  A1C: 6.1 % (1/9/2024)      Cholesterol Screening Once every 5 years if you don't have a lipid disorder. May order more often based on risk factors. Lipid panel: 01/09/2024          Other Preventive Screenings Covered by Medicare:  Abdominal Aortic Aneurysm (AAA) Screening: covered once if your at risk. You're considered to be at risk if you have a family history of AAA.  Lung Cancer Screening: covers low dose CT scan once per year if you meet all of the following conditions: (1) Age 55-77; (2) No signs or symptoms of lung cancer; (3) Current smoker or have quit smoking within the last 15 years; (4) You have a tobacco smoking history of at least 20 pack years (packs per day multiplied by number of years you smoked); (5) You get a written order from a healthcare provider.  Glaucoma Screening: covered annually if you're considered high risk: (1) You have diabetes OR (2) Family history of glaucoma OR (3)  aged 50 and older OR (4)  American aged 65 and older  Osteoporosis Screening: covered every 2 years if you meet one of the following conditions: (1) You're estrogen deficient and at risk for osteoporosis based off medical history and other findings; (2) Have a vertebral abnormality; (3) On glucocorticoid therapy for more than 3 months; (4) Have primary hyperparathyroidism; (5) On osteoporosis medications and need to assess response to drug therapy.   Last bone density test (DXA Scan): Not on file.  HIV Screening: covered annually if you're between the age of 15-65. Also covered annually if you are younger than 15 and older than 65 with risk factors for HIV  infection. For pregnant patients, it is covered up to 3 times per pregnancy.    Immunizations:  Immunization Recommendations   Influenza Vaccine Annual influenza vaccination during flu season is recommended for all persons aged >= 6 months who do not have contraindications   Pneumococcal Vaccine   * Pneumococcal conjugate vaccine = PCV13 (Prevnar 13), PCV15 (Vaxneuvance), PCV20 (Prevnar 20)  * Pneumococcal polysaccharide vaccine = PPSV23 (Pneumovax) Adults 19-63 yo with certain risk factors or if 65+ yo  If never received any pneumonia vaccine: recommend Prevnar 20 (PCV20)  Give PCV20 if previously received 1 dose of PCV13 or PPSV23   Hepatitis B Vaccine 3 dose series if at intermediate or high risk (ex: diabetes, end stage renal disease, liver disease)   Respiratory syncytial virus (RSV) Vaccine - COVERED BY MEDICARE PART D  * RSVPreF3 (Arexvy) CDC recommends that adults 60 years of age and older may receive a single dose of RSV vaccine using shared clinical decision-making (SCDM)   Tetanus (Td) Vaccine - COST NOT COVERED BY MEDICARE PART B Following completion of primary series, a booster dose should be given every 10 years to maintain immunity against tetanus. Td may also be given as tetanus wound prophylaxis.   Tdap Vaccine - COST NOT COVERED BY MEDICARE PART B Recommended at least once for all adults. For pregnant patients, recommended with each pregnancy.   Shingles Vaccine (Shingrix) - COST NOT COVERED BY MEDICARE PART B  2 shot series recommended in those 19 years and older who have or will have weakened immune systems or those 50 years and older     Health Maintenance Due:      Topic Date Due    Breast Cancer Screening: Mammogram  08/04/2024    Colorectal Cancer Screening  11/18/2030    Hepatitis C Screening  Completed     Immunizations Due:      Topic Date Due    COVID-19 Vaccine (1 - 2023-24 season) Never done     Advance Directives   What are advance directives?  Advance directives are legal documents  that state your wishes and plans for medical care. These plans are made ahead of time in case you lose your ability to make decisions for yourself. Advance directives can apply to any medical decision, such as the treatments you want, and if you want to donate organs.   What are the types of advance directives?  There are many types of advance directives, and each state has rules about how to use them. You may choose a combination of any of the following:  Living will:  This is a written record of the treatment you want. You can also choose which treatments you do not want, which to limit, and which to stop at a certain time. This includes surgery, medicine, IV fluid, and tube feedings.   Durable power of  for healthcare (DPAHC):  This is a written record that states who you want to make healthcare choices for you when you are unable to make them for yourself. This person, called a proxy, is usually a family member or a friend. You may choose more than 1 proxy.  Do not resuscitate (DNR) order:  A DNR order is used in case your heart stops beating or you stop breathing. It is a request not to have certain forms of treatment, such as CPR. A DNR order may be included in other types of advance directives.  Medical directive:  This covers the care that you want if you are in a coma, near death, or unable to make decisions for yourself. You can list the treatments you want for each condition. Treatment may include pain medicine, surgery, blood transfusions, dialysis, IV or tube feedings, and a ventilator (breathing machine).  Values history:  This document has questions about your views, beliefs, and how you feel and think about life. This information can help others choose the care that you would choose.  Why are advance directives important?  An advance directive helps you control your care. Although spoken wishes may be used, it is better to have your wishes written down. Spoken wishes can be misunderstood, or  not followed. Treatments may be given even if you do not want them. An advance directive may make it easier for your family to make difficult choices about your care.   Weight Management   Why it is important to manage your weight:  Being overweight increases your risk of health conditions such as heart disease, high blood pressure, type 2 diabetes, and certain types of cancer. It can also increase your risk for osteoarthritis, sleep apnea, and other respiratory problems. Aim for a slow, steady weight loss. Even a small amount of weight loss can lower your risk of health problems.  How to lose weight safely:  A safe and healthy way to lose weight is to eat fewer calories and get regular exercise. You can lose up about 1 pound a week by decreasing the number of calories you eat by 500 calories each day.   Healthy meal plan for weight management:  A healthy meal plan includes a variety of foods, contains fewer calories, and helps you stay healthy. A healthy meal plan includes the following:  Eat whole-grain foods more often.  A healthy meal plan should contain fiber. Fiber is the part of grains, fruits, and vegetables that is not broken down by your body. Whole-grain foods are healthy and provide extra fiber in your diet. Some examples of whole-grain foods are whole-wheat breads and pastas, oatmeal, brown rice, and bulgur.  Eat a variety of vegetables every day.  Include dark, leafy greens such as spinach, kale, adele greens, and mustard greens. Eat yellow and orange vegetables such as carrots, sweet potatoes, and winter squash.   Eat a variety of fruits every day.  Choose fresh or canned fruit (canned in its own juice or light syrup) instead of juice. Fruit juice has very little or no fiber.  Eat low-fat dairy foods.  Drink fat-free (skim) milk or 1% milk. Eat fat-free yogurt and low-fat cottage cheese. Try low-fat cheeses such as mozzarella and other reduced-fat cheeses.  Choose meat and other protein foods that  are low in fat.  Choose beans or other legumes such as split peas or lentils. Choose fish, skinless poultry (chicken or turkey), or lean cuts of red meat (beef or pork). Before you cook meat or poultry, cut off any visible fat.   Use less fat and oil.  Try baking foods instead of frying them. Add less fat, such as margarine, sour cream, regular salad dressing and mayonnaise to foods. Eat fewer high-fat foods. Some examples of high-fat foods include french fries, doughnuts, ice cream, and cakes.  Eat fewer sweets.  Limit foods and drinks that are high in sugar. This includes candy, cookies, regular soda, and sweetened drinks.  Exercise:  Exercise at least 30 minutes per day on most days of the week. Some examples of exercise include walking, biking, dancing, and swimming. You can also fit in more physical activity by taking the stairs instead of the elevator or parking farther away from stores. Ask your healthcare provider about the best exercise plan for you.      © Copyright [employer] 2018 Information is for End User's use only and may not be sold, redistributed or otherwise used for commercial purposes. All illustrations and images included in CareNotes® are the copyrighted property of [employer] or [employer]

## 2024-10-28 NOTE — PROGRESS NOTES
Ambulatory Visit  Name: Yue Art      : 1950      MRN: [id number]  Encounter Provider: Vargas Hinojosa MD  Encounter Date: 10/28/2024   Encounter department: [facility]  Medicare annual wellness visit, subsequent       Hypothyroidism due to Hashimoto's thyroiditis  -Last TSH within normal range  -Continue current dose of levothyroxine  -Order repeat TSH during next visit     Bilateral carotid artery stenosis  -Asymptomatic  -Reminded patient to please schedule her carotid imaging study       Prediabetes  -A1c now down to 5.4 from 6.1.  Suspect this is largely due to her weight loss.  Orders:    POCT hemoglobin A1c    Pure hypercholesterolemia  -Tolerating statin well  -Continue current dose of rosuvastatin       Gastroesophageal reflux disease, unspecified whether esophagitis present  -Occasionally requires the use of Pepcid with good response  Orders:    famotidine (PEPCID) 20 mg tablet; Take 1 tablet (20 mg total) by mouth daily as needed for heartburn    Non morbid obesity  -Congratulated patient on weight loss.  Appreciate weight management follow-up.  Continue Wellbutrin as prescribed.         Encounter for screening mammogram for breast cancer  Orders:    Mammo screening bilateral w 3d and cad; Future    B12 deficiency  -Patient status post IM B12 injections.  Follow-up B12 level pending.  Recommended she start oral B12 1000 mcg daily.            Preventive health issues were discussed with patient, and age appropriate screening tests were ordered as noted in patient's After Visit Summary. Personalized health advice and appropriate referrals for health education or preventive services given if needed, as noted in patient's After Visit Summary.    History of Present Illness     HPI   Patient presents today for her Medicare wellness visit.  Overall she states she is doing well and has no major complaints.  She reports she feels as though she  has been doing well on Wellbutrin and that it has helped with her food cravings.  Since this time last year she has lost close to 20 pounds.    Patient Care Team:  Vargas Hinojosa MD as PCP - General (Internal Medicine)  Stan Zuluaga DO as PCP - PCP-[facility] Attributed-Roster    Review of Systems  All other systems negative except for pertinent findings noted in HPI.     Medical History Reviewed by provider this encounter:       Annual Wellness Visit Questionnaire   Yue is here for her Subsequent Wellness visit.     Health Risk Assessment:   Patient rates overall health as good. Patient feels that their physical health rating is same. Patient is satisfied with their life. Eyesight was rated as same. Hearing was rated as same. Patient feels that their emotional and mental health rating is same. Patients states they are never, rarely angry. Patient states they are sometimes unusually tired/fatigued. Pain experienced in the last 7 days has been some. Patient's pain rating has been 7/10. Patient states that she has experienced no weight loss or gain in last 6 months.     Depression Screening:   PHQ-2 Score: 0      Fall Risk Screening:   In the past year, patient has experienced: history of falling in past year    Number of falls: 1  Injured during fall?: No    Feels unsteady when standing or walking?: No    Worried about falling?: No      Urinary Incontinence Screening:   Patient has not leaked urine accidently in the last six months.     Home Safety:  Patient does not have trouble with stairs inside or outside of their home. Patient has working smoke alarms and has working carbon monoxide detector. Home safety hazards include: none.     Nutrition:   Current diet is Regular.     Medications:   Patient is currently taking over-the-counter supplements. OTC medications include: see medication list. Patient is able to manage medications.     Activities of Daily Living (ADLs)/Instrumental Activities  "of Daily Living (IADLs):   Walk and transfer into and out of bed and chair?: Yes  Dress and groom yourself?: Yes    Bathe or shower yourself?: Yes    Feed yourself? Yes  Do your laundry/housekeeping?: Yes  Manage your money, pay your bills and track your expenses?: Yes  Make your own meals?: Yes    Do your own shopping?: Yes    PREVENTIVE SCREENINGS      Cardiovascular Screening:    General: Screening Not Indicated and History Lipid Disorder      Diabetes Screening:     General: Screening Current      Colorectal Cancer Screening:     General: Screening Current      Breast Cancer Screening:     General: Screening Current      Cervical Cancer Screening:    General: Screening Not Indicated      Lung Cancer Screening:     General: Screening Not Indicated      Hepatitis C Screening:    General: Screening Current    Social Determinants of Health     Financial Resource Strain: Low Risk  (7/17/2023)    Overall Financial Resource Strain (CARDIA)     Difficulty of Paying Living Expenses: Not hard at all   Transportation Needs: No Transportation Needs (7/17/2023)    PRAPARE - Transportation     Lack of Transportation (Medical): No     Lack of Transportation (Non-Medical): No     No results found.    Objective     /76 (BP Location: Left arm, Patient Position: Sitting, Cuff Size: Large)   Pulse 67   Ht 5' 1.52\" (1.563 m)   Wt 87.5 kg (193 lb)   SpO2 98%   BMI 35.85 kg/m²     Physical Exam  General: NAD  HEENT: NCAT, EOMI, normal conjunctiva  Cardiovascular: RRR, normal S1 and S2, no m/r/g  Pulmonary: Normal respiratory effort, no wheezes, rales or rhonchi  GI: Soft, nontender, nondistended, normoactive bowel sounds  Musculoskeletal: Normal bulk and tone  Neuro: Non-focal, ambulating without difficulty, non-antalgic gait  Extremities: No lower extremity edema  Skin: Normal skin color, no rashes   Psychiatric: Normal mood and affect      "

## 2024-10-28 NOTE — ASSESSMENT & PLAN NOTE
-Occasionally requires the use of Pepcid with good response  Orders:    famotidine (PEPCID) 20 mg tablet; Take 1 tablet (20 mg total) by mouth daily as needed for heartburn

## 2024-10-30 DIAGNOSIS — E53.8 B12 DEFICIENCY: Primary | ICD-10-CM

## 2024-10-30 RX ORDER — CYANOCOBALAMIN 1000 UG/ML
1000 INJECTION, SOLUTION INTRAMUSCULAR; SUBCUTANEOUS 3 TIMES WEEKLY
Status: SHIPPED | OUTPATIENT
Start: 2024-11-04 | End: 2024-11-18

## 2024-11-04 ENCOUNTER — CLINICAL SUPPORT (OUTPATIENT)
Dept: INTERNAL MEDICINE CLINIC | Facility: CLINIC | Age: 74
End: 2024-11-04
Payer: MEDICARE

## 2024-11-04 DIAGNOSIS — E53.8 B12 DEFICIENCY: Primary | ICD-10-CM

## 2024-11-04 PROCEDURE — 96372 THER/PROPH/DIAG INJ SC/IM: CPT

## 2024-11-04 RX ADMIN — CYANOCOBALAMIN 1000 MCG: 1000 INJECTION, SOLUTION INTRAMUSCULAR; SUBCUTANEOUS at 10:36

## 2024-11-06 ENCOUNTER — CLINICAL SUPPORT (OUTPATIENT)
Dept: INTERNAL MEDICINE CLINIC | Facility: CLINIC | Age: 74
End: 2024-11-06
Payer: MEDICARE

## 2024-11-06 DIAGNOSIS — E53.8 B12 DEFICIENCY: Primary | ICD-10-CM

## 2024-11-06 PROCEDURE — 96372 THER/PROPH/DIAG INJ SC/IM: CPT

## 2024-11-06 RX ADMIN — CYANOCOBALAMIN 1000 MCG: 1000 INJECTION, SOLUTION INTRAMUSCULAR; SUBCUTANEOUS at 10:19

## 2024-11-08 ENCOUNTER — CLINICAL SUPPORT (OUTPATIENT)
Dept: INTERNAL MEDICINE CLINIC | Facility: CLINIC | Age: 74
End: 2024-11-08
Payer: MEDICARE

## 2024-11-08 DIAGNOSIS — E53.8 B12 DEFICIENCY: Primary | ICD-10-CM

## 2024-11-08 PROCEDURE — 96372 THER/PROPH/DIAG INJ SC/IM: CPT

## 2024-11-08 RX ADMIN — CYANOCOBALAMIN 1000 MCG: 1000 INJECTION, SOLUTION INTRAMUSCULAR; SUBCUTANEOUS at 10:31

## 2024-11-09 DIAGNOSIS — E06.3 HYPOTHYROIDISM DUE TO HASHIMOTO'S THYROIDITIS: ICD-10-CM

## 2024-11-09 RX ORDER — LEVOTHYROXINE SODIUM 75 UG/1
TABLET ORAL
Qty: 90 TABLET | Refills: 1 | Status: SHIPPED | OUTPATIENT
Start: 2024-11-09

## 2024-11-11 ENCOUNTER — TELEPHONE (OUTPATIENT)
Dept: INTERNAL MEDICINE CLINIC | Facility: CLINIC | Age: 74
End: 2024-11-11

## 2024-11-11 ENCOUNTER — CLINICAL SUPPORT (OUTPATIENT)
Dept: INTERNAL MEDICINE CLINIC | Facility: CLINIC | Age: 74
End: 2024-11-11
Payer: MEDICARE

## 2024-11-11 DIAGNOSIS — E53.8 B12 DEFICIENCY: Primary | ICD-10-CM

## 2024-11-11 PROCEDURE — 96372 THER/PROPH/DIAG INJ SC/IM: CPT

## 2024-11-11 RX ADMIN — CYANOCOBALAMIN 1000 MCG: 1000 INJECTION, SOLUTION INTRAMUSCULAR; SUBCUTANEOUS at 10:27

## 2024-11-13 ENCOUNTER — CLINICAL SUPPORT (OUTPATIENT)
Dept: INTERNAL MEDICINE CLINIC | Facility: CLINIC | Age: 74
End: 2024-11-13
Payer: MEDICARE

## 2024-11-13 DIAGNOSIS — E53.8 B12 DEFICIENCY: Primary | ICD-10-CM

## 2024-11-13 PROCEDURE — 96372 THER/PROPH/DIAG INJ SC/IM: CPT

## 2024-11-13 RX ADMIN — CYANOCOBALAMIN 1000 MCG: 1000 INJECTION, SOLUTION INTRAMUSCULAR; SUBCUTANEOUS at 10:33

## 2024-11-15 ENCOUNTER — CLINICAL SUPPORT (OUTPATIENT)
Dept: INTERNAL MEDICINE CLINIC | Facility: CLINIC | Age: 74
End: 2024-11-15
Payer: MEDICARE

## 2024-11-15 DIAGNOSIS — E53.8 B12 DEFICIENCY: Primary | ICD-10-CM

## 2024-11-15 PROCEDURE — 96372 THER/PROPH/DIAG INJ SC/IM: CPT

## 2024-11-15 RX ADMIN — CYANOCOBALAMIN 1000 MCG: 1000 INJECTION, SOLUTION INTRAMUSCULAR; SUBCUTANEOUS at 10:31

## 2024-11-19 DIAGNOSIS — K21.9 GASTROESOPHAGEAL REFLUX DISEASE, UNSPECIFIED WHETHER ESOPHAGITIS PRESENT: ICD-10-CM

## 2024-11-20 RX ORDER — FAMOTIDINE 20 MG/1
TABLET, FILM COATED ORAL
Qty: 90 TABLET | Refills: 0 | Status: SHIPPED | OUTPATIENT
Start: 2024-11-20

## 2024-12-10 ENCOUNTER — HOSPITAL ENCOUNTER (OUTPATIENT)
Dept: NON INVASIVE DIAGNOSTICS | Facility: CLINIC | Age: 74
Discharge: HOME | End: 2024-12-10
Payer: MEDICARE

## 2024-12-10 PROCEDURE — 93880 EXTRACRANIAL BILAT STUDY: CPT

## 2024-12-18 ENCOUNTER — OFFICE VISIT (OUTPATIENT)
Dept: BARIATRICS | Facility: CLINIC | Age: 74
End: 2024-12-18
Payer: MEDICARE

## 2024-12-18 ENCOUNTER — HOSPITAL ENCOUNTER (OUTPATIENT)
Facility: HOSPITAL | Age: 74
Discharge: HOME | End: 2024-12-18
Payer: MEDICARE

## 2024-12-18 VITALS — WEIGHT: 191.8 LBS | BODY MASS INDEX: 37.66 KG/M2 | HEIGHT: 60 IN

## 2024-12-18 VITALS
BODY MASS INDEX: 37.46 KG/M2 | WEIGHT: 190.8 LBS | HEART RATE: 71 BPM | DIASTOLIC BLOOD PRESSURE: 80 MMHG | HEIGHT: 60 IN | OXYGEN SATURATION: 97 % | SYSTOLIC BLOOD PRESSURE: 126 MMHG

## 2024-12-18 DIAGNOSIS — E66.01 CLASS 3 SEVERE OBESITY DUE TO EXCESS CALORIES WITH SERIOUS COMORBIDITY AND BODY MASS INDEX (BMI) OF 40.0 TO 44.9 IN ADULT (HCC): ICD-10-CM

## 2024-12-18 DIAGNOSIS — E66.01 CLASS 2 SEVERE OBESITY DUE TO EXCESS CALORIES WITH SERIOUS COMORBIDITY AND BODY MASS INDEX (BMI) OF 37.0 TO 37.9 IN ADULT (HCC): Primary | ICD-10-CM

## 2024-12-18 DIAGNOSIS — E66.813 CLASS 3 SEVERE OBESITY DUE TO EXCESS CALORIES WITH SERIOUS COMORBIDITY AND BODY MASS INDEX (BMI) OF 40.0 TO 44.9 IN ADULT (HCC): ICD-10-CM

## 2024-12-18 DIAGNOSIS — E66.812 CLASS 2 SEVERE OBESITY DUE TO EXCESS CALORIES WITH SERIOUS COMORBIDITY AND BODY MASS INDEX (BMI) OF 37.0 TO 37.9 IN ADULT (HCC): Primary | ICD-10-CM

## 2024-12-18 DIAGNOSIS — R73.03 PREDIABETES: ICD-10-CM

## 2024-12-18 DIAGNOSIS — Z78.0 POST-MENOPAUSE: ICD-10-CM

## 2024-12-18 PROCEDURE — G2211 COMPLEX E/M VISIT ADD ON: HCPCS | Performed by: NURSE PRACTITIONER

## 2024-12-18 PROCEDURE — 77080 DXA BONE DENSITY AXIAL: CPT

## 2024-12-18 PROCEDURE — 99214 OFFICE O/P EST MOD 30 MIN: CPT | Performed by: NURSE PRACTITIONER

## 2024-12-18 RX ORDER — BUPROPION HYDROCHLORIDE 150 MG/1
150 TABLET ORAL DAILY
Qty: 90 TABLET | Refills: 1 | Status: SHIPPED | OUTPATIENT
Start: 2024-12-18

## 2024-12-18 NOTE — PROGRESS NOTES
Assessment/Plan:     Class 2 severe obesity due to excess calories with serious comorbidity and body mass index (BMI) of 37.0 to 37.9 in adult (HCC)  - Patient is pursuing Conservative Program and follow up visits with medical weight management provider  - Initial weight loss goal of 5-10% weight loss for improved health. Weight loss can improve patient's co-morbid conditions and/or prevent weight-related complications.  - Explained the importance of continuing lifestyle changes in addition to any anti-obesity medications.   - Labs reviewed from 1/2024 and 4/2024 - will repeat CMP to monitor kidneys    General Recommendations:  Nutrition:  Eat breakfast daily.  Do not skip meals.      Food log (ie.) www.xTV.com, sparkpeople.com, loseit.com, calorieking.com, etc.     Practice mindful eating.  Be sure to set aside time to eat, eat slowly, and savor your food.     Hydration:    At least 64oz of water daily.  No sugar sweetened beverages.  No juice (eat the fruit instead).     Exercise:  Studies have shown that the ideal exercise goal is somewhere between 150 to 300 minutes of moderate intensity exercise a week.  Start with exercising 10 minutes every other day and gradually increase physical activity with a goal of at least 150 minutes of moderate intensity exercise a week, divided over at least 3 days a week.  An example of this would be exercising 30 minutes a day, 5 days a week.  Resistance training can increase muscle mass and increase our resting metabolic rate.   FULL BODY resistance training is recommended 2-3 times a week.  Do not do this on consecutive days to allow for muscle recovery.     Aim for a bare minimum 5000 steps, even on days you do not exercise.     Monitoring:   Weigh yourself daily.  If this causes undue stress, then just weigh yourself once a week.  Weigh yourself the same time of the day with the same amount of clothing on.  Preferably this should be done after waking up, before you  eat, and with no clothing or minimal clothing on.     Specific Goals:  Patient lifestyle habits were reviewed and patient was congratulated on her weight loss.  Nutrition was discussed and she will continue with her current plan of smaller portions and evenly distributed calories.  She will focus on making sure she has protein with each meal and drinking at least 48 ounces of water daily.  Activity was discussed and she was encouraged to incorporate low impact activity 2-3 times a week to maintain muscle mass.  Medications were discussed and she is tolerating bupropion 150 mg once daily without any side effects and is seeing weight loss with this treatment plan.  She will continue on this medication and follow-up in 3 months.    Prediabetes  A1c improving with weight loss         Yue was seen today for follow-up.    Diagnoses and all orders for this visit:    Class 2 severe obesity due to excess calories with serious comorbidity and body mass index (BMI) of 37.0 to 37.9 in adult ([facility])  -     Comprehensive metabolic panel; Future  -     buPROPion (WELLBUTRIN XL) 150 mg 24 hr tablet; Take 1 tablet (150 mg total) by mouth daily    Class 3 severe obesity due to excess calories with serious comorbidity and body mass index (BMI) of 40.0 to 44.9 in adult ([facility])    Prediabetes        Total time spent reviewing chart, interviewing patient, examining patient, discussing plan, answering all questions, and documentin minutes with >50% face-to-face time with the patient.    Follow up in approximately 3 months with Non-Surgical Physician/Advanced Practitioner.    Subjective:   Chief Complaint   Patient presents with    Follow-up     Pt here today for MWM f/u        Patient ID: Yue Art  is a 74 y.o. female with excess weight/obesity here to pursue weight management.  Patient is pursuing Conservative Program.   Most recent notes and records were reviewed.    HPI    Wt Readings from Last 20 Encounters:   24  86.5 kg (190 lb 12.8 oz)   12/18/24 87 kg (191 lb 12.8 oz)   10/28/24 87.5 kg (193 lb)   07/22/24 84.6 kg (186 lb 6.4 oz)   07/03/24 92.5 kg (204 lb)   04/15/24 92.6 kg (204 lb 3.2 oz)   02/06/24 93.4 kg (206 lb)   01/08/24 93.4 kg (206 lb)   01/08/24 93.7 kg (206 lb 9.6 oz)   11/20/23 93 kg (205 lb)   10/16/23 96.4 kg (212 lb 9.6 oz)   10/13/23 96.6 kg (213 lb)   08/04/23 96.6 kg (213 lb)   07/17/23 96.9 kg (213 lb 9.6 oz)   11/21/22 92 kg (202 lb 12.8 oz)   07/13/22 88.5 kg (195 lb)   06/23/22 89.6 kg (197 lb 9.6 oz)   04/04/22 88.3 kg (194 lb 9.6 oz)   03/09/22 87.7 kg (193 lb 6.4 oz)   03/05/22 86.9 kg (191 lb 9.3 oz)       Patient presents today to medical weight management office for follow up.  Patient continues on bupropion 150mg daily. She is tolerating the medication well, has noticed some occasional hand tremors which she didn't notice before the medication. She states they aren't everyday and are very mild. She is very happy with her weight loss progress and wishes to continue with bupropion.  She reports her portions are well controlled and she is choosing healthier options during the day. She is feeling less pain in her hip and her clothes are fitting better.      Weight loss medication and dose: bupropion 150mg daily  Started weight and date: 213 lbs in 10/2023  Current weight: 190.8 lbs (206 lbs at last OV)  Difference: -22.2 lbs (-15.2 lbs since last OV)    Starting BMI: 41.0 in 10/2023  Current BMI: 37.58    Waist Measurements:  10/2023: 45 in  12/2024: 42.5 in      Diet recall:  B: egg with 1/2 English Muffin  L: 1/2 sandwich (chicken breast)  S: rice cakes  D: salad with protein (chicken) OR chickpea pasta  S: rice cakes    Hydration: tea and water - 24-32 oz  Alcohol: no  Smoking: no  Exercise: no  Occupation: [employer]  Sleep: well  STOP bang: 3/8    Mammogram: 9/2023      The following portions of the patient's history were reviewed and updated as appropriate: allergies, current  "medications, past family history, past medical history, past social history, past surgical history, and problem list.    Family History   Problem Relation Age of Onset    Cancer Mother     Diabetes Mother     Cancer Father     Diabetes Father     Urolithiasis Sister     No Known Problems Daughter     No Known Problems Maternal Grandmother     No Known Problems Maternal Grandfather     No Known Problems Paternal Grandmother     No Known Problems Paternal Grandfather     No Known Problems Maternal Aunt     No Known Problems Paternal Aunt     No Known Problems Paternal Aunt         Review of Systems   Constitutional:  Negative for fatigue.   HENT:  Negative for sore throat.    Respiratory:  Negative for cough and shortness of breath.    Cardiovascular:  Negative for chest pain, palpitations and leg swelling.   Gastrointestinal:  Negative for abdominal pain, constipation, diarrhea and nausea.   Genitourinary:  Negative for dysuria.   Musculoskeletal:  Negative for arthralgias and back pain.   Skin:  Negative for rash.   Neurological:  Negative for headaches.   Psychiatric/Behavioral:  Negative for dysphoric mood. The patient is not nervous/anxious.        Objective:  /80 (BP Location: Left arm, Patient Position: Sitting, Cuff Size: Large)   Pulse 71   Ht 4' 11.75\" (1.518 m)   Wt 86.5 kg (190 lb 12.8 oz)   SpO2 97%   BMI 37.58 kg/m²     Physical Exam  Vitals and nursing note reviewed.   Constitutional:       Appearance: Normal appearance. She is obese.   HENT:      Head: Normocephalic.   Pulmonary:      Effort: Pulmonary effort is normal.   Neurological:      General: No focal deficit present.      Mental Status: She is alert and oriented to person, place, and time.   Psychiatric:         Mood and Affect: Mood normal.         Behavior: Behavior normal.         Thought Content: Thought content normal.         Judgment: Judgment normal.            Labs   Most recent labs reviewed   Lab Results   Component Value " Date     05/10/2015    SODIUM 138 01/09/2024    K 4.3 01/09/2024     01/09/2024    CO2 29 01/09/2024    ANIONGAP 5 05/10/2015    AGAP 8 01/09/2024    BUN 15 01/09/2024    CREATININE 0.86 01/09/2024    GLUC 104 03/06/2022    GLUF 97 01/09/2024    CALCIUM 9.3 01/09/2024    AST 17 01/09/2024    ALT 10 01/09/2024    ALKPHOS 81 01/09/2024    PROT 7.6 05/10/2015    TP 7.6 01/09/2024    BILITOT 0.4 05/10/2015    TBILI 0.57 01/09/2024    EGFR 66 01/09/2024     Lab Results   Component Value Date    HGBA1C 5.4 10/28/2024     Lab Results   Component Value Date    XMU6FRTETZHN 2.372 04/17/2024     Lab Results   Component Value Date    CHOLESTEROL 107 01/09/2024     Lab Results   Component Value Date    HDL 38 (L) 01/09/2024     Lab Results   Component Value Date    TRIG 129 01/09/2024     Lab Results   Component Value Date    LDLCALC 43 01/09/2024

## 2024-12-18 NOTE — ASSESSMENT & PLAN NOTE
- Patient is pursuing Conservative Program and follow up visits with medical weight management provider  - Initial weight loss goal of 5-10% weight loss for improved health. Weight loss can improve patient's co-morbid conditions and/or prevent weight-related complications.  - Explained the importance of continuing lifestyle changes in addition to any anti-obesity medications.   - Labs reviewed from 1/2024 and 4/2024 - will repeat CMP to monitor kidneys    General Recommendations:  Nutrition:  Eat breakfast daily.  Do not skip meals.      Food log (ie.) www.[email or web address].com, sparkpeople.com, loseit.com, calorieking.com, etc.     Practice mindful eating.  Be sure to set aside time to eat, eat slowly, and savor your food.     Hydration:    At least 64oz of water daily.  No sugar sweetened beverages.  No juice (eat the fruit instead).     Exercise:  Studies have shown that the ideal exercise goal is somewhere between 150 to 300 minutes of moderate intensity exercise a week.  Start with exercising 10 minutes every other day and gradually increase physical activity with a goal of at least 150 minutes of moderate intensity exercise a week, divided over at least 3 days a week.  An example of this would be exercising 30 minutes a day, 5 days a week.  Resistance training can increase muscle mass and increase our resting metabolic rate.   FULL BODY resistance training is recommended 2-3 times a week.  Do not do this on consecutive days to allow for muscle recovery.     Aim for a bare minimum 5000 steps, even on days you do not exercise.     Monitoring:   Weigh yourself daily.  If this causes undue stress, then just weigh yourself once a week.  Weigh yourself the same time of the day with the same amount of clothing on.  Preferably this should be done after waking up, before you eat, and with no clothing or minimal clothing on.     Specific Goals:  Patient lifestyle habits were reviewed and patient was congratulated on her  weight loss.  Nutrition was discussed and she will continue with her current plan of smaller portions and evenly distributed calories.  She will focus on making sure she has protein with each meal and drinking at least 48 ounces of water daily.  Activity was discussed and she was encouraged to incorporate low impact activity 2-3 times a week to maintain muscle mass.  Medications were discussed and she is tolerating bupropion 150 mg once daily without any side effects and is seeing weight loss with this treatment plan.  She will continue on this medication and follow-up in 3 months.

## 2024-12-19 ENCOUNTER — RESULTS FOLLOW-UP (OUTPATIENT)
Dept: INTERNAL MEDICINE CLINIC | Facility: CLINIC | Age: 74
End: 2024-12-19

## 2024-12-20 ENCOUNTER — APPOINTMENT (EMERGENCY)
Dept: CT IMAGING | Facility: HOSPITAL | Age: 74
End: 2024-12-20
Payer: MEDICARE

## 2024-12-20 ENCOUNTER — HOSPITAL ENCOUNTER (EMERGENCY)
Facility: HOSPITAL | Age: 74
Discharge: HOME | End: 2024-12-20
Attending: EMERGENCY MEDICINE
Payer: MEDICARE

## 2024-12-20 VITALS
TEMPERATURE: 98 F | OXYGEN SATURATION: 98 % | SYSTOLIC BLOOD PRESSURE: 145 MMHG | HEART RATE: 73 BPM | DIASTOLIC BLOOD PRESSURE: 81 MMHG | RESPIRATION RATE: 18 BRPM

## 2024-12-20 DIAGNOSIS — K27.9 PUD (PEPTIC ULCER DISEASE): ICD-10-CM

## 2024-12-20 DIAGNOSIS — K29.70 GASTRITIS: Primary | ICD-10-CM

## 2024-12-20 LAB
ALBUMIN SERPL BCG-MCNC: 4.5 G/DL (ref 3.5–5)
ALP SERPL-CCNC: 93 U/L (ref 34–104)
ALT SERPL W P-5'-P-CCNC: 7 U/L (ref 7–52)
ANION GAP SERPL CALCULATED.3IONS-SCNC: 8 MMOL/L (ref 4–13)
AST SERPL W P-5'-P-CCNC: 23 U/L (ref 13–39)
BASOPHILS # BLD AUTO: 0.13 THOUSANDS/ΜL (ref 0–0.1)
BASOPHILS NFR BLD AUTO: 1 % (ref 0–1)
BILIRUB SERPL-MCNC: 0.45 MG/DL (ref 0.2–1)
BUN SERPL-MCNC: 15 MG/DL (ref 5–25)
CALCIUM SERPL-MCNC: 9.4 MG/DL (ref 8.4–10.2)
CHLORIDE SERPL-SCNC: 103 MMOL/L (ref 96–108)
CO2 SERPL-SCNC: 26 MMOL/L (ref 21–32)
CREAT SERPL-MCNC: 0.8 MG/DL (ref 0.6–1.3)
EOSINOPHIL # BLD AUTO: 0.3 THOUSAND/ΜL (ref 0–0.61)
EOSINOPHIL NFR BLD AUTO: 3 % (ref 0–6)
ERYTHROCYTE [DISTWIDTH] IN BLOOD BY AUTOMATED COUNT: 13.5 % (ref 11.6–15.1)
GFR SERPL CREATININE-BSD FRML MDRD: 71 ML/MIN/1.73SQ M
GLUCOSE SERPL-MCNC: 97 MG/DL (ref 65–140)
HCT VFR BLD AUTO: 47.1 % (ref 34.8–46.1)
HGB BLD-MCNC: 15.1 G/DL (ref 11.5–15.4)
IMM GRANULOCYTES # BLD AUTO: 0.03 THOUSAND/UL (ref 0–0.2)
IMM GRANULOCYTES NFR BLD AUTO: 0 % (ref 0–2)
LIPASE SERPL-CCNC: 33 U/L (ref 11–82)
LYMPHOCYTES # BLD AUTO: 2.09 THOUSANDS/ΜL (ref 0.6–4.47)
LYMPHOCYTES NFR BLD AUTO: 18 % (ref 14–44)
MCH RBC QN AUTO: 28.4 PG (ref 26.8–34.3)
MCHC RBC AUTO-ENTMCNC: 32.1 G/DL (ref 31.4–37.4)
MCV RBC AUTO: 89 FL (ref 82–98)
MONOCYTES # BLD AUTO: 0.8 THOUSAND/ΜL (ref 0.17–1.22)
MONOCYTES NFR BLD AUTO: 7 % (ref 4–12)
NEUTROPHILS # BLD AUTO: 8.08 THOUSANDS/ΜL (ref 1.85–7.62)
NEUTS SEG NFR BLD AUTO: 71 % (ref 43–75)
NRBC BLD AUTO-RTO: 0 /100 WBCS
PLATELET # BLD AUTO: 306 THOUSANDS/UL (ref 149–390)
PMV BLD AUTO: 10.6 FL (ref 8.9–12.7)
POTASSIUM SERPL-SCNC: 5.2 MMOL/L (ref 3.5–5.3)
PROT SERPL-MCNC: 7.7 G/DL (ref 6.4–8.4)
RBC # BLD AUTO: 5.31 MILLION/UL (ref 3.81–5.12)
SODIUM SERPL-SCNC: 137 MMOL/L (ref 135–147)
WBC # BLD AUTO: 11.43 THOUSAND/UL (ref 4.31–10.16)

## 2024-12-20 PROCEDURE — 96374 THER/PROPH/DIAG INJ IV PUSH: CPT

## 2024-12-20 PROCEDURE — 93005 ELECTROCARDIOGRAM TRACING: CPT

## 2024-12-20 PROCEDURE — 36415 COLL VENOUS BLD VENIPUNCTURE: CPT

## 2024-12-20 PROCEDURE — 74177 CT ABD & PELVIS W/CONTRAST: CPT

## 2024-12-20 PROCEDURE — 83690 ASSAY OF LIPASE: CPT | Performed by: EMERGENCY MEDICINE

## 2024-12-20 PROCEDURE — 80053 COMPREHEN METABOLIC PANEL: CPT | Performed by: EMERGENCY MEDICINE

## 2024-12-20 PROCEDURE — 99284 EMERGENCY DEPT VISIT MOD MDM: CPT

## 2024-12-20 PROCEDURE — 99285 EMERGENCY DEPT VISIT HI MDM: CPT | Performed by: EMERGENCY MEDICINE

## 2024-12-20 PROCEDURE — 71260 CT THORAX DX C+: CPT

## 2024-12-20 PROCEDURE — 85025 COMPLETE CBC W/AUTO DIFF WBC: CPT | Performed by: EMERGENCY MEDICINE

## 2024-12-20 RX ORDER — MAGNESIUM HYDROXIDE/ALUMINUM HYDROXICE/SIMETHICONE 120; 1200; 1200 MG/30ML; MG/30ML; MG/30ML
30 SUSPENSION ORAL ONCE
Status: COMPLETED | OUTPATIENT
Start: 2024-12-20 | End: 2024-12-20

## 2024-12-20 RX ORDER — SUCRALFATE 1 G/1
1 TABLET ORAL ONCE
Status: COMPLETED | OUTPATIENT
Start: 2024-12-20 | End: 2024-12-20

## 2024-12-20 RX ORDER — PANTOPRAZOLE SODIUM 20 MG/1
20 TABLET, DELAYED RELEASE ORAL DAILY
Qty: 20 TABLET | Refills: 0 | Status: SHIPPED | OUTPATIENT
Start: 2024-12-20

## 2024-12-20 RX ORDER — SUCRALFATE 1 G/1
1 TABLET ORAL 4 TIMES DAILY
Qty: 120 TABLET | Refills: 0 | Status: SHIPPED | OUTPATIENT
Start: 2024-12-20

## 2024-12-20 RX ORDER — FAMOTIDINE 10 MG/ML
20 INJECTION, SOLUTION INTRAVENOUS ONCE
Status: COMPLETED | OUTPATIENT
Start: 2024-12-20 | End: 2024-12-20

## 2024-12-20 RX ORDER — PANTOPRAZOLE SODIUM 40 MG/10ML
40 INJECTION, POWDER, LYOPHILIZED, FOR SOLUTION INTRAVENOUS ONCE
Status: COMPLETED | OUTPATIENT
Start: 2024-12-20 | End: 2024-12-20

## 2024-12-20 RX ADMIN — PANTOPRAZOLE SODIUM 40 MG: 40 INJECTION, POWDER, FOR SOLUTION INTRAVENOUS at 22:22

## 2024-12-20 RX ADMIN — FAMOTIDINE 20 MG: 10 INJECTION INTRAVENOUS at 20:09

## 2024-12-20 RX ADMIN — IOHEXOL 100 ML: 350 INJECTION, SOLUTION INTRAVENOUS at 20:33

## 2024-12-20 RX ADMIN — ALUMINUM HYDROXIDE, MAGNESIUM HYDROXIDE, AND DIMETHICONE 30 ML: 200; 20; 200 SUSPENSION ORAL at 20:08

## 2024-12-20 RX ADMIN — SUCRALFATE 1 G: 1 TABLET ORAL at 20:17

## 2024-12-21 NOTE — ED ATTENDING ATTESTATION
12/20/2024  I, Yandel Deng DO, saw and evaluated the patient. I have discussed the patient with the resident/non-physician practitioner and agree with the resident's/non-physician practitioner's findings, Plan of Care, and MDM as documented in the resident's/non-physician practitioner's note, except where noted. All available labs and Radiology studies were reviewed.  I was present for key portions of any procedure(s) performed by the resident/non-physician practitioner and I was immediately available to provide assistance.       At this point I agree with the current assessment done in the Emergency Department.  I have conducted an independent evaluation of this patient a history and physical is as follows:    Patient is a 74-year-old female who presents with abdominal and back pain.  Patient states that it has been intermittent for the past 2 to 3 months.  He describes it as a band across her upper abdomen and thoracic back.  There is no exacerbating factors.  The symptoms do seem to improve with Tylenol and Pepcid.  She denies any black or bloody stools.  She admits to occasional nausea but denies vomiting.  She denies shortness of breath, chest pain, other concerns.    On exam, patient is in no acute distress.  Heart is regular rate and rhythm.  Breath sounds normal.  Abdomen is soft, nontender, nondistended.  No rebound or guarding.    Imaging consistent with gastritis, possible peptic ulcer disease.  Do not suspect acute surgical process including but not limited to acute cholecystitis, acute appendicitis, SBO, mesenteric ischemia, AAA, vascular dissection, vascular occlusion, perforated viscus. Do not suspect intrathoracic cause of abdominal pain. Symptoms improved and patient is tolerating po. Will treat with Protonix, Carafate.  Patient to follow-up with gastroenterology. Patient advised to return to ED if symptoms worsen or persist.     Portions of the above record have been created with voice  "recognition software.  Occasional wrong word or \"sound alike\" substitutions may have occurred due to the inherent limitations of voice recognition software.  Read the chart carefully and recognize, using context, where substitutions may have occurred.      ED Course         Critical Care Time  Procedures      "

## 2024-12-21 NOTE — DISCHARGE INSTRUCTIONS
Continue Carafate 4 times a day before meals.  Continue Pepcid and Protonix as well.  Avoid meloxicam for the next few weeks until stomach heals, may need to take less in the future but to see GI first.  Stay well-hydrated.  Please return to the ED as needed for new or worsening symptoms

## 2024-12-21 NOTE — ED PROVIDER NOTES
Time reflects when diagnosis was documented in both MDM as applicable and the Disposition within this note       Time User Action Codes Description Comment    12/20/2024 10:19 PM Roque Lee Add [K29.70] Gastritis     12/20/2024 10:23 PM Roque Lee Add [K27.9] PUD (peptic ulcer disease)     12/20/2024 10:23 PM Roque Lee Modify [K27.9] PUD (peptic ulcer disease) possible          ED Disposition       ED Disposition   Discharge    Condition   Stable    Date/Time   Fri Dec 20, 2024 10:19 PM    Comment   Yue Augusteo discharge to home/self care.                   Assessment & Plan       Medical Decision Making  DDx including but not limited to: GERD/Gastritis/PUD, appendicitis, gastroenteritis, gastritis, PUD, GERD, gastroparesis, hepatitis, pancreatitis, colitis, enteritis, diverticulitis, food poisoning, mesenteric adenitis, epiploic appendagitis, mesenteric panniculitis, mesenteric ischemia, IBD, IBS, ileus, bowel obstruction, volvulus, internal hernia, AAA, cholecystitis, biliary colic, choledocholithiasis, perforated viscus, tumor, splenic etiology, constipation, pelvic pathology, renal colic, pyelonephritis, UTI; doubt cardiac etiology.     Pt is a 75 y/o female presenting with epigastric abdominal pain as well as cough - worse laying flat over the past few days. Has been taking meloxicam daily for several months with recent increase in dosage. No fevers, chills, focal weakness, chest pain, dysuria, rash, headaches, ams.     CT findings suspicious for gastritis/pud - suspected with long term daily use of meloxicam as well as increase in dosage - advised to cease use until evaluated by GI, referral placed. Takes meloxicam for arthritis, will try tylenol for next few weeks. [place] diet, carafate, ppi, pepcid rx.  She and family are agreeable to plan as well as return precautions. Hemodynamically stable, no acute distress.    Amount and/or Complexity of Data Reviewed  Labs:  ordered.  Radiology: ordered.    Risk  OTC drugs.  Prescription drug management.             Medications   sucralfate (CARAFATE) tablet 1 g (1 g Oral Given 24)   Famotidine (PF) (PEPCID) injection 20 mg (20 mg Intravenous Given 24)   aluminum-magnesium hydroxide-simethicone (MAALOX) oral suspension 30 mL (30 mL Oral Given 24)   iohexol (OMNIPAQUE) 350 MG/ML injection (MULTI-DOSE) 100 mL (100 mL Intravenous Given 24)   pantoprazole (PROTONIX) injection 40 mg (40 mg Intravenous Given 24)       ED Risk Strat Scores                                              History of Present Illness       Chief Complaint   Patient presents with    Back Pain     Patient presents for back pain that began an hour and a half ago. States that the pain is bilateral and wraps around to the front. Denies urinary symptoms.        Past Medical History:   Diagnosis Date    Arthritis     Cat scratch 2022    Cataracts, bilateral     Colon polyp     Disease of thyroid gland     Diverticulitis of colon     Gastroesophageal reflux disease 2024    History of COVID-19 2022    Hypothyroid     Kidney stone Not sure    Obesity (BMI 35.0-39.9 without comorbidity)     PNA (pneumonia) 2019      Past Surgical History:   Procedure Laterality Date     SECTION  1983    CHOLECYSTECTOMY  1968    open    COLONOSCOPY      EYE SURGERY  10/2021    FL RETROGRADE PYELOGRAM  2022    [place] CYSTO/URETERO W/LITHOTRIPSY &INDWELL STENT INSRT Right 2022    Procedure: CYSTOSCOPY URETEROSCOPY WITH LITHOTRIPSY HOLMIUM LASER, RETROGRADE PYELOGRAM AND INSERTION STENT URETERAL;  Surgeon: Bill Serrano MD;  Location: [facility];  Service: Urology      Family History   Problem Relation Age of Onset    Cancer Mother     Diabetes Mother     Cancer Father     Diabetes Father     Urolithiasis Sister     No Known Problems Daughter     No Known Problems Maternal Grandmother     No  Known Problems Maternal Grandfather     No Known Problems Paternal Grandmother     No Known Problems Paternal Grandfather     No Known Problems Maternal Aunt     No Known Problems Paternal Aunt     No Known Problems Paternal Aunt       Social History     Tobacco Use    Smoking status: Former     Current packs/day: 0.00     Types: Cigarettes    Smokeless tobacco: Never   Vaping Use    Vaping status: Never Used   Substance Use Topics    Alcohol use: Not Currently     Alcohol/week: 0.0 standard drinks of alcohol    Drug use: Never      E-Cigarette/Vaping    E-Cigarette Use Never User       E-Cigarette/Vaping Substances    Nicotine No     THC No     CBD No     Flavoring No     Other No     Unknown No       I have reviewed and agree with the history as documented.     Pt is a 73 y/o female presenting with epigastric abdominal pain as well as cough - worse laying flat over the past few days. Has been taking meloxicam daily for several months with recent increase in dosage. No fevers, chills, focal weakness, chest pain, dysuria, rash, headaches, ams.           Review of Systems   Gastrointestinal:  Positive for abdominal pain.   Musculoskeletal:  Positive for back pain.   All other systems reviewed and are negative.          Objective       ED Triage Vitals [12/20/24 1902]   Temperature Pulse Blood Pressure Respirations SpO2 Patient Position - Orthostatic VS   98 °F (36.7 °C) 77 (!) 173/88 18 98 % Sitting      Temp Source Heart Rate Source BP Location FiO2 (%) Pain Score    Oral Monitor Left arm -- --      Vitals      Date and Time Temp Pulse SpO2 Resp BP Pain Score FACES Pain Rating User   12/20/24 2225 -- 73 98 % 18 145/81 -- -- KB   12/20/24 2156 -- 78 98 % 18 185/80 -- -- KB   12/20/24 1902 98 °F (36.7 °C) 77 98 % 18 173/88 -- -- EG            Physical Exam  Vitals and nursing note reviewed.   Constitutional:       General: She is not in acute distress.     Appearance: Normal appearance. She is not ill-appearing,  toxic-appearing or diaphoretic.   HENT:      Head: Normocephalic and atraumatic.      Nose: Nose normal. No congestion or rhinorrhea.      Mouth/Throat:      Mouth: Mucous membranes are moist.      Pharynx: Oropharynx is clear. No oropharyngeal exudate or posterior oropharyngeal erythema.   Eyes:      General: No scleral icterus.        Right eye: No discharge.         Left eye: No discharge.      Extraocular Movements: Extraocular movements intact.      Conjunctiva/sclera: Conjunctivae normal.      Pupils: Pupils are equal, round, and reactive to light.   Neck:      Vascular: No carotid bruit.   Cardiovascular:      Rate and Rhythm: Normal rate and regular rhythm.      Pulses: Normal pulses.      Heart sounds: Normal heart sounds. No murmur heard.     No friction rub. No gallop.   Pulmonary:      Effort: Pulmonary effort is normal. No respiratory distress.      Breath sounds: Normal breath sounds. No stridor. No wheezing, rhonchi or rales.   Chest:      Chest wall: No tenderness.   Abdominal:      General: Abdomen is flat. Bowel sounds are normal. There is no distension.      Palpations: Abdomen is soft. There is no mass.      Tenderness: There is abdominal tenderness (mild epigastric tenderness). There is no right CVA tenderness, left CVA tenderness, guarding or rebound.      Hernia: No hernia is present.   Musculoskeletal:         General: No swelling, tenderness, deformity or signs of injury. Normal range of motion.      Cervical back: Normal range of motion and neck supple. No rigidity or tenderness.      Right lower leg: No edema.      Left lower leg: No edema.   Lymphadenopathy:      Cervical: No cervical adenopathy.   Skin:     General: Skin is warm and dry.      Coloration: Skin is not jaundiced or pale.      Findings: No bruising, erythema, lesion or rash.   Neurological:      General: No focal deficit present.      Mental Status: She is alert and oriented to person, place, and time.      Cranial Nerves:  No cranial nerve deficit.      Sensory: No sensory deficit.      Motor: No weakness.      Coordination: Coordination normal.      Gait: Gait normal.      Deep Tendon Reflexes: Reflexes normal.   Psychiatric:         Mood and Affect: Mood normal.         Behavior: Behavior normal.         Results Reviewed       Procedure Component Value Units Date/Time    Comprehensive metabolic panel [[id number]] Collected: 12/20/24 1545    Lab Status: Final result Specimen: Blood from Arm, Right Updated: 12/20/24 1653     Sodium 137 mmol/L      Potassium 5.2 mmol/L      Chloride 103 mmol/L      CO2 26 mmol/L      ANION GAP 8 mmol/L      BUN 15 mg/dL      Creatinine 0.80 mg/dL      Glucose 97 mg/dL      Calcium 9.4 mg/dL      AST 23 U/L      ALT 7 U/L      Alkaline Phosphatase 93 U/L      Total Protein 7.7 g/dL      Albumin 4.5 g/dL      Total Bilirubin 0.45 mg/dL      eGFR 71 ml/min/1.73sq m     Narrative:      National Kidney Disease Foundation guidelines for Chronic Kidney Disease (CKD):     Stage 1 with normal or high GFR (GFR > 90 mL/min/1.73 square meters)    Stage 2 Mild CKD (GFR = 60-89 mL/min/1.73 square meters)    Stage 3A Moderate CKD (GFR = 45-59 mL/min/1.73 square meters)    Stage 3B Moderate CKD (GFR = 30-44 mL/min/1.73 square meters)    Stage 4 Severe CKD (GFR = 15-29 mL/min/1.73 square meters)    Stage 5 End Stage CKD (GFR <15 mL/min/1.73 square meters)  Note: GFR calculation is accurate only with a steady state creatinine    Lipase [[id number]]  (Normal) Collected: 12/20/24 1545    Lab Status: Final result Specimen: Blood from Arm, Right Updated: 12/20/24 1653     Lipase 33 u/L     CBC and differential [[id number]]  (Abnormal) Collected: 12/20/24 1545    Lab Status: Final result Specimen: Blood from Arm, Right Updated: 12/20/24 1638     WBC 11.43 Thousand/uL      RBC 5.31 Million/uL      Hemoglobin 15.1 g/dL      Hematocrit 47.1 %      MCV 89 fL      MCH 28.4 pg      MCHC 32.1 g/dL      RDW 13.5 %      MPV 10.6 fL       Platelets 306 Thousands/uL      nRBC 0 /100 WBCs      Segmented % 71 %      Immature Grans % 0 %      Lymphocytes % 18 %      Monocytes % 7 %      Eosinophils Relative 3 %      Basophils Relative 1 %      Absolute Neutrophils 8.08 Thousands/µL      Absolute Immature Grans 0.03 Thousand/uL      Absolute Lymphocytes 2.09 Thousands/µL      Absolute Monocytes 0.80 Thousand/µL      Eosinophils Absolute 0.30 Thousand/µL      Basophils Absolute 0.13 Thousands/µL             CT chest abdomen pelvis w contrast   Final Interpretation by Toño Santos MD (12/20 2119)      Gastric antral thickening suspicious for gastritis. Potential ulcer disease.      No other acute findings in the chest, abdomen or pelvis.      The study was marked in EPIC for immediate notification.         Workstation performed: [id number]             ECG 12 Lead Documentation Only    Date/Time: 12/20/2024 8:08 PM    Performed by: Roque Lee DO  Authorized by: Roque Lee DO    ECG reviewed by me, the ED Provider: yes    Patient location:  ED  Previous ECG:     Previous ECG:  Compared to current    Comparison ECG info:  2/13/22    Similarity:  No change    Comparison to cardiac monitor: Yes    Interpretation:     Interpretation: non-specific    Rate:     ECG rate:  76    ECG rate assessment: normal    Rhythm:     Rhythm: sinus rhythm    Ectopy:     Ectopy: none    QRS:     QRS axis:  Normal    QRS intervals:  Normal  Conduction:     Conduction: normal    T waves:     T waves: non-specific    Comments:      No STEMI.      ED Medication and Procedure Management   Prior to Admission Medications   Prescriptions Last Dose Informant Patient Reported? Taking?   Cholecalciferol (Vitamin D3) 1.25 MG (66739 UT) capsule   No No   Sig: Take 1 capsule (50,000 Units total) by mouth once a week for 8 doses   Patient not taking: Reported on 12/18/2024   acetaminophen (TYLENOL) 325 mg tablet  Self No No   Sig: Take 2 tablets  (650 mg total) by mouth every 6 (six) hours as needed for mild pain, headaches or fever   buPROPion (WELLBUTRIN XL) 150 mg 24 hr tablet   No No   Sig: Take 1 tablet (150 mg total) by mouth daily   cyanocobalamin (VITAMIN B-12) 1000 MCG tablet   Yes No   Sig: Take 1,000 mcg by mouth daily   famotidine (PEPCID) 20 mg tablet   No No   Sig: TAKE 1 TABLET BY MOUTH DAILY AS NEEDED FOR HEARTBURN.   levothyroxine 75 mcg tablet   No No   Sig: TAKE 1 TABLET BY MOUTH EVERY DAY   meloxicam (MOBIC) 15 mg tablet   No No   Sig: Take 1 tablet (15 mg total) by mouth daily   rosuvastatin (CRESTOR) 20 MG tablet   No No   Sig: Take 1 tablet (20 mg total) by mouth daily      Facility-Administered Medications: None     Discharge Medication List as of 12/20/2024 10:24 PM        START taking these medications    Details   pantoprazole (PROTONIX) 20 mg tablet Take 1 tablet (20 mg total) by mouth daily, Starting Fri 12/20/2024, Normal      sucralfate (CARAFATE) 1 g tablet Take 1 tablet (1 g total) by mouth 4 (four) times a day, Starting Fri 12/20/2024, Normal           CONTINUE these medications which have NOT CHANGED    Details   acetaminophen (TYLENOL) 325 mg tablet Take 2 tablets (650 mg total) by mouth every 6 (six) hours as needed for mild pain, headaches or fever, Starting Sat 3/5/2022, No Print      buPROPion (WELLBUTRIN XL) 150 mg 24 hr tablet Take 1 tablet (150 mg total) by mouth daily, Starting Wed 12/18/2024, Normal      Cholecalciferol (Vitamin D3) 1.25 MG (72027 UT) capsule Take 1 capsule (50,000 Units total) by mouth once a week for 8 doses, Starting Fri 4/19/2024, Until Sat 6/8/2024, Normal      cyanocobalamin (VITAMIN B-12) 1000 MCG tablet Take 1,000 mcg by mouth daily, Historical Med      famotidine (PEPCID) 20 mg tablet TAKE 1 TABLET BY MOUTH DAILY AS NEEDED FOR HEARTBURN., Normal      levothyroxine 75 mcg tablet TAKE 1 TABLET BY MOUTH EVERY DAY, Normal      meloxicam (MOBIC) 15 mg tablet Take 1 tablet (15 mg total) by  mouth daily, Starting Thu 6/27/2024, Normal      rosuvastatin (CRESTOR) 20 MG tablet Take 1 tablet (20 mg total) by mouth daily, Starting Sat 8/10/2024, Normal             ED SEPSIS DOCUMENTATION   Time reflects when diagnosis was documented in both MDM as applicable and the Disposition within this note       Time User Action Codes Description Comment    12/20/2024 10:19 PM Roque Lee Add [K29.70] Gastritis     12/20/2024 10:23 PM Roque Lee Add [K27.9] PUD (peptic ulcer disease)     12/20/2024 10:23 PM Roque Lee Modify [K27.9] PUD (peptic ulcer disease) possible                 Roque Lee DO  12/22/24 1901

## 2024-12-23 ENCOUNTER — TELEPHONE (OUTPATIENT)
Age: 74
End: 2024-12-23

## 2024-12-23 DIAGNOSIS — M25.50 ARTHRALGIA, UNSPECIFIED JOINT: ICD-10-CM

## 2024-12-23 DIAGNOSIS — I65.23 BILATERAL CAROTID ARTERY STENOSIS: Primary | ICD-10-CM

## 2024-12-23 RX ORDER — MELOXICAM 15 MG/1
15 TABLET ORAL DAILY
Qty: 30 TABLET | Refills: 5 | OUTPATIENT
Start: 2024-12-23

## 2024-12-26 ENCOUNTER — TELEPHONE (OUTPATIENT)
Age: 74
End: 2024-12-26

## 2024-12-26 DIAGNOSIS — M25.50 ARTHRALGIA, UNSPECIFIED JOINT: ICD-10-CM

## 2024-12-26 RX ORDER — MELOXICAM 15 MG/1
15 TABLET ORAL DAILY
Qty: 30 TABLET | Refills: 5 | Status: SHIPPED | OUTPATIENT
Start: 2024-12-26

## 2024-12-26 NOTE — TELEPHONE ENCOUNTER
Call from patient advising she was told not to take the Meloxicam script anymore as that is what has cause her esophagus issue due to long term usage. Patient is asking what else she can be prescribed? Please call to discuss. Thank you.

## 2024-12-26 NOTE — TELEPHONE ENCOUNTER
Spoke with patient, patient is requesting to have the providers office call [facility] to cancel the script for the Meloxicam. Please advise.

## 2024-12-27 ENCOUNTER — TELEPHONE (OUTPATIENT)
Age: 74
End: 2024-12-27

## 2024-12-27 DIAGNOSIS — G89.29 CHRONIC HIP PAIN, UNSPECIFIED LATERALITY: Primary | ICD-10-CM

## 2024-12-27 DIAGNOSIS — M25.559 CHRONIC HIP PAIN, UNSPECIFIED LATERALITY: Primary | ICD-10-CM

## 2024-12-27 RX ORDER — TRAMADOL HYDROCHLORIDE 50 MG/1
50 TABLET ORAL 2 TIMES DAILY PRN
Qty: 20 TABLET | Refills: 0 | Status: SHIPPED | OUTPATIENT
Start: 2024-12-27

## 2024-12-27 NOTE — TELEPHONE ENCOUNTER
Patient called in stating her  was seen this morning by Dr. Hinojosa and they discussed patients hip pain and spoken about tramadol.  Patient stated she would like to try the tramadol for her hip pain.    Please advise, thank you

## 2024-12-29 ENCOUNTER — RESULTS FOLLOW-UP (OUTPATIENT)
Dept: INTERNAL MEDICINE CLINIC | Facility: CLINIC | Age: 74
End: 2024-12-29

## 2024-12-29 LAB
ATRIAL RATE: 76 BPM
P AXIS: 64 DEGREES
PR INTERVAL: 130 MS
QRS AXIS: 1 DEGREES
QRSD INTERVAL: 78 MS
QT INTERVAL: 386 MS
QTC INTERVAL: 434 MS
T WAVE AXIS: -5 DEGREES
VENTRICULAR RATE: 76 BPM

## 2024-12-29 PROCEDURE — 93010 ELECTROCARDIOGRAM REPORT: CPT | Performed by: INTERNAL MEDICINE

## 2024-12-30 ENCOUNTER — TELEPHONE (OUTPATIENT)
Age: 74
End: 2024-12-30

## 2024-12-30 NOTE — TELEPHONE ENCOUNTER
Pt called with questions of her ECG that she had done. Can Dr Hinojosa review and give her a call with results please

## 2025-01-06 ENCOUNTER — OFFICE VISIT (OUTPATIENT)
Dept: OBGYN CLINIC | Facility: CLINIC | Age: 75
End: 2025-01-06
Payer: MEDICARE

## 2025-01-06 VITALS — WEIGHT: 190 LBS | HEIGHT: 60 IN | BODY MASS INDEX: 37.3 KG/M2

## 2025-01-06 DIAGNOSIS — M70.61 GREATER TROCHANTERIC BURSITIS OF RIGHT HIP: Primary | ICD-10-CM

## 2025-01-06 PROCEDURE — 20610 DRAIN/INJ JOINT/BURSA W/O US: CPT | Performed by: PHYSICIAN ASSISTANT

## 2025-01-06 PROCEDURE — 99213 OFFICE O/P EST LOW 20 MIN: CPT | Performed by: ORTHOPAEDIC SURGERY

## 2025-01-06 RX ORDER — TRIAMCINOLONE ACETONIDE 40 MG/ML
80 INJECTION, SUSPENSION INTRA-ARTICULAR; INTRAMUSCULAR
Status: COMPLETED | OUTPATIENT
Start: 2025-01-06 | End: 2025-01-06

## 2025-01-06 RX ORDER — BUPIVACAINE HYDROCHLORIDE 2.5 MG/ML
2 INJECTION, SOLUTION INFILTRATION; PERINEURAL
Status: COMPLETED | OUTPATIENT
Start: 2025-01-06 | End: 2025-01-06

## 2025-01-06 RX ADMIN — TRIAMCINOLONE ACETONIDE 80 MG: 40 INJECTION, SUSPENSION INTRA-ARTICULAR; INTRAMUSCULAR at 10:15

## 2025-01-06 RX ADMIN — BUPIVACAINE HYDROCHLORIDE 2 ML: 2.5 INJECTION, SOLUTION INFILTRATION; PERINEURAL at 10:15

## 2025-01-06 NOTE — PROGRESS NOTES
Assessment:  1. Greater trochanteric bursitis of right hip          Patient Active Problem List   Diagnosis    Hypothyroidism due to Hashimoto's thyroiditis    Diverticulitis of large intestine with perforation without bleeding    Ureteral stone with hydronephrosis    Mass of soft tissue of neck    Bilateral carotid artery stenosis    Prediabetes    Pure hypercholesterolemia    Lipoma of neck    Gastroesophageal reflux disease    Acute right hip pain    Fatigue    Psoas tendinitis of right side    Tremor of both hands    Vitamin D deficiency    B12 deficiency    Primary osteoarthritis of right hip    Greater trochanteric bursitis of right hip    Chronic bilateral low back pain without sciatica    Class 2 severe obesity due to excess calories with serious comorbidity and body mass index (BMI) of 37.0 to 37.9 in adult ([facility])       Plan:    74 y.o. female  with right greater trochanteric hip bursitis, right hip osteoarthritis    Diagnosis and treatment discussed with the patient. Patient interested in right greater trochanter bursa injection.  Injection given without complication.  Post-injection information discussed.  Follow-up prn.          Subjective:   Patient ID: Yue Art is a 74 y.o. female .    HPI    Patient presents to the office for follow up of right hip pain.  Patient has right hip osteoarthritis and right greater trochanteric hip bursitis. Patient states that she has had the right hip greater trochanter injected in the past with good relief, she is interested in repeat injection. .    The following portions of the patient's history were reviewed and updated as appropriate: allergies, current medications, past family history, past social history, past surgical history and problem list.    Social History     Socioeconomic History    Marital status: /Civil Union     Spouse name: Not on file    Number of children: Not on file    Years of education: Not on file    Highest education level: Not  on file   Occupational History    Not on file   Tobacco Use    Smoking status: Former     Current packs/day: 0.00     Types: Cigarettes    Smokeless tobacco: Never   Vaping Use    Vaping status: Never Used   Substance and Sexual Activity    Alcohol use: Not Currently     Alcohol/week: 0.0 standard drinks of alcohol    Drug use: Never    Sexual activity: Not on file   Other Topics Concern    Not on file   Social History Narrative    Not on file     Social Drivers of Health     Financial Resource Strain: Low Risk  (2023)    Overall Financial Resource Strain (CARDIA)     Difficulty of Paying Living Expenses: Not hard at all   Food Insecurity: Not on file   Transportation Needs: No Transportation Needs (2023)    PRAPARE - Transportation     Lack of Transportation (Medical): No     Lack of Transportation (Non-Medical): No   Physical Activity: Not on file   Stress: Not on file   Social Connections: Not on file   Intimate Partner Violence: Not on file   Housing Stability: Not on file     Past Medical History:   Diagnosis Date    Arthritis     Cat scratch 2022    Cataracts, bilateral     Colon polyp     Disease of thyroid gland     Diverticulitis of colon     Gastroesophageal reflux disease 2024    History of COVID-19 2022    Hypothyroid     Kidney stone Not sure    Obesity (BMI 35.0-39.9 without comorbidity)     PNA (pneumonia) 2019     Past Surgical History:   Procedure Laterality Date     SECTION  1983    CHOLECYSTECTOMY  1968    open    COLONOSCOPY      EYE SURGERY  10/2021    FL RETROGRADE PYELOGRAM  2022    [place] CYSTO/URETERO W/LITHOTRIPSY &INDWELL STENT INSRT Right 2022    Procedure: CYSTOSCOPY URETEROSCOPY WITH LITHOTRIPSY HOLMIUM LASER, RETROGRADE PYELOGRAM AND INSERTION STENT URETERAL;  Surgeon: Bill Serrano MD;  Location: [facility];  Service: Urology     Allergies   Allergen Reactions    Codeine Anaphylaxis     Increased Heart Rate, Palpitations      Current Outpatient Medications on File Prior to Visit   Medication Sig Dispense Refill    acetaminophen (TYLENOL) 325 mg tablet Take 2 tablets (650 mg total) by mouth every 6 (six) hours as needed for mild pain, headaches or fever  0    buPROPion (WELLBUTRIN XL) 150 mg 24 hr tablet Take 1 tablet (150 mg total) by mouth daily 90 tablet 1    Cholecalciferol (Vitamin D3) 1.25 MG (33261 UT) capsule Take 1 capsule (50,000 Units total) by mouth once a week for 8 doses (Patient not taking: Reported on 12/18/2024) 8 capsule 0    cyanocobalamin (VITAMIN B-12) 1000 MCG tablet Take 1,000 mcg by mouth daily      famotidine (PEPCID) 20 mg tablet TAKE 1 TABLET BY MOUTH DAILY AS NEEDED FOR HEARTBURN. 90 tablet 0    levothyroxine 75 mcg tablet TAKE 1 TABLET BY MOUTH EVERY DAY 90 tablet 1    meloxicam (MOBIC) 15 mg tablet Take 1 tablet (15 mg total) by mouth daily (Patient not taking: Reported on 1/6/2025) 30 tablet 5    pantoprazole (PROTONIX) 20 mg tablet Take 1 tablet (20 mg total) by mouth daily 20 tablet 0    rosuvastatin (CRESTOR) 20 MG tablet Take 1 tablet (20 mg total) by mouth daily 90 tablet 1    sucralfate (CARAFATE) 1 g tablet Take 1 tablet (1 g total) by mouth 4 (four) times a day 120 tablet 0    traMADol (Ultram) 50 mg tablet Take 1 tablet (50 mg total) by mouth 2 (two) times a day as needed for moderate pain 20 tablet 0     No current facility-administered medications on file prior to visit.       Review of Systems  See HPi    Objective:    There were no vitals filed for this visit.    Physical Exam  Vitals and nursing note reviewed.   Constitutional:       General: She is not in acute distress.     Appearance: She is well-developed.   HENT:      Head: Normocephalic and atraumatic.   Eyes:      Conjunctiva/sclera: Conjunctivae normal.   Cardiovascular:      Rate and Rhythm: Normal rate and regular rhythm.      Heart sounds: No murmur heard.  Pulmonary:      Effort: Pulmonary effort is normal. No respiratory  "distress.      Breath sounds: Normal breath sounds.   Abdominal:      Palpations: Abdomen is soft.      Tenderness: There is no abdominal tenderness.   Musculoskeletal:         General: No swelling.      Cervical back: Neck supple.   Skin:     General: Skin is warm and dry.      Capillary Refill: Capillary refill takes less than 2 seconds.   Neurological:      Mental Status: She is alert.   Psychiatric:         Mood and Affect: Mood normal.         Right Hip Exam     Tenderness   The patient is experiencing tenderness in the greater trochanter.    Range of Motion   External rotation:  normal   Internal rotation:  5     Muscle Strength   Flexion: 3/5     Other   Sensation: normal  Pulse: present            Large joint arthrocentesis: R greater trochanteric bursa  Universal Protocol:  Consent: Verbal consent obtained.  Risks and benefits: risks, benefits and alternatives were discussed  Consent given by: patient  Patient understanding: patient states understanding of the procedure being performed  Patient identity confirmed: verbally with patient  Supporting Documentation  Indications: pain   Procedure Details  Location: hip - R greater trochanteric bursa  Preparation: Patient was prepped and draped in the usual sterile fashion  Needle size: 22 G  Approach: lateral  Medications administered: 2 mL bupivacaine 0.25 %; 80 mg triamcinolone acetonide 40 mg/mL    Patient tolerance: patient tolerated the procedure well with no immediate complications  Dressing:  Sterile dressing applied                  Portions of the record may have been created with voice recognition software.  Occasional wrong word or \"sound a like\" substitutions may have occurred due to the inherent limitations of voice recognition software.  Read the chart carefully and recognize, using context, where substitutions have occurred.    "

## 2025-01-07 ENCOUNTER — RA CDI HCC (OUTPATIENT)
Dept: OTHER | Facility: HOSPITAL | Age: 75
End: 2025-01-07

## 2025-01-10 DIAGNOSIS — G89.29 CHRONIC HIP PAIN, UNSPECIFIED LATERALITY: ICD-10-CM

## 2025-01-10 DIAGNOSIS — M25.559 CHRONIC HIP PAIN, UNSPECIFIED LATERALITY: ICD-10-CM

## 2025-01-10 RX ORDER — TRAMADOL HYDROCHLORIDE 50 MG/1
50 TABLET ORAL 2 TIMES DAILY PRN
Qty: 20 TABLET | Refills: 0 | Status: SHIPPED | OUTPATIENT
Start: 2025-01-10 | End: 2025-01-13

## 2025-01-10 NOTE — TELEPHONE ENCOUNTER
Patient called because she needs a refill for traMADol (Ultram) 50 mg tablet sent to the pharmacy on file asap because she is out. Thank you

## 2025-01-13 ENCOUNTER — OFFICE VISIT (OUTPATIENT)
Dept: INTERNAL MEDICINE CLINIC | Facility: CLINIC | Age: 75
End: 2025-01-13
Payer: MEDICARE

## 2025-01-13 VITALS
OXYGEN SATURATION: 99 % | TEMPERATURE: 98.4 F | DIASTOLIC BLOOD PRESSURE: 84 MMHG | SYSTOLIC BLOOD PRESSURE: 120 MMHG | WEIGHT: 187 LBS | HEART RATE: 70 BPM | BODY MASS INDEX: 37.7 KG/M2 | HEIGHT: 59 IN

## 2025-01-13 DIAGNOSIS — E78.00 PURE HYPERCHOLESTEROLEMIA: ICD-10-CM

## 2025-01-13 DIAGNOSIS — E66.01 CLASS 2 SEVERE OBESITY DUE TO EXCESS CALORIES WITH SERIOUS COMORBIDITY AND BODY MASS INDEX (BMI) OF 37.0 TO 37.9 IN ADULT (HCC): ICD-10-CM

## 2025-01-13 DIAGNOSIS — E66.812 CLASS 2 SEVERE OBESITY DUE TO EXCESS CALORIES WITH SERIOUS COMORBIDITY AND BODY MASS INDEX (BMI) OF 37.0 TO 37.9 IN ADULT (HCC): ICD-10-CM

## 2025-01-13 DIAGNOSIS — M70.61 GREATER TROCHANTERIC BURSITIS OF RIGHT HIP: ICD-10-CM

## 2025-01-13 DIAGNOSIS — M25.559 CHRONIC HIP PAIN, UNSPECIFIED LATERALITY: Primary | ICD-10-CM

## 2025-01-13 DIAGNOSIS — E06.3 HYPOTHYROIDISM DUE TO HASHIMOTO'S THYROIDITIS: ICD-10-CM

## 2025-01-13 DIAGNOSIS — G89.29 CHRONIC HIP PAIN, UNSPECIFIED LATERALITY: Primary | ICD-10-CM

## 2025-01-13 DIAGNOSIS — K29.70 GASTRITIS: ICD-10-CM

## 2025-01-13 PROCEDURE — 99214 OFFICE O/P EST MOD 30 MIN: CPT | Performed by: INTERNAL MEDICINE

## 2025-01-13 RX ORDER — TRAMADOL HYDROCHLORIDE 50 MG/1
25 TABLET ORAL 2 TIMES DAILY PRN
Qty: 20 TABLET | Refills: 0 | Status: SHIPPED | OUTPATIENT
Start: 2025-01-13

## 2025-01-13 RX ORDER — PANTOPRAZOLE SODIUM 20 MG/1
20 TABLET, DELAYED RELEASE ORAL DAILY
Qty: 30 TABLET | Refills: 1 | Status: SHIPPED | OUTPATIENT
Start: 2025-01-13

## 2025-01-13 NOTE — PATIENT INSTRUCTIONS
-For GI Dr. John Rodriguez or Dr. Rhett Lee  -Take pantoprazole first thing in the morning at least 30 minutes before breakfast  -Please have your vitamin B12 and thyroid function panel drawn

## 2025-01-13 NOTE — ASSESSMENT & PLAN NOTE
-Symptoms mostly resolved.  Only mild tenderness noted with epigastric palpation.  Continue Protonix 20 mg daily as prescribed.  Encourage patient to schedule follow-up with GI for possible upper EGD.  Orders:  •  pantoprazole (PROTONIX) 20 mg tablet; Take 1 tablet (20 mg total) by mouth daily

## 2025-01-13 NOTE — PROGRESS NOTES
Name: Yue Art      : 1950      MRN: [id number]  Encounter Provider: Vargas Hinojosa MD  Encounter Date: 2025   Enc[facility] department: [facility]  :  Assessment & Plan  Chronic hip pain, unspecified laterality  -Recently started on tramadol 25 mg twice daily as needed.  NSAIDs have been discontinued given CT findings concerning for gastritis/PUD.  Orders:  •  traMADol (Ultram) 50 mg tablet; Take 0.5 tablets (25 mg total) by mouth 2 (two) times a day as needed for moderate pain    Greater trochanteric bursitis of right hip  -Pain improved status post steroid injection.  Appreciate follow-up with orthopedic surgery.       Class 2 severe obesity due to excess calories with serious comorbidity and body mass index (BMI) of 37.0 to 37.9 in adult (HCC)  -Patient has lost 6 pounds since her last appointment in October with dietary changes.  Congratulated her on weight loss.  Continue with current changes.       Gastritis  -Symptoms mostly resolved.  Only mild tenderness noted with epigastric palpation.  Continue Protonix 20 mg daily as prescribed.  Encourage patient to schedule follow-up with GI for possible upper EGD.  Orders:  •  pantoprazole (PROTONIX) 20 mg tablet; Take 1 tablet (20 mg total) by mouth daily    Hypothyroidism due to Hashimoto's thyroiditis  -Continue current dose of levothyroxine.  Check TSH.  Orders:  •  TSH, 3rd generation with Free T4 reflex; Future    Pure hypercholesterolemia  -Tolerating Crestor well.  Check lipid profile.  Orders:  •  Lipid Panel with Direct LDL reflex; Future           History of Present Illness     HPI  Patient presents today for post ED follow-up.  She was seen in the emergency department on  complaining of acute onset upper abdominal pain radiating to her back.  Blood work in the form of a CBC, CMP and lipase were unremarkable.  A CT of the abdomen/pelvis was ordered and revealed gastric antral thickening suspicious for  "gastritis and potential ulcer disease.  Prior to discharge she was started on pantoprazole and Carafate and told to discontinue her meloxicam as this was felt to be the offending agent.  Since then she reports her abdominal discomfort has resolved.    Regarding her chronic right hip pain she recently underwent a steroid injection through orthopedic surgery.  She reports her hip is feeling better.  Instead of taking meloxicam she has been taking tramadol half tablet twice a day as needed.       Review of Systems  All other systems negative except for pertinent findings noted in HPI.       Objective   /84   Pulse 70   Temp 98.4 °F (36.9 °C)   Ht 4' 11\" (1.499 m)   Wt 84.8 kg (187 lb)   SpO2 99%   BMI 37.77 kg/m²      Physical Exam  General: NAD  HEENT: NCAT, EOMI, normal conjunctiva  Cardiovascular: RRR, normal S1 and S2, no m/r/g  Pulmonary: Normal respiratory effort, no wheezes, rales or rhonchi  GI: Soft, mild epigastric tenderness to palpation, nondistended, normoactive bowel sounds  Musculoskeletal: Normal bulk and tone, antalgic gait  Psychiatric: Normal mood and affect      "

## 2025-01-13 NOTE — ASSESSMENT & PLAN NOTE
-Patient has lost 6 pounds since her last appointment in October with dietary changes.  Congratulated her on weight loss.  Continue with current changes.

## 2025-01-13 NOTE — ASSESSMENT & PLAN NOTE
-Continue current dose of levothyroxine.  Check TSH.  Orders:  •  TSH, 3rd generation with Free T4 reflex; Future

## 2025-01-13 NOTE — ASSESSMENT & PLAN NOTE
-Tolerating Crestor well.  Check lipid profile.  Orders:  •  Lipid Panel with Direct LDL reflex; Future

## 2025-01-27 ENCOUNTER — CONSULT (OUTPATIENT)
Dept: VASCULAR SURGERY | Facility: CLINIC | Age: 75
End: 2025-01-27
Payer: MEDICARE

## 2025-01-27 VITALS
RESPIRATION RATE: 18 BRPM | BODY MASS INDEX: 36.91 KG/M2 | WEIGHT: 188 LBS | HEIGHT: 60 IN | HEART RATE: 74 BPM | DIASTOLIC BLOOD PRESSURE: 92 MMHG | OXYGEN SATURATION: 98 % | SYSTOLIC BLOOD PRESSURE: 148 MMHG

## 2025-01-27 DIAGNOSIS — Z82.49 FAMILY HISTORY OF ABDOMINAL AORTIC ANEURYSM (AAA): ICD-10-CM

## 2025-01-27 DIAGNOSIS — R73.03 PREDIABETES: ICD-10-CM

## 2025-01-27 DIAGNOSIS — M25.559 CHRONIC HIP PAIN, UNSPECIFIED LATERALITY: ICD-10-CM

## 2025-01-27 DIAGNOSIS — I65.23 BILATERAL CAROTID ARTERY STENOSIS: Primary | ICD-10-CM

## 2025-01-27 DIAGNOSIS — G89.29 CHRONIC HIP PAIN, UNSPECIFIED LATERALITY: ICD-10-CM

## 2025-01-27 DIAGNOSIS — E78.00 PURE HYPERCHOLESTEROLEMIA: ICD-10-CM

## 2025-01-27 DIAGNOSIS — E66.812 CLASS 2 SEVERE OBESITY DUE TO EXCESS CALORIES WITH SERIOUS COMORBIDITY AND BODY MASS INDEX (BMI) OF 37.0 TO 37.9 IN ADULT (HCC): ICD-10-CM

## 2025-01-27 DIAGNOSIS — E66.01 CLASS 2 SEVERE OBESITY DUE TO EXCESS CALORIES WITH SERIOUS COMORBIDITY AND BODY MASS INDEX (BMI) OF 37.0 TO 37.9 IN ADULT (HCC): ICD-10-CM

## 2025-01-27 PROCEDURE — 99204 OFFICE O/P NEW MOD 45 MIN: CPT | Performed by: PHYSICIAN ASSISTANT

## 2025-01-27 RX ORDER — TRAMADOL HYDROCHLORIDE 50 MG/1
25 TABLET ORAL 2 TIMES DAILY PRN
Qty: 20 TABLET | Refills: 0 | Status: SHIPPED | OUTPATIENT
Start: 2025-01-27

## 2025-01-27 RX ORDER — ASPIRIN 81 MG/1
81 TABLET ORAL DAILY
Qty: 90 TABLET | Refills: 1 | Status: SHIPPED | OUTPATIENT
Start: 2025-01-27

## 2025-01-27 NOTE — ASSESSMENT & PLAN NOTE
-No symptoms of TIA/stroke    -Carotid du 12/10/24:    R 1-49% (90/15), moderate ECA stenosis    L 50-69% (182/35, ratio 3.25); severe ECA stenosis    Plan:  -Asymptomatic, bilateral mild to moderate cervical carotid artery stenosis  -Discussed the pathophysiology and treatment of carotid artery disease, including rationale for medical therapy and indications for surgical intervention which may include symptomatic disease or asymptomatic ICA stenosis > 80%  -Carotid artery duplex shows mild R ICA stenosis and moderate L ICA stenosis by velocity criteria for which we can continue to monitior clinically with duplex surveillance  -No indication for surgical intervention at this time  -Healthy lifestyle changes- regular exercise, diet, wt loss  -Maintain good blood pressure, cholesterol and glucose control  -Continue with rosuvastatin 20 and aspirin 81  -Reviewed the symptoms of stroke for which she should call 911.  -Follow up CV duplex in 6 months (call result) and OV in 1 year    Orders:    VAS carotid complete study; Future    aspirin (Ecotrin Low Strength) 81 mg EC tablet; Take 1 tablet (81 mg total) by mouth daily    Ambulatory Referral to Vascular Surgery

## 2025-01-27 NOTE — PROGRESS NOTES
Name: Yue Art      : 1950      MRN: [id number]  Encounter Provider: Mitzy Mitchell PA-C  Encounter Date: 2025   Enc[facility]: [facility]  :  Assessment & Plan  Bilateral carotid artery stenosis  -No symptoms of TIA/stroke    -Carotid du 12/10/24:    R 1-49% (90/15), moderate ECA stenosis    L 50-69% (182/35, ratio 3.25); severe ECA stenosis    Plan:  -Asymptomatic, bilateral mild to moderate cervical carotid artery stenosis  -Discussed the pathophysiology and treatment of carotid artery disease, including rationale for medical therapy and indications for surgical intervention which may include symptomatic disease or asymptomatic ICA stenosis > 80%  -Carotid artery duplex shows mild R ICA stenosis and moderate L ICA stenosis by velocity criteria for which we can continue to monitior clinically with duplex surveillance  -No indication for surgical intervention at this time  -Healthy lifestyle changes- regular exercise, diet, wt loss  -Maintain good blood pressure, cholesterol and glucose control  -Continue with rosuvastatin 20 and aspirin 81  -Reviewed the symptoms of stroke for which she should call 911.  -Follow up CV duplex in 6 months (call result) and OV in 1 year    Orders:    VAS carotid complete study; Future    aspirin (Ecotrin Low Strength) 81 mg EC tablet; Take 1 tablet (81 mg total) by mouth daily    Ambulatory Referral to Vascular Surgery    Class 2 severe obesity due to excess calories with serious comorbidity and body mass index (BMI) of 37.0 to 37.9 in adult (HCC)  -Healthy lifestyle changes       Prediabetes  -A1c now 5.4  -Continue to work on healthy diet and exercise  -Maintain good blood pressure, cholesterol glucose control primary care       Pure hypercholesterolemia  -Maintain good blood pressure, cholesterol glucose control primary care       Family history of abdominal aortic aneurysm (AAA)  -Father had AAA  -Patient would like AAA screening  "which is ordered (call result)  Orders:    US abdominal aorta screening aaa; Future        History of Present Illness   Patient is new to our practice and was referred by PCP. Pt had CV on 12/10/24. Pt denies TIA or CVA symptoms.     HPI  Yue Art is a 74 y.o. female with obesity, GERD, hypothyroidism, hyperlipidemia, prediabetes, low back pain, osteoarthritis of right hip who is referred for evaluation of carotid artery stenosis.  Family doctor has been following carotid artery stenosis by Doppler for the past 2 years and due to concern for worsening carotid disease, patient is referred to vascular surgery for evaluation.    Consult 1/27/2025: Patient has no symptoms of TIA/stroke.  No amaurosis fugax, vision changes, dysarthria unilateral numbness or weakness.  We reviewed the symptoms of stroke for which she should call 911.    She does report occasional word finding slowness which she can discuss with her family doctor this is not a typical stroke symptom.    Patient has no history of heart attack or stroke.  She has no chest limiting symptoms.  She considers herself very active.  She is a non-smoker.    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  She is active  and working on her feet all day without any chest or leg limiting symptoms.  She does have osteoarthritis of the right hip and periodically has injections.  She is \"too busy\" for a total hip replacement.  Unfortunately, due to osteoarthritis of the right hip she is unable to comfortably walk for exercise.  If she can do so safely, daily walking, cycling or swimming is recommended.    [facility].  She would like a screening US which was ordered in the office today.     We reviewed her carotid Doppler extensively, as well as the pathophysiology and treatment of carotid artery stenosis.  Recommend aspirin and statin therapy.  Her LDL was previously at 140 and now 43.  She " should continue with heart healthy diet, exercise and cholesterol monitoring per primary care.  We will follow-up Doppler in 6 months.  If it remains stable, we may be able to go to annual surveillance.    LDL 43  A1c 5.4      Carotid du 12/10/24  [facility]PORT  CLINICAL:  Indications:  Yearly surveillance of carotid artery disease. Patient reports she has a  tingling sensation along the left side of her neck.  Operative History:  Patient denies any cardiovascular procedures  Risk Factors  The patient has history of Obesity, Hyperlipidemia, and previous smoking.  Clinical  Right Pressure:  128/70 mm Hg, Left Pressure:  124/70 mm Hg.     FINDINGS:     Right        Impression  PSV  EDV (cm/s)  Direction of Flow  Ratio    Dist. ICA                 76          20                      0.82    Mid. ICA                  76          20                      0.82    Prox. ICA    1 - 49%      90          15                      0.97    Dist CCA                  67          16                              Mid CCA                   93          19                      1.14    Prox CCA                  82          15                              Ext Carotid  Moderate    218          31                      2.35    Prox Vert                 93          19  Antegrade                   Subclavian               161           0                                 Left         Impression  PSV  EDV (cm/s)  Direction of Flow  Ratio    Dist. ICA                 60          15                      1.07    Mid. ICA                 100          16                      1.79    Prox. ICA    50 - 69%    182          35                      3.25    Dist CCA                  74          15                              Mid CCA                   56          13                      0.62    Prox CCA                  91          18                              Ext Carotid  Severe      335          52                      5.99    Prox Vert                  85          16  Antegrade                   Subclavian               157           0                                       CONCLUSION:     Impression     RIGHT:  There is <50% stenosis noted in the internal carotid artery. Plaque is  heterogenous and irregular.  Moderate stenosis noted in the external carotid artery.  Vertebral artery flow is antegrade. There is no significant subclavian artery  disease.     LEFT:  There is 50-69% stenosis noted in the internal carotid artery. Plaque is  heterogenous and irregular.  Severe stenosis noted in the external carotid artery.  Vertebral artery flow is antegrade. There is no significant subclavian artery  disease.     Compared to previous study on 9/5/2023, the left ECA stenosis is a new finding.  Recommend repeat testing in 6 months as per protocol unless otherwise  indicated.      Review of Systems   Constitutional: Negative.    HENT: Negative.     Eyes: Negative.    Respiratory: Negative.     Cardiovascular: Negative.    Gastrointestinal: Negative.    Endocrine: Negative.    Genitourinary: Negative.    Musculoskeletal: Negative.    Skin: Negative.    Allergic/Immunologic: Negative.    Neurological: Negative.    Hematological: Negative.    Psychiatric/Behavioral: Negative.            Objective   /92 (BP Location: Left arm, Patient Position: Sitting)   Pulse 74   Resp 18   Ht 5' (1.524 m)   Wt 85.3 kg (188 lb)   SpO2 98%   BMI 36.72 kg/m²   L neck carotid bruit     Physical Exam  Vitals and nursing note reviewed.   Constitutional:       Appearance: She is well-developed.   HENT:      Head: Normocephalic and atraumatic.   Eyes:      Pupils: Pupils are equal, round, and reactive to light.   Neck:      Thyroid: No thyromegaly.      Vascular: No JVD.      Trachea: Trachea normal.   Cardiovascular:      Rate and Rhythm: Normal rate and regular rhythm.      Pulses:           Carotid pulses are 2+ on the right side and 2+ on the left side.       Radial  pulses are 2+ on the right side and 2+ on the left side.        Dorsalis pedis pulses are 2+ on the right side and 2+ on the left side.      Heart sounds: Normal heart sounds, S1 normal and S2 normal. No murmur heard.     No friction rub. No gallop.   Pulmonary:      Effort: Pulmonary effort is normal. No accessory muscle usage or respiratory distress.      Breath sounds: Normal breath sounds. No wheezing or rales.   Abdominal:      General: Bowel sounds are normal. There is no distension.      Palpations: Abdomen is soft.      Tenderness: There is no abdominal tenderness.   Musculoskeletal:         General: No deformity. Normal range of motion.      Cervical back: Neck supple.   Skin:     General: Skin is warm and dry.      Findings: No lesion or rash.      Nails: There is no clubbing.   Neurological:      Mental Status: She is alert and oriented to person, place, and time.      Comments: Grossly normal    Psychiatric:         Behavior: Behavior is cooperative.           I have reviewed and made appropriate changes to the review of systems input by the medical assistant.    Vitals:    01/27/25 1434   BP: 148/92   BP Location: Left arm   Patient Position: Sitting   Pulse: 74   Resp: 18   SpO2: 98%   Weight: 85.3 kg (188 lb)   Height: 5' (1.524 m)       Patient Active Problem List   Diagnosis    Hypothyroidism due to Hashimoto's thyroiditis    Diverticulitis of large intestine with perforation without bleeding    Ureteral stone with hydronephrosis    Mass of soft tissue of neck    Bilateral carotid artery stenosis    Prediabetes    Pure hypercholesterolemia    Lipoma of neck    Gastroesophageal reflux disease    Acute right hip pain    Fatigue    Psoas tendinitis of right side    Tremor of both hands    Vitamin D deficiency    B12 deficiency    Primary osteoarthritis of right hip    Greater trochanteric bursitis of right hip    Chronic bilateral low back pain without sciatica    Class 2 severe obesity due to excess  calories with serious comorbidity and body mass index (BMI) of 37.0 to 37.9 in adult (HCC)    Gastritis       Past Surgical History:   Procedure Laterality Date     SECTION  1983    CHOLECYSTECTOMY  1968    open    COLONOSCOPY      EYE SURGERY  10/2021    FL RETROGRADE PYELOGRAM  2022    PA CYSTO/URETERO W/LITHOTRIPSY &INDWELL STENT INSRT Right 2022    Procedure: CYSTOSCOPY URETEROSCOPY WITH LITHOTRIPSY HOLMIUM LASER, RETROGRADE PYELOGRAM AND INSERTION STENT URETERAL;  Surgeon: Bill Serrano MD;  Location:  MAIN OR;  Service: Urology       Family History   Problem Relation Age of Onset    Cancer Mother     Diabetes Mother     Cancer Father     Diabetes Father     Urolithiasis Sister     No Known Problems Daughter     No Known Problems Maternal Grandmother     No Known Problems Maternal Grandfather     No Known Problems Paternal Grandmother     No Known Problems Paternal Grandfather     No Known Problems Maternal Aunt     No Known Problems Paternal Aunt     No Known Problems Paternal Aunt        Social History     Socioeconomic History    Marital status: /Civil Union     Spouse name: Not on file    Number of children: Not on file    Years of education: Not on file    Highest education level: Not on file   Occupational History    Not on file   Tobacco Use    Smoking status: Former     Current packs/day: 0.00     Types: Cigarettes    Smokeless tobacco: Never   Vaping Use    Vaping status: Never Used   Substance and Sexual Activity    Alcohol use: Not Currently    Drug use: Never    Sexual activity: Not Currently     Partners: Male   Other Topics Concern    Not on file   Social History Narrative    Not on file     Social Drivers of Health     Financial Resource Strain: Low Risk  (2023)    Overall Financial Resource Strain (CARDIA)     Difficulty of Paying Living Expenses: Not hard at all   Food Insecurity: Not on file   Transportation Needs: No Transportation Needs (2023)     PRAPARE - Transportation     Lack of Transportation (Medical): No     Lack of Transportation (Non-Medical): No   Physical Activity: Not on file   Stress: Not on file   Social Connections: Not on file   Intimate Partner Violence: Not on file   Housing Stability: Not on file       Allergies   Allergen Reactions    Codeine Anaphylaxis     Increased Heart Rate, Palpitations         Current Outpatient Medications:     acetaminophen (TYLENOL) 325 mg tablet, Take 2 tablets (650 mg total) by mouth every 6 (six) hours as needed for mild pain, headaches or fever, Disp: , Rfl: 0    buPROPion (WELLBUTRIN XL) 150 mg 24 hr tablet, Take 1 tablet (150 mg total) by mouth daily, Disp: 90 tablet, Rfl: 1    cyanocobalamin (VITAMIN B-12) 1000 MCG tablet, Take 1,000 mcg by mouth daily, Disp: , Rfl:     famotidine (PEPCID) 20 mg tablet, TAKE 1 TABLET BY MOUTH DAILY AS NEEDED FOR HEARTBURN., Disp: 90 tablet, Rfl: 0    levothyroxine 75 mcg tablet, TAKE 1 TABLET BY MOUTH EVERY DAY, Disp: 90 tablet, Rfl: 1    pantoprazole (PROTONIX) 20 mg tablet, Take 1 tablet (20 mg total) by mouth daily, Disp: 30 tablet, Rfl: 1    rosuvastatin (CRESTOR) 20 MG tablet, Take 1 tablet (20 mg total) by mouth daily, Disp: 90 tablet, Rfl: 1    traMADol (Ultram) 50 mg tablet, Take 0.5 tablets (25 mg total) by mouth 2 (two) times a day as needed for moderate pain, Disp: 20 tablet, Rfl: 0    Cholecalciferol (Vitamin D3) 1.25 MG (32074 UT) capsule, Take 1 capsule (50,000 Units total) by mouth once a week for 8 doses (Patient not taking: Reported on 12/18/2024), Disp: 8 capsule, Rfl: 0    meloxicam (MOBIC) 15 mg tablet, Take 1 tablet (15 mg total) by mouth daily (Patient not taking: Reported on 1/6/2025), Disp: 30 tablet, Rfl: 5

## 2025-01-27 NOTE — ASSESSMENT & PLAN NOTE
-Father had AAA  -Patient would like AAA screening which is ordered (call result)  Orders:    US abdominal aorta screening aaa; Future

## 2025-01-27 NOTE — ASSESSMENT & PLAN NOTE
-A1c now 5.4  -Continue to work on healthy diet and exercise  -Maintain good blood pressure, cholesterol glucose control primary care

## 2025-01-27 NOTE — PATIENT INSTRUCTIONS
Symptoms of stroke:  - Unable to speak or understand speech  - Unable to move one side of the body (arm or leg)  - Visual changes  - Call 911 for any symptoms of stroke        Carotid stenosis  -Mild R side and moderate L side (50-70, ~ 50-60%)      -No indication for surgical intervention at this time  -Healthy lifestyle changes  -Maintain good blood pressure, cholesterol and glucose control  -Continue with rosuvastatin 20 and aspirin 81  -Reviewed the symptoms of stroke for which she should call 911.  -Follow up CV duplex in 6 months (call result) and OV in 1 year

## 2025-01-31 ENCOUNTER — HOSPITAL ENCOUNTER (OUTPATIENT)
Dept: ULTRASOUND IMAGING | Facility: HOSPITAL | Age: 75
Discharge: HOME | End: 2025-01-31
Payer: MEDICARE

## 2025-01-31 DIAGNOSIS — Z82.49 FAMILY HISTORY OF ABDOMINAL AORTIC ANEURYSM (AAA): ICD-10-CM

## 2025-01-31 DIAGNOSIS — E78.5 MILD HYPERLIPIDEMIA: ICD-10-CM

## 2025-01-31 PROCEDURE — 76706 US ABDL AORTA SCREEN AAA: CPT

## 2025-01-31 RX ORDER — ROSUVASTATIN CALCIUM 20 MG/1
20 TABLET, COATED ORAL DAILY
Qty: 90 TABLET | Refills: 1 | Status: SHIPPED | OUTPATIENT
Start: 2025-01-31

## 2025-02-04 DIAGNOSIS — K29.70 GASTRITIS: ICD-10-CM

## 2025-02-05 RX ORDER — PANTOPRAZOLE SODIUM 20 MG/1
20 TABLET, DELAYED RELEASE ORAL DAILY
Qty: 90 TABLET | Refills: 1 | Status: SHIPPED | OUTPATIENT
Start: 2025-02-05

## 2025-02-06 ENCOUNTER — RESULTS FOLLOW-UP (OUTPATIENT)
Dept: VASCULAR SURGERY | Facility: CLINIC | Age: 75
End: 2025-02-06

## 2025-02-17 DIAGNOSIS — G89.29 CHRONIC HIP PAIN, UNSPECIFIED LATERALITY: ICD-10-CM

## 2025-02-17 DIAGNOSIS — M25.559 CHRONIC HIP PAIN, UNSPECIFIED LATERALITY: ICD-10-CM

## 2025-02-17 RX ORDER — TRAMADOL HYDROCHLORIDE 50 MG/1
25 TABLET ORAL 2 TIMES DAILY PRN
Qty: 20 TABLET | Refills: 0 | Status: SHIPPED | OUTPATIENT
Start: 2025-02-17

## 2025-02-17 NOTE — TELEPHONE ENCOUNTER
Pt requested a refill for the traMADol (Ultram) 50 mg tablet . Please send to:       [facility]/pharmacy #[id number] - [place] - [address].         Thank you for you for your help.

## 2025-02-19 DIAGNOSIS — K21.9 GASTROESOPHAGEAL REFLUX DISEASE, UNSPECIFIED WHETHER ESOPHAGITIS PRESENT: ICD-10-CM

## 2025-02-19 RX ORDER — FAMOTIDINE 20 MG/1
20 TABLET, FILM COATED ORAL DAILY
Qty: 90 TABLET | Refills: 0 | Status: SHIPPED | OUTPATIENT
Start: 2025-02-19 | End: 2025-05-20

## 2025-03-10 DIAGNOSIS — G89.29 CHRONIC HIP PAIN, UNSPECIFIED LATERALITY: ICD-10-CM

## 2025-03-10 DIAGNOSIS — M25.559 CHRONIC HIP PAIN, UNSPECIFIED LATERALITY: ICD-10-CM

## 2025-03-10 RX ORDER — TRAMADOL HYDROCHLORIDE 50 MG/1
25 TABLET ORAL 2 TIMES DAILY PRN
Qty: 20 TABLET | Refills: 0 | Status: SHIPPED | OUTPATIENT
Start: 2025-03-10 | End: 2025-03-13 | Stop reason: SDUPTHER

## 2025-03-10 NOTE — TELEPHONE ENCOUNTER
Yue called in regards to her medication. She states that the pharmacy told her it is too soon for her to pick her medication up and they will not fill it until 3/18.  Per previous order picked up on 2/17 - it shows 30 day supply but quantity only 20 pills dispensed and 1 pill per day (0.5 tabs twice daily)    Please advise as she is completely out of her medication.     Thank you!

## 2025-03-11 NOTE — TELEPHONE ENCOUNTER
Patient will be completely out of the medication on Friday, Please take a look at the message prior regarding the last script was sent 02/17 was written for 20 tablets for 30 days leaving her short 10 days and not able to get the script due to pharmacy stating too soon to fill. Please advise patient what to do.

## 2025-03-12 NOTE — TELEPHONE ENCOUNTER
Pt called in regarding the status of this medication. Pt states she will be out of this medication tomorrow. Pt would like a refill if possible and is a requesting a call back with an update, if possible.    Please advise.

## 2025-03-12 NOTE — TELEPHONE ENCOUNTER
Pt called again with an update.  She is asking if she can still get the other 10 pills for the month.  Please advise

## 2025-03-13 ENCOUNTER — TELEPHONE (OUTPATIENT)
Dept: INTERNAL MEDICINE CLINIC | Facility: CLINIC | Age: 75
End: 2025-03-13

## 2025-03-13 DIAGNOSIS — G89.29 CHRONIC HIP PAIN, UNSPECIFIED LATERALITY: ICD-10-CM

## 2025-03-13 DIAGNOSIS — M25.559 CHRONIC HIP PAIN, UNSPECIFIED LATERALITY: ICD-10-CM

## 2025-03-13 RX ORDER — TRAMADOL HYDROCHLORIDE 50 MG/1
25 TABLET ORAL 2 TIMES DAILY PRN
Qty: 30 TABLET | Refills: 0 | Status: SHIPPED | OUTPATIENT
Start: 2025-03-13

## 2025-03-13 NOTE — TELEPHONE ENCOUNTER
Patient called in requesting a month supply refill for her Tramadol. She states that the [facility] was [facility] to does not have any right now and is requesting it to be sent to [facility] at [address] instead. Please advise thank you

## 2025-03-13 NOTE — TELEPHONE ENCOUNTER
Pt said she does not want this sent to [facility]. She will pick it up at [facility] on the 18th. She said that the prescription will need to be changed to  30 days. Currently, it is only for 20 days and that is causing the issue she is having. She is asking for a call back so she knows it is taken care of.

## 2025-03-21 DIAGNOSIS — G89.29 CHRONIC HIP PAIN, UNSPECIFIED LATERALITY: ICD-10-CM

## 2025-03-21 DIAGNOSIS — M25.559 CHRONIC HIP PAIN, UNSPECIFIED LATERALITY: ICD-10-CM

## 2025-03-21 RX ORDER — TRAMADOL HYDROCHLORIDE 50 MG/1
25 TABLET ORAL 2 TIMES DAILY PRN
Qty: 30 TABLET | Refills: 0 | OUTPATIENT
Start: 2025-03-21

## 2025-03-26 ENCOUNTER — OFFICE VISIT (OUTPATIENT)
Dept: BARIATRICS | Facility: CLINIC | Age: 75
End: 2025-03-26
Payer: MEDICARE

## 2025-03-26 VITALS
OXYGEN SATURATION: 99 % | HEART RATE: 76 BPM | DIASTOLIC BLOOD PRESSURE: 80 MMHG | WEIGHT: 184 LBS | HEIGHT: 60 IN | BODY MASS INDEX: 36.12 KG/M2 | SYSTOLIC BLOOD PRESSURE: 146 MMHG

## 2025-03-26 DIAGNOSIS — E66.01 CLASS 2 SEVERE OBESITY DUE TO EXCESS CALORIES WITH SERIOUS COMORBIDITY AND BODY MASS INDEX (BMI) OF 35.0 TO 35.9 IN ADULT (HCC): ICD-10-CM

## 2025-03-26 DIAGNOSIS — E66.812 CLASS 2 SEVERE OBESITY DUE TO EXCESS CALORIES WITH SERIOUS COMORBIDITY AND BODY MASS INDEX (BMI) OF 35.0 TO 35.9 IN ADULT (HCC): ICD-10-CM

## 2025-03-26 PROCEDURE — G2211 COMPLEX E/M VISIT ADD ON: HCPCS | Performed by: NURSE PRACTITIONER

## 2025-03-26 PROCEDURE — 99214 OFFICE O/P EST MOD 30 MIN: CPT | Performed by: NURSE PRACTITIONER

## 2025-03-26 NOTE — ASSESSMENT & PLAN NOTE
- Patient is pursuing Conservative Program and follow up visits with medical weight management provider  - Initial weight loss goal of 5-10% weight loss for improved health. Weight loss can improve patient's co-morbid conditions and/or prevent weight-related complications.  - Explained the importance of continuing lifestyle changes in addition to any anti-obesity medications.   - Labs reviewed from 12/2024    General Recommendations:  Nutrition:  Eat breakfast daily.  Do not skip meals.      Food log (ie.) www.[employer].com, sparkpeople.com, loseit.com, calorieking.com, etc.     Practice mindful eating.  Be sure to set aside time to eat, eat slowly, and savor your food.     Hydration:    At least 64oz of water daily.  No sugar sweetened beverages.  No juice (eat the fruit instead).     Exercise:  Studies have shown that the ideal exercise goal is somewhere between 150 to 300 minutes of moderate intensity exercise a week.  Start with exercising 10 minutes every other day and gradually increase physical activity with a goal of at least 150 minutes of moderate intensity exercise a week, divided over at least 3 days a week.  An example of this would be exercising 30 minutes a day, 5 days a week.  Resistance training can increase muscle mass and increase our resting metabolic rate.   FULL BODY resistance training is recommended 2-3 times a week.  Do not do this on consecutive days to allow for muscle recovery.     Aim for a bare minimum 5000 steps, even on days you do not exercise.     Monitoring:   Weigh yourself daily.  If this causes undue stress, then just weigh yourself once a week.  Weigh yourself the same time of the day with the same amount of clothing on.  Preferably this should be done after waking up, before you eat, and with no clothing or minimal clothing on.     Specific Goals:  Patient lifestyle habits were reviewed and patient was congratulated on her weight loss.  Nutrition was discussed and she will  continue with her current plan of smaller portions and evenly distributed calories.  She will focus on making sure she has protein with each meal and drinking at least 48 ounces of water daily.  Activity was discussed and she was encouraged to incorporate low impact activity 2-3 times a week.  Medications were discussed and she is tolerating bupropion 150 mg once daily well.  She will continue on this medication and follow-up in 3 months.

## 2025-03-26 NOTE — PROGRESS NOTES
Assessment/Plan:     Class 2 severe obesity due to excess calories with serious comorbidity and body mass index (BMI) of 35.0 to 35.9 in adult (HCC)  - Patient is pursuing Conservative Program and follow up visits with medical weight management provider  - Initial weight loss goal of 5-10% weight loss for improved health. Weight loss can improve patient's co-morbid conditions and/or prevent weight-related complications.  - Explained the importance of continuing lifestyle changes in addition to any anti-obesity medications.   - Labs reviewed from 12/2024    General Recommendations:  Nutrition:  Eat breakfast daily.  Do not skip meals.      Food log (ie.) www.Vedantu.com, sparkpeople.com, loseit.com, calorieking.com, etc.     Practice mindful eating.  Be sure to set aside time to eat, eat slowly, and savor your food.     Hydration:    At least 64oz of water daily.  No sugar sweetened beverages.  No juice (eat the fruit instead).     Exercise:  Studies have shown that the ideal exercise goal is somewhere between 150 to 300 minutes of moderate intensity exercise a week.  Start with exercising 10 minutes every other day and gradually increase physical activity with a goal of at least 150 minutes of moderate intensity exercise a week, divided over at least 3 days a week.  An example of this would be exercising 30 minutes a day, 5 days a week.  Resistance training can increase muscle mass and increase our resting metabolic rate.   FULL BODY resistance training is recommended 2-3 times a week.  Do not do this on consecutive days to allow for muscle recovery.     Aim for a bare minimum 5000 steps, even on days you do not exercise.     Monitoring:   Weigh yourself daily.  If this causes undue stress, then just weigh yourself once a week.  Weigh yourself the same time of the day with the same amount of clothing on.  Preferably this should be done after waking up, before you eat, and with no clothing or minimal clothing  on.     Specific Goals:  Patient lifestyle habits were reviewed and patient was congratulated on her weight loss.  Nutrition was discussed and she will continue with her current plan of smaller portions and evenly distributed calories.  She will focus on making sure she has protein with each meal and drinking at least 48 ounces of water daily.  Activity was discussed and she was encouraged to incorporate low impact activity 2-3 times a week.  Medications were discussed and she is tolerating bupropion 150 mg once daily well.  She will continue on this medication and follow-up in 3 months.         Yue was seen today for follow-up.    Diagnoses and all orders for this visit:    Class 2 severe obesity due to excess calories with serious comorbidity and body mass index (BMI) of 35.0 to 35.9 in adult (HCC)        Total time spent reviewing chart, interviewing patient, examining patient, discussing plan, answering all questions, and documentin minutes with >50% face-to-face time with the patient.    Follow up in approximately 3 months with Non-Surgical Physician/Advanced Practitioner.    Subjective:   Chief Complaint   Patient presents with    Follow-up       Patient ID: Yue Art  is a 74 y.o. female with excess weight/obesity here to pursue weight management.  Patient is pursuing Conservative Program.   Most recent notes and records were reviewed.    HPI    Wt Readings from Last 20 Encounters:   25 83.5 kg (184 lb)   25 85.3 kg (188 lb)   25 84.8 kg (187 lb)   25 86.2 kg (190 lb)   24 87 kg (191 lb 12.8 oz)   24 86.5 kg (190 lb 12.8 oz)   10/28/24 87.5 kg (193 lb)   24 84.6 kg (186 lb 6.4 oz)   24 92.5 kg (204 lb)   04/15/24 92.6 kg (204 lb 3.2 oz)   24 93.4 kg (206 lb)   24 93.4 kg (206 lb)   24 93.7 kg (206 lb 9.6 oz)   23 93 kg (205 lb)   10/16/23 96.4 kg (212 lb 9.6 oz)   10/13/23 96.6 kg (213 lb)   23 96.6 kg (213 lb)    07/17/23 96.9 kg (213 lb 9.6 oz)   11/21/22 92 kg (202 lb 12.8 oz)   07/13/22 88.5 kg (195 lb)       Patient presents today to medical weight management office for follow up.  Patient continues on bupropion 150mg daily. She is tolerating the medication well without any side effects. She is very happy with her weight loss progress and wishes to continue with bupropion.  She continues to stay consistent with her nutrition and is choosing healthier foods.  She is picking different snack options and feels satisfied with smaller portions.      Weight loss medication and dose: bupropion 150mg daily  Started weight and date: 213 lbs in 10/2023  Current weight: 184 lbs (190.8 lbs at last OV)  Difference: -29 lbs (-6.8 lbs since last OV)  Percentage of weight loss: -13.6%    Starting BMI: 41.0 in 10/2023  Current BMI: 35.46    Waist Measurements:  10/2023: 45 in  3/2025: 42 in      Diet recall:  B: egg with 1/2 English Muffin  L: 1/2 sandwich (chicken breast)  S: rice cakes  D: salad with protein (chicken) OR chickpea pasta  S: rice cakes    Hydration: tea and water - 24-32 oz  Alcohol: no  Smoking: no  Exercise: no  Occupation: [employer]  Sleep: well  STOP bang: 3/8    Mammogram: 9/2023      The following portions of the patient's history were reviewed and updated as appropriate: allergies, current medications, past family history, past medical history, past social history, past surgical history, and problem list.    Family History   Problem Relation Age of Onset    Cancer Mother     Diabetes Mother     Cancer Father     Diabetes Father     Urolithiasis Sister     No Known Problems Daughter     No Known Problems Maternal Grandmother     No Known Problems Maternal Grandfather     No Known Problems Paternal Grandmother     No Known Problems Paternal Grandfather     No Known Problems Maternal Aunt     No Known Problems Paternal Aunt     No Known Problems Paternal Aunt         Review of Systems   Constitutional:   "Negative for fatigue.   HENT:  Negative for sore throat.    Respiratory:  Negative for cough and shortness of breath.    Cardiovascular:  Negative for chest pain, palpitations and leg swelling.   Gastrointestinal:  Negative for abdominal pain, constipation, diarrhea and nausea.   Genitourinary:  Negative for dysuria.   Musculoskeletal:  Negative for arthralgias and back pain.   Skin:  Negative for rash.   Neurological:  Negative for headaches.   Psychiatric/Behavioral:  Negative for dysphoric mood. The patient is not nervous/anxious.        Objective:  /80   Pulse 76   Ht 5' 0.4\" (1.534 m)   Wt 83.5 kg (184 lb)   SpO2 99%   BMI 35.46 kg/m²     Physical Exam  Vitals and nursing note reviewed.   Constitutional:       Appearance: Normal appearance. She is obese.   HENT:      Head: Normocephalic.   Pulmonary:      Effort: Pulmonary effort is normal.   Neurological:      General: No focal deficit present.      Mental Status: She is alert and oriented to person, place, and time.   Psychiatric:         Mood and Affect: Mood normal.         Behavior: Behavior normal.         Thought Content: Thought content normal.         Judgment: Judgment normal.            Labs   Most recent labs reviewed   Lab Results   Component Value Date     05/10/2015    SODIUM 137 12/20/2024    K 5.2 12/20/2024     12/20/2024    CO2 26 12/20/2024    ANIONGAP 5 05/10/2015    AGAP 8 12/20/2024    BUN 15 12/20/2024    CREATININE 0.80 12/20/2024    GLUC 97 12/20/2024    GLUF 97 01/09/2024    CALCIUM 9.4 12/20/2024    AST 23 12/20/2024    ALT 7 12/20/2024    ALKPHOS 93 12/20/2024    PROT 7.6 05/10/2015    TP 7.7 12/20/2024    BILITOT 0.4 05/10/2015    TBILI 0.45 12/20/2024    EGFR 71 12/20/2024     Lab Results   Component Value Date    HGBA1C 5.4 10/28/2024     Lab Results   Component Value Date    PYJ9MUKFSNWG 2.372 04/17/2024     Lab Results   Component Value Date    CHOLESTEROL 107 01/09/2024     Lab Results   Component Value " Date    HDL 38 (L) 01/09/2024     Lab Results   Component Value Date    TRIG 129 01/09/2024     Lab Results   Component Value Date    LDLCALC 43 01/09/2024

## 2025-04-18 DIAGNOSIS — M25.559 CHRONIC HIP PAIN, UNSPECIFIED LATERALITY: ICD-10-CM

## 2025-04-18 DIAGNOSIS — G89.29 CHRONIC HIP PAIN, UNSPECIFIED LATERALITY: ICD-10-CM

## 2025-04-18 RX ORDER — TRAMADOL HYDROCHLORIDE 50 MG/1
25 TABLET ORAL 2 TIMES DAILY PRN
Qty: 30 TABLET | Refills: 0 | Status: SHIPPED | OUTPATIENT
Start: 2025-04-18

## 2025-04-22 ENCOUNTER — RA CDI HCC (OUTPATIENT)
Dept: OTHER | Facility: HOSPITAL | Age: 75
End: 2025-04-22

## 2025-04-28 ENCOUNTER — OFFICE VISIT (OUTPATIENT)
Dept: INTERNAL MEDICINE CLINIC | Facility: CLINIC | Age: 75
End: 2025-04-28
Payer: MEDICARE

## 2025-04-28 VITALS
SYSTOLIC BLOOD PRESSURE: 120 MMHG | BODY MASS INDEX: 36.2 KG/M2 | WEIGHT: 184.4 LBS | HEART RATE: 59 BPM | DIASTOLIC BLOOD PRESSURE: 80 MMHG | OXYGEN SATURATION: 98 % | HEIGHT: 60 IN

## 2025-04-28 DIAGNOSIS — E06.3 HYPOTHYROIDISM DUE TO HASHIMOTO'S THYROIDITIS: Primary | ICD-10-CM

## 2025-04-28 DIAGNOSIS — K21.9 GASTROESOPHAGEAL REFLUX DISEASE WITHOUT ESOPHAGITIS: ICD-10-CM

## 2025-04-28 DIAGNOSIS — Z86.69 HISTORY OF TRIGEMINAL NEURALGIA: ICD-10-CM

## 2025-04-28 DIAGNOSIS — I65.23 BILATERAL CAROTID ARTERY STENOSIS: ICD-10-CM

## 2025-04-28 DIAGNOSIS — E78.00 PURE HYPERCHOLESTEROLEMIA: ICD-10-CM

## 2025-04-28 DIAGNOSIS — M16.11 PRIMARY OSTEOARTHRITIS OF RIGHT HIP: ICD-10-CM

## 2025-04-28 PROCEDURE — G2211 COMPLEX E/M VISIT ADD ON: HCPCS | Performed by: INTERNAL MEDICINE

## 2025-04-28 PROCEDURE — 99214 OFFICE O/P EST MOD 30 MIN: CPT | Performed by: INTERNAL MEDICINE

## 2025-04-28 NOTE — ASSESSMENT & PLAN NOTE
Pain currently controlled.  Continue tramadol as prescribed.  Patient to avoid NSAIDs given history of GERD/gastritis.

## 2025-04-28 NOTE — ASSESSMENT & PLAN NOTE
Appreciate vascular surgery eval.  They recommended 6-month follow-up carotid duplex study for patient.

## 2025-04-28 NOTE — ASSESSMENT & PLAN NOTE
Recent flare on 4/11.  Patient reports pain resolved on its own spontaneously.  Continue to monitor.

## 2025-04-28 NOTE — ASSESSMENT & PLAN NOTE
Last TSH within normal range.  Repeat thyroid function panel pending.  Continue levothyroxine 75 mcg daily for now.

## 2025-04-28 NOTE — PROGRESS NOTES
"Name: Yue Art      : 1950      MRN: [id number]  Encounter Provider: Vargas Hinojosa MD  Encounter Date: 2025   Encounter department: [facility]  :  Assessment & Plan  Hypothyroidism due to Hashimoto's thyroiditis  Last TSH within normal range.  Repeat thyroid function panel pending.  Continue levothyroxine 75 mcg daily for now.       Gastroesophageal reflux disease without esophagitis  Well-controlled.  Continue Protonix 20 mg daily.       Bilateral carotid artery stenosis  Appreciate vascular surgery eval.  They recommended 6-month follow-up carotid duplex study for patient.       Primary osteoarthritis of right hip  Pain currently controlled.  Continue tramadol as prescribed.  Patient to avoid NSAIDs given history of GERD/gastritis.       Pure hypercholesterolemia  Lipid profile ordered and pending.  Tolerating statin well.       History of trigeminal neuralgia  Recent flare on .  Patient reports pain resolved on its own spontaneously.  Continue to monitor.              History of Present Illness   HPI  Patient presents today for chronic follow-up.  Overall she states she is doing well and has no major complaints.  She does note that back on  she experienced an acute flare of her trigeminal neuralgia.  She states her last episode occurred 39 years ago at which time she was on Tegretol for short period of time.  She states she is currently asymptomatic.    Review of Systems  All other systems negative except for pertinent findings noted in HPI.       Objective   /80 (BP Location: Left arm, Patient Position: Sitting, Cuff Size: Large)   Pulse 59   Ht 5' 0.4\" (1.534 m)   Wt 83.6 kg (184 lb 6.4 oz)   SpO2 98%   BMI 35.54 kg/m²      Physical Exam  General: NAD  HEENT: NCAT, EOMI, normal conjunctiva  Cardiovascular: RRR, normal S1 and S2, no m/r/g  Pulmonary: Normal respiratory effort, no wheezes, rales or rhonchi  Extremities: No lower extremity " edema  Skin: Normal skin color, no rashes   Psychiatric: Normal mood and affect

## 2025-05-16 DIAGNOSIS — E06.3 HYPOTHYROIDISM DUE TO HASHIMOTO'S THYROIDITIS: ICD-10-CM

## 2025-05-16 RX ORDER — LEVOTHYROXINE SODIUM 75 UG/1
75 TABLET ORAL DAILY
Qty: 30 TABLET | Refills: 0 | Status: SHIPPED | OUTPATIENT
Start: 2025-05-16

## 2025-05-16 NOTE — TELEPHONE ENCOUNTER
Patient needs updated blood work and has previously placed orders. Please contact patient to go for labs. Courtesy refill provided.      Pt needs TSH

## 2025-05-21 DIAGNOSIS — M25.559 CHRONIC HIP PAIN, UNSPECIFIED LATERALITY: ICD-10-CM

## 2025-05-21 DIAGNOSIS — G89.29 CHRONIC HIP PAIN, UNSPECIFIED LATERALITY: ICD-10-CM

## 2025-05-21 RX ORDER — TRAMADOL HYDROCHLORIDE 50 MG/1
25 TABLET ORAL 2 TIMES DAILY PRN
Qty: 30 TABLET | Refills: 0 | Status: SHIPPED | OUTPATIENT
Start: 2025-05-21

## 2025-05-29 ENCOUNTER — APPOINTMENT (OUTPATIENT)
Dept: RADIOLOGY | Facility: HOSPITAL | Age: 75
DRG: 661 | End: 2025-05-29
Payer: MEDICARE

## 2025-05-29 ENCOUNTER — HOSPITAL ENCOUNTER (INPATIENT)
Facility: HOSPITAL | Age: 75
LOS: 1 days | Discharge: HOME | DRG: 661 | End: 2025-05-31
Attending: EMERGENCY MEDICINE | Admitting: INTERNAL MEDICINE
Payer: MEDICARE

## 2025-05-29 ENCOUNTER — APPOINTMENT (EMERGENCY)
Dept: CT IMAGING | Facility: HOSPITAL | Age: 75
DRG: 661 | End: 2025-05-29
Payer: MEDICARE

## 2025-05-29 DIAGNOSIS — N13.2 URETERAL STONE WITH HYDRONEPHROSIS: Primary | ICD-10-CM

## 2025-05-29 DIAGNOSIS — N13.30 HYDRONEPHROSIS: ICD-10-CM

## 2025-05-29 DIAGNOSIS — N20.0 NEPHROLITHIASIS: ICD-10-CM

## 2025-05-29 PROBLEM — E06.3 HYPOTHYROIDISM DUE TO HASHIMOTO'S THYROIDITIS: Status: RESOLVED | Noted: 2018-11-24 | Resolved: 2025-05-29

## 2025-05-29 PROBLEM — N39.0 UTI (URINARY TRACT INFECTION): Status: ACTIVE | Noted: 2025-05-29

## 2025-05-29 LAB
ALBUMIN SERPL BCG-MCNC: 4.5 G/DL (ref 3.5–5)
ALP SERPL-CCNC: 79 U/L (ref 34–104)
ALT SERPL W P-5'-P-CCNC: 7 U/L (ref 7–52)
ANION GAP SERPL CALCULATED.3IONS-SCNC: 9 MMOL/L (ref 4–13)
AST SERPL W P-5'-P-CCNC: 17 U/L (ref 13–39)
BASOPHILS # BLD AUTO: 0.09 THOUSANDS/ÂΜL (ref 0–0.1)
BASOPHILS NFR BLD AUTO: 1 % (ref 0–1)
BILIRUB SERPL-MCNC: 0.59 MG/DL (ref 0.2–1)
BILIRUB UR QL STRIP: NEGATIVE
BUN SERPL-MCNC: 23 MG/DL (ref 5–25)
CALCIUM SERPL-MCNC: 9.4 MG/DL (ref 8.4–10.2)
CHLORIDE SERPL-SCNC: 102 MMOL/L (ref 96–108)
CLARITY UR: CLEAR
CO2 SERPL-SCNC: 26 MMOL/L (ref 21–32)
COLOR UR: COLORLESS
CREAT SERPL-MCNC: 1.08 MG/DL (ref 0.6–1.3)
EOSINOPHIL # BLD AUTO: 0.14 THOUSAND/ÂΜL (ref 0–0.61)
EOSINOPHIL NFR BLD AUTO: 1 % (ref 0–6)
ERYTHROCYTE [DISTWIDTH] IN BLOOD BY AUTOMATED COUNT: 13.3 % (ref 11.6–15.1)
GFR SERPL CREATININE-BSD FRML MDRD: 49 ML/MIN/1.73SQ M
GLUCOSE SERPL-MCNC: 143 MG/DL (ref 65–140)
GLUCOSE UR STRIP-MCNC: NEGATIVE MG/DL
HCT VFR BLD AUTO: 49.2 % (ref 34.8–46.1)
HGB BLD-MCNC: 15.9 G/DL (ref 11.5–15.4)
HGB UR QL STRIP.AUTO: NEGATIVE
IMM GRANULOCYTES # BLD AUTO: 0.11 THOUSAND/UL (ref 0–0.2)
IMM GRANULOCYTES NFR BLD AUTO: 1 % (ref 0–2)
KETONES UR STRIP-MCNC: NEGATIVE MG/DL
LEUKOCYTE ESTERASE UR QL STRIP: NEGATIVE
LIPASE SERPL-CCNC: 23 U/L (ref 11–82)
LYMPHOCYTES # BLD AUTO: 1.34 THOUSANDS/ÂΜL (ref 0.6–4.47)
LYMPHOCYTES NFR BLD AUTO: 8 % (ref 14–44)
MCH RBC QN AUTO: 29.1 PG (ref 26.8–34.3)
MCHC RBC AUTO-ENTMCNC: 32.3 G/DL (ref 31.4–37.4)
MCV RBC AUTO: 90 FL (ref 82–98)
MONOCYTES # BLD AUTO: 1.07 THOUSAND/ÂΜL (ref 0.17–1.22)
MONOCYTES NFR BLD AUTO: 7 % (ref 4–12)
NEUTROPHILS # BLD AUTO: 13.75 THOUSANDS/ÂΜL (ref 1.85–7.62)
NEUTS SEG NFR BLD AUTO: 82 % (ref 43–75)
NITRITE UR QL STRIP: NEGATIVE
NRBC BLD AUTO-RTO: 0 /100 WBCS
PH UR STRIP.AUTO: 7.5 [PH]
PLATELET # BLD AUTO: 287 THOUSANDS/UL (ref 149–390)
PMV BLD AUTO: 10.1 FL (ref 8.9–12.7)
POTASSIUM SERPL-SCNC: 4.3 MMOL/L (ref 3.5–5.3)
PROT SERPL-MCNC: 7.7 G/DL (ref 6.4–8.4)
PROT UR STRIP-MCNC: NEGATIVE MG/DL
RBC # BLD AUTO: 5.47 MILLION/UL (ref 3.81–5.12)
SODIUM SERPL-SCNC: 137 MMOL/L (ref 135–147)
SP GR UR STRIP.AUTO: 1.03 (ref 1–1.03)
UROBILINOGEN UR STRIP-ACNC: <2 MG/DL
WBC # BLD AUTO: 16.5 THOUSAND/UL (ref 4.31–10.16)

## 2025-05-29 PROCEDURE — 99223 1ST HOSP IP/OBS HIGH 75: CPT | Performed by: UROLOGY

## 2025-05-29 PROCEDURE — 74177 CT ABD & PELVIS W/CONTRAST: CPT

## 2025-05-29 PROCEDURE — 80053 COMPREHEN METABOLIC PANEL: CPT | Performed by: STUDENT IN AN ORGANIZED HEALTH CARE EDUCATION/TRAINING PROGRAM

## 2025-05-29 PROCEDURE — 81003 URINALYSIS AUTO W/O SCOPE: CPT | Performed by: STUDENT IN AN ORGANIZED HEALTH CARE EDUCATION/TRAINING PROGRAM

## 2025-05-29 PROCEDURE — 36415 COLL VENOUS BLD VENIPUNCTURE: CPT | Performed by: STUDENT IN AN ORGANIZED HEALTH CARE EDUCATION/TRAINING PROGRAM

## 2025-05-29 PROCEDURE — 96365 THER/PROPH/DIAG IV INF INIT: CPT

## 2025-05-29 PROCEDURE — 85025 COMPLETE CBC W/AUTO DIFF WBC: CPT | Performed by: STUDENT IN AN ORGANIZED HEALTH CARE EDUCATION/TRAINING PROGRAM

## 2025-05-29 PROCEDURE — 99284 EMERGENCY DEPT VISIT MOD MDM: CPT

## 2025-05-29 PROCEDURE — 96366 THER/PROPH/DIAG IV INF ADDON: CPT

## 2025-05-29 PROCEDURE — 99285 EMERGENCY DEPT VISIT HI MDM: CPT | Performed by: EMERGENCY MEDICINE

## 2025-05-29 PROCEDURE — 96375 TX/PRO/DX INJ NEW DRUG ADDON: CPT

## 2025-05-29 PROCEDURE — 96376 TX/PRO/DX INJ SAME DRUG ADON: CPT

## 2025-05-29 PROCEDURE — 83690 ASSAY OF LIPASE: CPT | Performed by: STUDENT IN AN ORGANIZED HEALTH CARE EDUCATION/TRAINING PROGRAM

## 2025-05-29 PROCEDURE — 99223 1ST HOSP IP/OBS HIGH 75: CPT | Performed by: HOSPITALIST

## 2025-05-29 PROCEDURE — 93005 ELECTROCARDIOGRAM TRACING: CPT

## 2025-05-29 RX ORDER — HYDROMORPHONE HCL/PF 1 MG/ML
0.5 SYRINGE (ML) INJECTION ONCE
Refills: 0 | Status: COMPLETED | OUTPATIENT
Start: 2025-05-29 | End: 2025-05-29

## 2025-05-29 RX ORDER — HYDROMORPHONE HCL/PF 1 MG/ML
1 SYRINGE (ML) INJECTION EVERY 4 HOURS PRN
Status: DISCONTINUED | OUTPATIENT
Start: 2025-05-29 | End: 2025-05-29

## 2025-05-29 RX ORDER — ONDANSETRON 2 MG/ML
4 INJECTION INTRAMUSCULAR; INTRAVENOUS EVERY 8 HOURS PRN
Status: DISCONTINUED | OUTPATIENT
Start: 2025-05-29 | End: 2025-05-29

## 2025-05-29 RX ORDER — ACETAMINOPHEN 325 MG/1
650 TABLET ORAL EVERY 6 HOURS PRN
Status: DISCONTINUED | OUTPATIENT
Start: 2025-05-29 | End: 2025-05-31 | Stop reason: HOSPADM

## 2025-05-29 RX ORDER — ONDANSETRON 2 MG/ML
4 INJECTION INTRAMUSCULAR; INTRAVENOUS ONCE
Status: COMPLETED | OUTPATIENT
Start: 2025-05-29 | End: 2025-05-29

## 2025-05-29 RX ORDER — HYDROMORPHONE HCL IN WATER/PF 6 MG/30 ML
0.2 PATIENT CONTROLLED ANALGESIA SYRINGE INTRAVENOUS EVERY 2 HOUR PRN
Status: DISCONTINUED | OUTPATIENT
Start: 2025-05-29 | End: 2025-05-31 | Stop reason: HOSPADM

## 2025-05-29 RX ORDER — PANTOPRAZOLE SODIUM 40 MG/1
40 TABLET, DELAYED RELEASE ORAL ONCE
Status: COMPLETED | OUTPATIENT
Start: 2025-05-29 | End: 2025-05-29

## 2025-05-29 RX ORDER — HYDROMORPHONE HCL/PF 1 MG/ML
0.5 SYRINGE (ML) INJECTION ONCE
Status: COMPLETED | OUTPATIENT
Start: 2025-05-29 | End: 2025-05-29

## 2025-05-29 RX ORDER — ACETAMINOPHEN 325 MG/1
650 TABLET ORAL ONCE
Status: COMPLETED | OUTPATIENT
Start: 2025-05-29 | End: 2025-05-29

## 2025-05-29 RX ORDER — BUPROPION HYDROCHLORIDE 150 MG/1
150 TABLET ORAL DAILY
Status: DISCONTINUED | OUTPATIENT
Start: 2025-05-29 | End: 2025-05-31 | Stop reason: HOSPADM

## 2025-05-29 RX ORDER — HYDROMORPHONE HCL/PF 1 MG/ML
0.5 SYRINGE (ML) INJECTION EVERY 4 HOURS PRN
Status: DISCONTINUED | OUTPATIENT
Start: 2025-05-29 | End: 2025-05-31 | Stop reason: HOSPADM

## 2025-05-29 RX ORDER — ONDANSETRON 2 MG/ML
4 INJECTION INTRAMUSCULAR; INTRAVENOUS EVERY 6 HOURS PRN
Status: DISCONTINUED | OUTPATIENT
Start: 2025-05-29 | End: 2025-05-31 | Stop reason: HOSPADM

## 2025-05-29 RX ORDER — ASPIRIN 81 MG/1
81 TABLET ORAL DAILY
Status: DISCONTINUED | OUTPATIENT
Start: 2025-05-30 | End: 2025-05-31 | Stop reason: HOSPADM

## 2025-05-29 RX ORDER — SODIUM CHLORIDE 9 MG/ML
125 INJECTION, SOLUTION INTRAVENOUS CONTINUOUS
Status: DISCONTINUED | OUTPATIENT
Start: 2025-05-29 | End: 2025-05-31 | Stop reason: HOSPADM

## 2025-05-29 RX ORDER — PROCHLORPERAZINE MALEATE 10 MG
5 TABLET ORAL ONCE
Status: COMPLETED | OUTPATIENT
Start: 2025-05-29 | End: 2025-05-29

## 2025-05-29 RX ORDER — ENOXAPARIN SODIUM 100 MG/ML
40 INJECTION SUBCUTANEOUS DAILY
Status: DISCONTINUED | OUTPATIENT
Start: 2025-05-30 | End: 2025-05-31 | Stop reason: HOSPADM

## 2025-05-29 RX ORDER — KETOROLAC TROMETHAMINE 30 MG/ML
15 INJECTION, SOLUTION INTRAMUSCULAR; INTRAVENOUS ONCE
Status: COMPLETED | OUTPATIENT
Start: 2025-05-29 | End: 2025-05-29

## 2025-05-29 RX ORDER — HYDROMORPHONE HCL IN WATER/PF 6 MG/30 ML
0.2 PATIENT CONTROLLED ANALGESIA SYRINGE INTRAVENOUS EVERY 4 HOURS PRN
Status: DISCONTINUED | OUTPATIENT
Start: 2025-05-29 | End: 2025-05-31 | Stop reason: HOSPADM

## 2025-05-29 RX ORDER — LEVOTHYROXINE SODIUM 75 UG/1
75 TABLET ORAL
Status: DISCONTINUED | OUTPATIENT
Start: 2025-05-30 | End: 2025-05-31 | Stop reason: HOSPADM

## 2025-05-29 RX ORDER — ATORVASTATIN CALCIUM 40 MG/1
40 TABLET, FILM COATED ORAL
Status: DISCONTINUED | OUTPATIENT
Start: 2025-05-29 | End: 2025-05-31 | Stop reason: HOSPADM

## 2025-05-29 RX ADMIN — SODIUM CHLORIDE, SODIUM LACTATE, POTASSIUM CHLORIDE, AND CALCIUM CHLORIDE 1000 ML: .6; .31; .03; .02 INJECTION, SOLUTION INTRAVENOUS at 06:08

## 2025-05-29 RX ADMIN — CEFTRIAXONE SODIUM 1000 MG: 10 INJECTION, POWDER, FOR SOLUTION INTRAVENOUS at 13:24

## 2025-05-29 RX ADMIN — ONDANSETRON 4 MG: 2 INJECTION INTRAMUSCULAR; INTRAVENOUS at 17:17

## 2025-05-29 RX ADMIN — HYDROMORPHONE HYDROCHLORIDE 0.2 MG: 0.2 INJECTION, SOLUTION INTRAMUSCULAR; INTRAVENOUS; SUBCUTANEOUS at 19:19

## 2025-05-29 RX ADMIN — KETOROLAC TROMETHAMINE 15 MG: 30 INJECTION, SOLUTION INTRAMUSCULAR; INTRAVENOUS at 06:15

## 2025-05-29 RX ADMIN — SODIUM CHLORIDE 125 ML/HR: 0.9 INJECTION, SOLUTION INTRAVENOUS at 15:05

## 2025-05-29 RX ADMIN — HYDROMORPHONE HYDROCHLORIDE 0.5 MG: 1 INJECTION, SOLUTION INTRAMUSCULAR; INTRAVENOUS; SUBCUTANEOUS at 10:20

## 2025-05-29 RX ADMIN — PANTOPRAZOLE SODIUM 40 MG: 40 TABLET, DELAYED RELEASE ORAL at 10:19

## 2025-05-29 RX ADMIN — PROCHLORPERAZINE MALEATE 5 MG: 10 TABLET ORAL at 19:46

## 2025-05-29 RX ADMIN — IOHEXOL 80 ML: 350 INJECTION, SOLUTION INTRAVENOUS at 07:38

## 2025-05-29 RX ADMIN — ACETAMINOPHEN 650 MG: 325 TABLET ORAL at 13:24

## 2025-05-29 RX ADMIN — HYDROMORPHONE HYDROCHLORIDE 0.5 MG: 1 INJECTION, SOLUTION INTRAMUSCULAR; INTRAVENOUS; SUBCUTANEOUS at 06:16

## 2025-05-29 RX ADMIN — HYDROMORPHONE HYDROCHLORIDE 0.2 MG: 0.2 INJECTION, SOLUTION INTRAMUSCULAR; INTRAVENOUS; SUBCUTANEOUS at 15:45

## 2025-05-29 RX ADMIN — HYDROMORPHONE HYDROCHLORIDE 0.5 MG: 1 INJECTION, SOLUTION INTRAMUSCULAR; INTRAVENOUS; SUBCUTANEOUS at 13:24

## 2025-05-29 RX ADMIN — ONDANSETRON 4 MG: 2 INJECTION, SOLUTION INTRAMUSCULAR; INTRAVENOUS at 13:24

## 2025-05-29 RX ADMIN — ONDANSETRON 4 MG: 2 INJECTION, SOLUTION INTRAMUSCULAR; INTRAVENOUS at 06:03

## 2025-05-29 NOTE — ED PROVIDER NOTES
Time reflects when diagnosis was documented in both MDM as applicable and the Disposition within this note       Time User Action Codes Description Comment    5/29/2025 11:42 AM Maya Barron Add [N13.2] Ureteral stone with hydronephrosis     5/29/2025 12:49 PM Reaalicia Fabrizio Add [N20.0] Nephrolithiasis     5/29/2025 12:49 PM Aguilar Fabrizio Add [N13.30] Hydronephrosis           ED Disposition       ED Disposition   Admit    Condition   Stable    Date/Time   Thu May 29, 2025 12:49 PM    Comment   Case was discussed with Dr. Tejada and the patient's admission status was agreed to be Admission Status: observation status to the service of Dr. Arnold .               Assessment & Plan       Medical Decision Making  Patient presents with:  Abdominal Pain: R sided flank pain radiating into abdomen starting around 0115 this am. +back pain and chills, nausea and vomiting.   DDx includes UTI, renal calculus, appendicitis, SBO, ACS, pancreatitis, electrolyte abnormality, volume depletion or other unknown process  Workup per ED course: Marked improvement of symptoms and hypertension on initial presentation with interventions.  New leukocytosis , hemoglobin stable.  CMP reassuring.  Care subsequently transitioned to Dr. Uriarte pending abdominal imaging.  Final dispo pending imaging, UA and symptomatic improvement.    Amount and/or Complexity of Data Reviewed  Labs: ordered. Decision-making details documented in ED Course.  Radiology: ordered.  ECG/medicine tests:  Decision-making details documented in ED Course.    Risk  OTC drugs.  Prescription drug management.  Decision regarding hospitalization.        ED Course as of 06/05/25 1817   Thu May 29, 2025   0750 Patient care transitioned to Dr. Sheikh pending abdominal imaging.   Resting comfortably with marked symptomatic improvement, elevated blood pressure reading improved   0750 WBC(!): 16.50   0751 Hemoglobin(!): 15.9  Likely hemoconcentrated   0751 Comprehensive metabolic  panel(!)  No electrolyte derangement, anion gap, Transaminitis, KRISTY     0751 LIPASE: 23  Doubt pancreatitis   0810 ECG 12 lead  ECG per my independent interpretation:   Normal rate, regular rhythm, normal axis,   AL/QRS/Qtc within normal limits  no ST elevations or depressions   Compared to prior ECG without significant interval change       Medications   ondansetron (ZOFRAN) injection 4 mg (4 mg Intravenous Given 5/29/25 0603)   ketorolac (TORADOL) injection 15 mg (15 mg Intravenous Given 5/29/25 0615)   lactated ringers bolus 1,000 mL (0 mL Intravenous Stopped 5/29/25 0908)   HYDROmorphone (DILAUDID) injection 0.5 mg (0.5 mg Intravenous Given 5/29/25 0616)   iohexol (OMNIPAQUE) 350 MG/ML injection (MULTI-DOSE) 80 mL (80 mL Intravenous Given 5/29/25 0738)   pantoprazole (PROTONIX) EC tablet 40 mg (40 mg Oral Given 5/29/25 1019)   HYDROmorphone (DILAUDID) injection 0.5 mg (0.5 mg Intravenous Given 5/29/25 1020)   HYDROmorphone (DILAUDID) injection 0.5 mg (0.5 mg Intravenous Given 5/29/25 1324)   acetaminophen (TYLENOL) tablet 650 mg (650 mg Oral Given 5/29/25 1324)       ED Risk Strat Scores                    No data recorded                            History of Present Illness       Chief Complaint   Patient presents with    Abdominal Pain     R sided flank pain radiating into abdomen starting around 0115 this am. +back pain and chills, nausea and vomiting.        Past Medical History[1]   Past Surgical History[2]   Family History[3]   Social History[4]   E-Cigarette/Vaping    E-Cigarette Use Never User       E-Cigarette/Vaping Substances    Nicotine No     THC No     CBD No     Flavoring No     Other No     Unknown No       I have reviewed and agree with the history as documented.     2 weeks colicky right flank/low back pain pain with sudden worsening this morning with fever/chills multiple episodes of nonbloody emesis this morning. radiation to abdomen.  No obvious association with meals. denies urinary  symptoms, dark/bloody urine.  Denies chest pain or shortness of breath. history of  and cholecystectomy.     HPI    Review of Systems   All other systems reviewed and are negative.          Objective       ED Triage Vitals   Temperature Pulse Blood Pressure Respirations SpO2 Patient Position - Orthostatic VS   25 0544 25 0525 0525 0544 25 0544 25 05   98.4 °F (36.9 °C) 85 (!) 224/98 20 96 % Sitting      Temp Source Heart Rate Source BP Location FiO2 (%) Pain Score    25 0544 25 0544 25 05 -- 25 0615    Oral Monitor Right arm  10 - Worst Possible Pain      Vitals      Date and Time Temp Pulse SpO2 Resp BP Pain Score FACES Pain Rating User   25 2300 97.2 °F (36.2 °C) 72 -- -- 157/62 No Pain -- AV   25 -- -- -- -- -- 9 -- AV   25 -- -- -- -- 162/90 -- -- MK   25 1745 97 °F (36.1 °C) 74 97 % 21 174/84 No Pain -- BERTRAND   25 1730 97 °F (36.1 °C) 82 98 % 21 176/77 No Pain -- BERTRAND   25 1715 -- 80 98 % 16 183/81 No Pain -- FM   25 1707 96.9 °F (36.1 °C) 85 98 % 18 183/89 No Pain -- FM   25 1504 98 °F (36.7 °C) 78 99 % 18 150/69 No Pain -- MD   25 1347 -- -- -- -- -- 8 -- BLM   25 1107 -- -- -- -- -- 8 -- BLM   25 0824 -- -- -- -- -- 8 -- BLM   25 0308 -- -- -- -- -- 10 - Worst Possible Pain -- AP   25 191 -- -- -- -- -- 8 -- AP   25 1755 -- -- -- -- -- 3 -- DL   25 1545 -- -- -- -- -- 8 -- RI   25 1507 98.6 °F (37 °C) 75 97 % 18 150/69 8 -- LD   25 1158 -- 72 98 % -- 148/71 -- -- HR   25 1025 -- 81 98 % 18 163/73 -- -- LR   25 1020 -- -- -- -- -- 10 - Worst Possible Pain -- LR   25 0715 -- 71 -- -- 174/ -- -- LD   25 0708 98.4 °F (36.9 °C) 73 98 % 17 174/81 -- -- KK   25 0616 -- -- -- -- -- 10 - Worst Possible Pain -- JR   25 0615 -- -- -- -- -- 10 - Worst Possible Pain -- JR   25 0544 98.4 °F  (36.9 °C) 85 96 % 20 224/98 -- -- RM            Physical Exam    General appearance:   uncomfortable, no acute distress  HENT: Normocephalic, atraumatic, hearing grossly intact, and mucous membranes moist   Eyes: Conjunctiva normal, PERRL, EOM intact   Lungs/Chest: Respirations even and unlabored, breath sounds normal  Cardiovascular: Normal rate, regular rhythm, hypertensive  Abdomen: soft, non-distended, mild right abdominal tenderness; right flank tenderness without overlying skin changes  Musculoskeletal: extremities normal, atraumatic, no cyanosis or edema   Skin: warm and dry   Neurologic: Awake and alert, no apparent focal deficit  Psych: Mood consistent with affect     Results Reviewed       Procedure Component Value Units Date/Time    UA w Reflex to Microscopic w Reflex to Culture [[id number]] Collected: 05/29/25 0809    Lab Status: Final result Specimen: Urine, Clean Catch Updated: 05/29/25 0840     Color, UA Colorless     Clarity, UA Clear     Specific Gravity, UA 1.027     pH, UA 7.5     Leukocytes, UA Negative     Nitrite, UA Negative     Protein, UA Negative mg/dl      Glucose, UA Negative mg/dl      Ketones, UA Negative mg/dl      Urobilinogen, UA <2.0 mg/dl      Bilirubin, UA Negative     Occult Blood, UA Negative    Comprehensive metabolic panel [[id number]]  (Abnormal) Collected: 05/29/25 0600    Lab Status: Final result Specimen: Blood from Arm, Right Updated: 05/29/25 0633     Sodium 137 mmol/L      Potassium 4.3 mmol/L      Chloride 102 mmol/L      CO2 26 mmol/L      ANION GAP 9 mmol/L      BUN 23 mg/dL      Creatinine 1.08 mg/dL      Glucose 143 mg/dL      Calcium 9.4 mg/dL      AST 17 U/L      ALT 7 U/L      Alkaline Phosphatase 79 U/L      Total Protein 7.7 g/dL      Albumin 4.5 g/dL      Total Bilirubin 0.59 mg/dL      eGFR 49 ml/min/1.73sq m     Narrative:      National Kid[employer] guidelines for Chronic Kidney Disease (CKD):     Stage 1 with normal or high GFR (GFR > 90  mL/min/1.73 square meters)    Stage 2 Mild CKD (GFR = 60-89 mL/min/1.73 square meters)    Stage 3A Moderate CKD (GFR = 45-59 mL/min/1.73 square meters)    Stage 3B Moderate CKD (GFR = 30-44 mL/min/1.73 square meters)    Stage 4 Severe CKD (GFR = 15-29 mL/min/1.73 square meters)    Stage 5 End Stage CKD (GFR <15 mL/min/1.73 square meters)  Note: GFR calculation is accurate only with a steady state creatinine    Lipase [[id number]]  (Normal) Collected: 05/29/25 0600    Lab Status: Final result Specimen: Blood from Arm, Right Updated: 05/29/25 0633     Lipase 23 u/L     CBC and differential [[id number]]  (Abnormal) Collected: 05/29/25 0600    Lab Status: Final result Specimen: Blood from Arm, Right Updated: 05/29/25 0613     WBC 16.50 Thousand/uL      RBC 5.47 Million/uL      Hemoglobin 15.9 g/dL      Hematocrit 49.2 %      MCV 90 fL      MCH 29.1 pg      MCHC 32.3 g/dL      RDW 13.3 %      MPV 10.1 fL      Platelets 287 Thousands/uL      nRBC 0 /100 WBCs      Segmented % 82 %      Immature Grans % 1 %      Lymphocytes % 8 %      Monocytes % 7 %      Eosinophils Relative 1 %      Basophils Relative 1 %      Absolute Neutrophils 13.75 Thousands/µL      Absolute Immature Grans 0.11 Thousand/uL      Absolute Lymphocytes 1.34 Thousands/µL      Absolute Monocytes 1.07 Thousand/µL      Eosinophils Absolute 0.14 Thousand/µL      Basophils Absolute 0.09 Thousands/µL             FL retrograde pyelogram   Final Interpretation by Tej León MD (05/31 9168)      Fluoroscopic guidance provided for right retrograde pyelogram.      Please see separate procedure report for additional details.         Workstation performed: [id number]         CT abdomen pelvis with contrast   Final Interpretation by Jerod Jennings MD (05/29 7482)      1.  Right-sided hydronephrosis secondary to an 11 mm stone at the ureteropelvic junction.      2.  Additional nonobstructing right renal calculi measuring up to 5 mm.      3.  New biliary ductal  dilatation, possibly related to cholecystectomy. Correlation with liver enzymes recommended.      4.  Stable bibasilar groundglass and reticular opacities, possibly interstitial lung disease.      The study was marked in EPIC for immediate notification.      Workstation performed: [id number]             POC Renal US    Date/Time: 6/5/2025 6:15 PM    Performed by: Sen Jordan MD  Authorized by: Sen Jordan MD    Patient location:  ED  Performed by:  Resident  Procedure details:     Exam Type:  Diagnostic    Indications: flank/back pain      Assessment for:  Suspected hydronephrosis    Views obtained: right kidney      Image quality: limited diagnostic    Findings:     RIGHT hydronephrosis: moderate (grade 2)    Interpretation:     Renal ultrasound impressions: hydronephrosis        ED Medication and Procedure Management   Prior to Admission Medications   Prescriptions Last Dose Informant Patient Reported? Taking?   acetaminophen (TYLENOL) 325 mg tablet  Self No No   Sig: Take 2 tablets (650 mg total) by mouth every 6 (six) hours as needed for mild pain, headaches or fever   aspirin (Ecotrin Low Strength) 81 mg EC tablet 5/28/2025  No Yes   Sig: Take 1 tablet (81 mg total) by mouth daily   buPROPion (WELLBUTRIN XL) 150 mg 24 hr tablet 5/28/2025  No Yes   Sig: Take 1 tablet (150 mg total) by mouth daily   levothyroxine 75 mcg tablet 5/29/2025  No Yes   Sig: TAKE 1 TABLET BY MOUTH EVERY DAY   rosuvastatin (CRESTOR) 20 MG tablet 5/28/2025  No Yes   Sig: Take 1 tablet (20 mg total) by mouth daily   traMADol (Ultram) 50 mg tablet Past Week  No Yes   Sig: Take 0.5 tablets (25 mg total) by mouth 2 (two) times a day as needed for moderate pain      Facility-Administered Medications: None     Discharge Medication List as of 5/31/2025 11:33 AM        START taking these medications    Details   oxybutynin (DITROPAN-XL) 5 mg 24 hr tablet Take 1 tablet (5 mg total) by mouth daily as needed (As needed for  bladder spasms or stent discomfort), Starting Sat 5/31/2025, Normal      tamsulosin (FLOMAX) 0.4 mg Take 1 capsule (0.4 mg total) by mouth daily with dinner, Starting Sat 5/31/2025, Normal           CONTINUE these medications which have NOT CHANGED    Details   aspirin (Ecotrin Low Strength) 81 mg EC tablet Take 1 tablet (81 mg total) by mouth daily, Starting Mon 1/27/2025, Normal      buPROPion (WELLBUTRIN XL) 150 mg 24 hr tablet Take 1 tablet (150 mg total) by mouth daily, Starting Wed 12/18/2024, Normal      levothyroxine 75 mcg tablet TAKE 1 TABLET BY MOUTH EVERY DAY, Starting Fri 5/16/2025, Normal      rosuvastatin (CRESTOR) 20 MG tablet Take 1 tablet (20 mg total) by mouth daily, Starting Fri 1/31/2025, Normal      traMADol (Ultram) 50 mg tablet Take 0.5 tablets (25 mg total) by mouth 2 (two) times a day as needed for moderate pain, Starting Wed 5/21/2025, Normal      acetaminophen (TYLENOL) 325 mg tablet Take 2 tablets (650 mg total) by mouth every 6 (six) hours as needed for mild pain, headaches or fever, Starting Sat 3/5/2022, No Print           Outpatient Discharge Orders   Ambulatory referral to Urology   Standing Status: Future Standing Exp. Date: 05/31/26      Discharge Diet     Activity as tolerated     ED SEPSIS DOCUMENTATION   Time reflects when diagnosis was documented in both MDM as applicable and the Disposition within this note       Time User Action Codes Description Comment    5/29/2025 11:42 AM Maya Barron Add [N13.2] Ureteral stone with hydronephrosis     5/29/2025 12:49 PM Fabrizio Sheikh Add [N20.0] Nephrolithiasis     5/29/2025 12:49 PM Fabrizio Sheikh Add [N13.30] Hydronephrosis                    [1]   Past Medical History:  Diagnosis Date    Arthritis     Cat scratch 12/31/2022    Cataracts, bilateral     Colon polyp     Disease of thyroid gland     Diverticulitis of colon 2019    Gastroesophageal reflux disease 01/08/2024    History of COVID-19 12/31/2022    Hypothyroid      Hypothyroidism due to Hashimoto's thyroiditis 2018    Kidney stone Not sure    Obesity (BMI 35.0-39.9 without comorbidity)     PNA (pneumonia) 2019   [2]   Past Surgical History:  Procedure Laterality Date     SECTION  1983    CHOLECYSTECTOMY      open    COLONOSCOPY      EYE SURGERY  10/2021    FL RETROGRADE PYELOGRAM  2022    FL RETROGRADE PYELOGRAM  2025    CT CYSTO/URETERO W/LITHOTRIPSY &INDWELL STENT INSRT Right 2022    Procedure: CYSTOSCOPY URETEROSCOPY WITH LITHOTRIPSY HOLMIUM LASER, RETROGRADE PYELOGRAM AND INSERTION STENT URETERAL;  Surgeon: Bill Serrano MD;  Location: [facility];  Service: Urology    CT CYSTO/URETERO W/LITHOTRIPSY &INDWELL STENT INSRT Right 2025    Procedure: CYSTOSCOPY URETEROSCOPY WITH LITHOTRIPSY HOLMIUM LASER, RETROGRADE PYELOGRAM AND INSERTION STENT URETERAL;  Surgeon: Giuliano Bowden DO;  Location: [facility];  Service: Urology   [3]   Family History  Problem Relation Name Age of Onset    Cancer Mother Adrianne     Diabetes Mother Adrianne     Cancer Father Vincent     Diabetes Father Vincent     Urolithiasis Sister      No Known Problems Daughter      No Known Problems Maternal Grandmother      No Known Problems Maternal Grandfather      No Known Problems Paternal Grandmother      No Known Problems Paternal Grandfather      No Known Problems Maternal Aunt      No Known Problems Paternal Aunt      No Known Problems Paternal Aunt     [4]   Social History  Tobacco Use    Smoking status: Former     Current packs/day: 0.00     Types: Cigarettes    Smokeless tobacco: Never   Vaping Use    Vaping status: Never Used   Substance Use Topics    Alcohol use: Not Currently    Drug use: Never        Sen Jordan MD  25 [id number]

## 2025-05-29 NOTE — CONSULTS
Consultation - Urology   Name: Yue Art 74 y.o. female I MRN: [id number]  Unit/Bed#: ED-30 I Date of Admission: 5/29/2025   Date of Service: 5/29/2025 I Hospital Day: 0   Inpatient consult to Urology  Consult performed by: Maya Barron PA-C  Consult ordered by: Vaughn Arnold MD        Physician Requesting Evaluation: Vaughn Arnold MD   Reason for Evaluation / Principal Problem: Ureteral stone    Assessment & Plan  Ureteral stone with hydronephrosis  Right-sided hydronephrosis due to 11 mm stone at [facility] there are additional nonobstructing right renal calculi  Vital signs stable, afebrile  UA negative  WBC 16  Received Rocephin in the ED  Discussed with patient cystoscopy, R ureteroscopy, holmium laser lithotripsy, basket stone extraction, retrograde pyelogram, insertion of ureteral stent.  Discussed risk associated with procedure including risk with anesthesia, bleeding, infection, damage to kidneys, ureter, bladder, inability to treat stone, ureteral stricture, need for delayed ureteroscopy for any signs of infection.  Discussed stent colic including frequency, urgency, hematuria, flank pain.  Discussed likely stent only and definitive ureteroscopy as an outpatient        Subjective:   HPI: Yue is a 74-year-old past med history hypothyroidism, HLD, nephrolithiasis who presented to the ED with right-sided flank pain.  Patient reports has been ongoing on and off flank pain for the past 4 weeks.  She takes Tylenol and tramadol at home when she is having the pain.  She reports severe pain started yesterday.  She reports subjective chills this morning, did not take her temperature.  In the ED she was found to have a white blood cell count of 16, vital signs stable, afebrile.  She underwent a CT scan which showed a 11 mm right [facility] stone with additional nonobstructing 5 mm stones.  Creatinine at baseline, UA did not appear infected.  She received Rocephin in the ED.  Urology was consulted for surgical  intervention.    Review of Systems   Constitutional:  Positive for chills. Negative for fever.   Respiratory:  Negative for cough and shortness of breath.    Cardiovascular:  Negative for chest pain and palpitations.   Gastrointestinal:  Positive for abdominal pain. Negative for vomiting.   Genitourinary:  Positive for flank pain and hematuria. Negative for dysuria.   Musculoskeletal:  Negative for arthralgias and back pain.   Skin:  Negative for color change and rash.   All other systems reviewed and are negative.      Objective:    Vitals: Blood pressure 148/71, pulse 72, temperature 98.4 °F (36.9 °C), temperature source Oral, resp. rate 18, SpO2 98%.,There is no height or weight on file to calculate BMI.    Physical Exam  Vitals reviewed.   Constitutional:       General: She is not in acute distress.     Appearance: Normal appearance. She is normal weight. She is not ill-appearing, toxic-appearing or diaphoretic.   HENT:      Head: Normocephalic and atraumatic.      Right Ear: External ear normal.      Left Ear: External ear normal.      Nose: Nose normal.      Mouth/Throat:      Mouth: Mucous membranes are moist.     Eyes:      General: No scleral icterus.     Conjunctiva/sclera: Conjunctivae normal.       Cardiovascular:      Rate and Rhythm: Normal rate.      Pulses: Normal pulses.   Pulmonary:      Effort: Pulmonary effort is normal.     Neurological:      General: No focal deficit present.      Mental Status: She is alert and oriented to person, place, and time. Mental status is at baseline.     Psychiatric:         Mood and Affect: Mood normal.         Behavior: Behavior normal.         Thought Content: Thought content normal.         Judgment: Judgment normal.           Imaging:  CT ABDOMEN AND PELVIS WITH IV CONTRAST     INDICATION: Right flank pain. .     COMPARISON: 12/20/2024     TECHNIQUE: CT examination of the abdomen and pelvis was performed. Multiplanar 2D reformatted images were created from the  source data.     This examination, like all CT scans performed in the [facility], was performed utilizing techniques to minimize radiation dose exposure, including the use of iterative reconstruction and automated exposure control. Radiation dose length   product (DLP) for this visit: 712 mGy-cm.     IV Contrast: 80 mL of iohexol (OMNIPAQUE)  Enteric Contrast: Not administered.     FINDINGS:     ABDOMEN     LOWER CHEST: Bibasilar reticular and groundglass opacity as seen on prior CT of the chest, possibly interstitial lung disease.     LIVER/BILIARY TREE: There is new biliary ductal dilatation which may be related to previous cholecystectomy.     GALLBLADDER: Post cholecystectomy.     SPLEEN: Unremarkable.     PANCREAS: Unremarkable.     ADRENAL GLANDS: Unremarkable.     KIDNEYS/URETERS: There is mild right-sided hydronephrosis with delayed enhancement and perinephric stranding. This is secondary to a large stone at the ureteropelvic junction measuring 8 x 11 mm. There are multiple additional nonobstructing renal calculi   measuring up to 5 mm. There is a stable right parapelvic cyst. Subcentimeter hypoattenuating renal lesion(s), too small to characterize but statistically likely benign, which do not warrant follow-up (Radiology June 2019).     STOMACH AND BOWEL: Unremarkable.     APPENDIX: No findings to suggest appendicitis.     ABDOMINOPELVIC CAVITY: No ascites. No pneumoperitoneum. No lymphadenopathy.     VESSELS: Unremarkable for patient's age.     PELVIS     REPRODUCTIVE ORGANS: Unremarkable for patient's age.     URINARY BLADDER: Unremarkable.     ABDOMINAL WALL/INGUINAL REGIONS: Small fat-containing umbilical hernia.     BONES: No acute fracture or suspicious osseous lesion.     IMPRESSION:     1.  Right-sided hydronephrosis secondary to an 11 mm stone at the ureteropelvic junction.     2.  Additional nonobstructing right renal calculi measuring up to 5 mm.     3.  New biliary ductal  dilatation, possibly related to cholecystectomy. Correlation with liver enzymes recommended.     4.  Stable bibasilar groundglass and reticular opacities, possibly interstitial lung disease.     The study was marked in EPIC for immediate notification.     Workstation performed: [id number]  Labs:  Recent Labs     25  0600   WBC 16.50*       Recent Labs     25  0600   HGB 15.9*     Recent Labs     25  0600   HCT 49.2*     Recent Labs     25  0600   CREATININE 1.08         History:  Past Medical History[1]  Social History[2]  Past Surgical History[3]  Family History[4]    Maya Barron PA-C  Date: 2025 Time: 3:02 PM          [1]   Past Medical History:  Diagnosis Date    Arthritis     Cat scratch 2022    Cataracts, bilateral     Colon polyp     Disease of thyroid gland     Diverticulitis of colon     Gastroesophageal reflux disease 2024    History of COVID-19 2022    Hypothyroid     Kidney stone Not sure    Obesity (BMI 35.0-39.9 without comorbidity)     PNA (pneumonia) 2019   [2]   Social History  Socioeconomic History    Marital status: /Civil Union   Tobacco Use    Smoking status: Former     Current packs/day: 0.00     Types: Cigarettes    Smokeless tobacco: Never   Vaping Use    Vaping status: Never Used   Substance and Sexual Activity    Alcohol use: Not Currently    Drug use: Never    Sexual activity: Not Currently     Partners: Male     Social Drivers of Health     Financial Resource Strain: Low Risk  (2023)    Overall Financial Resource Strain (CARDIA)     Difficulty of Paying Living Expenses: Not hard at all   Transportation Needs: No Transportation Needs (2023)    PRAPARE - Transportation     Lack of Transportation (Medical): No     Lack of Transportation (Non-Medical): No   [3]   Past Surgical History:  Procedure Laterality Date     SECTION  1983    CHOLECYSTECTOMY  1968    open    COLONOSCOPY      EYE SURGERY  10/2021     FL RETROGRADE PYELOGRAM  03/05/2022    [place] CYSTO/URETERO W/LITHOTRIPSY &INDWELL STENT INSRT Right 03/05/2022    Procedure: CYSTOSCOPY URETEROSCOPY WITH LITHOTRIPSY HOLMIUM LASER, RETROGRADE PYELOGRAM AND INSERTION STENT URETERAL;  Surgeon: Bill Serrano MD;  Location: [facility];  Service: Urology   [4]   Family History  Problem Relation Name Age of Onset    Cancer Mother Adrianne     Diabetes Mother Adrianne     Cancer Father Vincent     Diabetes Father Vincent     Urolithiasis Sister      No Known Problems Daughter      No Known Problems Maternal Grandmother      No Known Problems Maternal Grandfather      No Known Problems Paternal Grandmother      No Known Problems Paternal Grandfather      No Known Problems Maternal Aunt      No Known Problems Paternal Aunt      No Known Problems Paternal Aunt

## 2025-05-29 NOTE — ASSESSMENT & PLAN NOTE
POA with flank pain, nausea and chills  CT imaging showing R sided hydro with 11mm stone at [facility]  Urology consulted; plan for intervention later today   Cont pain control, antiemetics, abx as below

## 2025-05-29 NOTE — ED CARE HANDOFF
Emergency Department Sign Out Note        Sign out and transfer of care from Dr Sen Jordan. See Separate Emergency Department note.     The patient, Yue Art, was evaluated by the previous provider for two weeks of flank pain. At the time of transfer, CT abdomen had been ordered but not completed.    Workup Completed:  CT abdomen pelvis    ED Course / Workup Pending (followup):  CT abdomen pelvis resulted showing a significant 11x8mm obstructing stone near the R ureteropelvic junction.  R hydronephrosis also demonstrated. The On-call urology provider was contacted and recommended inpatient admission for possible stent placement given stone is unlikely to pass spontaneously. Urinalysis negative for UTI. Patient was admitted under observation to [facility].         No data recorded                             Procedures  Medical Decision Making  Please refer to Dr. Jordan's note for further information.    This is a 74 year old female presenting with 2 weeks of worsening flank pain. She was initially hypertensive in ED to 244/98, improved to 150/69 with morphine for pain control. CT abdomen pelvis with contrast obtained. She was found to have a large (11x8mm) R kidney stone with hydronephrosis. UA was obtained and negative for UTI. Urology was contacted and would like the patient admitted into the hospital for probable stent placement. The patient was made NPO and admitted to the [facility] service.    Amount and/or Complexity of Data Reviewed  Labs: ordered. Decision-making details documented in ED Course.  Radiology: ordered. Decision-making details documented in ED Course.  ECG/medicine tests:  Decision-making details documented in ED Course.    Risk  OTC drugs.  Prescription drug management.  Decision regarding hospitalization.            Disposition  Final diagnoses:   Nephrolithiasis   Hydronephrosis     Time reflects when diagnosis was documented in both MDM as applicable and the Disposition within  this note       Time User Action Codes Description Comment    5/29/2025 11:42 AM BeatriceMaya jay Add [N13.2] Ureteral stone with hydronephrosis     5/29/2025 12:49 PM Fabrizio Sheikh Add [N20.0] Nephrolithiasis     5/29/2025 12:49 PM Fabrizio Sheikh Add [N13.30] Hydronephrosis           ED Disposition       ED Disposition   Admit    Condition   Stable    Date/Time   Thu May 29, 2025 12:49 PM    Comment   Case was discussed with Dr. Tejada and the patient's admission status was agreed to be Admission Status: observation status to the service of Dr. Arnold .               Follow-up Information    None       Patient's Medications   Discharge Prescriptions    No medications on file     No discharge procedures on file.       ED Provider  Electronically Signed by     Fabrizio Sheikh MD  05/29/25 [id number]

## 2025-05-29 NOTE — H&P
"H&P - Hospitalist   Name: Yue Art 74 y.o. female I MRN: [id number]  Unit/Bed#: ED-30 I Date of Admission: 5/29/2025   Date of Service: 5/29/2025 I Hospital Day: 0     Assessment & Plan  Ureteral stone with hydronephrosis  POA with flank pain, nausea and chills  CT imaging showing R sided hydro with 11mm stone at [facility]  Urology consulted; plan for intervention later today   Cont pain control, antiemetics, abx as below   UTI (urinary tract infection)  POA with chills and perinephric stranding on CT imaging  UA \"clean\" but likely sampled from nonobstructed kidney   Cont ceftriaxone   Hypothyroidism due to Hashimoto's thyroiditis  Cont synthroid       VTE Pharmacologic Prophylaxis: VTE Score: 5 High Risk (Score >/= 5) - Pharmacological DVT Prophylaxis Ordered: enoxaparin (Lovenox). Sequential Compression Devices Ordered.  Code Status: Level 1 - Full Code   Discussion with family: Updated  (daughter) at bedside.    Anticipated Length of Stay: Patient will be admitted on an observation basis with an anticipated length of stay of less than 2 midnights secondary to hydronephrosis d/t obstructing stone.    History of Present Illness     Chief Complaint:   flank pain     Yue Atr is a 74 y.o. female with a PMH of hypothyroidism, hyperlipidemia, nephrolithiasis who presents with right-sided flank pain.  Symptoms started yesterday evening and continued to progress.  She complained of shaking chills but no fevers.  Has right-sided pain as well as nausea.  No dysuria or hematuria.     Review of Systems   Constitutional:  Positive for chills and diaphoresis. Negative for fever.   Gastrointestinal:  Positive for constipation and nausea.   Genitourinary:  Positive for flank pain.   Skin: Negative.    Psychiatric/Behavioral: Negative.     All other systems reviewed and are negative.      Historical Information   Past Medical History[1]  Past Surgical History[2]  Social History[3]  E-Cigarette/Vaping    " E-Cigarette Use Never User      E-Cigarette/Vaping Substances    Nicotine No     THC No     CBD No     Flavoring No     Other No     Unknown No      Family history non-contributory  Social History:  Marital Status: /Civil Union   Occupation:   Patient Pre-hospital Living Situation: Home  Patient Pre-hospital Level of Mobility: walks  Patient Pre-hospital Diet Restrictions:     Meds/Allergies   I have reviewed home medications with patient personally.  Prior to Admission medications    Medication Sig Start Date End Date Taking? Authorizing Provider   traMADol (Ultram) 50 mg tablet Take 0.5 tablets (25 mg total) by mouth 2 (two) times a day as needed for moderate pain 5/21/25   Henna Velazquez MD   acetaminophen (TYLENOL) 325 mg tablet Take 2 tablets (650 mg total) by mouth every 6 (six) hours as needed for mild pain, headaches or fever 3/5/22   Tamy Noland PA-C   aspirin (Ecotrin Low Strength) 81 mg EC tablet Take 1 tablet (81 mg total) by mouth daily 1/27/25   Mitzy Mitchell PA-C   buPROPion (WELLBUTRIN XL) 150 mg 24 hr tablet Take 1 tablet (150 mg total) by mouth daily 12/18/24   RILEY Kinney   Cholecalciferol (Vitamin D3) 1.25 MG (00782 UT) capsule Take 1 capsule (50,000 Units total) by mouth once a week for 8 doses  Patient not taking: Reported on 12/18/2024 4/19/24 6/8/24  Vargas Hinojosa MD   famotidine (PEPCID) 20 mg tablet Take 1 tablet (20 mg total) by mouth daily  Patient not taking: Reported on 4/28/2025 2/19/25 5/20/25  Vargas Hinojosa MD   levothyroxine 75 mcg tablet TAKE 1 TABLET BY MOUTH EVERY DAY 5/16/25   Vargas Hinojosa MD   pantoprazole (PROTONIX) 20 mg tablet TAKE 1 TABLET BY MOUTH EVERY DAY 2/5/25   Vargas Hinojosa MD   rosuvastatin (CRESTOR) 20 MG tablet Take 1 tablet (20 mg total) by mouth daily 1/31/25   Vargas Hinojosa MD   cyanocobalamin (VITAMIN B-12) 1000 MCG tablet Take 1,000 mcg by mouth daily  5/29/25  Historical Provider, MD      Allergies   Allergen Reactions    Codeine Anaphylaxis     Increased Heart Rate, Palpitations       Objective :  Temp:  [98.4 °F (36.9 °C)] 98.4 °F (36.9 °C)  HR:  [71-85] 72  BP: (148-224)/(71-98) 148/71  Resp:  [17-20] 18  SpO2:  [96 %-98 %] 98 %  O2 Device: None (Room air)    Physical Exam  Vitals and nursing note reviewed.   Constitutional:       Appearance: Normal appearance. She is ill-appearing and diaphoretic. She is not toxic-appearing.   HENT:      Head: Normocephalic and atraumatic.     Cardiovascular:      Rate and Rhythm: Normal rate and regular rhythm.      Heart sounds: No murmur heard.  Pulmonary:      Effort: Pulmonary effort is normal. No respiratory distress.      Breath sounds: No wheezing or rales.   Chest:      Chest wall: No tenderness.   Abdominal:      General: There is no distension.      Tenderness: There is no abdominal tenderness. There is right CVA tenderness. There is no left CVA tenderness.     Musculoskeletal:      Right lower leg: No edema.      Left lower leg: No edema.     Skin:     Coloration: Skin is not jaundiced.      Findings: No bruising, lesion or rash.     Neurological:      General: No focal deficit present.      Mental Status: She is alert and oriented to person, place, and time.     Psychiatric:         Mood and Affect: Mood normal.         Behavior: Behavior normal.          Lines/Drains:            Lab Results: I have reviewed the following results:  Results from last 7 days   Lab Units 05/29/25  0600   WBC Thousand/uL 16.50*   HEMOGLOBIN g/dL 15.9*   HEMATOCRIT % 49.2*   PLATELETS Thousands/uL 287   SEGS PCT % 82*   LYMPHO PCT % 8*   MONO PCT % 7   EOS PCT % 1     Results from last 7 days   Lab Units 05/29/25  0600   SODIUM mmol/L 137   POTASSIUM mmol/L 4.3   CHLORIDE mmol/L 102   CO2 mmol/L 26   BUN mg/dL 23   CREATININE mg/dL 1.08   ANION GAP mmol/L 9   CALCIUM mg/dL 9.4   ALBUMIN g/dL 4.5   TOTAL BILIRUBIN mg/dL 0.59   ALK PHOS U/L 79   ALT U/L 7   AST U/L 17    GLUCOSE RANDOM mg/dL 143*             Lab Results   Component Value Date    HGBA1C 5.4 10/28/2024    HGBA1C 6.1 (H) 2024    HGBA1C 5.5 2022           Imaging Results Review: I reviewed radiology reports from this admission including: CT abdomen/pelvis.  Other Study Results Review: No additional pertinent studies reviewed.      ** Please Note: This note has been constructed using a voice recognition system. **         [1]   Past Medical History:  Diagnosis Date    Arthritis     Cat scratch 2022    Cataracts, bilateral     Colon polyp     Disease of thyroid gland     Diverticulitis of colon     Gastroesophageal reflux disease 2024    History of COVID-19 2022    Hypothyroid     Kidney stone Not sure    Obesity (BMI 35.0-39.9 without comorbidity)     PNA (pneumonia) 2019   [2]   Past Surgical History:  Procedure Laterality Date     SECTION  1983    CHOLECYSTECTOMY  1968    open    COLONOSCOPY      EYE SURGERY  10/2021    FL RETROGRADE PYELOGRAM  2022    NH CYSTO/URETERO W/LITHOTRIPSY &INDWELL STENT INSRT Right 2022    Procedure: CYSTOSCOPY URETEROSCOPY WITH LITHOTRIPSY HOLMIUM LASER, RETROGRADE PYELOGRAM AND INSERTION STENT URETERAL;  Surgeon: Bill Serrano MD;  Location: [facility];  Service: Urology   [3]   Social History  Tobacco Use    Smoking status: Former     Current packs/day: 0.00     Types: Cigarettes    Smokeless tobacco: Never   Vaping Use    Vaping status: Never Used   Substance and Sexual Activity    Alcohol use: Not Currently    Drug use: Never    Sexual activity: Not Currently     Partners: Male

## 2025-05-29 NOTE — PLAN OF CARE
Problem: PAIN - ADULT  Goal: Verbalizes/displays adequate comfort level or baseline comfort level  Description: Interventions:  - Encourage patient to monitor pain and request assistance  - Assess pain using appropriate pain scale  - Administer analgesics as ordered based on type and severity of pain and evaluate response  - Implement non-pharmacological measures as appropriate and evaluate response  - Consider cultural and social influences on pain and pain management  - Notify physician/advanced practitioner if interventions unsuccessful or patient reports new pain  - Educate patient/family on pain management process including their role and importance of  reporting pain   - Provide non-pharmacologic/complimentary pain relief interventions  Outcome: Progressing     Problem: INFECTION - ADULT  Goal: Absence or prevention of progression during hospitalization  Description: INTERVENTIONS:  - Assess and monitor for signs and symptoms of infection  - Monitor lab/diagnostic results  - Monitor all insertion sites, i.e. indwelling lines, tubes, and drains  - Monitor endotracheal if appropriate and nasal secretions for changes in amount and color  - [place] appropriate cooling/warming therapies per order  - Administer medications as ordered  - Instruct and encourage patient and family to use good hand hygiene technique  - Identify and instruct in appropriate isolation precautions for identified infection/condition  Outcome: Progressing  Goal: Absence of fever/infection during neutropenic period  Description: INTERVENTIONS:  - Monitor WBC  - Perform strict hand hygiene  - Limit to healthy visitors only  - No plants, dried, fresh or silk flowers with lr in patient room  Outcome: Progressing     Problem: SAFETY ADULT  Goal: Patient will remain free of falls  Description: INTERVENTIONS:  - Educate patient/family on patient safety including physical limitations  - Instruct patient to call for assistance with activity   -  Consider consulting OT/PT to assist with strengthening/mobility based on AM PAC & JH-HLM score  - Consult OT/PT to assist with strengthening/mobility   - Keep Call bell within reach  - Keep bed low and locked with side rails adjusted as appropriate  - Keep care items and personal belongings within reach  - Initiate and maintain comfort rounds  - Make Fall Risk Sign visible to staff  - Offer Toileting every 2 Hours, in advance of need  - Initiate/Maintain alarm  - Obtain necessary fall risk management equipment  - Apply yellow socks and bracelet for high fall risk patients  - Consider moving patient to room near nurses station  Outcome: Progressing  Goal: Maintain or return to baseline ADL function  Description: INTERVENTIONS:  -  Assess patient's ability to carry out ADLs; assess patient's baseline for ADL function and identify physical deficits which impact ability to perform ADLs (bathing, care of mouth/teeth, toileting, grooming, dressing, etc.)  - Assess/evaluate cause of self-care deficits   - Assess range of motion  - Assess patient's mobility; develop plan if impaired  - Assess patient's need for assistive devices and provide as appropriate  - Encourage maximum independence but intervene and supervise when necessary  - Involve family in performance of ADLs  - Assess for home care needs following discharge   - Consider OT consult to assist with ADL evaluation and planning for discharge  - Provide patient education as appropriate  - Monitor functional capacity and physical performance, use of AM PAC & JH-HLM   - Monitor gait, balance and fatigue with ambulation    Outcome: Progressing  Goal: Maintains/Returns to pre admission functional level  Description: INTERVENTIONS:  - Perform AM-PAC 6 Click Basic Mobility/ Daily Activity assessment daily.  - Set and communicate daily mobility goal to care team and patient/family/caregiver.   - Collaborate with rehabilitation services on mobility goals if consulted  -  Perform Range of Motion 3 times a day.  - Reposition patient every 2 hours.  - Dangle patient 3 times a day  - Stand patient 3 times a day  - Ambulate patient 3 times a day  - Out of bed to chair 3 times a day   - Out of bed for meals 3 times a day  - Out of bed for toileting  - Record patient progress and toleration of activity level   Outcome: Progressing     Problem: DISCHARGE PLANNING  Goal: Discharge to home or other facility with appropriate resources  Description: INTERVENTIONS:  - Identify barriers to discharge w/patient and caregiver  - Arrange for needed discharge resources and transportation as appropriate  - Identify discharge learning needs (meds, wound care, etc.)  - Arrange for interpretive services to assist at discharge as needed  - Refer to Case Management Department for coordinating discharge planning if the patient needs post-hospital services based on physician/advanced practitioner order or complex needs related to functional status, cognitive ability, or social support system  Outcome: Progressing     Problem: Knowledge Deficit  Goal: Patient/family/caregiver demonstrates understanding of disease process, treatment plan, medications, and discharge instructions  Description: Complete learning assessment and assess knowledge base.  Interventions:  - Provide teaching at level of understanding  - Provide teaching via preferred learning methods  Outcome: Progressing     Problem: GENITOURINARY - ADULT  Goal: Maintains or returns to baseline urinary function  Description: INTERVENTIONS:  - Assess urinary function  - Encourage oral fluids to ensure adequate hydration if ordered  - Administer IV fluids as ordered to ensure adequate hydration  - Administer ordered medications as needed  - Offer frequent toileting  - Follow urinary retention protocol if ordered  Outcome: Progressing     Problem: METABOLIC, FLUID AND ELECTROLYTES - ADULT  Goal: Electrolytes maintained within normal limits  Description:  INTERVENTIONS:  - Monitor labs and assess patient for signs and symptoms of electrolyte imbalances  - Administer electrolyte replacement as ordered  - Monitor response to electrolyte replacements, including repeat lab results as appropriate  - Instruct patient on fluid and nutrition as appropriate  Outcome: Progressing  Goal: Fluid balance maintained  Description: INTERVENTIONS:  - Monitor labs   - Monitor I/O and WT  - Instruct patient on fluid and nutrition as appropriate  - Assess for signs & symptoms of volume excess or deficit  Outcome: Progressing     Problem: HEMATOLOGIC - ADULT  Goal: Maintains hematologic stability  Description: INTERVENTIONS  - Assess for signs and symptoms of bleeding or hemorrhage  - Monitor labs  - Administer supportive blood products/factors as ordered and appropriate  Outcome: Progressing

## 2025-05-29 NOTE — ASSESSMENT & PLAN NOTE
Right-sided hydronephrosis due to 11 mm stone at [facility] there are additional nonobstructing right renal calculi  Vital signs stable, afebrile  UA negative  WBC 16  Received Rocephin in the ED  Discussed with patient cystoscopy, R ureteroscopy, holmium laser lithotripsy, basket stone extraction, retrograde pyelogram, insertion of ureteral stent.  Discussed risk associated with procedure including risk with anesthesia, bleeding, infection, damage to kidneys, ureter, bladder, inability to treat stone, ureteral stricture, need for delayed ureteroscopy for any signs of infection.  Discussed stent colic including frequency, urgency, hematuria, flank pain.  Discussed likely stent only and definitive ureteroscopy as an outpatient

## 2025-05-29 NOTE — ASSESSMENT & PLAN NOTE
"POA with chills and perinephric stranding on CT imaging  UA \"clean\" but likely sampled from nonobstructed kidney   Cont ceftriaxone   "

## 2025-05-30 ENCOUNTER — ANESTHESIA (INPATIENT)
Dept: PERIOP | Facility: HOSPITAL | Age: 75
DRG: 661 | End: 2025-05-30
Payer: MEDICARE

## 2025-05-30 ENCOUNTER — APPOINTMENT (INPATIENT)
Dept: RADIOLOGY | Facility: HOSPITAL | Age: 75
DRG: 661 | End: 2025-05-30
Payer: MEDICARE

## 2025-05-30 ENCOUNTER — ANESTHESIA EVENT (INPATIENT)
Dept: PERIOP | Facility: HOSPITAL | Age: 75
DRG: 661 | End: 2025-05-30
Payer: MEDICARE

## 2025-05-30 ENCOUNTER — TELEPHONE (OUTPATIENT)
Dept: UROLOGY | Facility: CLINIC | Age: 75
End: 2025-05-30

## 2025-05-30 LAB
ALBUMIN SERPL BCG-MCNC: 3.6 G/DL (ref 3.5–5)
ALP SERPL-CCNC: 61 U/L (ref 34–104)
ALT SERPL W P-5'-P-CCNC: 5 U/L (ref 7–52)
ANION GAP SERPL CALCULATED.3IONS-SCNC: 7 MMOL/L (ref 4–13)
AST SERPL W P-5'-P-CCNC: 18 U/L (ref 13–39)
BASOPHILS # BLD AUTO: 0.07 THOUSANDS/ÂΜL (ref 0–0.1)
BASOPHILS NFR BLD AUTO: 1 % (ref 0–1)
BILIRUB SERPL-MCNC: 0.52 MG/DL (ref 0.2–1)
BUN SERPL-MCNC: 20 MG/DL (ref 5–25)
CALCIUM SERPL-MCNC: 8.5 MG/DL (ref 8.4–10.2)
CHLORIDE SERPL-SCNC: 104 MMOL/L (ref 96–108)
CO2 SERPL-SCNC: 26 MMOL/L (ref 21–32)
CREAT SERPL-MCNC: 1.29 MG/DL (ref 0.6–1.3)
EOSINOPHIL # BLD AUTO: 0.19 THOUSAND/ÂΜL (ref 0–0.61)
EOSINOPHIL NFR BLD AUTO: 2 % (ref 0–6)
ERYTHROCYTE [DISTWIDTH] IN BLOOD BY AUTOMATED COUNT: 13.6 % (ref 11.6–15.1)
GFR SERPL CREATININE-BSD FRML MDRD: 40 ML/MIN/1.73SQ M
GLUCOSE SERPL-MCNC: 88 MG/DL (ref 65–140)
HCT VFR BLD AUTO: 41.3 % (ref 34.8–46.1)
HGB BLD-MCNC: 13.1 G/DL (ref 11.5–15.4)
IMM GRANULOCYTES # BLD AUTO: 0.08 THOUSAND/UL (ref 0–0.2)
IMM GRANULOCYTES NFR BLD AUTO: 1 % (ref 0–2)
LYMPHOCYTES # BLD AUTO: 1.35 THOUSANDS/ÂΜL (ref 0.6–4.47)
LYMPHOCYTES NFR BLD AUTO: 11 % (ref 14–44)
MCH RBC QN AUTO: 29 PG (ref 26.8–34.3)
MCHC RBC AUTO-ENTMCNC: 31.7 G/DL (ref 31.4–37.4)
MCV RBC AUTO: 92 FL (ref 82–98)
MONOCYTES # BLD AUTO: 1.22 THOUSAND/ÂΜL (ref 0.17–1.22)
MONOCYTES NFR BLD AUTO: 10 % (ref 4–12)
NEUTROPHILS # BLD AUTO: 9.18 THOUSANDS/ÂΜL (ref 1.85–7.62)
NEUTS SEG NFR BLD AUTO: 75 % (ref 43–75)
NRBC BLD AUTO-RTO: 0 /100 WBCS
PLATELET # BLD AUTO: 230 THOUSANDS/UL (ref 149–390)
PMV BLD AUTO: 9.8 FL (ref 8.9–12.7)
POTASSIUM SERPL-SCNC: 4.2 MMOL/L (ref 3.5–5.3)
PROT SERPL-MCNC: 6.2 G/DL (ref 6.4–8.4)
RBC # BLD AUTO: 4.51 MILLION/UL (ref 3.81–5.12)
SODIUM SERPL-SCNC: 137 MMOL/L (ref 135–147)
WBC # BLD AUTO: 12.09 THOUSAND/UL (ref 4.31–10.16)

## 2025-05-30 PROCEDURE — 74420 UROGRAPHY RTRGR +-KUB: CPT

## 2025-05-30 PROCEDURE — 80053 COMPREHEN METABOLIC PANEL: CPT | Performed by: HOSPITALIST

## 2025-05-30 PROCEDURE — 99232 SBSQ HOSP IP/OBS MODERATE 35: CPT | Performed by: UROLOGY

## 2025-05-30 PROCEDURE — 0T768DZ DILATION OF RIGHT URETER WITH INTRALUMINAL DEVICE, VIA NATURAL OR ARTIFICIAL OPENING ENDOSCOPIC: ICD-10-PCS | Performed by: UROLOGY

## 2025-05-30 PROCEDURE — C1769 GUIDE WIRE: HCPCS | Performed by: UROLOGY

## 2025-05-30 PROCEDURE — 85025 COMPLETE CBC W/AUTO DIFF WBC: CPT | Performed by: HOSPITALIST

## 2025-05-30 PROCEDURE — C2625 STENT, NON-COR, TEM W/DEL SY: HCPCS | Performed by: UROLOGY

## 2025-05-30 PROCEDURE — BT1D1ZZ FLUOROSCOPY OF RIGHT KIDNEY, URETER AND BLADDER USING LOW OSMOLAR CONTRAST: ICD-10-PCS | Performed by: UROLOGY

## 2025-05-30 PROCEDURE — 0TC68ZZ EXTIRPATION OF MATTER FROM RIGHT URETER, VIA NATURAL OR ARTIFICIAL OPENING ENDOSCOPIC: ICD-10-PCS | Performed by: UROLOGY

## 2025-05-30 PROCEDURE — 99232 SBSQ HOSP IP/OBS MODERATE 35: CPT | Performed by: PHYSICIAN ASSISTANT

## 2025-05-30 PROCEDURE — 52356 CYSTO/URETERO W/LITHOTRIPSY: CPT | Performed by: UROLOGY

## 2025-05-30 PROCEDURE — C1758 CATHETER, URETERAL: HCPCS | Performed by: UROLOGY

## 2025-05-30 DEVICE — INLAY OPTIMA URETERAL STENT W/O GUIDEWIRE
Type: IMPLANTABLE DEVICE | Site: URETER | Status: FUNCTIONAL
Brand: BARD® INLAY OPTIMA® URETERAL STENT

## 2025-05-30 RX ORDER — PROPOFOL 10 MG/ML
INJECTION, EMULSION INTRAVENOUS AS NEEDED
Status: DISCONTINUED | OUTPATIENT
Start: 2025-05-30 | End: 2025-05-30

## 2025-05-30 RX ORDER — HYDROMORPHONE HCL/PF 1 MG/ML
0.5 SYRINGE (ML) INJECTION
Refills: 0 | Status: DISCONTINUED | OUTPATIENT
Start: 2025-05-30 | End: 2025-05-30 | Stop reason: HOSPADM

## 2025-05-30 RX ORDER — SCOPOLAMINE 1 MG/3D
1 PATCH, EXTENDED RELEASE TRANSDERMAL
Status: DISCONTINUED | OUTPATIENT
Start: 2025-05-30 | End: 2025-05-31 | Stop reason: HOSPADM

## 2025-05-30 RX ORDER — OXYBUTYNIN CHLORIDE 5 MG/1
5 TABLET, EXTENDED RELEASE ORAL DAILY PRN
Status: DISCONTINUED | OUTPATIENT
Start: 2025-05-30 | End: 2025-05-31 | Stop reason: HOSPADM

## 2025-05-30 RX ORDER — ONDANSETRON 2 MG/ML
4 INJECTION INTRAMUSCULAR; INTRAVENOUS ONCE AS NEEDED
Status: DISCONTINUED | OUTPATIENT
Start: 2025-05-30 | End: 2025-05-30 | Stop reason: HOSPADM

## 2025-05-30 RX ORDER — ONDANSETRON 2 MG/ML
INJECTION INTRAMUSCULAR; INTRAVENOUS AS NEEDED
Status: DISCONTINUED | OUTPATIENT
Start: 2025-05-30 | End: 2025-05-30

## 2025-05-30 RX ORDER — DEXAMETHASONE SODIUM PHOSPHATE 10 MG/ML
INJECTION, SOLUTION INTRAMUSCULAR; INTRAVENOUS AS NEEDED
Status: DISCONTINUED | OUTPATIENT
Start: 2025-05-30 | End: 2025-05-30

## 2025-05-30 RX ORDER — MAGNESIUM HYDROXIDE 1200 MG/15ML
LIQUID ORAL AS NEEDED
Status: DISCONTINUED | OUTPATIENT
Start: 2025-05-30 | End: 2025-05-30 | Stop reason: HOSPADM

## 2025-05-30 RX ORDER — SODIUM CHLORIDE, SODIUM LACTATE, POTASSIUM CHLORIDE, CALCIUM CHLORIDE 600; 310; 30; 20 MG/100ML; MG/100ML; MG/100ML; MG/100ML
INJECTION, SOLUTION INTRAVENOUS CONTINUOUS PRN
Status: DISCONTINUED | OUTPATIENT
Start: 2025-05-30 | End: 2025-05-30

## 2025-05-30 RX ORDER — FENTANYL CITRATE/PF 50 MCG/ML
25 SYRINGE (ML) INJECTION
Refills: 0 | Status: DISCONTINUED | OUTPATIENT
Start: 2025-05-30 | End: 2025-05-30 | Stop reason: HOSPADM

## 2025-05-30 RX ORDER — CEFAZOLIN SODIUM 2 G/50ML
2000 SOLUTION INTRAVENOUS ONCE
Status: DISCONTINUED | OUTPATIENT
Start: 2025-05-30 | End: 2025-05-30 | Stop reason: HOSPADM

## 2025-05-30 RX ORDER — LIDOCAINE HYDROCHLORIDE 10 MG/ML
INJECTION, SOLUTION EPIDURAL; INFILTRATION; INTRACAUDAL; PERINEURAL AS NEEDED
Status: DISCONTINUED | OUTPATIENT
Start: 2025-05-30 | End: 2025-05-30

## 2025-05-30 RX ADMIN — BUPROPION HYDROCHLORIDE 150 MG: 150 TABLET, EXTENDED RELEASE ORAL at 08:24

## 2025-05-30 RX ADMIN — SODIUM CHLORIDE, SODIUM LACTATE, POTASSIUM CHLORIDE, AND CALCIUM CHLORIDE: .6; .31; .03; .02 INJECTION, SOLUTION INTRAVENOUS at 16:06

## 2025-05-30 RX ADMIN — ATORVASTATIN CALCIUM 40 MG: 40 TABLET, FILM COATED ORAL at 18:13

## 2025-05-30 RX ADMIN — PROPOFOL 150 MG: 10 INJECTION, EMULSION INTRAVENOUS at 16:10

## 2025-05-30 RX ADMIN — CEFTRIAXONE SODIUM 1000 MG: 10 INJECTION, POWDER, FOR SOLUTION INTRAVENOUS at 13:43

## 2025-05-30 RX ADMIN — ASPIRIN 81 MG: 81 TABLET ORAL at 08:24

## 2025-05-30 RX ADMIN — SODIUM CHLORIDE 125 ML/HR: 0.9 INJECTION, SOLUTION INTRAVENOUS at 03:09

## 2025-05-30 RX ADMIN — LEVOTHYROXINE SODIUM 75 MCG: 75 TABLET ORAL at 05:13

## 2025-05-30 RX ADMIN — ONDANSETRON 4 MG: 2 INJECTION INTRAMUSCULAR; INTRAVENOUS at 16:10

## 2025-05-30 RX ADMIN — ENOXAPARIN SODIUM 40 MG: 40 INJECTION SUBCUTANEOUS at 08:24

## 2025-05-30 RX ADMIN — DEXAMETHASONE SODIUM PHOSPHATE 10 MG: 10 INJECTION INTRAMUSCULAR; INTRAVENOUS at 16:10

## 2025-05-30 RX ADMIN — HYDROMORPHONE HYDROCHLORIDE 0.2 MG: 0.2 INJECTION, SOLUTION INTRAMUSCULAR; INTRAVENOUS; SUBCUTANEOUS at 03:08

## 2025-05-30 RX ADMIN — HYDROMORPHONE HYDROCHLORIDE 0.2 MG: 0.2 INJECTION, SOLUTION INTRAMUSCULAR; INTRAVENOUS; SUBCUTANEOUS at 11:07

## 2025-05-30 RX ADMIN — LIDOCAINE HYDROCHLORIDE 50 MG: 10 INJECTION, SOLUTION EPIDURAL; INFILTRATION; INTRACAUDAL at 16:10

## 2025-05-30 RX ADMIN — HYDROMORPHONE HYDROCHLORIDE 0.2 MG: 0.2 INJECTION, SOLUTION INTRAMUSCULAR; INTRAVENOUS; SUBCUTANEOUS at 08:24

## 2025-05-30 RX ADMIN — ACETAMINOPHEN 650 MG: 325 TABLET ORAL at 19:36

## 2025-05-30 RX ADMIN — PROPOFOL 100 MCG/KG/MIN: 10 INJECTION, EMULSION INTRAVENOUS at 16:11

## 2025-05-30 RX ADMIN — HYDROMORPHONE HYDROCHLORIDE 0.2 MG: 0.2 INJECTION, SOLUTION INTRAMUSCULAR; INTRAVENOUS; SUBCUTANEOUS at 13:47

## 2025-05-30 RX ADMIN — SCOPOLAMINE 1 PATCH: 1.5 PATCH, EXTENDED RELEASE TRANSDERMAL at 18:09

## 2025-05-30 RX ADMIN — SODIUM CHLORIDE 125 ML/HR: 0.9 INJECTION, SOLUTION INTRAVENOUS at 18:13

## 2025-05-30 NOTE — PROGRESS NOTES
"Progress Note - Hospitalist   Name: Yue Art 74 y.o. female I MRN: [id number]  Unit/Bed#: S -01 I Date of Admission: 5/29/2025   Date of Service: 5/30/2025 I Hospital Day: 0    Assessment & Plan  Ureteral stone with hydronephrosis  POA with flank pain, nausea and chills  CT imaging showing R sided hydro with 11mm stone at [facility]  Urology consulted  Plan for intervention later today   NPO  Continue pain control, antiemetics  UA bland, but stranding noted on CT scan so continue Rocephin   UTI (urinary tract infection)  POA with chills and perinephric stranding on CT imaging  UA \"clean\" but likely sampled from nonobstructed kidney   Continue ceftriaxone   Hypothyroidism due to Hashimoto's thyroiditis  Continue synthroid     VTE Pharmacologic Prophylaxis: VTE Score: 5 High Risk (Score >/= 5) - Pharmacological DVT Prophylaxis Ordered: enoxaparin (Lovenox). Sequential Compression Devices Ordered.    Mobility:   Basic Mobility Inpatient Raw Score: 24  JH-HLM Goal: 8: Walk 250 feet or more  JH-HLM Achieved: 7: Walk 25 feet or more  JH-HLM Goal NOT achieved. Continue with multidisciplinary rounding and encourage appropriate mobility to improve upon JH-HLM goals.    Patient Centered Rounds: I performed bedside rounds with nursing staff today.   Discussions with Specialists or Other Care Team Provider: Discussed with RN, CM     Education and Discussions with Family / Patient: Updated  () at bedside.    Current Length of Stay: 0 day(s)  Current Patient Status: Observation   Certification Statement: The patient, admitted on an observation basis, will now require > 2 midnight hospital stay due to cystoscopy tonight, monitoring post-operative  Discharge Plan: Anticipate discharge tomorrow to home.    Code Status: Level 1 - Full Code    Subjective   Patient reports that she is comfortable right now. States that she has some right flank and back pain. Denies nausea or vomiting. Denies hematuria. " Denies fevers or chills.     Objective :  Temp:  [98.6 °F (37 °C)] 98.6 °F (37 °C)  HR:  [72-81] 75  BP: (148-163)/(69-73) 150/69  Resp:  [18] 18  SpO2:  [97 %-98 %] 97 %  O2 Device: None (Room air)    Body mass index is 35.94 kg/m².     Input and Output Summary (last 24 hours):     Intake/Output Summary (Last 24 hours) at 5/30/2025 0902  Last data filed at 5/30/2025 0309  Gross per 24 hour   Intake 1050 ml   Output --   Net 1050 ml       Physical Exam  Vitals and nursing note reviewed.   Constitutional:       General: She is not in acute distress.     Appearance: Normal appearance. She is overweight. She is not ill-appearing or diaphoretic.   HENT:      Head: Normocephalic and atraumatic.      Mouth/Throat:      Mouth: Mucous membranes are moist.     Eyes:      General: No scleral icterus.     Pupils: Pupils are equal, round, and reactive to light.       Cardiovascular:      Rate and Rhythm: Normal rate and regular rhythm.      Pulses: Normal pulses.      Heart sounds: Normal heart sounds, S1 normal and S2 normal. No murmur heard.     No systolic murmur is present.      No diastolic murmur is present.      No gallop. No S3 or S4 sounds.   Pulmonary:      Effort: Pulmonary effort is normal. No accessory muscle usage or respiratory distress.      Breath sounds: Normal breath sounds. No stridor. No decreased breath sounds, wheezing, rhonchi or rales.   Chest:      Chest wall: No tenderness.   Abdominal:      General: Bowel sounds are normal. There is no distension.      Palpations: Abdomen is soft.      Tenderness: There is abdominal tenderness in the right lower quadrant. There is right CVA tenderness. There is no guarding.     Musculoskeletal:      Right lower leg: No edema.      Left lower leg: No edema.     Skin:     General: Skin is warm and dry.      Coloration: Skin is not jaundiced.     Neurological:      General: No focal deficit present.      Mental Status: She is alert. Mental status is at baseline.       Motor: No tremor or seizure activity.     Psychiatric:         Behavior: Behavior is cooperative.           Lines/Drains:              Lab Results: I have reviewed the following results:   Results from last 7 days   Lab Units 05/30/25  0513   WBC Thousand/uL 12.09*   HEMOGLOBIN g/dL 13.1   HEMATOCRIT % 41.3   PLATELETS Thousands/uL 230   SEGS PCT % 75   LYMPHO PCT % 11*   MONO PCT % 10   EOS PCT % 2     Results from last 7 days   Lab Units 05/30/25  0513   SODIUM mmol/L 137   POTASSIUM mmol/L 4.2   CHLORIDE mmol/L 104   CO2 mmol/L 26   BUN mg/dL 20   CREATININE mg/dL 1.29   ANION GAP mmol/L 7   CALCIUM mg/dL 8.5   ALBUMIN g/dL 3.6   TOTAL BILIRUBIN mg/dL 0.52   ALK PHOS U/L 61   ALT U/L 5*   AST U/L 18   GLUCOSE RANDOM mg/dL 88                       Recent Cultures (last 7 days):         Imaging Results Review: No pertinent imaging studies reviewed.  Other Study Results Review: No additional pertinent studies reviewed.    Last 24 Hours Medication List:     Current Facility-Administered Medications:     acetaminophen (TYLENOL) tablet 650 mg, Q6H PRN    aspirin (ECOTRIN LOW STRENGTH) EC tablet 81 mg, Daily    atorvastatin (LIPITOR) tablet 40 mg, Daily With Dinner    buPROPion (WELLBUTRIN XL) 24 hr tablet 150 mg, Daily    ceftriaxone (ROCEPHIN) 1 g/50 mL in dextrose IVPB, Q24H, Last Rate: Stopped (05/29/25 1507)    enoxaparin (LOVENOX) subcutaneous injection 40 mg, Daily    HYDROmorphone (DILAUDID) injection 0.5 mg, Q4H PRN    HYDROmorphone HCl (DILAUDID) injection 0.2 mg, Q4H PRN    HYDROmorphone HCl (DILAUDID) injection 0.2 mg, Q2H PRN    levothyroxine tablet 75 mcg, Early Morning    ondansetron (ZOFRAN) injection 4 mg, Q6H PRN    sodium chloride 0.9 % infusion, Continuous, Last Rate: 125 mL/hr (05/30/25 0309)    Administrative Statements   Today, Patient Was Seen By: Oscar Cervantes PA-C  I have spent a total time of 35 minutes in caring for this patient on the day of the visit/encounter including Diagnostic  results, Instructions for management, Impressions, Counseling / Coordination of care, Documenting in the medical record, Reviewing/placing orders in the medical record (including tests, medications, and/or procedures), Obtaining or reviewing history  , and Communicating with other healthcare professionals .    **Please Note: This note may have been constructed using a voice recognition system.**

## 2025-05-30 NOTE — OP NOTE
OPERATIVE REPORT  PATIENT NAME: Yue Art    :  1950  MRN: [id number]  Pt Location: AN OR ROOM 01    SURGERY DATE: 2025    Surgeons and Role:     * Giuliano Bowden DO - Primary    Preop Diagnosis:  Ureteral stone with hydronephrosis [N13.2]    Post-Op Diagnosis Codes:     * Ureteral stone with hydronephrosis [N13.2]    Procedure(s):      Cystourethroscopy  Right retrograde pyelogram with live fluoroscopic interpretation  Right ureteroscopy  Laser lithotripsy  Right ureteral stent placement        -single use ureteroscope utilized ()      Specimen(s):  * No specimens in log *    Estimated Blood Loss:   Minimal    Drains:  Ureteral Drain/Stent Right ureter 6 Fr. (Active)   Number of days: 1182       Ureteral Internal Stent Right ureter 6 Fr. (Active)   Number of days: 0       Anesthesia Type:   General    Operative Indications:  Ureteral stone with hydronephrosis [N13.2]        74-year-old female with urolithiasis     History of previous ureteroscopy in      Presented to the ED on 2025 with right flank pain     CT abdomen pelvis with contrast 2025: Right-sided hydronephrosis secondary to 11 mm stone at the [facility]; additional nonobstructing right kidney stones measuring up to 5 mm     UA micro negative on 2025     Labs 2025: White count 16.5, hemoglobin 15.9, creatinine 1.08     Afebrile, vital signs stable overnight     Labs 2025: White count 12.09, hemoglobin 13.1, creatinine 1.29     Plan to proceed today with cystoscopy, right retrograde pyelogram, right ureteroscopy, laser, stone basket extract, right ureteral stent placement        Operative Findings:                Overall fairly capacious urethra  No urethral strictures  Moderate cystocele  Bilateral ureteral orifices in orthotopic positions  No bladder lesions  No stones in bladder  No trabeculations  No diverticuli  Right retrograde pyelogram: Mild hydronephrosis, multiple filling defects in the  upper pole calyces, renal pelvis, lower pole calyces consistent with stones  Right pyeloscopy: 11 mm stone in the renal pelvis, 5 mm stone in the upper pole, 4 separate stones in the lower pole ranging from 3 to 5 mm.  All stones were dusted using 272 µm laser fiber.  Back out ureteroscopy: No stone fragments, no ureteral injury  Successfully placed 6 x 24 double-J ureteral stent with no string attached         Complications:   None    Procedure and Technique:                Patient identified in the preop holding area.  Consent was obtained.  Risks and benefits of the procedure were explained to the patient.  Patient was in agreement.  Patient was brought back to the OR and placed upon the table.  Bilateral lower extremity SCDs were placed and turned on.  Patient received IV ceftriaxone for antibiotics earlier in the day.  Patient underwent smooth induction of anesthesia.  Bilateral lower extremities were placed in stirrups.  Patient was in dorsal lithotomy position.  Area was prepped and draped in sterile fashion.  Timeout was performed by the OR team.     Case began with insertion of 21 Green[place]ic rigid cystoscope.  Pan cystourethroscopy was performed.  See the above findings for details.     Attention was turned toward the right ureteral orifice.      5 Green[place]ic open-ended catheter was advanced through the bridge of the scope toward the ureteral orifice.  Guidewire was advanced through the 5 [place]ic open-ended catheter, into the ureter, and coiled in renal pelvis under fluoroscopic guidance.        Dual-lumen catheter was advanced over the wire and into the ureter under fluoroscopic guidance.  Retrograde pyelogram was performed at this time.  See the above findings for details.  Second wire was placed through the dual-lumen catheter and coiled into the renal pelvis under fluoroscopic guidance.  Dual-lumen catheter was removed over the 2 wires in a push pull fashion.    It was decided that an access sheath would be  utilized. 11-13 Fr 35 cm access sheath was advanced over one of the wires under fluoroscopic guidance toward the proximal ureter.  Wire and inner sheath were removed. Flexible single use ureteroscope was assembled.  Scope was advanced into the renal pelvis.  Pyeloscopy was performed.  See the above findings for details regarding stone works.        Backout flexible ureteroscopy was then performed.  See above findings for details.    The ureteroscope was then removed from the bladder and urethra.     Cystoscope was reentered into the bladder over the wire toward the right ureteral orifice.  6 x 24 double-J ureteral stent was advanced over the wire toward the renal pelvis under fluoroscopic guidance.  Wire was removed and stent was deployed.  Good proximal curl was seen on fluoroscopy.  Good distal curl was seen on direct cystoscopy.      There was no string left on the stent.      Bladder was emptied and scope was removed.     Patient was uneventfully awoken from anesthesia and extubated.  Patient was brought to PACU in good condition.              A qualified resident physician was not available.    Patient Disposition:  PACU          SIGNATURE: Giuliano Bowden,   DATE: May 30, 2025  TIME: 5:06 PM        Plan  - Patient okay for diet from urology perspective  - Patient has right sided 6 x 24 double-J ureteral stent.  No string.  Patient will follow-up for cystoscopy, stent removal in about 2 weeks.  - Recommend stent colic medications oxybutynin 5 mg extended release daily as needed for bladder spasm discomfort, Flomax 0.4 mg daily.  Patient to be discharged on his medications as well.  - Due to case being late in the day, recommend watching patient overnight.  If patient is afebrile, vital signs stable overnight and feels well, can go home on 5/31/2025 from urology perspective.  - Does not need additional antibiotics from urology perspective.

## 2025-05-30 NOTE — ANESTHESIA POSTPROCEDURE EVALUATION
Post-Op Assessment Note    CV Status:  Stable  Pain Score: 2    Pain management: adequate       Mental Status:  Alert   PONV Controlled:  None   Airway Patency:  Patent  Two or more mitigation strategies used for obstructive sleep apnea   Post Op Vitals Reviewed: Yes    No anethesia notable event occurred.    Staff: Anesthesiologist           Last Filed PACU Vitals:  Vitals Value Taken Time   Temp 97 °F (36.1 °C) 05/30/25 17:45   Pulse 72 05/30/25 17:49   /84 05/30/25 17:45   Resp 13 05/30/25 17:49   SpO2 97 % 05/30/25 17:49   Vitals shown include unfiled device data.    Modified Pema:     Vitals Value Taken Time   Activity 2 05/30/25 17:15   Respiration 2 05/30/25 17:15   Circulation 2 05/30/25 17:15   Consciousness 2 05/30/25 17:15   Oxygen Saturation 2 05/30/25 17:15     Modified Pema Score: 10

## 2025-05-30 NOTE — PLAN OF CARE
Problem: PAIN - ADULT  Goal: Verbalizes/displays adequate comfort level or baseline comfort level  Description: Interventions:  - Encourage patient to monitor pain and request assistance  - Assess pain using appropriate pain scale  - Administer analgesics as ordered based on type and severity of pain and evaluate response  - Implement non-pharmacological measures as appropriate and evaluate response  - Consider cultural and social influences on pain and pain management  - Notify physician/advanced practitioner if interventions unsuccessful or patient reports new pain  - Educate patient/family on pain management process including their role and importance of  reporting pain   - Provide non-pharmacologic/complimentary pain relief interventions  Outcome: Progressing     Problem: INFECTION - ADULT  Goal: Absence or prevention of progression during hospitalization  Description: INTERVENTIONS:  - Assess and monitor for signs and symptoms of infection  - Monitor lab/diagnostic results  - Monitor all insertion sites, i.e. indwelling lines, tubes, and drains  - Monitor endotracheal if appropriate and nasal secretions for changes in amount and color  - Freedom appropriate cooling/warming therapies per order  - Administer medications as ordered  - Instruct and encourage patient and family to use good hand hygiene technique  - Identify and instruct in appropriate isolation precautions for identified infection/condition  Outcome: Progressing     Problem: GENITOURINARY - ADULT  Goal: Maintains or returns to baseline urinary function  Description: INTERVENTIONS:  - Assess urinary function  - Encourage oral fluids to ensure adequate hydration if ordered  - Administer IV fluids as ordered to ensure adequate hydration  - Administer ordered medications as needed  - Offer frequent toileting  - Follow urinary retention protocol if ordered  Outcome: Progressing

## 2025-05-30 NOTE — TREATMENT PLAN
Yue Art is a 74-year-old female presenting with renal colic and CT confirmed right obstructing ureteral calculus.          Preop Diagnosis:  Ureteral stone with hydronephrosis [N13.2]     Post-Op Diagnosis Codes:     * Ureteral stone with hydronephrosis [N13.2]     Procedure(s): 5/30/2025        Cystourethroscopy  Right retrograde pyelogram with live fluoroscopic interpretation  Right ureteroscopy  Laser lithotripsy  Right ureteral stent placement       Plan  - Patient okay for diet from urology perspective  - Patient has right sided 6 x 24 double-J ureteral stent.  No string.  Patient will follow-up for cystoscopy, stent removal in about 2 weeks.  - Recommend stent colic medications oxybutynin 5 mg extended release daily as needed for bladder spasm discomfort, Flomax 0.4 mg daily.  Patient to be discharged on his medications as well.  - Due to case being late in the day, recommend watching patient overnight.  If patient is afebrile, vital signs stable overnight and feels well, can go home on 5/31/2025 from urology perspective.  - Does not need additional antibiotics from urology perspective.

## 2025-05-30 NOTE — ANESTHESIA PREPROCEDURE EVALUATION
Procedure:  CYSTOSCOPY URETEROSCOPY WITH LITHOTRIPSY HOLMIUM LASER, RETROGRADE PYELOGRAM AND INSERTION STENT URETERAL (Right: Bladder)    Relevant Problems   CARDIO   (+) Bilateral carotid artery stenosis   (+) Pure hypercholesterolemia      ENDO   (+) Hypothyroidism due to Hashimoto's thyroiditis      GI/HEPATIC   (+) Gastroesophageal reflux disease      /RENAL   (+) Ureteral stone with hydronephrosis      MUSCULOSKELETAL   (+) Chronic bilateral low back pain without sciatica   (+) Primary osteoarthritis of right hip      NEURO/PSYCH   (+) Chronic bilateral low back pain without sciatica      PONV    Physical Exam    Airway     Mallampati score: II  TM Distance: >3 FB  Neck ROM: full  Mouth opening: >= 4 cm      Cardiovascular  Cardiovascular exam normal    Dental   No notable dental hx     Pulmonary  Pulmonary exam normal     Neurological  - normal exam    Other Findings  post-pubertal.    Normal sinus rhythm  Nonspecific T wave changes  Abnormal ECG         RIGHT:  There is <50% stenosis noted in the internal carotid artery. Plaque is  heterogenous and irregular.  Moderate stenosis noted in the external carotid artery.  Vertebral artery flow is antegrade. There is no significant subclavian artery  disease.     LEFT:  There is 50-69% stenosis noted in the internal carotid artery. Plaque is  heterogenous and irregular.  Severe stenosis noted in the external carotid artery.  Vertebral artery flow is antegrade. There is no significant subclavian artery  disease.     Compared to previous study on 9/5/2023, the left ECA stenosis is a new finding.  Recommend repeat testing in 6 months as per protocol unless otherwise  indicated.          Anesthesia Plan  ASA Score- 3     Anesthesia Type- general with ASA Monitors.         Additional Monitors:     Airway Plan: LMA and LMA.           Plan Factors-Exercise tolerance (METS): >4 METS.    Chart reviewed. EKG reviewed. Imaging results reviewed. Existing labs reviewed.  Patient summary reviewed.    Patient is not a current smoker.              Induction- intravenous.    Postoperative Plan- Plan for postoperative opioid use.   Monitoring Plan - Monitoring plan - standard ASA monitoring  Post Operative Pain Plan - plan for postoperative opioid use    Perioperative Resuscitation Plan - Level 1 - Full Code.       Informed Consent- Anesthetic plan and risks discussed with patient.  I personally reviewed this patient with the CRNA. Discussed and agreed on the Anesthesia Plan with the CRNA..      NPO Status:  No vitals data found for the desired time range.

## 2025-05-30 NOTE — TELEPHONE ENCOUNTER
Patient status post on-call right ureteroscopy at [place] on 5/30/2025    Patient has right sided double-J ureteral stent in place.  No string.    Patient needs office cystoscopy, stent removal in about 2 weeks

## 2025-05-30 NOTE — PROGRESS NOTES
UROLOGY PROGRESS NOTE   Patient Identifiers: Yue Art (MRN [id number])  Date of Service: 5/30/2025    Subjective:     N.p.o. today for surgery.  Afebrile, vital signs stable overnight    Objective:     VITALS:    Vitals:    05/30/25 1504   BP: 150/69   Pulse: 78   Resp: 18   Temp: 98 °F (36.7 °C)   SpO2: 99%       INS & OUTS:  I/O last 24 hours:  In: 1050 [I.V.:1000; IV Piggyback:50]  Out: -     LABS:  Lab Results   Component Value Date    HGB 13.1 05/30/2025    HCT 41.3 05/30/2025    WBC 12.09 (H) 05/30/2025     05/30/2025   ]    Lab Results   Component Value Date     05/10/2015    K 4.2 05/30/2025     05/30/2025    CO2 26 05/30/2025    BUN 20 05/30/2025    CREATININE 1.29 05/30/2025    CALCIUM 8.5 05/30/2025    GLUCOSE 89 05/10/2015   ]    INPATIENT MEDS:  Current Medications[1]      Physical Exam:     GEN: alert and oriented x 3    RESP: breathing comfortably with no accessory muscle use    ABD: soft, non-tender, non-distended   EXT: no significant peripheral edema       Assessment:   74-year-old female with urolithiasis     History of previous ureteroscopy in 2022     Presented to the ED on 5/29/2025 with right flank pain     CT abdomen pelvis with contrast 5/29/2025: Right-sided hydronephrosis secondary to 11 mm stone at the [facility]; additional nonobstructing right kidney stones measuring up to 5 mm     UA micro negative on 5/29/2025     Labs 5/29/2025: White count 16.5, hemoglobin 15.9, creatinine 1.08    Afebrile, vital signs stable overnight    Labs 5/30/2025: White count 12.09, hemoglobin 13.1, creatinine 1.29    Plan to proceed today with cystoscopy, right retrograde pyelogram, right ureteroscopy, laser, stone basket extract, right ureteral stent placement        We discussed ureteroscopy procedure.    I explained that ureteroscopy consisted of patient going to sleep and having scope initially placed into the urethra and bladder.  From there, x-rays would be shot in the ureter and  "kidney in order to assess for safety wire placement and stone location.  At that point, if possible, a smaller scope would be placed into the ureter and/or kidney to look for stones.  Once the stones were located, they would either be removed with a basket, or lasered into smaller pieces.  Once they were broken up into smaller pieces, they would either be \"dusted\" into very small fragments that would pass on their own or \"fragmented\" into slightly larger pieces that would be able to be removed in their entirety with the basket.    I explained that after the procedure, most patients needed placement of a ureteral stent.  I explained that ureteral stent is essentially a straw with 2 curly ends. One curl is in the kidney and the other curl is in the bladder.  The stent would allow urine to safely pass from the kidney to the bladder without obstruction.  I explained that in the vast majority of circumstances, the placement of the ureteral stent was not permanent.  I explained that sometimes we are able to leave a string coming out of the end of the stent which comes out of the urethra.  I explained that these types of patients will get instructions in their discharge packet which tells them when to pull the string and remove the stent in its entirety.  I also explained that there are circumstances where we are not able to do this and there is no string coming off the stent.  In these cases, the patient's would need to come to the office for a quick 2-minute procedure called cystoscopy, removal of ureteral stent.    I also explained that there are unfortunately times where we are not able to get up with a smaller scope into the ureter.  In the circumstances, we place ureteral stent and have patient come back to the OR a few weeks later so that the ureter is more dilated and can better tolerate a scope.    I explained that based on data from the urine culture and the appearance of the urine during the case, the patient " may or may not be discharged with antibiotics after the procedure.    I did explain that having ureteral stent in place can be quite uncomfortable for some patients.  I explained that I do not routinely give patient's narcotics for this type of procedure.  The patient verbalized understanding.  I did state that I often prescribe medications to help with stent discomfort including but not limited to: Daily tamsulosin 0.4 mg, daily as needed oxybutynin 5 mg XR, as needed diclofenac 50 mg twice daily.  I also told the patient that I often have patients use over-the-counter Tylenol around-the-clock for the first few days.  I also explained the most important thing for pain often is staying very well-hydrated and that patients should plan to drink plenty of water after the procedure.    I explained that it can be normal to have blood in urine after this procedure.  I explained that as long as the urine was lighter than fruit punch and that there were no blood clots being passed, and that the patient was able to urinate, nothing urgently needed to be done about blood in the urine.  I did explain that it was very important to stay hydrated after the procedure.    We also went over the risk of infection from the procedure.  I explained that if the urine did appear to have a very infected appearance, there are times where we have to simply place a ureteral stent and defer definitive stone management a few weeks later after the patient gets additional antibiotics.  I also explained that there is a low, but nonzero risk of developing a serious infection after stone treatment which would potentially require hospitalization and IV antibiotics.    I also explained that there was a risk of injury to urethra, bladder, ureter, kidneys during the procedure.  I explained that if there appeared to be any serious injury to the urethra bladder, we would likely just leave Rojas catheter for a number of days.  I explained that if there  appeared to be extensive injury to the ureter or kidney, we often would elect to leave a ureteral stent in place for a longer period of time.  I explained that in extremely rare circumstances, there would be a severe injury to the ureter or kidney which would actually require placement of percutaneous nephrostomy tube in order for proper drainage of the urine.    I also explained that there are of course risks to getting general anesthesia such as cardiac or pulmonary complications.  I also explained that there could be risk of developing blood clots.    Additionally, we did review alternative treatments which include extracorporal shockwave lithotripsy, percutaneous nephrolithotomy, observation.  We discussed the risks and benefits of each of these strategies.        She understands that she does have a lot of stone burden.  She understands that it may take more than 1 ureteroscopy to clear out her stone burden.  She also understands that if her urine appears to be infected or if she has a tight ureter, all she would be getting today would be a right ureteral stent and she would need follow-up ureteroscopy.      Plan:   Preoperative Ancef  OR plan as above       [1]   Current Facility-Administered Medications:     acetaminophen (TYLENOL) tablet 650 mg, 650 mg, Oral, Q6H PRN, Vaughn Arnold MD    aspirin (ECOTRIN LOW STRENGTH) EC tablet 81 mg, 81 mg, Oral, Daily, Vaughn Arnold MD, 81 mg at 05/30/25 0824    atorvastatin (LIPITOR) tablet 40 mg, 40 mg, Oral, Daily With Dinner, Vaughn Arnold MD    buPROPion (WELLBUTRIN XL) 24 hr tablet 150 mg, 150 mg, Oral, Daily, Vaughn Arnold MD, 150 mg at 05/30/25 0824    ceFAZolin (ANCEF) IVPB (premix in dextrose) 2,000 mg 50 mL, 2,000 mg, Intravenous, Once, Maya Barron PA-C    ceftriaxone (ROCEPHIN) 1 g/50 mL in dextrose IVPB, 1,000 mg, Intravenous, Q24H, Vaughn Arnold MD, Last Rate: 100 mL/hr at 05/30/25 1343, 1,000 mg at 05/30/25 1343    enoxaparin (LOVENOX) subcutaneous  injection 40 mg, 40 mg, Subcutaneous, Daily, Vaughn Arnold MD, 40 mg at 05/30/25 0824    HYDROmorphone (DILAUDID) injection 0.5 mg, 0.5 mg, Intravenous, Q4H PRN, Vaughn Arnold MD    HYDROmorphone HCl (DILAUDID) injection 0.2 mg, 0.2 mg, Intravenous, Q4H PRN, Vaughn Arnold MD    [Transfer Hold] HYDROmorphone HCl (DILAUDID) injection 0.2 mg, 0.2 mg, Intravenous, Q2H PRN, Fabrizio Sheikh MD, 0.2 mg at 05/30/25 1347    levothyroxine tablet 75 mcg, 75 mcg, Oral, Early Morning, Vaughn Arnold MD, 75 mcg at 05/30/25 0513    ondansetron (ZOFRAN) injection 4 mg, 4 mg, Intravenous, Q6H PRN, Vaughn Arnold MD, 4 mg at 05/29/25 1717    scopolamine (TRANSDERM-SCOP) 1 mg/3 days TD 72 hr patch 1 patch, 1 patch, Transdermal, Q72H, Corey Jacome MD    sodium chloride 0.9 % infusion, 125 mL/hr, Intravenous, Continuous, Vaughn Arnold MD, Last Rate: 125 mL/hr at 05/30/25 0309, 125 mL/hr at 05/30/25 0309

## 2025-05-30 NOTE — DISCHARGE INSTR - AVS FIRST PAGE
Ms. Art,        I was able to go into your right kidney and lasered your stones.  You did have a lot of stones, but I was able to break them apart into tiny pieces.  You will therefore likely pass little grains of sand along your stent for the next few days.    Please see the additional postoperative instructions below.    Please call the office with any additional questions or concerns.      Sincerely,  Giuliano Bowden        You had procedure on your ureter and kidney.      You had ureteral stent placed.  This is a tube that is placed in your ureter to keep urine draining from your kidney to your bladder.  It may cause discomfort in the form of back spasms or lower abdominal spasms.  This is normal and can be treated with medications if need be.      You may see some blood in your urine.  This is normal.  Please drink plenty of fluids.  Generally speaking, as long as your urine is fruit punch color or lighter, that is okay.  You may even pass small blood clots in your urine.  That is also okay.  If you notice that you are passing large blood clots, if it is becoming more difficult to urinate, or if your urine is very dark like the color of [place]lot wine, please call the office for further instruction.      You have been given a prescription for Flomax.  Please take this daily while your ureteral stent is in place. This medication can occasionally cause lightheadedness. Please stop medication if you experience this or any other concerning side effects.      You have been given a prescription for oxybutynin.  Please take this daily as needed for stent discomfort. Please note that this medication may have side effects including but not limited to: dry eyes, dry mouth, constipation, urinary retention. Please drink plenty of water with this medication.      You may take Tylenol as needed for pain.      Pain control plan: Having a ureteral stent is often very uncomfortable. In order to stay on top of your pain, please  take over the counter Tylenol around the clock. Additionally take Flomax daily with your stent in place whether or not you are having discomfort. Additionally if you are having discomfort, you need to take your oxybutynin daily, as this helps a lot with bladder spasms and stent discomfort.   Most importantly, please drink lots of fluids, as this is the best way to avoid pain with your stent!        You may shower and bathe as usual when you get home.    You do not have any incisions and can resume typical physical activities, as long as you are not having too much discomfort with stent in place.    Please call the office or present to the ED if you experience concerning signs or symptoms including but not limited to: fevers, chills, nausea, vomiting, worsening flank pain, worsening abdominal pain, passage of large blood clots in the urine, inability to urinate.    You will follow up in the office in about 2 weeks to have your stent removed. Office will call you with details.

## 2025-05-30 NOTE — ASSESSMENT & PLAN NOTE
POA with flank pain, nausea and chills  CT imaging showing R sided hydro with 11mm stone at [facility]  Urology consulted  Plan for intervention later today   NPO  Continue pain control, antiemetics  UA bland, but stranding noted on CT scan so continue Rocephin

## 2025-05-31 VITALS
BODY MASS INDEX: 36.12 KG/M2 | HEIGHT: 60 IN | TEMPERATURE: 97.2 F | DIASTOLIC BLOOD PRESSURE: 62 MMHG | SYSTOLIC BLOOD PRESSURE: 157 MMHG | OXYGEN SATURATION: 97 % | WEIGHT: 184 LBS | HEART RATE: 72 BPM | RESPIRATION RATE: 21 BRPM

## 2025-05-31 LAB
ANION GAP SERPL CALCULATED.3IONS-SCNC: 10 MMOL/L (ref 4–13)
BUN SERPL-MCNC: 15 MG/DL (ref 5–25)
CALCIUM SERPL-MCNC: 9.1 MG/DL (ref 8.4–10.2)
CHLORIDE SERPL-SCNC: 104 MMOL/L (ref 96–108)
CO2 SERPL-SCNC: 25 MMOL/L (ref 21–32)
CREAT SERPL-MCNC: 0.98 MG/DL (ref 0.6–1.3)
ERYTHROCYTE [DISTWIDTH] IN BLOOD BY AUTOMATED COUNT: 13.2 % (ref 11.6–15.1)
GFR SERPL CREATININE-BSD FRML MDRD: 56 ML/MIN/1.73SQ M
GLUCOSE SERPL-MCNC: 166 MG/DL (ref 65–140)
HCT VFR BLD AUTO: 44.1 % (ref 34.8–46.1)
HGB BLD-MCNC: 14.1 G/DL (ref 11.5–15.4)
MCH RBC QN AUTO: 29.1 PG (ref 26.8–34.3)
MCHC RBC AUTO-ENTMCNC: 32 G/DL (ref 31.4–37.4)
MCV RBC AUTO: 91 FL (ref 82–98)
PLATELET # BLD AUTO: 266 THOUSANDS/UL (ref 149–390)
PMV BLD AUTO: 10.2 FL (ref 8.9–12.7)
POTASSIUM SERPL-SCNC: 4 MMOL/L (ref 3.5–5.3)
RBC # BLD AUTO: 4.85 MILLION/UL (ref 3.81–5.12)
SODIUM SERPL-SCNC: 139 MMOL/L (ref 135–147)
WBC # BLD AUTO: 11.03 THOUSAND/UL (ref 4.31–10.16)

## 2025-05-31 PROCEDURE — 99239 HOSP IP/OBS DSCHRG MGMT >30: CPT | Performed by: PHYSICIAN ASSISTANT

## 2025-05-31 PROCEDURE — 85027 COMPLETE CBC AUTOMATED: CPT | Performed by: PHYSICIAN ASSISTANT

## 2025-05-31 PROCEDURE — 80048 BASIC METABOLIC PNL TOTAL CA: CPT | Performed by: PHYSICIAN ASSISTANT

## 2025-05-31 RX ORDER — OXYBUTYNIN CHLORIDE 5 MG/1
5 TABLET, EXTENDED RELEASE ORAL DAILY PRN
Qty: 30 TABLET | Refills: 0 | Status: SHIPPED | OUTPATIENT
Start: 2025-05-31

## 2025-05-31 RX ORDER — TAMSULOSIN HYDROCHLORIDE 0.4 MG/1
0.4 CAPSULE ORAL
Qty: 30 CAPSULE | Refills: 0 | Status: SHIPPED | OUTPATIENT
Start: 2025-05-31

## 2025-05-31 RX ADMIN — BUPROPION HYDROCHLORIDE 150 MG: 150 TABLET, EXTENDED RELEASE ORAL at 09:46

## 2025-05-31 RX ADMIN — ENOXAPARIN SODIUM 40 MG: 40 INJECTION SUBCUTANEOUS at 09:46

## 2025-05-31 RX ADMIN — SODIUM CHLORIDE 125 ML/HR: 0.9 INJECTION, SOLUTION INTRAVENOUS at 10:22

## 2025-05-31 RX ADMIN — ASPIRIN 81 MG: 81 TABLET ORAL at 09:46

## 2025-05-31 RX ADMIN — LEVOTHYROXINE SODIUM 75 MCG: 75 TABLET ORAL at 06:26

## 2025-05-31 NOTE — ASSESSMENT & PLAN NOTE
"POA with chills and perinephric stranding on CT imaging  UA \"clean\" but likely sampled from nonobstructed kidney   No antibiotics needed from Urology perspective, she was afebrile status post decompression of renal unit   "

## 2025-05-31 NOTE — PLAN OF CARE
Problem: PAIN - ADULT  Goal: Verbalizes/displays adequate comfort level or baseline comfort level  Description: Interventions:  - Encourage patient to monitor pain and request assistance  - Assess pain using appropriate pain scale  - Administer analgesics as ordered based on type and severity of pain and evaluate response  - Implement non-pharmacological measures as appropriate and evaluate response  - Consider cultural and social influences on pain and pain management  - Notify physician/advanced practitioner if interventions unsuccessful or patient reports new pain  - Educate patient/family on pain management process including their role and importance of  reporting pain   - Provide non-pharmacologic/complimentary pain relief interventions  Outcome: Adequate for Discharge     Problem: INFECTION - ADULT  Goal: Absence or prevention of progression during hospitalization  Description: INTERVENTIONS:  - Assess and monitor for signs and symptoms of infection  - Monitor lab/diagnostic results  - Monitor all insertion sites, i.e. indwelling lines, tubes, and drains  - Monitor endotracheal if appropriate and nasal secretions for changes in amount and color  - [place] appropriate cooling/warming therapies per order  - Administer medications as ordered  - Instruct and encourage patient and family to use good hand hygiene technique  - Identify and instruct in appropriate isolation precautions for identified infection/condition  Outcome: Adequate for Discharge     Problem: SAFETY ADULT  Goal: Maintains/Returns to pre admission functional level  Description: INTERVENTIONS:  - Perform AM-PAC 6 Click Basic Mobility/ Daily Activity assessment daily.  - Set and communicate daily mobility goal to care team and patient/family/caregiver.   - Ambulate patient 3 times a day  - Out of bed to chair 3 times a day   - Out of bed for meals 3 times a day  - Out of bed for toileting  - Record patient progress and toleration of activity  level   Outcome: Adequate for Discharge     Problem: DISCHARGE PLANNING  Goal: Discharge to home or other facility with appropriate resources  Description: INTERVENTIONS:  - Identify barriers to discharge w/patient and caregiver  - Arrange for needed discharge resources and transportation as appropriate  - Identify discharge learning needs (meds, wound care, etc.)  - Arrange for interpretive services to assist at discharge as needed  - Refer to Case Management Department for coordinating discharge planning if the patient needs post-hospital services based on physician/advanced practitioner order or complex needs related to functional status, cognitive ability, or social support system  Outcome: Adequate for Discharge     Problem: Knowledge Deficit  Goal: Patient/family/caregiver demonstrates understanding of disease process, treatment plan, medications, and discharge instructions  Description: Complete learning assessment and assess knowledge base.  Interventions:  - Provide teaching at level of understanding  - Provide teaching via preferred learning methods  Outcome: Adequate for Discharge     Problem: GENITOURINARY - ADULT  Goal: Maintains or returns to baseline urinary function  Description: INTERVENTIONS:  - Assess urinary function  - Encourage oral fluids to ensure adequate hydration if ordered  - Administer IV fluids as ordered to ensure adequate hydration  - Administer ordered medications as needed  - Offer frequent toileting  - Follow urinary retention protocol if ordered  Outcome: Adequate for Discharge     Problem: METABOLIC, FLUID AND ELECTROLYTES - ADULT  Goal: Electrolytes maintained within normal limits  Description: INTERVENTIONS:  - Monitor labs and assess patient for signs and symptoms of electrolyte imbalances  - Administer electrolyte replacement as ordered  - Monitor response to electrolyte replacements, including repeat lab results as appropriate  - Instruct patient on fluid and nutrition as  appropriate  Outcome: Adequate for Discharge     Problem: METABOLIC, FLUID AND ELECTROLYTES - ADULT  Goal: Fluid balance maintained  Description: INTERVENTIONS:  - Monitor labs   - Monitor I/O and WT  - Instruct patient on fluid and nutrition as appropriate  - Assess for signs & symptoms of volume excess or deficit  Outcome: Adequate for Discharge     Problem: HEMATOLOGIC - ADULT  Goal: Maintains hematologic stability  Description: INTERVENTIONS  - Assess for signs and symptoms of bleeding or hemorrhage  - Monitor labs  - Administer supportive blood products/factors as ordered and appropriate  Outcome: Adequate for Discharge

## 2025-05-31 NOTE — ASSESSMENT & PLAN NOTE
POA with flank pain, nausea and chills  CT imaging showing R sided hydro with 11mm stone at [facility]  Status post cystoscopy with stent placement, no string attached   Stable for discharge   Follow up with Urology in 2 weeks for repeat cystoscopy and stent removal   Oxybutynin 5 mg QD PRN  Flomax QD  No antibiotics from Urology perspective

## 2025-05-31 NOTE — PLAN OF CARE
Problem: PAIN - ADULT  Goal: Verbalizes/displays adequate comfort level or baseline comfort level  Description: Interventions:  - Encourage patient to monitor pain and request assistance  - Assess pain using appropriate pain scale  - Administer analgesics as ordered based on type and severity of pain and evaluate response  - Implement non-pharmacological measures as appropriate and evaluate response  - Consider cultural and social influences on pain and pain management  - Notify physician/advanced practitioner if interventions unsuccessful or patient reports new pain  - Educate patient/family on pain management process including their role and importance of  reporting pain   - Provide non-pharmacologic/complimentary pain relief interventions  Outcome: Progressing     Problem: INFECTION - ADULT  Goal: Absence or prevention of progression during hospitalization  Description: INTERVENTIONS:  - Assess and monitor for signs and symptoms of infection  - Monitor lab/diagnostic results  - Monitor all insertion sites, i.e. indwelling lines, tubes, and drains  - Monitor endotracheal if appropriate and nasal secretions for changes in amount and color  - [place] appropriate cooling/warming therapies per order  - Administer medications as ordered  - Instruct and encourage patient and family to use good hand hygiene technique  - Identify and instruct in appropriate isolation precautions for identified infection/condition  Outcome: Progressing  Goal: Absence of fever/infection during neutropenic period  Description: INTERVENTIONS:  - Monitor WBC  - Perform strict hand hygiene  - Limit to healthy visitors only  - No plants, dried, fresh or silk flowers with lr in patient room  Outcome: Progressing     Problem: SAFETY ADULT  Goal: Patient will remain free of falls  Description: INTERVENTIONS:  - Educate patient/family on patient safety including physical limitations  - Instruct patient to call for assistance with activity   -  Consider consulting OT/PT to assist with strengthening/mobility based on AM PAC & JH-HLM score  - Consult OT/PT to assist with strengthening/mobility   - Keep Call bell within reach  - Keep bed low and locked with side rails adjusted as appropriate  - Keep care items and personal belongings within reach  - Initiate and maintain comfort rounds  - Make Fall Risk Sign visible to staff  - Offer Toileting every 2 Hours, in advance of need  - Initiate/Maintain bed alarm  - Apply yellow socks and bracelet for high fall risk patients  - Consider moving patient to room near nurses station  Outcome: Progressing  Goal: Maintain or return to baseline ADL function  Description: INTERVENTIONS:  -  Assess patient's ability to carry out ADLs; assess patient's baseline for ADL function and identify physical deficits which impact ability to perform ADLs (bathing, care of mouth/teeth, toileting, grooming, dressing, etc.)  - Assess/evaluate cause of self-care deficits   - Assess range of motion  - Assess patient's mobility; develop plan if impaired  - Assess patient's need for assistive devices and provide as appropriate  - Encourage maximum independence but intervene and supervise when necessary  - Involve family in performance of ADLs  - Assess for home care needs following discharge   - Consider OT consult to assist with ADL evaluation and planning for discharge  - Provide patient education as appropriate  - Monitor functional capacity and physical performance, use of AM PAC & JH-HLM   - Monitor gait, balance and fatigue with ambulation    Outcome: Progressing  Goal: Maintains/Returns to pre admission functional level  Description: INTERVENTIONS:  - Perform AM-PAC 6 Click Basic Mobility/ Daily Activity assessment daily.  - Set and communicate daily mobility goal to care team and patient/family/caregiver.   - Collaborate with rehabilitation services on mobility goals if consulted  - Perform Range of Motion 3 times a day.  -  Reposition patient every 2 hours.  - Dangle patient 3 times a day  - Stand patient 3 times a day  - Ambulate patient 3 times a day  - Out of bed to chair 3 times a day   - Out of bed for meals 3  Problem: DISCHARGE PLANNING  Goal: Discharge to home or other facility with appropriate resources  Description: INTERVENTIONS:  - Identify barriers to discharge w/patient and caregiver  - Arrange for needed discharge resources and transportation as appropriate  - Identify discharge learning needs (meds, wound care, etc.)  - Arrange for interpretive services to assist at discharge as needed  - Refer to Case Management Department for coordinating discharge planning if the patient needs post-hospital services based on physician/advanced practitioner order or complex needs related to functional status, cognitive ability, or social support system  Outcome: Progressing     Problem: Knowledge Deficit  Goal: Patient/family/caregiver demonstrates understanding of disease process, treatment plan, medications, and discharge instructions  Description: Complete learning assessment and assess knowledge base.  Interventions:  - Provide teaching at level of understanding  - Provide teaching via preferred learning methods  Outcome: Progressing     Problem: METABOLIC, FLUID AND ELECTROLYTES - ADULT  Goal: Electrolytes maintained within normal limits  Description: INTERVENTIONS:  - Monitor labs and assess patient for signs and symptoms of electrolyte imbalances  - Administer electrolyte replacement as ordered  - Monitor response to electrolyte replacements, including repeat lab results as appropriate  - Instruct patient on fluid and nutrition as appropriate  Outcome: Progressing  Goal: Fluid balance maintained  Description: INTERVENTIONS:  - Monitor labs   - Monitor I/O and WT  - Instruct patient on fluid and nutrition as appropriate  - Assess for signs & symptoms of volume excess or deficit  Outcome: Progressing     Problem: GENITOURINARY -  ADULT  Goal: Maintains or returns to baseline urinary function  Description: INTERVENTIONS:  - Assess urinary function  - Encourage oral fluids to ensure adequate hydration if ordered  - Administer IV fluids as ordered to ensure adequate hydration  - Administer ordered medications as needed  - Offer frequent toileting  - Follow urinary retention protocol if ordered  Outcome: Progressing    times a day  - Out of bed for toileting  - Record patient progress and toleration of activity level   Outcome: Progressing

## 2025-05-31 NOTE — DISCHARGE SUMMARY
"Discharge Summary - Hospitalist   Name: Yue Art 74 y.o. female I MRN: [id number]  Unit/Bed#: S -01 I Date of Admission: 5/29/2025   Date of Service: 5/31/2025 I Hospital Day: 1     Assessment & Plan  Ureteral stone with hydronephrosis  POA with flank pain, nausea and chills  CT imaging showing R sided hydro with 11mm stone at [facility]  Status post cystoscopy with stent placement, no string attached   Stable for discharge   Follow up with Urology in 2 weeks for repeat cystoscopy and stent removal   Oxybutynin 5 mg QD PRN  Flomax QD  No antibiotics from Urology perspective   UTI (urinary tract infection)  POA with chills and perinephric stranding on CT imaging  UA \"clean\" but likely sampled from nonobstructed kidney   No antibiotics needed from Urology perspective, she was afebrile status post decompression of renal unit   Hypothyroidism due to Hashimoto's thyroiditis  Continue synthroid      Medical Problems       Resolved Problems  Date Reviewed: 5/31/2025   None       Discharging Physician / Practitioner: Oscar Cervantes PA-C  PCP: Vargas Hinojosa MD  Admission Date:   Admission Orders (From admission, onward)       Ordered        05/30/25 1510  INPATIENT ADMISSION  Once            05/29/25 1336  Place in Observation  Once                          Discharge Date: 05/31/25    Next Steps for Physician/AP Assuming Care:  Make sure patient follows up with Urology in 2 weeks for cystoscopy and stent removal     Test Results Pending at Discharge (will require follow up):  CBC  BMP    Medication Changes for Discharge & Rationale:   Oxybutynin 5 mg QD PRN  Flomax 0.4 mg QD  See after visit summary for reconciled discharge medications provided to patient and/or family.     Consultations During Hospital Stay:  Urology - Dr. Bowden     Procedures Performed:   CT Abdomen Pelvis      Significant Findings / Test Results:   CT Abdomen Pelvis: Right-sided hydronephrosis secondary to an 11 mm stone at the " ureteropelvic junction. Additional non-obstructing right renal calculi measuring up to 5 mm. New biliary ductal dilatation, possibly related to cholecystectomy. Correlation with liver enzymes recommended. Stable bibasilar groundglass and reticular opacities, possibly interstitial lung disease  Cystoscopy, Right Retrograde Pyelogram, Right Ureteroscopy, Laser Lithotripsy, Right Ureteral Stent Placement - see op note for details     Incidental Findings:   None     Hospital Course:   Yue Art is a 74 y.o. female patient who originally presented to the hospital on 5/29/2025 due to abdominal/flank pain. She was found to have an 11 mm stone at the [facility]. She was admitted for pain control and Urologic consultation. She was started on pain medications and aggressive IVF. Urology was able to take her to the OR on 5/30 for cystoscopy and stent placement which was successful at symptom mitigation. Her lab work remained stable throughout her stay and she remained hemodynamically stable pre and post procedure. She was able to be discharged home in stable condition. She will follow up with Urology in 2 weeks for repeat cystoscopy and stent removal.     No notes on file      Please see above list of diagnoses and related plan for additional information.     Discharge Day Visit / Exam:   Subjective:  Patient reports that she is feeling much better. Denies any pain. Ate and drank well. Urinating without difficulty. Denies fevers or chills.   Vitals: Blood Pressure: 157/62 (05/30/25 2300)  Pulse: 72 (05/30/25 2300)  Temperature: (!) 97.2 °F (36.2 °C) (05/30/25 2300)  Temp Source: Temporal (05/30/25 1504)  Respirations: 21 (05/30/25 1745)  Height: 5' (152.4 cm) (05/30/25 1504)  Weight - Scale: 83.5 kg (184 lb) (05/30/25 1504)  SpO2: 97 % (05/30/25 1745)  Physical Exam  Vitals and nursing note reviewed.   Constitutional:       General: She is not in acute distress.     Appearance: Normal appearance. She is overweight. She is  not ill-appearing or diaphoretic.   HENT:      Head: Normocephalic and atraumatic.      Mouth/Throat:      Mouth: Mucous membranes are moist.     Eyes:      General: No scleral icterus.     Pupils: Pupils are equal, round, and reactive to light.       Cardiovascular:      Rate and Rhythm: Normal rate and regular rhythm.      Pulses: Normal pulses.      Heart sounds: Normal heart sounds, S1 normal and S2 normal. No murmur heard.     No systolic murmur is present.      No diastolic murmur is present.      No gallop. No S3 or S4 sounds.   Pulmonary:      Effort: Pulmonary effort is normal. No accessory muscle usage or respiratory distress.      Breath sounds: Normal breath sounds. No stridor. No decreased breath sounds, wheezing, rhonchi or rales.   Chest:      Chest wall: No tenderness.   Abdominal:      General: Bowel sounds are normal. There is no distension.      Palpations: Abdomen is soft.      Tenderness: There is no abdominal tenderness. There is no guarding.     Musculoskeletal:      Right lower leg: No edema.      Left lower leg: No edema.     Skin:     General: Skin is warm and dry.      Coloration: Skin is not jaundiced.     Neurological:      General: No focal deficit present.      Mental Status: She is alert. Mental status is at baseline.      Motor: No tremor or seizure activity.     Psychiatric:         Behavior: Behavior is cooperative.          Discussion with Family: Patient declined call to .     Discharge instructions/Information to patient and family:   See after visit summary for information provided to patient and family.      Provisions for Follow-Up Care:  See after visit summary for information related to follow-up care and any pertinent home health orders.      Mobility at time of Discharge:   Basic Mobility Inpatient Raw Score: 24  JH-HLM Goal: 8: Walk 250 feet or more  JH-HLM Achieved: 8: Walk 250 feet ot more  HLM Goal achieved. Continue to encourage appropriate mobility.      Disposition:   Home    Planned Readmission: None    Administrative Statements   Discharge Statement:  I have spent a total time of 45 minutes in caring for this patient on the day of the visit/encounter. >30 minutes of time was spent on: Diagnostic results, Instructions for management, Patient and family education, Impressions, Counseling / Coordination of care, Documenting in the medical record, Reviewing / ordering tests, medicine, procedures  , and Communicating with other healthcare professionals .    **Please Note: This note may have been constructed using a voice recognition system**

## 2025-06-02 ENCOUNTER — TRANSITIONAL CARE MANAGEMENT (OUTPATIENT)
Dept: INTERNAL MEDICINE CLINIC | Facility: CLINIC | Age: 75
End: 2025-06-02

## 2025-06-02 LAB
ATRIAL RATE: 70 BPM
P AXIS: 54 DEGREES
PR INTERVAL: 130 MS
QRS AXIS: 2 DEGREES
QRSD INTERVAL: 80 MS
QT INTERVAL: 388 MS
QTC INTERVAL: 419 MS
T WAVE AXIS: 16 DEGREES
VENTRICULAR RATE: 70 BPM

## 2025-06-02 PROCEDURE — 93010 ELECTROCARDIOGRAM REPORT: CPT | Performed by: INTERNAL MEDICINE

## 2025-06-08 NOTE — ED ATTENDING ATTESTATION
5/29/2025  ITerry MD, saw and evaluated the patient. I have discussed the patient with the resident/non-physician practitioner and agree with the resident's/non-physician practitioner's findings, Plan of Care, and MDM as documented in the resident's/non-physician practitioner's note, except where noted. All available labs and Radiology studies were reviewed.  I was present for key portions of any procedure(s) performed by the resident/non-physician practitioner and I was immediately available to provide assistance.       At this point I agree with the current assessment done in the Emergency Department.  I have conducted an independent evaluation of this patient a history and physical is as follows:see h and p above- agree with er resident tx plan  / dispo     ED Course  ED Course as of 06/08/25 1603   Thu May 29, 2025   0627 Er md note- pt seen and thoroughly evaluated by er md- case d/w er resident -- 74 yr female with hx of kidney stones in past- requiring urologic intervention -- over last 2 weeks with intermitent  r flank pain back to front--  no other comps- no fevers- no v/d- no dysuria/hematuria- this am with acute worsening or r flank shantal k to front pain with n/v x 1- chills- only fevers-   cp after vomitus this am only -- avss- htnsive- pulse ox 96 % on ra- interpretation is normal- no intervention - pt in pain -- rrr s1/s2-cta-b/ soft abd- mod r cva tenderness- rest of bd is nt- no peritoneal signs -equal bilateral radial/dp pulses- no ble edema/calf tenderness/asym/ erythema- normal no non focal neuro exam    0633 Er md note- most recent ct scan of abd/pelvis report- labs all reviewed by er md    0634 Er md note-  er right kidney u/s reviewed by er md- pos hydronephrosis   0655 Er md note- pt re-eval- resting in and- feels much relieved with pain - aware of pending  ct scan and need for urine specimen   0712 Er md note- 12 lead ecg reviewed by er md -  nsr rate of 70-  no signs of  ischemia/ injury - - compared to previous  12/20/24 - no sign changes         Critical Care Time  Procedures

## 2025-06-09 ENCOUNTER — OFFICE VISIT (OUTPATIENT)
Dept: INTERNAL MEDICINE CLINIC | Facility: CLINIC | Age: 75
End: 2025-06-09
Payer: MEDICARE

## 2025-06-09 VITALS
HEART RATE: 73 BPM | TEMPERATURE: 97.1 F | DIASTOLIC BLOOD PRESSURE: 74 MMHG | BODY MASS INDEX: 36.48 KG/M2 | SYSTOLIC BLOOD PRESSURE: 128 MMHG | WEIGHT: 186.8 LBS | OXYGEN SATURATION: 97 %

## 2025-06-09 DIAGNOSIS — N13.2 URETERAL STONE WITH HYDRONEPHROSIS: Primary | ICD-10-CM

## 2025-06-09 PROCEDURE — 99495 TRANSJ CARE MGMT MOD F2F 14D: CPT

## 2025-06-09 NOTE — PROGRESS NOTES
Transition of Care Visit:  Name: Yue Art      : 1950      MRN: [id number]  Encounter Provider: Rosanna Schwartz MD  Encounter Date: 2025   Enc[facility] department: [facility]    Assessment & Plan  Ureteral stone with hydronephrosis  Patient presents for a TCM follow-up after being admitted  for right-sided abdominal/flank pain.  Patient found to have a right-sided obstructing stone with hydronephrosis for which she underwent cystoscopy with lithotripsy and stent placement per urology.  Patient discharged on oxybutynin PRN, Flomax qd.   Patient is doing well without any concerns or complaints today.  Patient encouraged to keep follow-up with urology             History of Present Illness     Transitional Care Management Review:   Yue Art is a 74 y.o. female here for TCM follow up. Patient was admitted  -  after presenting to the ED with abdominal/flank pain.  Patient was found to have right-sided hydronephrosis secondary to an 11 mm stone in the [facility].  Patient was seen by urology and underwent a cystoscopy with lithotripsy and stent placement on .  Patient was discharged with oxybutynin 5 mg daily PRN and Flomax daily.  No antibiotics indicated per urology.  Patient is to follow-up with urology  for repeat cystoscopy and stent removal.    Patient states she has been doing well. Her pain has been controlled with the Tylenol and oxybutynin PRN.  She has not needed to take the tramadol at all.  She reports compliance with her daily Flomax, and has been tolerating the medications. Patient denies any urinary symptoms dysuria, hematuria, frequency, urgency, fevers, chills.    During the TCM phone call patient stated:  TCM Call (since 2025)       Date and time call was made  2025  8:39 AM    Hospital care reviewed  Records reviewed    Patient was hospitialized at  [facility]    Date of Admission  25    Date of discharge   05/31/25    Diagnosis  Ureteral stone with hydronephrosis    Disposition  Home    Were the patients medications reviewed and updated  No    Current Symptoms  None          TCM Call (since 5/26/2025)       Post hospital issues  None    Scheduled for follow up?  Yes    Did you obtain your prescribed medications  Yes    Do you need help managing your prescriptions or medications  No    Is transportation to your appointment needed  No    I have advised the patient to call PCP with any new or worsening symptoms  Kevin Gama - /[place]    Are you recieving home care services  No          HPI  Review of Systems   Constitutional:  Negative for chills and fever.   Gastrointestinal:  Negative for abdominal pain, diarrhea, nausea and vomiting.   Genitourinary:  Negative for decreased urine volume, difficulty urinating, dysuria, flank pain, frequency, hematuria and urgency.   Musculoskeletal:  Negative for arthralgias, back pain and myalgias.   Neurological:  Negative for dizziness and weakness.     Objective   /74 (BP Location: Left arm, Patient Position: Sitting, Cuff Size: Standard)   Pulse 73   Temp (!) 97.1 °F (36.2 °C) (Tympanic)   Wt 84.7 kg (186 lb 12.8 oz)   SpO2 97%   BMI 36.48 kg/m²     Physical Exam  Vitals and nursing note reviewed.   Constitutional:       Appearance: Normal appearance.   HENT:      Head: Normocephalic and atraumatic.      Mouth/Throat:      Mouth: Mucous membranes are moist.     Eyes:      Extraocular Movements: Extraocular movements intact.      Conjunctiva/sclera: Conjunctivae normal.      Pupils: Pupils are equal, round, and reactive to light.       Cardiovascular:      Rate and Rhythm: Normal rate and regular rhythm.   Pulmonary:      Effort: Pulmonary effort is normal. No respiratory distress.      Breath sounds: Normal breath sounds.   Abdominal:      General: Bowel sounds are normal. There is no distension.      Palpations: Abdomen is soft.      Tenderness: There is no abdominal  tenderness. There is no right CVA tenderness, left CVA tenderness or guarding.     Skin:     General: Skin is warm and dry.     Neurological:      General: No focal deficit present.      Mental Status: She is alert. Mental status is at baseline.     Psychiatric:         Mood and Affect: Mood normal.       Medications have been reviewed by provider in current encounter

## 2025-06-09 NOTE — ASSESSMENT & PLAN NOTE
Patient presents for a TCM follow-up after being admitted 5/29 for right-sided abdominal/flank pain.  Patient found to have a right-sided obstructing stone with hydronephrosis for which she underwent cystoscopy with lithotripsy and stent placement per urology.  Patient discharged on oxybutynin PRN, Flomax qd.   Patient is doing well without any concerns or complaints today.  Patient encouraged to keep follow-up with urology 6/13

## 2025-06-13 ENCOUNTER — PROCEDURE VISIT (OUTPATIENT)
Dept: UROLOGY | Facility: CLINIC | Age: 75
End: 2025-06-13
Payer: MEDICARE

## 2025-06-13 VITALS
BODY MASS INDEX: 36.52 KG/M2 | OXYGEN SATURATION: 98 % | HEART RATE: 90 BPM | WEIGHT: 186 LBS | SYSTOLIC BLOOD PRESSURE: 132 MMHG | HEIGHT: 60 IN | DIASTOLIC BLOOD PRESSURE: 88 MMHG

## 2025-06-13 DIAGNOSIS — Z29.89 NEED FOR PROPHYLAXIS AGAINST URINARY TRACT INFECTION: ICD-10-CM

## 2025-06-13 DIAGNOSIS — N13.2 URETERAL STONE WITH HYDRONEPHROSIS: Primary | ICD-10-CM

## 2025-06-13 PROCEDURE — 52310 CYSTOSCOPY AND TREATMENT: CPT

## 2025-06-13 RX ORDER — CEPHALEXIN 500 MG/1
500 CAPSULE ORAL EVERY 8 HOURS SCHEDULED
Qty: 9 CAPSULE | Refills: 0 | Status: SHIPPED | OUTPATIENT
Start: 2025-06-13 | End: 2025-06-16

## 2025-06-13 NOTE — PROGRESS NOTES
Cystoscopy     Date/Time  6/13/2025 11:00 AM     Performed by  RILEY Angulo   Authorized by  RILEY Angulo       Universal Protocol:  procedure performed by consultantConsent: Verbal consent obtained. Written consent obtained  Risks and benefits: risks, benefits and alternatives were discussed  Consent given by: patient  Patient understanding: patient states understanding of the procedure being performed  Patient consent: the patient's understanding of the procedure matches consent given  Procedure consent: procedure consent matches procedure scheduled  Relevant documents: relevant documents present and verified  Test results: test results available and properly labeled  Site marked: the operative site was marked  Radiology Images displayed and confirmed. If images not available, report reviewed: imaging studies available  Required items: required blood products, implants, devices, and special equipment available  Patient identity confirmed: verbally with patient      Procedure Details:  Procedure type: unilateral ureteral stent    Patient tolerance: Patient tolerated the procedure well with no immediate complications    Additional Procedure Details: The patient returns to the office today to undergo cystoscopy with ureteral stent removal. Risk and benefits of the procedure were discussed and informed consent was obtained.  The patient was placed in the modified supine position. The genitalia were prepped and draped in a sterile fashion. Viscous lidocaine was used for local anesthesia. The flexible cystoscope was passed. The bladder was inspected. The stent was identified coming from the right ureteral orifice. The stent was grasped with a flexible grasper and was then removed in its entirety without complications. Overall the patient tolerated the procedure.    The patient was provided with a 3 day prescription of keflex for infection prophylaxis following the procedure.    They were made  aware to advise our office of any fevers greater than 101 degrees Fahrenheit, malaise, or chills.  They were advised that it is normal to have cramping pain on the ipsilateral side for a day or so after ureteral stent removal.  They are to remain well hydrated in the coming days.         Assessment and Plan:  Nephrolithiasis  - status right ureteroscopy at [facility] on 5/30/2025  - Follow up in 6 weeks with US prior to visit  - ER precautions          RILEY Sims

## 2025-06-14 DIAGNOSIS — E06.3 HYPOTHYROIDISM DUE TO HASHIMOTO'S THYROIDITIS: ICD-10-CM

## 2025-06-16 RX ORDER — LEVOTHYROXINE SODIUM 75 UG/1
75 TABLET ORAL DAILY
Qty: 90 TABLET | Refills: 0 | Status: SHIPPED | OUTPATIENT
Start: 2025-06-16

## 2025-06-16 NOTE — TELEPHONE ENCOUNTER
Please instruct patient to go for blood work.  She has a basic metabolic panel and cholesterol profile pending in her chart.

## 2025-06-28 PROBLEM — N39.0 UTI (URINARY TRACT INFECTION): Status: RESOLVED | Noted: 2025-05-29 | Resolved: 2025-06-28

## 2025-07-11 ENCOUNTER — HOSPITAL ENCOUNTER (OUTPATIENT)
Dept: VASCULAR ULTRASOUND | Facility: HOSPITAL | Age: 75
Discharge: HOME | End: 2025-07-11
Payer: MEDICARE

## 2025-07-11 DIAGNOSIS — I65.23 BILATERAL CAROTID ARTERY STENOSIS: ICD-10-CM

## 2025-07-11 PROCEDURE — 93880 EXTRACRANIAL BILAT STUDY: CPT | Performed by: SURGERY

## 2025-07-11 PROCEDURE — 93880 EXTRACRANIAL BILAT STUDY: CPT

## 2025-07-16 DIAGNOSIS — M25.559 CHRONIC HIP PAIN, UNSPECIFIED LATERALITY: ICD-10-CM

## 2025-07-16 DIAGNOSIS — E66.812 CLASS 2 SEVERE OBESITY DUE TO EXCESS CALORIES WITH SERIOUS COMORBIDITY AND BODY MASS INDEX (BMI) OF 37.0 TO 37.9 IN ADULT (HCC): ICD-10-CM

## 2025-07-16 DIAGNOSIS — G89.29 CHRONIC HIP PAIN, UNSPECIFIED LATERALITY: ICD-10-CM

## 2025-07-16 DIAGNOSIS — E66.01 CLASS 2 SEVERE OBESITY DUE TO EXCESS CALORIES WITH SERIOUS COMORBIDITY AND BODY MASS INDEX (BMI) OF 37.0 TO 37.9 IN ADULT (HCC): ICD-10-CM

## 2025-07-16 RX ORDER — BUPROPION HYDROCHLORIDE 150 MG/1
150 TABLET ORAL DAILY
Qty: 90 TABLET | Refills: 1 | Status: SHIPPED | OUTPATIENT
Start: 2025-07-16

## 2025-07-17 RX ORDER — TRAMADOL HYDROCHLORIDE 50 MG/1
25 TABLET ORAL 2 TIMES DAILY PRN
Qty: 30 TABLET | Refills: 0 | Status: SHIPPED | OUTPATIENT
Start: 2025-07-17

## 2025-07-20 DIAGNOSIS — I65.23 BILATERAL CAROTID ARTERY STENOSIS: ICD-10-CM

## 2025-07-22 RX ORDER — ASPIRIN 81 MG/1
81 TABLET, COATED ORAL DAILY
Qty: 90 TABLET | Refills: 1 | Status: SHIPPED | OUTPATIENT
Start: 2025-07-22

## 2025-07-25 ENCOUNTER — APPOINTMENT (OUTPATIENT)
Dept: LAB | Facility: CLINIC | Age: 75
End: 2025-07-25
Payer: MEDICARE

## 2025-07-25 DIAGNOSIS — E53.8 B12 DEFICIENCY: ICD-10-CM

## 2025-07-25 DIAGNOSIS — E78.00 PURE HYPERCHOLESTEROLEMIA: ICD-10-CM

## 2025-07-25 DIAGNOSIS — E66.01 CLASS 2 SEVERE OBESITY DUE TO EXCESS CALORIES WITH SERIOUS COMORBIDITY AND BODY MASS INDEX (BMI) OF 37.0 TO 37.9 IN ADULT (HCC): ICD-10-CM

## 2025-07-25 DIAGNOSIS — E66.812 CLASS 2 SEVERE OBESITY DUE TO EXCESS CALORIES WITH SERIOUS COMORBIDITY AND BODY MASS INDEX (BMI) OF 37.0 TO 37.9 IN ADULT (HCC): ICD-10-CM

## 2025-07-25 DIAGNOSIS — E06.3 HYPOTHYROIDISM DUE TO HASHIMOTO'S THYROIDITIS: ICD-10-CM

## 2025-07-25 LAB
ALBUMIN SERPL BCG-MCNC: 4.4 G/DL (ref 3.5–5)
ALP SERPL-CCNC: 79 U/L (ref 34–104)
ALT SERPL W P-5'-P-CCNC: 7 U/L (ref 7–52)
ANION GAP SERPL CALCULATED.3IONS-SCNC: 6 MMOL/L (ref 4–13)
AST SERPL W P-5'-P-CCNC: 14 U/L (ref 13–39)
BILIRUB SERPL-MCNC: 0.46 MG/DL (ref 0.2–1)
BUN SERPL-MCNC: 15 MG/DL (ref 5–25)
CALCIUM SERPL-MCNC: 9.8 MG/DL (ref 8.4–10.2)
CHLORIDE SERPL-SCNC: 104 MMOL/L (ref 96–108)
CHOLEST SERPL-MCNC: 109 MG/DL (ref ?–200)
CO2 SERPL-SCNC: 31 MMOL/L (ref 21–32)
CREAT SERPL-MCNC: 0.82 MG/DL (ref 0.6–1.3)
GFR SERPL CREATININE-BSD FRML MDRD: 69 ML/MIN/1.73SQ M
GLUCOSE P FAST SERPL-MCNC: 91 MG/DL (ref 65–99)
HDLC SERPL-MCNC: 41 MG/DL
LDLC SERPL CALC-MCNC: 45 MG/DL (ref 0–100)
POTASSIUM SERPL-SCNC: 4.6 MMOL/L (ref 3.5–5.3)
PROT SERPL-MCNC: 7.4 G/DL (ref 6.4–8.4)
SODIUM SERPL-SCNC: 141 MMOL/L (ref 135–147)
TRIGL SERPL-MCNC: 115 MG/DL (ref ?–150)
TSH SERPL DL<=0.05 MIU/L-ACNC: 0.63 UIU/ML (ref 0.45–4.5)
VIT B12 SERPL-MCNC: 1762 PG/ML (ref 180–914)

## 2025-07-25 PROCEDURE — 80053 COMPREHEN METABOLIC PANEL: CPT

## 2025-07-25 PROCEDURE — 36415 COLL VENOUS BLD VENIPUNCTURE: CPT

## 2025-07-25 PROCEDURE — 82607 VITAMIN B-12: CPT

## 2025-07-25 PROCEDURE — 84443 ASSAY THYROID STIM HORMONE: CPT

## 2025-07-25 PROCEDURE — 80061 LIPID PANEL: CPT

## 2025-07-28 ENCOUNTER — TELEPHONE (OUTPATIENT)
Age: 75
End: 2025-07-28

## 2025-07-28 DIAGNOSIS — E78.5 MILD HYPERLIPIDEMIA: ICD-10-CM

## 2025-07-28 RX ORDER — ROSUVASTATIN CALCIUM 20 MG/1
20 TABLET, COATED ORAL DAILY
Qty: 90 TABLET | Refills: 1 | Status: SHIPPED | OUTPATIENT
Start: 2025-07-28

## 2025-08-01 ENCOUNTER — HOSPITAL ENCOUNTER (OUTPATIENT)
Dept: ULTRASOUND IMAGING | Facility: HOSPITAL | Age: 75
Discharge: HOME | End: 2025-08-01
Payer: MEDICARE

## 2025-08-01 DIAGNOSIS — N13.2 URETERAL STONE WITH HYDRONEPHROSIS: ICD-10-CM

## 2025-08-01 PROCEDURE — 76775 US EXAM ABDO BACK WALL LIM: CPT

## 2025-08-08 ENCOUNTER — OFFICE VISIT (OUTPATIENT)
Dept: UROLOGY | Facility: CLINIC | Age: 75
End: 2025-08-08
Payer: MEDICARE

## 2025-08-08 VITALS
SYSTOLIC BLOOD PRESSURE: 148 MMHG | WEIGHT: 182 LBS | BODY MASS INDEX: 35.73 KG/M2 | DIASTOLIC BLOOD PRESSURE: 84 MMHG | HEIGHT: 60 IN | HEART RATE: 73 BPM | OXYGEN SATURATION: 98 %

## 2025-08-08 DIAGNOSIS — N20.1 URETEROLITHIASIS: Primary | ICD-10-CM

## 2025-08-08 PROCEDURE — 99213 OFFICE O/P EST LOW 20 MIN: CPT

## 2025-08-18 ENCOUNTER — TELEPHONE (OUTPATIENT)
Age: 75
End: 2025-08-18

## 2025-08-18 ENCOUNTER — OFFICE VISIT (OUTPATIENT)
Dept: OBGYN CLINIC | Facility: CLINIC | Age: 75
End: 2025-08-18
Payer: MEDICARE

## 2025-08-18 ENCOUNTER — APPOINTMENT (OUTPATIENT)
Dept: RADIOLOGY | Facility: AMBULARY SURGERY CENTER | Age: 75
End: 2025-08-18
Attending: ORTHOPAEDIC SURGERY
Payer: MEDICARE

## 2025-08-18 VITALS — BODY MASS INDEX: 35.73 KG/M2 | WEIGHT: 182 LBS | HEIGHT: 60 IN

## 2025-08-18 DIAGNOSIS — M25.561 RIGHT KNEE PAIN, UNSPECIFIED CHRONICITY: Primary | ICD-10-CM

## 2025-08-18 DIAGNOSIS — M25.561 RIGHT KNEE PAIN, UNSPECIFIED CHRONICITY: ICD-10-CM

## 2025-08-18 DIAGNOSIS — M17.11 PRIMARY OSTEOARTHRITIS OF RIGHT KNEE: ICD-10-CM

## 2025-08-18 PROCEDURE — 99213 OFFICE O/P EST LOW 20 MIN: CPT | Performed by: ORTHOPAEDIC SURGERY

## 2025-08-18 PROCEDURE — 20610 DRAIN/INJ JOINT/BURSA W/O US: CPT | Performed by: ORTHOPAEDIC SURGERY

## 2025-08-18 PROCEDURE — 73562 X-RAY EXAM OF KNEE 3: CPT

## 2025-08-18 RX ORDER — BETAMETHASONE SODIUM PHOSPHATE AND BETAMETHASONE ACETATE 3; 3 MG/ML; MG/ML
12 INJECTION, SUSPENSION INTRA-ARTICULAR; INTRALESIONAL; INTRAMUSCULAR; SOFT TISSUE
Status: COMPLETED | OUTPATIENT
Start: 2025-08-18 | End: 2025-08-18

## 2025-08-18 RX ORDER — LIDOCAINE HYDROCHLORIDE 10 MG/ML
1 INJECTION, SOLUTION INFILTRATION; PERINEURAL
Status: COMPLETED | OUTPATIENT
Start: 2025-08-18 | End: 2025-08-18

## 2025-08-18 RX ORDER — BUPIVACAINE HYDROCHLORIDE 2.5 MG/ML
1 INJECTION, SOLUTION INFILTRATION; PERINEURAL
Status: COMPLETED | OUTPATIENT
Start: 2025-08-18 | End: 2025-08-18

## 2025-08-18 RX ADMIN — LIDOCAINE HYDROCHLORIDE 1 ML: 10 INJECTION, SOLUTION INFILTRATION; PERINEURAL at 14:30

## 2025-08-18 RX ADMIN — BUPIVACAINE HYDROCHLORIDE 1 ML: 2.5 INJECTION, SOLUTION INFILTRATION; PERINEURAL at 14:30

## 2025-08-18 RX ADMIN — BETAMETHASONE SODIUM PHOSPHATE AND BETAMETHASONE ACETATE 12 MG: 3; 3 INJECTION, SUSPENSION INTRA-ARTICULAR; INTRALESIONAL; INTRAMUSCULAR; SOFT TISSUE at 14:30

## (undated) DEVICE — GLOVE SRG BIOGEL ECLIPSE 7.5

## (undated) DEVICE — GLOVE INDICATOR PI UNDERGLOVE SZ 8 BLUE

## (undated) DEVICE — GLOVE SRG BIOGEL 7.5

## (undated) DEVICE — SYRINGE 10ML LL

## (undated) DEVICE — INVIEW CLEAR LEGGINGS: Brand: CONVERTORS

## (undated) DEVICE — 3M™ TEGADERM™ TRANSPARENT FILM DRESSING FRAME STYLE, 1624W, 2-3/8 IN X 2-3/4 IN (6 CM X 7 CM), 100/CT 4CT/CASE: Brand: 3M™ TEGADERM™

## (undated) DEVICE — SPECIMEN CONTAINER STERILE PEEL PACK

## (undated) DEVICE — CHLORHEXIDINE 4PCT 4 OZ

## (undated) DEVICE — PACK TUR

## (undated) DEVICE — DISPOSABLE OR TOWEL: Brand: CARDINAL HEALTH

## (undated) DEVICE — LUBRICANT JELLY SURGILUBE TUBE 4OZ FLIP TOP

## (undated) DEVICE — PREMIUM DRY TRAY LF: Brand: MEDLINE INDUSTRIES, INC.

## (undated) DEVICE — UROLOGIC DRAIN BAG: Brand: UNBRANDED

## (undated) DEVICE — CATH URETERAL 5FR X 70 CM FLEX TIP POLYUR BARD

## (undated) DEVICE — GUIDEWIRE STRGHT TIP 0.035 IN  SOLO PLUS

## (undated) DEVICE — SYRINGE 50ML LL

## (undated) RX ORDER — BRIMONIDINE TARTRATE, TIMOLOL MALEATE 2; 5 MG/ML; MG/ML: 1 SOLUTION/ DROPS OPHTHALMIC

## (undated) RX ORDER — DUREZOL 0.5 MG/ML: 1 EMULSION OPHTHALMIC

## (undated) RX ORDER — HYDROXYZINE HYDROCHLORIDE 25 MG/1: 1 TABLET, FILM COATED ORAL

## (undated) RX ORDER — BROMFENAC SODIUM 0.7 MG/ML: 1 SOLUTION/ DROPS OPHTHALMIC ONCE A DAY